# Patient Record
Sex: MALE | Race: BLACK OR AFRICAN AMERICAN | Employment: OTHER | URBAN - METROPOLITAN AREA
[De-identification: names, ages, dates, MRNs, and addresses within clinical notes are randomized per-mention and may not be internally consistent; named-entity substitution may affect disease eponyms.]

---

## 2017-01-04 ENCOUNTER — OFFICE VISIT (OUTPATIENT)
Dept: FAMILY MEDICINE CLINIC | Age: 67
End: 2017-01-04

## 2017-01-04 VITALS
SYSTOLIC BLOOD PRESSURE: 144 MMHG | DIASTOLIC BLOOD PRESSURE: 89 MMHG | HEART RATE: 96 BPM | WEIGHT: 165.4 LBS | BODY MASS INDEX: 24.5 KG/M2 | RESPIRATION RATE: 18 BRPM | HEIGHT: 69 IN | TEMPERATURE: 98.2 F | OXYGEN SATURATION: 77 %

## 2017-01-04 DIAGNOSIS — E05.90 HYPERTHYROIDISM: ICD-10-CM

## 2017-01-04 DIAGNOSIS — Z00.00 ANNUAL PHYSICAL EXAM: ICD-10-CM

## 2017-01-04 DIAGNOSIS — I10 ESSENTIAL HYPERTENSION, MALIGNANT: Primary | ICD-10-CM

## 2017-01-04 DIAGNOSIS — M19.90 ARTHRITIS: ICD-10-CM

## 2017-01-04 DIAGNOSIS — Z23 ENCOUNTER FOR IMMUNIZATION: ICD-10-CM

## 2017-01-04 DIAGNOSIS — R80.9 PROTEINURIA: ICD-10-CM

## 2017-01-04 RX ORDER — PROMETHAZINE HYDROCHLORIDE AND CODEINE PHOSPHATE 6.25; 1 MG/5ML; MG/5ML
5 SOLUTION ORAL
Qty: 120 ML | Refills: 1 | Status: SHIPPED | OUTPATIENT
Start: 2017-01-04 | End: 2017-07-17

## 2017-01-04 RX ORDER — AMLODIPINE BESYLATE 5 MG/1
5 TABLET ORAL DAILY
Qty: 90 TAB | Refills: 4 | Status: SHIPPED | OUTPATIENT
Start: 2017-01-04 | End: 2017-08-15 | Stop reason: SDUPTHER

## 2017-01-04 RX ORDER — LISINOPRIL 20 MG/1
20 TABLET ORAL DAILY
Qty: 90 TAB | Refills: 4 | Status: SHIPPED | OUTPATIENT
Start: 2017-01-04 | End: 2017-08-15 | Stop reason: SDUPTHER

## 2017-01-04 RX ORDER — TRAMADOL HYDROCHLORIDE 50 MG/1
50 TABLET ORAL 2 TIMES DAILY
Qty: 60 TAB | Refills: 5 | Status: SHIPPED | OUTPATIENT
Start: 2017-01-04 | End: 2017-07-17

## 2017-01-04 NOTE — PROGRESS NOTES
Tano Lynn is a 78 y.o.  male and presents with    Chief Complaint   Patient presents with    Hypertension    Arthritis    Thyroid Problem    Proteinuria           Subjective:    Cardiovascular Review:  The patient has hypertension and hyperlipidemia. Diet and Lifestyle: not attempting to follow a low fat, low cholesterol diet, not attempting to follow a low sodium diet  Home BP Monitoring: is not measured at home. Pertinent ROS: taking medications as instructed, no medication side effects noted, no TIA's, no chest pain on exertion, no dyspnea on exertion, no swelling of ankles. Thyroid Review:  Patient is seen for followup of hypothyroidism. Thyroid ROS: denies fatigue, weight changes, heat/cold intolerance, bowel/skin changes or CVS symptoms. Osteoarthritis and Chronic Pain:  Patient has osteoarthritis, primarily affecting the diffuse. Symptoms onset: problem is longstanding. Rheumatological ROS: no current joint or muscle symptoms, essentially pain-free. Response to treatment plan: stable. Proteinuria here for f/u     Additional Concerns:          Patient Active Problem List    Diagnosis Date Noted    Proteinuria 05/30/2014    Hyperthyroidism 05/30/2014    Arthritis 06/06/2011    Essential hypertension, malignant 08/03/2010     Current Outpatient Prescriptions   Medication Sig Dispense Refill    traMADol (ULTRAM) 50 mg tablet Take 1 Tab by mouth two (2) times a day. For arthritic pain 60 Tab 5    lisinopril (PRINIVIL, ZESTRIL) 20 mg tablet Take 1 Tab by mouth daily. 90 Tab 4    amLODIPine (NORVASC) 5 mg tablet Take 1 Tab by mouth daily. 90 Tab 4     No Known Allergies  Past Medical History   Diagnosis Date    Essential hypertension, malignant 8/3/2010    Leg pain 8/3/2010     No past surgical history on file.   Family History   Problem Relation Age of Onset    Heart Disease Mother     Lung Disease Father      Social History   Substance Use Topics    Smoking status: Former Smoker    Smokeless tobacco: Not on file    Alcohol use Yes       ROS       All other systems reviewed and are negative. Objective:  Vitals:    01/04/17 1232   BP: 144/89   Pulse: 96   Resp: 18   Temp: 98.2 °F (36.8 °C)   TempSrc: Oral   SpO2: (!) 77%   Weight: 165 lb 6.4 oz (75 kg)   Height: 5' 9\" (1.753 m)   PainSc:   0 - No pain                 alert, well appearing, and in no distress, oriented to person, place, and time and normal appearing weight  Chest - clear to auscultation, no wheezes, rales or rhonchi, symmetric air entry  Heart - normal rate, regular rhythm, normal S1, S2, no murmurs, rubs, clicks or gallops  Abdomen - soft, nontender, nondistended, no masses or organomegaly        LABS   Component      Latest Ref Rng & Units 12/30/2016 12/30/2016 12/30/2016 6/17/2016           2:00 PM  2:00 PM  2:00 PM  4:50 PM   Sodium      136 - 145 mmol/L   142    Potassium      3.5 - 5.5 mmol/L   4.3    Chloride      100 - 108 mmol/L   106    CO2      21 - 32 mmol/L   26    Anion gap      3.0 - 18 mmol/L   10    Glucose      74 - 99 mg/dL   80    BUN      7.0 - 18 MG/DL   9    Creatinine      0.6 - 1.3 MG/DL   0.78    BUN/Creatinine ratio      12 - 20     12    GFR est AA      >60 ml/min/1.73m2   >60    GFR est non-AA      >60 ml/min/1.73m2   >60    Calcium      8.5 - 10.1 MG/DL   9.3    Bilirubin, total      0.2 - 1.0 MG/DL   0.4    ALT      16 - 61 U/L   20    AST      15 - 37 U/L   14 (L)    Alk.  phosphatase      45 - 117 U/L   47    Protein, total      6.4 - 8.2 g/dL   7.7    Albumin      3.4 - 5.0 g/dL   3.8    Globulin      2.0 - 4.0 g/dL   3.9    A-G Ratio      0.8 - 1.7     1.0    TSH      0.36 - 3.74 uIU/mL  <0.01 (L)  <0.01 (L)   T4, Free      0.7 - 1.5 NG/DL 1.2   1.3     TESTS      Assessment/Plan:    Hypertension - stable  arthitsi stable  Thyroid well controlled  Proteinuria will recheck in 6 mo  Mild cough rx phen cod      Lab review: labs are reviewed, up to date and normal, orders written for new lab studies as appropriate; see orders      I have discussed the diagnosis with the patient and the intended plan as seen in the above orders. The patient has received an after-visit summary and questions were answered concerning future plans. I have discussed medication side effects and warnings with the patient as well. I have reviewed the plan of care with the patient, accepted their input and they are in agreement with the treatment goals. Follow-up Disposition:  Return in about 6 months (around 7/4/2017) for physical, labs prior, EKG next visit.

## 2017-01-04 NOTE — MR AVS SNAPSHOT
Visit Information Date & Time Provider Department Dept. Phone Encounter #  
 1/4/2017 12:30 PM Courtney Feng [phone] [id number] Follow-up Instructions Return in about 6 months (around 7/4/2017) for physical, labs prior, EKG next visit. Upcoming Health Maintenance Date Due DTaP/Tdap/Td series (1 - Tdap) 8/4/2006 INFLUENZA AGE 9 TO ADULT 8/1/2016 Pneumococcal 65+ Low/Medium Risk (2 of 2 - PPSV23) 11/19/2016 MEDICARE YEARLY EXAM 6/22/2017 GLAUCOMA SCREENING Q2Y 7/14/2018 COLONOSCOPY 6/6/2021 Allergies as of 1/4/2017  Review Complete On: 6/21/2016 By: Crystal Mckeon., DO No Known Allergies Current Immunizations  Reviewed on 2/4/2013 Name Date Influenza High Dose Vaccine PF  Incomplete, 11/19/2015 Influenza Vaccine 11/18/2014, 2/4/2013 Influenza Vaccine Split 11/17/2011, 11/22/2010 Pneumococcal Conjugate (PCV-13) 11/19/2015 Pneumococcal Vaccine (Unspecified Type) 11/22/2010 TD Vaccine 8/3/2006 Not reviewed this visit You Were Diagnosed With   
  
 Codes Comments Essential hypertension, malignant    -  Primary ICD-10-CM: I10 
ICD-9-CM: 401.0 Encounter for immunization     ICD-10-CM: A30 ICD-9-CM: V03.89 Arthritis     ICD-10-CM: M19.90 ICD-9-CM: 716.90 Proteinuria     ICD-10-CM: R80.9 ICD-9-CM: 791.0 Hyperthyroidism     ICD-10-CM: E05.90 ICD-9-CM: 242.90 Annual physical exam     ICD-10-CM: Z00.00 ICD-9-CM: V70.0 Vitals BP Pulse Temp Resp Height(growth percentile) Weight(growth percentile) 144/89 (BP 1 Location: Left arm, BP Patient Position: Sitting) 96 98.2 °F (36.8 °C) (Oral) 18 5' 9\" (1.753 m) 165 lb 6.4 oz (75 kg) SpO2 BMI Smoking Status (!) 77% 24.43 kg/m2 Former Smoker Vitals History BMI and BSA Data Body Mass Index Body Surface Area  
 24.43 kg/m 2 1.91 m 2 Preferred Pharmacy Pharmacy Name Phone [facility] [address] [place] [phone] Your Updated Medication List  
  
   
This list is accurate as of: 1/4/17 12:50 PM.  Always use your most recent med list. amLODIPine 5 mg tablet Commonly known as:  Imelda Aurelio Take 1 Tab by mouth daily. lisinopril 20 mg tablet Commonly known as:  Nico Marie Take 1 Tab by mouth daily. promethazine-codeine 6.25-10 mg/5 mL syrup Commonly known as:  PHENERGAN with CODEINE Take 5 mL by mouth four (4) times daily as needed for Cough. Max Daily Amount: 20 mL. traMADol 50 mg tablet Commonly known as:  ULTRAM  
Take 1 Tab by mouth two (2) times a day. For arthritic pain Prescriptions Printed Refills  
 traMADol (ULTRAM) 50 mg tablet 5 Sig: Take 1 Tab by mouth two (2) times a day. For arthritic pain  
 Class: Print Route: Oral  
 promethazine-codeine (PHENERGAN WITH CODEINE) 6.25-10 mg/5 mL syrup 1 Sig: Take 5 mL by mouth four (4) times daily as needed for Cough. Max Daily Amount: 20 mL. Class: Print Route: Oral  
  
Prescriptions Sent to Pharmacy Refills  
 lisinopril (PRINIVIL, ZESTRIL) 20 mg tablet 4 Sig: Take 1 Tab by mouth daily. Class: Normal  
 Pharmacy: [facility] [address] Ph #: [phone] Route: Oral  
 amLODIPine (NORVASC) 5 mg tablet 4 Sig: Take 1 Tab by mouth daily. Class: Normal  
 Pharmacy: [facility] [address] Ph #: [phone] Route: Oral  
  
Follow-up Instructions Return in about 6 months (around 7/4/2017) for physical, labs prior, EKG next visit. To-Do List   
 Around 07/03/2017 Lab:  CBC WITH AUTOMATED DIFF Around 07/03/2017 Lab:  LIPID PANEL Around 07/03/2017 Lab:  METABOLIC PANEL, COMPREHENSIVE Around 07/03/2017 Lab:  PSA DIAGNOSTIC (PROSTATIC SPECIFIC AG) Around 07/03/2017 Lab:  T4, FREE Around 07/03/2017 Lab:  TSH 3RD GENERATION Around 07/03/2017 Lab:  URINALYSIS W/ RFLX MICROSCOPIC Introducing [facility]! Lauren Bailey introduces [employer] patient portal. Now you can access parts of your medical record, email your doctor's office, and request medication refills online. 1. In your internet browser, go to [email or web address] 2. Click on the First Time User? Click Here link in the Sign In box. You will see the New Member Sign Up page. 3. Enter your ECO Access Code exactly as it appears below. You will not need to use this code after youve completed the sign-up process. If you do not sign up before the expiration date, you must request a new code. · [employer] Access Code: [email or web address] Expires: 3/30/2017  1:51 PM 
 
4. Enter the last four digits of your Social Security Number (xxxx) and Date of Birth (mm/dd/yyyy) as indicated and click Submit. You will be taken to the next sign-up page. 5. Create a ECO ID. This will be your E[employer] login ID and cannot be changed, so think of one that is secure and easy to remember. 6. Create a ECO password. You can change your password at any time. 7. Enter your Password Reset Question and Answer. This can be used at a later time if you forget your password. 8. Enter your e-mail address. You will receive e-mail notification when new information is available in [address] 9. Click Sign Up. You can now view and download portions of your medical record. 10. Click the Download Summary menu link to download a portable copy of your medical information. If you have questions, please visit the Frequently Asked Questions section of the ECO website. Remember, [employer] is NOT to be used for urgent needs. For medical emergencies, dial 911. Now available from your iPhone and Android! Please provide this summary of care documentation to your next provider. Your primary care clinician is listed as [id number] [facility]. If you have any questions after today's visit, please call [phone].

## 2017-01-04 NOTE — PROGRESS NOTES
1. Have you been to the ER, urgent care clinic since your last visit? Hospitalized since your last visit? No    2. Have you seen or consulted any other health care providers outside of the [address] [place] [address] since your last visit? Include any pap smears or colon screening.  No

## 2017-07-11 ENCOUNTER — HOSPITAL ENCOUNTER (EMERGENCY)
Age: 67
Discharge: HOME | End: 2017-07-11
Attending: EMERGENCY MEDICINE | Admitting: EMERGENCY MEDICINE
Payer: MEDICARE

## 2017-07-11 ENCOUNTER — APPOINTMENT (OUTPATIENT)
Dept: GENERAL RADIOLOGY | Age: 67
End: 2017-07-11
Attending: EMERGENCY MEDICINE
Payer: MEDICARE

## 2017-07-11 VITALS
HEART RATE: 74 BPM | SYSTOLIC BLOOD PRESSURE: 124 MMHG | TEMPERATURE: 98 F | OXYGEN SATURATION: 100 % | DIASTOLIC BLOOD PRESSURE: 81 MMHG | RESPIRATION RATE: 23 BRPM

## 2017-07-11 DIAGNOSIS — R10.12 ABDOMINAL PAIN, LUQ (LEFT UPPER QUADRANT): Primary | ICD-10-CM

## 2017-07-11 LAB
ALBUMIN SERPL BCP-MCNC: 3.8 G/DL (ref 3.4–5)
ALBUMIN/GLOB SERPL: 0.9 {RATIO} (ref 0.8–1.7)
ALP SERPL-CCNC: 47 U/L (ref 45–117)
ALT SERPL-CCNC: 17 U/L (ref 16–61)
ANION GAP BLD CALC-SCNC: 9 MMOL/L (ref 3–18)
AST SERPL W P-5'-P-CCNC: 16 U/L (ref 15–37)
BASOPHILS # BLD AUTO: 0 K/UL (ref 0–0.06)
BASOPHILS # BLD: 0 % (ref 0–2)
BILIRUB SERPL-MCNC: 0.5 MG/DL (ref 0.2–1)
BUN SERPL-MCNC: 8 MG/DL (ref 7–18)
BUN/CREAT SERPL: 9 (ref 12–20)
CALCIUM SERPL-MCNC: 9.3 MG/DL (ref 8.5–10.1)
CHLORIDE SERPL-SCNC: 105 MMOL/L (ref 100–108)
CK MB CFR SERPL CALC: 1.5 % (ref 0–4)
CK MB SERPL-MCNC: 3.6 NG/ML (ref 5–25)
CK SERPL-CCNC: 236 U/L (ref 39–308)
CO2 SERPL-SCNC: 25 MMOL/L (ref 21–32)
CREAT SERPL-MCNC: 0.89 MG/DL (ref 0.6–1.3)
DIFFERENTIAL METHOD BLD: ABNORMAL
EOSINOPHIL # BLD: 0.2 K/UL (ref 0–0.4)
EOSINOPHIL NFR BLD: 3 % (ref 0–5)
ERYTHROCYTE [DISTWIDTH] IN BLOOD BY AUTOMATED COUNT: 15 % (ref 11.6–14.5)
ETHANOL SERPL-MCNC: <3 MG/DL (ref 0–3)
GLOBULIN SER CALC-MCNC: 4.1 G/DL (ref 2–4)
GLUCOSE SERPL-MCNC: 117 MG/DL (ref 74–99)
HCT VFR BLD AUTO: 41.8 % (ref 36–48)
HGB BLD-MCNC: 14.6 G/DL (ref 13–16)
LIPASE SERPL-CCNC: 95 U/L (ref 73–393)
LYMPHOCYTES # BLD AUTO: 18 % (ref 21–52)
LYMPHOCYTES # BLD: 1.2 K/UL (ref 0.9–3.6)
MCH RBC QN AUTO: 29.6 PG (ref 24–34)
MCHC RBC AUTO-ENTMCNC: 34.9 G/DL (ref 31–37)
MCV RBC AUTO: 84.8 FL (ref 74–97)
MONOCYTES # BLD: 0.4 K/UL (ref 0.05–1.2)
MONOCYTES NFR BLD AUTO: 6 % (ref 3–10)
NEUTS SEG # BLD: 4.9 K/UL (ref 1.8–8)
NEUTS SEG NFR BLD AUTO: 73 % (ref 40–73)
PLATELET # BLD AUTO: 256 K/UL (ref 135–420)
PMV BLD AUTO: 10 FL (ref 9.2–11.8)
POTASSIUM SERPL-SCNC: 3.6 MMOL/L (ref 3.5–5.5)
PROT SERPL-MCNC: 7.9 G/DL (ref 6.4–8.2)
RBC # BLD AUTO: 4.93 M/UL (ref 4.7–5.5)
SODIUM SERPL-SCNC: 139 MMOL/L (ref 136–145)
TROPONIN I SERPL-MCNC: <0.02 NG/ML (ref 0–0.04)
WBC # BLD AUTO: 6.7 K/UL (ref 4.6–13.2)

## 2017-07-11 PROCEDURE — 71020 XR CHEST PA LAT: CPT

## 2017-07-11 PROCEDURE — 74011250637 HC RX REV CODE- 250/637: Performed by: EMERGENCY MEDICINE

## 2017-07-11 PROCEDURE — 80053 COMPREHEN METABOLIC PANEL: CPT | Performed by: EMERGENCY MEDICINE

## 2017-07-11 PROCEDURE — 80307 DRUG TEST PRSMV CHEM ANLYZR: CPT | Performed by: EMERGENCY MEDICINE

## 2017-07-11 PROCEDURE — 82550 ASSAY OF CK (CPK): CPT | Performed by: EMERGENCY MEDICINE

## 2017-07-11 PROCEDURE — 83690 ASSAY OF LIPASE: CPT | Performed by: EMERGENCY MEDICINE

## 2017-07-11 PROCEDURE — 99285 EMERGENCY DEPT VISIT HI MDM: CPT

## 2017-07-11 PROCEDURE — 85025 COMPLETE CBC W/AUTO DIFF WBC: CPT | Performed by: EMERGENCY MEDICINE

## 2017-07-11 PROCEDURE — 93005 ELECTROCARDIOGRAM TRACING: CPT

## 2017-07-11 RX ORDER — FAMOTIDINE 20 MG/1
20 TABLET, FILM COATED ORAL
Status: COMPLETED | OUTPATIENT
Start: 2017-07-11 | End: 2017-07-11

## 2017-07-11 RX ORDER — FAMOTIDINE 20 MG/1
20 TABLET, FILM COATED ORAL 2 TIMES DAILY
Qty: 60 TAB | Refills: 0 | Status: SHIPPED | OUTPATIENT
Start: 2017-07-11 | End: 2017-08-10

## 2017-07-11 RX ADMIN — FAMOTIDINE 20 MG: 20 TABLET ORAL at 08:41

## 2017-07-11 NOTE — ED PROVIDER NOTES
HPI Comments: 8:07 AM Angela Naik is a 78 y.o. male with a history of HTN who presents to ED c/o intermittent, left upper quadrant abdominal pain onset approximately one month ago. Pt does not currently have pain but describes pain as \"burning\". Pt also notes his stomach seems to swell when he has the pain. Pt reports he has been consuming EtOH daily, which exacerbates pain. Pt explains he had an appointment with his PCP yesterday but missed appointment because he was not told of appointment date change. Pt had R arm injury when he was 28 y.o. No other concerns at this time. PCP: Jihan Pierre.DO        Patient is a 78 y.o. male presenting with abdominal pain. Abdominal Pain    Pertinent negatives include no diarrhea, no dysuria, no hematuria, no arthralgias and no myalgias. Past Medical History:   Diagnosis Date    Essential hypertension, malignant 8/3/2010    Leg pain 8/3/2010       History reviewed. No pertinent surgical history. Family History:   Problem Relation Age of Onset    Heart Disease Mother     Lung Disease Father        Social History     Social History    Marital status: SINGLE     Spouse name: N/A    Number of children: N/A    Years of education: N/A     Occupational History    Not on file. Social History Main Topics    Smoking status: Former Smoker    Smokeless tobacco: Never Used    Alcohol use Yes    Drug use: No    Sexual activity: No     Other Topics Concern    Not on file     Social History Narrative         ALLERGIES: Review of patient's allergies indicates no known allergies. Review of Systems   HENT: Negative for congestion and sore throat. Eyes: Negative for pain and itching. Gastrointestinal: Positive for abdominal distention and abdominal pain (LUQ). Negative for diarrhea. Endocrine: Negative for polydipsia and polyuria. Genitourinary: Negative for dysuria and hematuria. Musculoskeletal: Negative for arthralgias and myalgias. Skin: Negative for rash and wound. Neurological: Negative for seizures and syncope. Hematological: Does not bruise/bleed easily. Psychiatric/Behavioral: Negative for agitation and hallucinations. Vitals:    07/11/17 0759   BP: 130/83   Pulse: (!) 113   Resp: 18   Temp: 98 °F (36.7 °C)   SpO2: 99%            Physical Exam   Constitutional: He appears well-developed and well-nourished. HENT:   Head: Normocephalic and atraumatic. Eyes: Conjunctivae are normal. No scleral icterus. Neck: Normal range of motion. Neck supple. No JVD present. Cardiovascular: Normal rate, regular rhythm and normal heart sounds. 4 intact extremity pulses   Pulmonary/Chest: Effort normal. No respiratory distress. He has rales (R lower lobe). Abdominal: Soft. Bowel sounds are normal. He exhibits no distension. There is no tenderness. Musculoskeletal: Normal range of motion. Lymphadenopathy:     He has no cervical adenopathy. Neurological: He is alert. Skin: Skin is warm and dry. Nursing note and vitals reviewed. MDM  Number of Diagnoses or Management Options  Diagnosis management comments: DDX: Gastritis associated with alocohol use. Less like pancreatitis. Minimal suspicion for ACS, CHF, or Pneumonia,       ED Course       Procedures    Vitals:  Patient Vitals for the past 12 hrs:   Temp Pulse Resp BP SpO2   07/11/17 0759 98 °F (36.7 °C) (!) 113 18 130/83 99 %     99% on RA, indicating adequate oxygenation.      Medications ordered:   Medications   famotidine (PEPCID) tablet 20 mg (20 mg Oral Given 7/11/17 0841)         Lab findings:  Recent Results (from the past 12 hour(s))   EKG, 12 LEAD, INITIAL    Collection Time: 07/11/17  8:01 AM   Result Value Ref Range    Ventricular Rate 110 BPM    Atrial Rate 110 BPM    P-R Interval 116 ms    QRS Duration 70 ms    Q-T Interval 348 ms    QTC Calculation (Bezet) 470 ms    Calculated R Axis 35 degrees    Calculated T Axis 100 degrees    Diagnosis       Sinus tachycardia  Nonspecific T wave abnormality  Abnormal ECG  No previous ECGs available     CBC WITH AUTOMATED DIFF    Collection Time: 07/11/17  8:10 AM   Result Value Ref Range    WBC 6.7 4.6 - 13.2 K/uL    RBC 4.93 4.70 - 5.50 M/uL    HGB 14.6 13.0 - 16.0 g/dL    HCT 41.8 36.0 - 48.0 %    MCV 84.8 74.0 - 97.0 FL    MCH 29.6 24.0 - 34.0 PG    MCHC 34.9 31.0 - 37.0 g/dL    RDW 15.0 (H) 11.6 - 14.5 %    PLATELET 937 340 - 500 K/uL    MPV 10.0 9.2 - 11.8 FL    NEUTROPHILS 73 40 - 73 %    LYMPHOCYTES 18 (L) 21 - 52 %    MONOCYTES 6 3 - 10 %    EOSINOPHILS 3 0 - 5 %    BASOPHILS 0 0 - 2 %    ABS. NEUTROPHILS 4.9 1.8 - 8.0 K/UL    ABS. LYMPHOCYTES 1.2 0.9 - 3.6 K/UL    ABS. MONOCYTES 0.4 0.05 - 1.2 K/UL    ABS. EOSINOPHILS 0.2 0.0 - 0.4 K/UL    ABS. BASOPHILS 0.0 0.0 - 0.06 K/UL    DF AUTOMATED     METABOLIC PANEL, COMPREHENSIVE    Collection Time: 07/11/17  8:10 AM   Result Value Ref Range    Sodium 139 136 - 145 mmol/L    Potassium 3.6 3.5 - 5.5 mmol/L    Chloride 105 100 - 108 mmol/L    CO2 25 21 - 32 mmol/L    Anion gap 9 3.0 - 18 mmol/L    Glucose 117 (H) 74 - 99 mg/dL    BUN 8 7.0 - 18 MG/DL    Creatinine 0.89 0.6 - 1.3 MG/DL    BUN/Creatinine ratio 9 (L) 12 - 20      GFR est AA >60 >60 ml/min/1.73m2    GFR est non-AA >60 >60 ml/min/1.73m2    Calcium 9.3 8.5 - 10.1 MG/DL    Bilirubin, total 0.5 0.2 - 1.0 MG/DL    ALT (SGPT) 17 16 - 61 U/L    AST (SGOT) 16 15 - 37 U/L    Alk.  phosphatase 47 45 - 117 U/L    Protein, total 7.9 6.4 - 8.2 g/dL    Albumin 3.8 3.4 - 5.0 g/dL    Globulin 4.1 (H) 2.0 - 4.0 g/dL    A-G Ratio 0.9 0.8 - 1.7     LIPASE    Collection Time: 07/11/17  8:10 AM   Result Value Ref Range    Lipase 95 73 - 393 U/L   CARDIAC PANEL,(CK, CKMB & TROPONIN)    Collection Time: 07/11/17  8:10 AM   Result Value Ref Range     39 - 308 U/L    CK - MB 3.6 (H) <3.6 ng/ml    CK-MB Index 1.5 0.0 - 4.0 %    Troponin-I, Qt. <0.02 0.0 - 0.045 NG/ML   ETHYL ALCOHOL    Collection Time: 07/11/17  8:10 AM   Result Value Ref Range    ALCOHOL(ETHYL),SERUM <3 0 - 3 MG/DL       EKG interpretation by ED Physician:  nsr @110, nonspec T wave abnormality. No ischemia    X-Ray, CT or other radiology findings or impressions:  XR CHEST PA LAT   Final Result   I  IMPRESSION: No acute radiographic cardiopulmonary abnormality. Progress notes, Consult notes or additional Procedure notes:   10:03 AM  Still pain free,  No acute events during his stay in ED. 10:04 AM Pt reevaluated at this time and is resting comfortably in NAD. Discussed results and findings, as well as, diagnosis and plan for discharge. Pt verbalizes understanding and agreement with plan. All questions addressed at this time. Really he has abd pain. Not c/w pe  HEART score 2 (ekg, age)    Disposition:  Diagnosis:   1. Abdominal pain, LUQ (left upper quadrant)        Disposition: home    Follow-up Information     None            Patient's Medications   Start Taking    FAMOTIDINE (PEPCID) 20 MG TABLET    Take 1 Tab by mouth two (2) times a day for 30 days. Continue Taking    AMLODIPINE (NORVASC) 5 MG TABLET    Take 1 Tab by mouth daily. LISINOPRIL (PRINIVIL, ZESTRIL) 20 MG TABLET    Take 1 Tab by mouth daily. PROMETHAZINE-CODEINE (PHENERGAN WITH CODEINE) 6.25-10 MG/5 ML SYRUP    Take 5 mL by mouth four (4) times daily as needed for Cough. Max Daily Amount: 20 mL. TRAMADOL (ULTRAM) 50 MG TABLET    Take 1 Tab by mouth two (2) times a day. For arthritic pain   These Medications have changed    No medications on file   Stop Taking    No medications on file     Scribe Attestation:     I, Taqueria De Los Santos, scribing for and in the presence of  Shelbi Puentes MD July 11, 2017 at 10:04 AM     Physician Attestation:   I personally performed the services described in this documentation, reviewed and edited the documentation which was dictated to the scribe in my presence, and it accurately records my words and actions.  Allegra Frazier MD  July 11, 2017     Signed by: Toni Goetz, 07/11/17, 8:07 AM

## 2017-07-11 NOTE — ED NOTES
Bedside and Verbal shift change report given to Robbie Burnett RN (oncoming nurse) by Amanda Evans RN (offgoing nurse). Report included the following information SBAR and ED Summary.

## 2017-07-11 NOTE — ED TRIAGE NOTES
\"I've been having pain in my stomach and I feel like it's swollen and sometimes my chest hurt. It's all been going on for about a month. \"

## 2017-07-12 LAB
ATRIAL RATE: 110 BPM
CALCULATED R AXIS, ECG10: 35 DEGREES
CALCULATED T AXIS, ECG11: 100 DEGREES
DIAGNOSIS, 93000: NORMAL
P-R INTERVAL, ECG05: 116 MS
Q-T INTERVAL, ECG07: 348 MS
QRS DURATION, ECG06: 70 MS
QTC CALCULATION (BEZET), ECG08: 470 MS
VENTRICULAR RATE, ECG03: 110 BPM

## 2017-07-13 ENCOUNTER — PATIENT OUTREACH (OUTPATIENT)
Dept: FAMILY MEDICINE CLINIC | Age: 67
End: 2017-07-13

## 2017-07-13 NOTE — PROGRESS NOTES
Nurse Navigator  POST ED NOTE  Patient discharged on 17 from [facility] ED : abd pain  NN contact on 17      START: 1637   END: 1647      Reached patient on phone and verified identity using name and . Introduced self/role and purpose of call. Pt stated: \"I'm alright. \" Pt denied any NVD, fever/chills, HA/dizziness, numbness/tingling, CP, bleeding, numbness/tingling. Pt c/o: Rib pain and SOB with ambulation      Med Reconciliation: Reviewed home medication and patient verbalized understanding self management of medications. Newly prescribed meds: pepcid  Discontinued meds: n/a  Medication dosage changes: n/a    Pt reported he has picked up Pepcid and taking it as prescribed      Reviewed red flags for: increased in pain, bleeding, NVD, fall, LOC, CP, increased SOB, fever/chills and patient understands when to seek medical attention from PCP/ED. Also discussed with patient to avoid consuming alcohol. Pt verbalized understanding    Patients next follow up visit: scheduled for 17 at 9:30am with PCP Dr. Steph Quinonez. Pt reported he will be taking the bus. Patient verbalized understanding of discharge plan and special follow up with PCP. Reviewed plan of care. Patient has provided input to plan and agrees with goals. Answered any questions patient had. Provided contact info for any additional questions. This note will not be viewable in [address]

## 2017-07-17 ENCOUNTER — HOSPITAL ENCOUNTER (OUTPATIENT)
Dept: LAB | Age: 67
Discharge: HOME | End: 2017-07-17
Payer: MEDICARE

## 2017-07-17 ENCOUNTER — OFFICE VISIT (OUTPATIENT)
Dept: FAMILY MEDICINE CLINIC | Age: 67
End: 2017-07-17

## 2017-07-17 ENCOUNTER — TELEPHONE (OUTPATIENT)
Dept: FAMILY MEDICINE CLINIC | Age: 67
End: 2017-07-17

## 2017-07-17 VITALS
HEART RATE: 90 BPM | DIASTOLIC BLOOD PRESSURE: 83 MMHG | BODY MASS INDEX: 24.65 KG/M2 | WEIGHT: 166.4 LBS | SYSTOLIC BLOOD PRESSURE: 124 MMHG | RESPIRATION RATE: 16 BRPM | HEIGHT: 69 IN | TEMPERATURE: 97.8 F | OXYGEN SATURATION: 97 %

## 2017-07-17 DIAGNOSIS — Z23 ENCOUNTER FOR IMMUNIZATION: ICD-10-CM

## 2017-07-17 DIAGNOSIS — R80.9 PROTEINURIA: ICD-10-CM

## 2017-07-17 DIAGNOSIS — R80.9 PROTEINURIA, UNSPECIFIED TYPE: Primary | ICD-10-CM

## 2017-07-17 DIAGNOSIS — M19.90 ARTHRITIS: ICD-10-CM

## 2017-07-17 DIAGNOSIS — E05.90 HYPERTHYROIDISM: ICD-10-CM

## 2017-07-17 DIAGNOSIS — I10 ESSENTIAL HYPERTENSION, MALIGNANT: ICD-10-CM

## 2017-07-17 DIAGNOSIS — N42.9 PROSTATE DISEASE: ICD-10-CM

## 2017-07-17 LAB
ALBUMIN SERPL BCP-MCNC: 3.9 G/DL (ref 3.4–5)
ALBUMIN/GLOB SERPL: 1 {RATIO} (ref 0.8–1.7)
ALP SERPL-CCNC: 45 U/L (ref 45–117)
ALT SERPL-CCNC: 20 U/L (ref 16–61)
ANION GAP BLD CALC-SCNC: 10 MMOL/L (ref 3–18)
APPEARANCE UR: CLEAR
AST SERPL W P-5'-P-CCNC: 15 U/L (ref 15–37)
BACTERIA URNS QL MICRO: NEGATIVE /HPF
BASOPHILS # BLD AUTO: 0 K/UL (ref 0–0.06)
BASOPHILS # BLD: 1 % (ref 0–2)
BILIRUB SERPL-MCNC: 0.4 MG/DL (ref 0.2–1)
BILIRUB UR QL: NEGATIVE
BUN SERPL-MCNC: 8 MG/DL (ref 7–18)
BUN/CREAT SERPL: 9 (ref 12–20)
CALCIUM SERPL-MCNC: 8.6 MG/DL (ref 8.5–10.1)
CHLORIDE SERPL-SCNC: 103 MMOL/L (ref 100–108)
CHOLEST SERPL-MCNC: 184 MG/DL
CO2 SERPL-SCNC: 25 MMOL/L (ref 21–32)
COLOR UR: YELLOW
CREAT SERPL-MCNC: 0.91 MG/DL (ref 0.6–1.3)
DIFFERENTIAL METHOD BLD: ABNORMAL
EOSINOPHIL # BLD: 0.2 K/UL (ref 0–0.4)
EOSINOPHIL NFR BLD: 3 % (ref 0–5)
EPITH CASTS URNS QL MICRO: NEGATIVE /LPF (ref 0–5)
ERYTHROCYTE [DISTWIDTH] IN BLOOD BY AUTOMATED COUNT: 15.1 % (ref 11.6–14.5)
GLOBULIN SER CALC-MCNC: 4.1 G/DL (ref 2–4)
GLUCOSE SERPL-MCNC: 75 MG/DL (ref 74–99)
GLUCOSE UR STRIP.AUTO-MCNC: NEGATIVE MG/DL
HCT VFR BLD AUTO: 42 % (ref 36–48)
HDLC SERPL-MCNC: 47 MG/DL (ref 40–60)
HDLC SERPL: 3.9 {RATIO} (ref 0–5)
HGB BLD-MCNC: 14.5 G/DL (ref 13–16)
HGB UR QL STRIP: NEGATIVE
KETONES UR QL STRIP.AUTO: NEGATIVE MG/DL
LDLC SERPL CALC-MCNC: 118.2 MG/DL (ref 0–100)
LEUKOCYTE ESTERASE UR QL STRIP.AUTO: NEGATIVE
LIPID PROFILE,FLP: ABNORMAL
LYMPHOCYTES # BLD AUTO: 22 % (ref 21–52)
LYMPHOCYTES # BLD: 1.3 K/UL (ref 0.9–3.6)
MCH RBC QN AUTO: 29.8 PG (ref 24–34)
MCHC RBC AUTO-ENTMCNC: 34.5 G/DL (ref 31–37)
MCV RBC AUTO: 86.2 FL (ref 74–97)
MONOCYTES # BLD: 0.5 K/UL (ref 0.05–1.2)
MONOCYTES NFR BLD AUTO: 8 % (ref 3–10)
NEUTS SEG # BLD: 4 K/UL (ref 1.8–8)
NEUTS SEG NFR BLD AUTO: 66 % (ref 40–73)
NITRITE UR QL STRIP.AUTO: NEGATIVE
PH UR STRIP: 5.5 [PH] (ref 5–8)
PLATELET # BLD AUTO: 278 K/UL (ref 135–420)
PMV BLD AUTO: 9.9 FL (ref 9.2–11.8)
POTASSIUM SERPL-SCNC: 4.2 MMOL/L (ref 3.5–5.5)
PROT SERPL-MCNC: 8 G/DL (ref 6.4–8.2)
PROT UR STRIP-MCNC: 30 MG/DL
PSA SERPL-MCNC: 2.3 NG/ML (ref 0–4)
RBC # BLD AUTO: 4.87 M/UL (ref 4.7–5.5)
RBC #/AREA URNS HPF: 0 /HPF (ref 0–5)
SODIUM SERPL-SCNC: 138 MMOL/L (ref 136–145)
SP GR UR REFRACTOMETRY: 1.02 (ref 1–1.03)
T4 FREE SERPL-MCNC: 1 NG/DL (ref 0.7–1.5)
TRIGL SERPL-MCNC: 94 MG/DL (ref ?–150)
TSH SERPL DL<=0.05 MIU/L-ACNC: 0.47 UIU/ML (ref 0.36–3.74)
UROBILINOGEN UR QL STRIP.AUTO: 1 EU/DL (ref 0.2–1)
VLDLC SERPL CALC-MCNC: 18.8 MG/DL
WBC # BLD AUTO: 5.9 K/UL (ref 4.6–13.2)
WBC URNS QL MICRO: NORMAL /HPF (ref 0–4)

## 2017-07-17 PROCEDURE — 36415 COLL VENOUS BLD VENIPUNCTURE: CPT | Performed by: FAMILY MEDICINE

## 2017-07-17 PROCEDURE — 84153 ASSAY OF PSA TOTAL: CPT | Performed by: FAMILY MEDICINE

## 2017-07-17 PROCEDURE — 81001 URINALYSIS AUTO W/SCOPE: CPT | Performed by: FAMILY MEDICINE

## 2017-07-17 PROCEDURE — 84443 ASSAY THYROID STIM HORMONE: CPT | Performed by: FAMILY MEDICINE

## 2017-07-17 PROCEDURE — 80053 COMPREHEN METABOLIC PANEL: CPT | Performed by: FAMILY MEDICINE

## 2017-07-17 PROCEDURE — 84439 ASSAY OF FREE THYROXINE: CPT | Performed by: FAMILY MEDICINE

## 2017-07-17 PROCEDURE — 80061 LIPID PANEL: CPT | Performed by: FAMILY MEDICINE

## 2017-07-17 PROCEDURE — 85025 COMPLETE CBC W/AUTO DIFF WBC: CPT | Performed by: FAMILY MEDICINE

## 2017-07-17 NOTE — MR AVS SNAPSHOT
Visit Information Date & Time Provider Department Dept. Phone Encounter #  
 7/17/2017  9:30 AM Courtney Feng [phone] [id number] Follow-up Instructions Return in about 4 weeks (around 8/14/2017) for physical, labs today, EKG next visit, Bring in Meds. Follow-up and Disposition History Your Appointments 7/17/2017  9:30 AM  
HOSPITAL FOLLOW-UP with Yoselin Hartley.,  [address]) Appt Note: ED f/u for abd pain; Confirmed 7/14/2017 ce; Pt due for  [facility] Appointment [address] [place] [address]  
  
   
 [address] 8/15/2017 12:30 PM  
Office Visit [facility] Yoselin Hartley.,  [address] [address]) Appt Note: Return in about 6 months (around 7/4/2017) for physical, labs prior, EKG next visit.; formerly [facility] 7/7/2017 ce; RESCHEDULED FROM 07/10/2017  
 [address] [place] [address] [facility] [address]  
  
   
 [address] Upcoming Health Maintenance Date Due DTaP/Tdap/Td series (1 - Tdap) 8/4/2006 Pneumococcal 65+ Low/Medium Risk (2 of 2 - PPSV23) 11/19/2016 MEDICARE YEARLY EXAM 6/22/2017 INFLUENZA AGE 9 TO ADULT 8/1/2017 GLAUCOMA SCREENING Q2Y 7/14/2018 COLONOSCOPY 6/6/2021 Allergies as of 7/17/2017  Review Complete On: 7/11/2017 By: Demetria Erwin RN No Known Allergies Current Immunizations  Reviewed on 1/4/2017 Name Date Influenza High Dose Vaccine PF 1/4/2017, 11/19/2015 Influenza Vaccine 11/18/2014, 2/4/2013 Influenza Vaccine Split 11/17/2011, 11/22/2010 Pneumococcal Conjugate (PCV-13) 11/19/2015 Pneumococcal Vaccine (Unspecified Type) 11/22/2010 TD Vaccine 8/3/2006 Not reviewed this visit You Were Diagnosed With   
  
 Codes Comments Proteinuria, unspecified type    -  Primary ICD-10-CM: R80.9 ICD-9-CM: 791.0 Hyperthyroidism     ICD-10-CM: E05.90 ICD-9-CM: 242.90 Arthritis     ICD-10-CM: M19.90 ICD-9-CM: 716.90 Essential hypertension, malignant     ICD-10-CM: I10 
ICD-9-CM: 401.0 Vitals BP Pulse Temp Resp Height(growth percentile) Weight(growth percentile) 124/83 (BP 1 Location: Left arm, BP Patient Position: Sitting) 90 97.8 °F (36.6 °C) (Oral) 16 5' 9\" (1.753 m) 166 lb 6.4 oz (75.5 kg) SpO2 BMI Smoking Status 97% 24.57 kg/m2 Current Every Day Smoker BMI and BSA Data Body Mass Index Body Surface Area 24.57 kg/m 2 1.92 m 2 Preferred Pharmacy Pharmacy Name Phone [facility] [address] [place] [address] [phone] Your Updated Medication List  
  
   
This list is accurate as of: 7/17/17  9:28 AM.  Always use your most recent med list. amLODIPine 5 mg tablet Commonly known as:  Florencia Decker Take 1 Tab by mouth daily. famotidine 20 mg tablet Commonly known as:  PEPCID Take 1 Tab by mouth two (2) times a day for 30 days. lisinopril 20 mg tablet Commonly known as:  Ramon Gage Take 1 Tab by mouth daily. Follow-up Instructions Return in about 4 weeks (around 8/14/2017) for physical, labs today, EKG next visit, Bring in Meds. Introducing [facility] & HEALTH SERVICES! [employer] introduces [employer] patient portal. Now you can access parts of your medical record, email your doctor's office, and request medication refills online. 1. In your internet browser, go to [email or web address] 2. Click on the First Time User? Click Here link in the Sign In box. You will see the New Member Sign Up page. 3. Enter your [employer] Access Code exactly as it appears below. You will not need to use this code after youve completed the sign-up process.  If you do not sign up before the expiration date, you must request a new code. · "[employer] Access Code: [email or web address] Expires: 10/9/2017 10:03 AM 
 
4. Enter the last four digits of your Social Security Number (xxxx) and Date of Birth (mm/dd/yyyy) as indicated and click Submit. You will be taken to the next sign-up page. 5. Create a "[employer] ID. This will be your "[employer] login ID and cannot be changed, so think of one that is secure and easy to remember. 6. Create a "[employer] password. You can change your password at any time. 7. Enter your Password Reset Question and Answer. This can be used at a later time if you forget your password. 8. Enter your e-mail address. You will receive e-mail notification when new information is available in [address] 9. Click Sign Up. You can now view and download portions of your medical record. 10. Click the Download Summary menu link to download a portable copy of your medical information. If you have questions, please visit the Frequently Asked Questions section of the "[employer] website. Remember, "[employer] is NOT to be used for urgent needs. For medical emergencies, dial 911. Now available from your iPhone and Android! Please provide this summary of care documentation to your next provider. Your primary care clinician is listed as [address] [facility]. If you have any questions after today's visit, please call [phone].

## 2017-07-17 NOTE — TELEPHONE ENCOUNTER
Patient called checking on the request for refills while in the office today. He forgot to mention this during his appointment but remembered at check out. Patient was told when provider comes out of room this would be taken care of. He is requesting a call letting him know done.

## 2017-07-17 NOTE — TELEPHONE ENCOUNTER
Patient was called earlier and was advised per Dr. Erin Ba that he was given a year worth of prescriptions on his 1/2017 office visit. Left patient phone number to call if he had any questions or concerns. No further action required.

## 2017-07-17 NOTE — PROGRESS NOTES
Dre Morocho is a 78 y.o. male presented to clinic for an hospital f/u. Patient was an inpatient at [facility] x 6 days ago. Pt states that he has started back smoking 1.5 pack per day since January 2017 and drinks daily. 1. Have you been to the ER, urgent care clinic since your last visit? Hospitalized since your last visit? Yes Reason for visit: Abdominal pain    2. Have you seen or consulted any other health care providers outside of the [address] [place] [address] since your last visit? Include any pap smears or colon screening. No     Learning Assessment 8/4/2014   PRIMARY LEARNER Patient   HIGHEST LEVEL OF EDUCATION - PRIMARY LEARNER  4 YEARS OF COLLEGE   BARRIERS PRIMARY LEARNER NONE   CO-LEARNER CAREGIVER No   PRIMARY LANGUAGE ENGLISH   LEARNER PREFERENCE PRIMARY READING     -   ANSWERED BY self   RELATIONSHIP SELF     Hm reviewed.

## 2017-07-17 NOTE — PROGRESS NOTES
Esther Bradley is a 78 y.o.  male and presents with    Chief Complaint   Patient presents with    Abdominal Pain    Hypertension    Arthritis    Proteinuria    Thyroid Problem     Here for transi of care  In [facility] 7/11  NN 7/13  hosp records, meds tests reviewed with pt today          Subjective:    Cardiovascular Review:  The patient has hypertension. Diet and Lifestyle: not attempting to follow a low fat, low cholesterol diet, not attempting to follow a low sodium diet  Home BP Monitoring: is not measured at home. Pertinent ROS: taking medications as instructed, no medication side effects noted, no TIA's, no chest pain on exertion, no dyspnea on exertion, no swelling of ankles. Thyroid Review:  Patient is seen for followup of hyperthyroidism. Thyroid ROS: denies fatigue, weight changes, heat/cold intolerance, bowel/skin changes or CVS symptoms. Osteoarthritis and Chronic Pain:  Patient has osteoarthritis, primarily affecting the diffuse. Symptoms onset: problem is longstanding. Rheumatological ROS: no current joint or muscle symptoms, essentially pain-free. Response to treatment plan: stable. 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 60 Tab 0    lisinopril (PRINIVIL, ZESTRIL) 20 mg tablet Take 1 Tab by mouth daily. 90 Tab 4    amLODIPine (NORVASC) 5 mg tablet Take 1 Tab by mouth daily. 90 Tab 4     No Known Allergies  Past Medical History:   Diagnosis Date    Essential hypertension, malignant 8/3/2010    Leg pain 8/3/2010     No past surgical history on file.   Family History   Problem Relation Age of Onset    Heart Disease Mother     Lung Disease Father      Social History   Substance Use Topics    Smoking status: Current Every Day Smoker     Packs/day: 1.50     Types: Cigarettes     Start date: 1/17/2017    Smokeless tobacco: Never Used      Comment: Restarted in Jan 2017    Alcohol use 6.6 oz/week     3 Cans of beer, 8 Standard drinks or equivalent per week      Comment: Drinks daily       ROS       All other systems reviewed and are negative. Objective:  Vitals:    07/17/17 0845   BP: 124/83   Pulse: 90   Resp: 16   Temp: 97.8 °F (36.6 °C)   TempSrc: Oral   SpO2: 97%   Weight: 166 lb 6.4 oz (75.5 kg)   Height: 5' 9\" (1.753 m)   PainSc:   3   PainLoc: Abdomen                 alert, well appearing, and in no distress, oriented to person, place, and time and normal appearing weight  Chest - clear to auscultation, no wheezes, rales or rhonchi, symmetric air entry  Heart - normal rate, regular rhythm, normal S1, S2, no murmurs, rubs, clicks or gallops  Abdomen - soft, nontender, nondistended, no masses or organomegaly        LABS   Component      Latest Ref Rng & Units 7/11/2017 7/11/2017 7/11/2017 7/11/2017           8:10 AM  8:10 AM  8:10 AM  8:10 AM   WBC      4.6 - 13.2 K/uL       RBC      4.70 - 5.50 M/uL       HGB      13.0 - 16.0 g/dL       HCT      36.0 - 48.0 %       MCV      74.0 - 97.0 FL       MCH      24.0 - 34.0 PG       MCHC      31.0 - 37.0 g/dL       RDW      11.6 - 14.5 %       PLATELET      257 - 091 K/uL       MPV      9.2 - 11.8 FL       NEUTROPHILS      40 - 73 %       LYMPHOCYTES      21 - 52 %       MONOCYTES      3 - 10 %       EOSINOPHILS      0 - 5 %       BASOPHILS      0 - 2 %       ABS. NEUTROPHILS      1.8 - 8.0 K/UL       ABS. LYMPHOCYTES      0.9 - 3.6 K/UL       ABS. MONOCYTES      0.05 - 1.2 K/UL       ABS. EOSINOPHILS      0.0 - 0.4 K/UL       ABS.  BASOPHILS      0.0 - 0.06 K/UL       DF             Sodium      136 - 145 mmol/L  139 Potassium      3.5 - 5.5 mmol/L  3.6     Chloride      100 - 108 mmol/L  105     CO2      21 - 32 mmol/L  25     Anion gap      3.0 - 18 mmol/L  9     Glucose      74 - 99 mg/dL  117 (H)     BUN      7.0 - 18 MG/DL  8     Creatinine      0.6 - 1.3 MG/DL  0.89     BUN/Creatinine ratio      12 - 20    9 (L)     GFR est AA      >60 ml/min/1.73m2  >60     GFR est non-AA      >60 ml/min/1.73m2  >60     Calcium      8.5 - 10.1 MG/DL  9.3     Bilirubin, total      0.2 - 1.0 MG/DL  0.5     ALT (SGPT)      16 - 61 U/L  17     AST      15 - 37 U/L  16     Alk. phosphatase      45 - 117 U/L  47     Protein, total      6.4 - 8.2 g/dL  7.9     Albumin      3.4 - 5.0 g/dL  3.8     Globulin      2.0 - 4.0 g/dL  4.1 (H)     A-G Ratio      0.8 - 1.7    0.9     CK      39 - 308 U/L 236      CK - MB      <3.6 ng/ml 3.6 (H)      CK-MB Index      0.0 - 4.0 % 1.5      Troponin-I, Qt.      0.0 - 0.045 NG/ML <0.02      Lipase      73 - 393 U/L    95   ALCOHOL(ETHYL),SERUM      0 - 3 MG/DL   <3      Component      Latest Ref Rng & Units 7/11/2017           8:10 AM   WBC      4.6 - 13.2 K/uL 6.7   RBC      4.70 - 5.50 M/uL 4.93   HGB      13.0 - 16.0 g/dL 14.6   HCT      36.0 - 48.0 % 41.8   MCV      74.0 - 97.0 FL 84.8   MCH      24.0 - 34.0 PG 29.6   MCHC      31.0 - 37.0 g/dL 34.9   RDW      11.6 - 14.5 % 15.0 (H)   PLATELET      898 - 185 K/uL 256   MPV      9.2 - 11.8 FL 10.0   NEUTROPHILS      40 - 73 % 73   LYMPHOCYTES      21 - 52 % 18 (L)   MONOCYTES      3 - 10 % 6   EOSINOPHILS      0 - 5 % 3   BASOPHILS      0 - 2 % 0   ABS. NEUTROPHILS      1.8 - 8.0 K/UL 4.9   ABS. LYMPHOCYTES      0.9 - 3.6 K/UL 1.2   ABS. MONOCYTES      0.05 - 1.2 K/UL 0.4   ABS. EOSINOPHILS      0.0 - 0.4 K/UL 0.2   ABS.  BASOPHILS      0.0 - 0.06 K/UL 0.0   DF       AUTOMATED   Sodium      136 - 145 mmol/L    Potassium      3.5 - 5.5 mmol/L    Chloride      100 - 108 mmol/L    CO2      21 - 32 mmol/L    Anion gap      3.0 - 18 mmol/L    Glucose      74 - 99 mg/dL    BUN      7.0 - 18 MG/DL    Creatinine      0.6 - 1.3 MG/DL    BUN/Creatinine ratio      12 - 20      GFR est AA      >60 ml/min/1.73m2    GFR est non-AA      >60 ml/min/1.73m2    Calcium      8.5 - 10.1 MG/DL    Bilirubin, total      0.2 - 1.0 MG/DL    ALT (SGPT)      16 - 61 U/L    AST      15 - 37 U/L    Alk. phosphatase      45 - 117 U/L    Protein, total      6.4 - 8.2 g/dL    Albumin      3.4 - 5.0 g/dL    Globulin      2.0 - 4.0 g/dL    A-G Ratio      0.8 - 1.7      CK      39 - 308 U/L    CK - MB      <3.6 ng/ml    CK-MB Index      0.0 - 4.0 %    Troponin-I, Qt.      0.0 - 0.045 NG/ML    Lipase      73 - 393 U/L    ALCOHOL(ETHYL),SERUM      0 - 3 MG/DL      TESTS      Assessment/Plan:    Hypertension - stable  arthritsi nasir  Not taking pa8in meds at this time  -- some confusion about what pain meds he is taking -- states it isn't tramadol --  just shows tramadol in last year  Protein uria needs f/u  Thyroid needs f/u  [id number] take pepcid for now f/u 1 mo  Overdue for medicare wellness will get labs today and f/u 1 ;mo      Lab review: orders written for new lab studies as appropriate; see orders      I have discussed the diagnosis with the patient and the intended plan as seen in the above orders. The patient has received an after-visit summary and questions were answered concerning future plans. I have discussed medication side effects and warnings with the patient as well. I have reviewed the plan of care with the patient, accepted their input and they are in agreement with the treatment goals. Follow-up Disposition:  Return in about 4 weeks (around 8/14/2017) for physical, labs today, EKG next visit, Bring in Meds.

## 2017-07-18 DIAGNOSIS — I10 ESSENTIAL HYPERTENSION, MALIGNANT: ICD-10-CM

## 2017-07-18 DIAGNOSIS — M19.90 ARTHRITIS: ICD-10-CM

## 2017-07-18 DIAGNOSIS — Z00.00 ANNUAL PHYSICAL EXAM: ICD-10-CM

## 2017-07-18 RX ORDER — LISINOPRIL 20 MG/1
20 TABLET ORAL DAILY
Qty: 90 TAB | Refills: 4 | Status: CANCELLED | OUTPATIENT
Start: 2017-07-18

## 2017-07-18 NOTE — TELEPHONE ENCOUNTER
2 patient identifiers verified. Spoke with Romi Alcala Person. Patient made aware that medication refill for his lisinopril and amlodipine, is ready for  at the [address] on [facility]  Patient was also provided education that he has enough medication refills on both meds for a year.   Patient acknowledges understanding and voices no concerns at this time

## 2017-08-15 ENCOUNTER — OFFICE VISIT (OUTPATIENT)
Dept: FAMILY MEDICINE CLINIC | Age: 67
End: 2017-08-15

## 2017-08-15 VITALS
HEART RATE: 107 BPM | OXYGEN SATURATION: 99 % | RESPIRATION RATE: 18 BRPM | HEIGHT: 69 IN | WEIGHT: 163.8 LBS | TEMPERATURE: 98.1 F | SYSTOLIC BLOOD PRESSURE: 126 MMHG | BODY MASS INDEX: 24.26 KG/M2 | DIASTOLIC BLOOD PRESSURE: 71 MMHG

## 2017-08-15 DIAGNOSIS — R80.9 PROTEINURIA, UNSPECIFIED TYPE: ICD-10-CM

## 2017-08-15 DIAGNOSIS — I10 ESSENTIAL HYPERTENSION, MALIGNANT: ICD-10-CM

## 2017-08-15 DIAGNOSIS — I25.9 CHEST PAIN DUE TO MYOCARDIAL ISCHEMIA, UNSPECIFIED ISCHEMIC CHEST PAIN TYPE: ICD-10-CM

## 2017-08-15 DIAGNOSIS — Z00.00 ROUTINE GENERAL MEDICAL EXAMINATION AT A HEALTH CARE FACILITY: Primary | ICD-10-CM

## 2017-08-15 DIAGNOSIS — Z00.00 ANNUAL PHYSICAL EXAM: ICD-10-CM

## 2017-08-15 DIAGNOSIS — M19.90 ARTHRITIS: ICD-10-CM

## 2017-08-15 DIAGNOSIS — Z23 ENCOUNTER FOR IMMUNIZATION: ICD-10-CM

## 2017-08-15 RX ORDER — AMLODIPINE BESYLATE 5 MG/1
5 TABLET ORAL DAILY
Qty: 90 TAB | Refills: 4 | Status: SHIPPED | OUTPATIENT
Start: 2017-08-15 | End: 2018-01-15

## 2017-08-15 RX ORDER — LISINOPRIL 20 MG/1
20 TABLET ORAL DAILY
Qty: 90 TAB | Refills: 4 | Status: SHIPPED | OUTPATIENT
Start: 2017-08-15 | End: 2018-01-15

## 2017-08-15 NOTE — PROGRESS NOTES
Dre Morocho is a 78 y.o. male presents today for his annual physical exam.  Patient reports having upper abdominal pain. Patient reports pain is present when he eats. Pt is in Room # 4        Learning Assessment (baseline): Completed  Depression Screening: Completed  Fall Risk Screening: Completed  Abuse screening: Completed  ADL Assessment: Completed    1. Have you been to the ER, urgent care clinic since your last visit? Hospitalized since your last visit? No    2. Have you seen or consulted any other health care providers outside of the [address] [place] [address] since your last visit? Include any pap smears or colon screening.  No

## 2017-08-15 NOTE — ACP (ADVANCE CARE PLANNING)
Advance Care Planning (ACP) Provider Note - Comprehensive     Date of ACP Conversation: 08/15/17  Persons included in Conversation:  patient  Length of ACP Conversation in minutes:  <16 minutes (Non-Billable)    Authorized Decision Maker (if patient is incapable of making informed decisions): This person is:  Healthcare Agent/Medical Power of  under Advance Directive          General ACP for ALL Patients with Decision Making Capacity:   Importance of advance care planning, including choosing a healthcare agent to communicate patient's healthcare decisions if patient lost the ability to make decisions, such as after a sudden illness or accident    Review of Existing Advance Directive:       For Serious or Chronic Illness:  Understanding of medical condition      Interventions Provided:  Reviewed existing Advance Directive

## 2017-08-15 NOTE — MR AVS SNAPSHOT
Visit Information Date & Time Provider Department Dept. Phone Encounter #  
 8/15/2017 12:30 PM Carloz Archibald., [address] [place] [address] [phone] [id number] Follow-up Instructions Return in about 2 weeks (around 8/29/2017) for EOV, xrays prior. Upcoming Health Maintenance Date Due DTaP/Tdap/Td series (1 - Tdap) 8/4/2006 Pneumococcal 65+ Low/Medium Risk (2 of 2 - PPSV23) 11/19/2016 MEDICARE YEARLY EXAM 6/22/2017 INFLUENZA AGE 9 TO ADULT 8/1/2017 GLAUCOMA SCREENING Q2Y 7/14/2018 COLONOSCOPY 6/6/2021 Allergies as of 8/15/2017  Review Complete On: 7/11/2017 By: Jillian Monique RN No Known Allergies Current Immunizations  Reviewed on 1/4/2017 Name Date Influenza High Dose Vaccine PF 1/4/2017, 11/19/2015 Influenza Vaccine 11/18/2014, 2/4/2013 Influenza Vaccine Split 11/17/2011, 11/22/2010 Pneumococcal Conjugate (PCV-13) 11/19/2015 TD Vaccine 8/3/2006 Tdap  Incomplete ZZZ-RETIRED (DO NOT USE) Pneumococcal Vaccine (Unspecified Type) 11/22/2010 Not reviewed this visit You Were Diagnosed With   
  
 Codes Comments Routine general medical examination at a health care facility    -  Primary ICD-10-CM: Z00.00 ICD-9-CM: V70.0 Essential hypertension, malignant     ICD-10-CM: I10 
ICD-9-CM: 401.0 Encounter for immunization     ICD-10-CM: W18 ICD-9-CM: V03.89 Proteinuria, unspecified type     ICD-10-CM: R80.9 ICD-9-CM: 791.0 Arthritis     ICD-10-CM: M19.90 ICD-9-CM: 716.90 Annual physical exam     ICD-10-CM: Z00.00 ICD-9-CM: V70.0 Chest pain due to myocardial ischemia, unspecified ischemic chest pain type ([facility] [address]     ICD-10-CM: I20.9 ICD-9-CM: 786.50 Vitals BP Pulse Temp Resp Height(growth percentile) Weight(growth percentile) 126/71 (BP 1 Location: Right arm, BP Patient Position: Sitting) (!) 107 98.1 °F (36.7 °C) (Oral) 18 5' 9\" (1.753 m) 163 lb 12.8 oz (74.3 kg) SpO2 BMI Smoking Status 99% 24.19 kg/m2 Current Every Day Smoker BMI and BSA Data Body Mass Index Body Surface Area  
 24.19 kg/m 2 1.9 m 2 Preferred Pharmacy Pharmacy Name Phone [facility] [address] [place], [address] [place] [address] [phone] Your Updated Medication List  
  
   
This list is accurate as of: 8/15/17 12:35 PM.  Always use your most recent med list. amLODIPine 5 mg tablet Commonly known as:  Achilles Boncarbo Take 1 Tab by mouth daily. lisinopril 20 mg tablet Commonly known as:  Kathyleen Niko Take 1 Tab by mouth daily. Prescriptions Sent to Pharmacy Refills  
 lisinopril (PRINIVIL, ZESTRIL) 20 mg tablet 4 Sig: Take 1 Tab by mouth daily. Class: Normal  
 Pharmacy: [address] Ph #: [phone] Route: Oral  
 amLODIPine (NORVASC) 5 mg tablet 4 Sig: Take 1 Tab by mouth daily. Class: Normal  
 Pharmacy: [address] Ph #: [phone] Route: Oral  
  
We Performed the Following AMB POC EKG ROUTINE W/ 12 LEADS, INTER & REP [66558 CPT(R)] TETANUS, DIPHTHERIA TOXOIDS AND ACELLULAR PERTUSSIS VACCINE (TDAP), IN INDIVIDS. >=7, IM [id number] CPT(R)] Follow-up Instructions Return in about 2 weeks (around 8/29/2017) for EOV, xrays prior. To-Do List   
 08/15/2017 ECG:  STRESS TEST CARDIAC   
  
 08/15/2017 Imaging:  XR UPPER GI W KUB AIR CONT Introducing [facility]! Aram [employer] introduces [employer] patient portal. Now you can access parts of your medical record, email your doctor's office, and request medication refills online. 1. In your internet browser, go to [email or web address] 2. Click on the First Time User? Click Here link in the Sign In box. You will see the New Member Sign Up page. 3. Enter your [employer] Access Code exactly as it appears below. You will not need to use this code after youve completed the sign-up process. If you do not sign up before the expiration date, you must request a new code. · [employer] Access Code: [email or web address] Expires: 10/9/2017 10:03 AM 
 
4. Enter the last four digits of your Social Security Number (xxxx) and Date of Birth (mm/dd/yyyy) as indicated and click Submit. You will be taken to the next sign-up page. 5. Create a [employer] ID. This will be your [employer] login ID and cannot be changed, so think of one that is secure and easy to remember. 6. Create a [employer] password. You can change your password at any time. 7. Enter your Password Reset Question and Answer. This can be used at a later time if you forget your password. 8. Enter your e-mail address. You will receive e-mail notification when new information is available in [address] 9. Click Sign Up. You can now view and download portions of your medical record. 10. Click the Download Summary menu link to download a portable copy of your medical information. If you have questions, please visit the Frequently Asked Questions section of the [employer] website. Remember, [employer] is NOT to be used for urgent needs. For medical emergencies, dial 911. Now available from your iPhone and Android! Please provide this summary of care documentation to your next provider. Your primary care clinician is listed as [id number] [facility]. If you have any questions after today's visit, please call [phone].

## 2017-08-15 NOTE — PROGRESS NOTES
THE SUBSEQUENT MEDICARE ANNUAL WELLNESS VISIT PROGRESS NOTES    This is a Subsequent Medicare Annual Wellness Visit providing Personalized Prevention Plan Services (PPPS) (Performed 12 months after initial AWV and PPPS )    I have reviewed the patient's medical history in detail and updated the computerized patient record. Honorio Cardona is a 78 y.o.  male and presents for an subsequent annual wellness exam     Patient Active Problem List    Diagnosis Date Noted    Proteinuria 05/30/2014    Hyperthyroidism 05/30/2014    Arthritis 06/06/2011    Essential hypertension, malignant 08/03/2010     Current Outpatient Prescriptions   Medication Sig Dispense Refill    lisinopril (PRINIVIL, ZESTRIL) 20 mg tablet Take 1 Tab by mouth daily. 90 Tab 4    amLODIPine (NORVASC) 5 mg tablet Take 1 Tab by mouth daily. 90 Tab 4     No Known Allergies  Past Medical History:   Diagnosis Date    Essential hypertension, malignant 8/3/2010    Leg pain 8/3/2010     No past surgical history on file. Family History   Problem Relation Age of Onset    Heart Disease Mother     Lung Disease Father      Social History   Substance Use Topics    Smoking status: Current Every Day Smoker     Packs/day: 1.50     Types: Cigarettes     Start date: 1/17/2017    Smokeless tobacco: Never Used      Comment: Restarted in Jan 2017    Alcohol use 6.6 oz/week     3 Cans of beer, 8 Standard drinks or equivalent per week      Comment: Drinks daily         ROS       All other systems reviewed and are negative. History     Past Medical History:   Diagnosis Date    Essential hypertension, malignant 8/3/2010    Leg pain 8/3/2010      No past surgical history on file. Current Outpatient Prescriptions   Medication Sig Dispense Refill    lisinopril (PRINIVIL, ZESTRIL) 20 mg tablet Take 1 Tab by mouth daily. 90 Tab 4    amLODIPine (NORVASC) 5 mg tablet Take 1 Tab by mouth daily.  90 Tab 4     No Known Allergies  Family History Problem Relation Age of Onset    Heart Disease Mother     Lung Disease Father      Social History   Substance Use Topics    Smoking status: Current Every Day Smoker     Packs/day: 1.50     Types: Cigarettes     Start date: 1/17/2017    Smokeless tobacco: Never Used      Comment: Restarted in Jan 2017    Alcohol use 6.6 oz/week     3 Cans of beer, 8 Standard drinks or equivalent per week      Comment: Drinks daily     Patient Active Problem List   Diagnosis Code    Essential hypertension, malignant I10    Arthritis M19.90    Proteinuria R80.9    Hyperthyroidism E05.90       Health Maintenance History  Immunizations reviewed, dtap due today  , pneumovax utd , flu utd , zoster na   Colonoscopy:utd ,   Chest CT :,  Eye exam:utd     Health Care Directive or Living Will: yes    Depression Risk Factor Screening:      Patient Health Questionnaire (PHQ-2)   Over the last 2 weeks, how often have you been bothered by any of the following problems? · Little interest or pleasure in doing things? · Not at all. [0]  · Feeling down, depressed, or hopeless? · Not at all. [0]    Total Score: 0/6  PHQ-2 Assessment Scoring:   A score of 2 or more requires further screening with the PHQ-9    Alcohol Risk Factor Screening:     Women: On any occasion during the past 3 months, have you had more than 3 drinks containing alcohol? Do you average more than 7 drinks per week? Men: On any occasion during the past 3 months, have you had more than 4 drinks containing alcohol? Do you average more than 14 drinks per week? Functional Ability and Level of Safety:     Hearing Loss    mild    Activities of Daily Living   Self-care.    Requires assistance with: no ADLs    Fall Risk   No fall risk factors    Abuse Screen   None      Examination   Physical Examination  Vitals:    08/15/17 1209   BP: 126/71   Pulse: (!) 107   Resp: 18   Temp: 98.1 °F (36.7 °C)   TempSrc: Oral   SpO2: 99%   Weight: 163 lb 12.8 oz (74.3 kg)   Height: 5' 9\" (1.753 m)   PainSc:   6   PainLoc: Abdomen     Body mass index is 24.19 kg/(m^2). Evaluation of Cognitive Function:  Mood/affect:appropriate   Appearance: well groomed   Family member/caregiver input: na     alert, well appearing, and in no distress, oriented to person, place, and time and normal appearing weight    Patient Care Team:  Nickolas Schmid,  as PCP - General  Edenilson Troy as Ambulatory Care Navigator    Advice/Referrals/Counseling/Plan:   Education and counseling provided:  Are appropriate based on today's review and evaluation  End-of-Life planning (with patient's consent)  Include in education list (weight loss, physical activity, smoking cessation, fall prevention, and nutrition)  current treatment plan is effective, no change in therapy. I have discussed the diagnosis with the patient and the intended plan as seen in the above orders. The patient has received an after-visit summary and questions were answered concerning future plans. I have discussed medication side effects and warnings with the patient as well. I have reviewed the plan of care with the patient, accepted their input and they are in agreement with the treatment goals. Follow-up Disposition:  Return in about 2 weeks (around 8/29/2017) for EOV, xrays prior. _____________________________________________________________    Problem Assessment    for treatment of   Chief Complaint   Patient presents with    Hypertension    Arthritis    Thyroid Problem    Proteinuria         SUBJECTIVE      Cardiovascular Review:  The patient has hypertension and hyperlipidemia. Diet and Lifestyle: not attempting to follow a low fat, low cholesterol diet, not attempting to follow a low sodium diet  Home BP Monitoring: is not measured at home. Pertinent ROS: taking medications as instructed, no medication side effects noted, no TIA's, no chest pain on exertion, no dyspnea on exertion, no swelling of ankles.    Thyroid Review:  Patient is seen for followup of hyperthyroidism. Thyroid ROS: denies fatigue, weight changes, heat/cold intolerance, bowel/skin changes or CVS symptoms. Protein uria ongoing          Additional Concerns: episodes of epigastric pain -- notes he drinks 1/2 gallon of gin every 4 days. Has some trouble swallowing. Has some episodes of chest and upper abd pain. In ed a few days ago. Labs noted         Visit Vitals    /71 (BP 1 Location: Right arm, BP Patient Position: Sitting)    Pulse (!) 107    Temp 98.1 °F (36.7 °C) (Oral)    Resp 18    Ht 5' 9\" (1.753 m)    Wt 163 lb 12.8 oz (74.3 kg)    SpO2 99%    BMI 24.19 kg/m2     General:  Alert, cooperative, no distress, appears stated age. Head:  Normocephalic, without obvious abnormality, atraumatic. Eyes:  Conjunctivae/corneas clear. PERRL, EOMs intact. Fundi benign   Ears:  Normal TMs and external ear canals both ears. Nose: Nares normal. Septum midline. Mucosa normal. No drainage or sinus tenderness. Throat: Lips, mucosa, and tongue normal. Teeth and gums normal.   Neck: Supple, symmetrical, trachea midline, no adenopathy, thyroid: no enlargement/tenderness/nodules, no carotid bruit and no JVD. Back:   Symmetric, no curvature. ROM normal. No CVA tenderness. Lungs:   Clear to auscultation bilaterally. Chest wall:  No tenderness or deformity. Heart:  Regular rate and rhythm, S1, S2 normal, no murmur, click, rub or gallop. Abdomen:   Soft, non-tender. Bowel sounds normal. No masses,  No organomegaly. Genitalia:  Normal male without lesion, discharge or tenderness. Rectal:  Normal tone, normal prostate, no masses or tenderness  Guaiac negative stool. Extremities: Extremities normal, atraumatic, no cyanosis or edema. Pulses: 2+ and symmetric all extremities. Skin: Skin color, texture, turgor normal. No rashes or lesions   Lymph nodes: Cervical, supraclavicular, and axillary nodes normal.   Neurologic: CNII-XII intact. Normal strength, sensation and reflexes throughout. LABS Component      Latest Ref Rng & Units 7/17/2017 7/17/2017 7/17/2017 7/17/2017           9:38 AM  9:38 AM  9:38 AM  9:38 AM   WBC      4.6 - 13.2 K/uL       RBC      4.70 - 5.50 M/uL       HGB      13.0 - 16.0 g/dL       HCT      36.0 - 48.0 %       MCV      74.0 - 97.0 FL       MCH      24.0 - 34.0 PG       MCHC      31.0 - 37.0 g/dL       RDW      11.6 - 14.5 %       PLATELET      990 - 781 K/uL       MPV      9.2 - 11.8 FL       NEUTROPHILS      40 - 73 %       LYMPHOCYTES      21 - 52 %       MONOCYTES      3 - 10 %       EOSINOPHILS      0 - 5 %       BASOPHILS      0 - 2 %       ABS. NEUTROPHILS      1.8 - 8.0 K/UL       ABS. LYMPHOCYTES      0.9 - 3.6 K/UL       ABS. MONOCYTES      0.05 - 1.2 K/UL       ABS. EOSINOPHILS      0.0 - 0.4 K/UL       ABS. BASOPHILS      0.0 - 0.06 K/UL       DF             Sodium      136 - 145 mmol/L       Potassium      3.5 - 5.5 mmol/L       Chloride      100 - 108 mmol/L       CO2      21 - 32 mmol/L       Anion gap      3.0 - 18 mmol/L       Glucose      74 - 99 mg/dL       BUN      7.0 - 18 MG/DL       Creatinine      0.6 - 1.3 MG/DL       BUN/Creatinine ratio      12 - 20         GFR est AA      >60 ml/min/1.73m2       GFR est non-AA      >60 ml/min/1.73m2       Calcium      8.5 - 10.1 MG/DL       Bilirubin, total      0.2 - 1.0 MG/DL       ALT (SGPT)      16 - 61 U/L       AST      15 - 37 U/L       Alk.  phosphatase      45 - 117 U/L       Protein, total      6.4 - 8.2 g/dL       Albumin      3.4 - 5.0 g/dL       Globulin      2.0 - 4.0 g/dL       A-G Ratio      0.8 - 1.7         Color       YELLOW      Appearance       CLEAR      Specific gravity      1.005 - 1.030   1.018      pH (UA)      5.0 - 8.0   5.5      Protein      NEG mg/dL 30 (A)      Glucose      NEG mg/dL NEGATIVE      Ketone      NEG mg/dL NEGATIVE      Bilirubin      NEG   NEGATIVE      Blood      NEG   NEGATIVE      Urobilinogen      0.2 - 1.0 EU/dL 1.0      Nitrites      NEG   NEGATIVE      Leukocyte Esterase      NEG   NEGATIVE      Cholesterol, total      <200 MG/DL       Triglyceride      <150 MG/DL       HDL Cholesterol      40 - 60 MG/DL       LDL, calculated      0 - 100 MG/DL       VLDL, calculated      MG/DL       CHOL/HDL Ratio      0 - 5.0         CK      39 - 308 U/L       CK - MB      <3.6 ng/ml       CK-MB Index      0.0 - 4.0 %       Troponin-I, Qt.      0.0 - 0.045 NG/ML       Lipase      73 - 393 U/L       ALCOHOL(ETHYL),SERUM      0 - 3 MG/DL       T4, Free      0.7 - 1.5 NG/DL   1.0    Prostate Specific Ag      0.0 - 4.0 ng/mL  2.3     TSH      0.36 - 3.74 uIU/mL    0.47     Component      Latest Ref Rng & Units 7/17/2017 7/17/2017 7/11/2017 7/11/2017           9:38 AM  9:38 AM  8:10 AM  8:10 AM   WBC      4.6 - 13.2 K/uL  5.9     RBC      4.70 - 5.50 M/uL  4.87     HGB      13.0 - 16.0 g/dL  14.5     HCT      36.0 - 48.0 %  42.0     MCV      74.0 - 97.0 FL  86.2     MCH      24.0 - 34.0 PG  29.8     MCHC      31.0 - 37.0 g/dL  34.5     RDW      11.6 - 14.5 %  15.1 (H)     PLATELET      806 - 841 K/uL  278     MPV      9.2 - 11.8 FL  9.9     NEUTROPHILS      40 - 73 %  66     LYMPHOCYTES      21 - 52 %  22     MONOCYTES      3 - 10 %  8     EOSINOPHILS      0 - 5 %  3     BASOPHILS      0 - 2 %  1     ABS. NEUTROPHILS      1.8 - 8.0 K/UL  4.0     ABS. LYMPHOCYTES      0.9 - 3.6 K/UL  1.3     ABS. MONOCYTES      0.05 - 1.2 K/UL  0.5     ABS. EOSINOPHILS      0.0 - 0.4 K/UL  0.2     ABS.  BASOPHILS      0.0 - 0.06 K/UL  0.0     DF        AUTOMATED     Sodium      136 - 145 mmol/L    139   Potassium      3.5 - 5.5 mmol/L    3.6   Chloride      100 - 108 mmol/L    105   CO2      21 - 32 mmol/L    25   Anion gap      3.0 - 18 mmol/L    9   Glucose      74 - 99 mg/dL    117 (H)   BUN      7.0 - 18 MG/DL    8   Creatinine      0.6 - 1.3 MG/DL    0.89   BUN/Creatinine ratio      12 - 20      9 (L)   GFR est AA      >60 ml/min/1.73m2    >60 GFR est non-AA      >60 ml/min/1.73m2    >60   Calcium      8.5 - 10.1 MG/DL    9.3   Bilirubin, total      0.2 - 1.0 MG/DL    0.5   ALT (SGPT)      16 - 61 U/L    17   AST      15 - 37 U/L    16   Alk.  phosphatase      45 - 117 U/L    47   Protein, total      6.4 - 8.2 g/dL    7.9   Albumin      3.4 - 5.0 g/dL    3.8   Globulin      2.0 - 4.0 g/dL    4.1 (H)   A-G Ratio      0.8 - 1.7      0.9   Color             Appearance             Specific gravity      1.005 - 1.030         pH (UA)      5.0 - 8.0         Protein      NEG mg/dL       Glucose      NEG mg/dL       Ketone      NEG mg/dL       Bilirubin      NEG         Blood      NEG         Urobilinogen      0.2 - 1.0 EU/dL       Nitrites      NEG         Leukocyte Esterase      NEG         Cholesterol, total      <200 MG/      Triglyceride      <150 MG/DL 94      HDL Cholesterol      40 - 60 MG/DL 47      LDL, calculated      0 - 100 MG/.2 (H)      VLDL, calculated      MG/DL 18.8      CHOL/HDL Ratio      0 - 5.0   3.9      CK      39 - 308 U/L   236    CK - MB      <3.6 ng/ml   3.6 (H)    CK-MB Index      0.0 - 4.0 %   1.5    Troponin-I, Qt.      0.0 - 0.045 NG/ML   <0.02    Lipase      73 - 393 U/L       ALCOHOL(ETHYL),SERUM      0 - 3 MG/DL       T4, Free      0.7 - 1.5 NG/DL       Prostate Specific Ag      0.0 - 4.0 ng/mL       TSH      0.36 - 3.74 uIU/mL         Component      Latest Ref Rng & Units 7/11/2017 7/11/2017 7/11/2017           8:10 AM  8:10 AM  8:10 AM   WBC      4.6 - 13.2 K/uL   6.7   RBC      4.70 - 5.50 M/uL   4.93   HGB      13.0 - 16.0 g/dL   14.6   HCT      36.0 - 48.0 %   41.8   MCV      74.0 - 97.0 FL   84.8   MCH      24.0 - 34.0 PG   29.6   MCHC      31.0 - 37.0 g/dL   34.9   RDW      11.6 - 14.5 %   15.0 (H)   PLATELET      761 - 454 K/uL   256   MPV      9.2 - 11.8 FL   10.0   NEUTROPHILS      40 - 73 %   73   LYMPHOCYTES      21 - 52 %   18 (L)   MONOCYTES      3 - 10 %   6   EOSINOPHILS      0 - 5 %   3   BASOPHILS 0 - 2 %   0   ABS. NEUTROPHILS      1.8 - 8.0 K/UL   4.9   ABS. LYMPHOCYTES      0.9 - 3.6 K/UL   1.2   ABS. MONOCYTES      0.05 - 1.2 K/UL   0.4   ABS. EOSINOPHILS      0.0 - 0.4 K/UL   0.2   ABS. BASOPHILS      0.0 - 0.06 K/UL   0.0   DF         AUTOMATED   Sodium      136 - 145 mmol/L      Potassium      3.5 - 5.5 mmol/L      Chloride      100 - 108 mmol/L      CO2      21 - 32 mmol/L      Anion gap      3.0 - 18 mmol/L      Glucose      74 - 99 mg/dL      BUN      7.0 - 18 MG/DL      Creatinine      0.6 - 1.3 MG/DL      BUN/Creatinine ratio      12 - 20        GFR est AA      >60 ml/min/1.73m2      GFR est non-AA      >60 ml/min/1.73m2      Calcium      8.5 - 10.1 MG/DL      Bilirubin, total      0.2 - 1.0 MG/DL      ALT (SGPT)      16 - 61 U/L      AST      15 - 37 U/L      Alk.  phosphatase      45 - 117 U/L      Protein, total      6.4 - 8.2 g/dL      Albumin      3.4 - 5.0 g/dL      Globulin      2.0 - 4.0 g/dL      A-G Ratio      0.8 - 1.7        Color            Appearance            Specific gravity      1.005 - 1.030        pH (UA)      5.0 - 8.0        Protein      NEG mg/dL      Glucose      NEG mg/dL      Ketone      NEG mg/dL      Bilirubin      NEG        Blood      NEG        Urobilinogen      0.2 - 1.0 EU/dL      Nitrites      NEG        Leukocyte Esterase      NEG        Cholesterol, total      <200 MG/DL      Triglyceride      <150 MG/DL      HDL Cholesterol      40 - 60 MG/DL      LDL, calculated      0 - 100 MG/DL      VLDL, calculated      MG/DL      CHOL/HDL Ratio      0 - 5.0        CK      39 - 308 U/L      CK - MB      <3.6 ng/ml      CK-MB Index      0.0 - 4.0 %      Troponin-I, Qt.      0.0 - 0.045 NG/ML      Lipase      73 - 393 U/L  95    ALCOHOL(ETHYL),SERUM      0 - 3 MG/DL <3     T4, Free      0.7 - 1.5 NG/DL      Prostate Specific Ag      0.0 - 4.0 ng/mL      TSH      0.36 - 3.74 uIU/mL        TESTS  ekg  nsr      Assessment/Plan:      Hypertension - stable  Hyperlipidemia - stable  Thyroid stable  Proteinuria ongiong but stable  abd pain  ? ? Cardiac vs GI will start with stress ekg and upper GI -- f/u  2 wk      Diagnoses and all orders for this visit:    1. Essential hypertension, malignant  -     AMB POC EKG ROUTINE W/ 12 LEADS, INTER & REP    2. Encounter for immunization  -     Tetanus, diphtheria toxoids and acellular pertussis (TDAP) vaccine, in individuals >=7 years, IM    3. Proteinuria, unspecified type    4. Arthritis    5.  Annual physical exam          Lab review: labs are reviewed, up to date and normal

## 2017-08-18 ENCOUNTER — HOSPITAL ENCOUNTER (OUTPATIENT)
Dept: NON INVASIVE DIAGNOSTICS | Age: 67
Discharge: HOME | End: 2017-08-18
Attending: FAMILY MEDICINE
Payer: MEDICARE

## 2017-08-18 ENCOUNTER — HOSPITAL ENCOUNTER (OUTPATIENT)
Dept: GENERAL RADIOLOGY | Age: 67
Discharge: HOME | End: 2017-08-18
Attending: FAMILY MEDICINE
Payer: MEDICARE

## 2017-08-18 DIAGNOSIS — Z23 ENCOUNTER FOR IMMUNIZATION: ICD-10-CM

## 2017-08-18 DIAGNOSIS — I10 ESSENTIAL HYPERTENSION, MALIGNANT: ICD-10-CM

## 2017-08-18 DIAGNOSIS — Z00.00 ANNUAL PHYSICAL EXAM: ICD-10-CM

## 2017-08-18 DIAGNOSIS — R80.9 PROTEINURIA, UNSPECIFIED TYPE: ICD-10-CM

## 2017-08-18 DIAGNOSIS — M19.90 ARTHRITIS: ICD-10-CM

## 2017-08-18 LAB
ATTENDING PHYSICIAN, CST07: NORMAL
DIAGNOSIS, 93000: NORMAL
DUKE TM SCORE RESULT, CST14: NORMAL
DUKE TREADMILL SCORE, CST13: NORMAL
ECG INTERP BEFORE EX, CST11: NORMAL
ECG INTERP DURING EX, CST12: NORMAL
FUNCTIONAL CAPACITY, CST17: NORMAL
KNOWN CARDIAC CONDITION, CST08: NORMAL
MAX. DIASTOLIC BP, CST04: 82 MMHG
MAX. HEART RATE, CST05: 142 BPM
MAX. SYSTOLIC BP, CST03: 149 MMHG
OVERALL BP RESPONSE TO EXERCISE, CST16: NORMAL
OVERALL HR RESPONSE TO EXERCISE, CST15: NORMAL
PEAK EX METS, CST10: 7 METS
PROTOCOL NAME, CST01: NORMAL
TEST INDICATION, CST09: NORMAL

## 2017-08-18 PROCEDURE — 74011000255 HC RX REV CODE- 255: Performed by: FAMILY MEDICINE

## 2017-08-18 PROCEDURE — 74247 XR UPPER GI W KUB AIR CONT: CPT

## 2017-08-18 PROCEDURE — 93017 CV STRESS TEST TRACING ONLY: CPT

## 2017-08-18 PROCEDURE — 74011000250 HC RX REV CODE- 250: Performed by: FAMILY MEDICINE

## 2017-08-18 RX ADMIN — BARIUM SULFATE 135 ML: 980 POWDER, FOR SUSPENSION ORAL at 08:56

## 2017-08-18 RX ADMIN — BARIUM SULFATE 355 ML: 0.6 SUSPENSION ORAL at 08:56

## 2017-08-18 RX ADMIN — ANTACID/ANTIFLATULENT 4 G: 380; 550; 10; 10 GRANULE, EFFERVESCENT ORAL at 08:56

## 2017-09-06 ENCOUNTER — OFFICE VISIT (OUTPATIENT)
Dept: FAMILY MEDICINE CLINIC | Age: 67
End: 2017-09-06

## 2017-09-06 VITALS
BODY MASS INDEX: 23.7 KG/M2 | HEIGHT: 69 IN | HEART RATE: 109 BPM | OXYGEN SATURATION: 96 % | RESPIRATION RATE: 18 BRPM | SYSTOLIC BLOOD PRESSURE: 126 MMHG | WEIGHT: 160 LBS | DIASTOLIC BLOOD PRESSURE: 83 MMHG | TEMPERATURE: 96.6 F

## 2017-09-06 DIAGNOSIS — R13.19 ESOPHAGEAL DYSPHAGIA: Primary | ICD-10-CM

## 2017-09-06 NOTE — MR AVS SNAPSHOT
Visit Information Date & Time Provider Department Dept. Phone Encounter #  
 9/6/2017  8:30 AM Ron Willis., [address] [facility] [phone] [id number] Follow-up Instructions Return for rov. Upcoming Health Maintenance Date Due Pneumococcal 65+ Low/Medium Risk (2 of 2 - PPSV23) 11/19/2016 INFLUENZA AGE 9 TO ADULT 8/1/2017 GLAUCOMA SCREENING Q2Y 7/14/2018 MEDICARE YEARLY EXAM 8/16/2018 COLONOSCOPY 6/6/2021 DTaP/Tdap/Td series (2 - Td) 8/15/2027 Allergies as of 9/6/2017  Review Complete On: 9/6/2017 By: Ron Curry, DO No Known Allergies Current Immunizations  Reviewed on 1/4/2017 Name Date Influenza High Dose Vaccine PF 1/4/2017, 11/19/2015 Influenza Vaccine 11/18/2014, 2/4/2013 Influenza Vaccine Split 11/17/2011, 11/22/2010 Pneumococcal Conjugate (PCV-13) 11/19/2015 TD Vaccine 8/3/2006 Tdap 8/15/2017 ZZZ-RETIRED (DO NOT USE) Pneumococcal Vaccine (Unspecified Type) 11/22/2010 Not reviewed this visit You Were Diagnosed With   
  
 Codes Comments Esophageal dysphagia    -  Primary ICD-10-CM: R13.14 ICD-9-CM: 787.24 Vitals BP Pulse Temp Resp Height(growth percentile) Weight(growth percentile) 126/83 (BP 1 Location: Right arm, BP Patient Position: Sitting) (!) 109 96.6 °F (35.9 °C) (Oral) 18 5' 9\" (1.753 m) 160 lb (72.6 kg) SpO2 BMI Smoking Status 96% 23.63 kg/m2 Current Every Day Smoker BMI and BSA Data Body Mass Index Body Surface Area  
 23.63 kg/m 2 1.88 m 2 Preferred Pharmacy Pharmacy Name Phone [facility] [address] [place] [address] [phone] Your Updated Medication List  
  
   
This list is accurate as of: 9/6/17  8:40 AM.  Always use your most recent med list. amLODIPine 5 mg tablet Commonly known as:  Marie Foley Take 1 Tab by mouth daily. lisinopril 20 mg tablet Commonly known as:  Mariela Husbands Take 1 Tab by mouth daily. We Performed the Following REFERRAL TO GASTROENTEROLOGY [PIH20 Custom] Comments:  
 Please evaluate patient for distal esophageal mass with stricture and dysphagia. 1st available is fine. REFERRAL TO SURGERY [UIF513 Custom] Comments:  
 Please evaluate patient for distal esophageal mass with stricture and dysphagia. Follow-up Instructions Return for rov. Referral Information Referral ID Referred By Referred To  
  
 [id number] Gloria Goncalves, [address] [facility], [place]   
   [facility] [address] [facility] Phone: [phone] Fax: [phone] Visits Status Start Date End Date 1 New Request 9/6/17 9/6/18 If your referral has a status of pending review or denied, additional information will be sent to support the outcome of this decision. Referral ID Referred By Referred To  
 [id number] Gloria Goncalves, [address], [place]  
   [address] [facility] [address] [place] [address] Phone: [phone] Fax: [phone] Visits Status Start Date End Date 1 New Request 9/6/17 9/6/18 If your referral has a status of pending review or denied, additional information will be sent to support the outcome of this decision. Introducing [facility]! Salty Dennis introduces [employer] patient portal. Now you can access parts of your medical record, email your doctor's office, and request medication refills online. 1. In your internet browser, go to [email or web address] 2. Click on the First Time User? Click Here link in the Sign In box. You will see the New Member Sign Up page. 3. Enter your [employer] Access Code exactly as it appears below. You will not need to use this code after youve completed the sign-up process.  If you do not sign up before the expiration date, you must request a new code. 
 
· [facility] Access Code: [email or web address] Expires: 10/9/2017 10:03 AM 
 
4. Enter the last four digits of your Social Security Number (xxxx) and Date of Birth (mm/dd/yyyy) as indicated and click Submit. You will be taken to the next sign-up page. 5. Create a [facility] ID. This will be your [facility] login ID and cannot be changed, so think of one that is secure and easy to remember. 6. Create a [facility] password. You can change your password at any time. 7. Enter your Password Reset Question and Answer. This can be used at a later time if you forget your password. 8. Enter your e-mail address. You will receive e-mail notification when new information is available in [address] 9. Click Sign Up. You can now view and download portions of your medical record. 10. Click the Download Summary menu link to download a portable copy of your medical information. If you have questions, please visit the Frequently Asked Questions section of the [facility] website. Remember, [facility] is NOT to be used for urgent needs. For medical emergencies, dial 911. Now available from your iPhone and Android! Please provide this summary of care documentation to your next provider. Your primary care clinician is listed as [id number] [facility]. If you have any questions after today's visit, please call [phone].

## 2017-09-06 NOTE — PROGRESS NOTES
Tati Abraham is a 78 y.o.  male and presents with    Chief Complaint   Patient presents with    Dysphagia           Subjective: Additional Concerns: ongong dysphagia . Here for f/u          Patient Active Problem List    Diagnosis Date Noted    Proteinuria 05/30/2014    Hyperthyroidism 05/30/2014    Arthritis 06/06/2011    Essential hypertension, malignant 08/03/2010     Current Outpatient Prescriptions   Medication Sig Dispense Refill    lisinopril (PRINIVIL, ZESTRIL) 20 mg tablet Take 1 Tab by mouth daily. 90 Tab 4    amLODIPine (NORVASC) 5 mg tablet Take 1 Tab by mouth daily. 90 Tab 4     No Known Allergies  Past Medical History:   Diagnosis Date    Essential hypertension, malignant 8/3/2010    Leg pain 8/3/2010     No past surgical history on file. Family History   Problem Relation Age of Onset    Heart Disease Mother     Lung Disease Father      Social History   Substance Use Topics    Smoking status: Current Every Day Smoker     Packs/day: 1.50     Types: Cigarettes     Start date: 1/17/2017    Smokeless tobacco: Never Used      Comment: Restarted in Jan 2017    Alcohol use 6.6 oz/week     3 Cans of beer, 8 Standard drinks or equivalent per week      Comment: Drinks daily       ROS       All other systems reviewed and are negative.       Objective:  Vitals:    09/06/17 0830   BP: 126/83   Pulse: (!) 109   Resp: 18   Temp: 96.6 °F (35.9 °C)   TempSrc: Oral   SpO2: 96%   Weight: 160 lb (72.6 kg)   Height: 5' 9\" (1.753 m)   PainSc:   0 - No pain                 alert, well appearing, and in no distress, oriented to person, place, and time and normal appearing weight  Chest - clear to auscultation, no wheezes, rales or rhonchi, symmetric air entry  Heart - normal rate, regular rhythm, normal S1, S2, no murmurs, rubs, clicks or gallops  Abdomen - soft, nontender, nondistended, no masses or organomegaly        LABS   Component      Latest Ref Rng & Units 7/17/2017 7/17/2017 7/17/2017 7/17/2017           9:38 AM  9:38 AM  9:38 AM  9:38 AM   WBC      4.6 - 13.2 K/uL       RBC      4.70 - 5.50 M/uL       HGB      13.0 - 16.0 g/dL       HCT      36.0 - 48.0 %       MCV      74.0 - 97.0 FL       MCH      24.0 - 34.0 PG       MCHC      31.0 - 37.0 g/dL       RDW      11.6 - 14.5 %       PLATELET      904 - 268 K/uL       MPV      9.2 - 11.8 FL       NEUTROPHILS      40 - 73 %       LYMPHOCYTES      21 - 52 %       MONOCYTES      3 - 10 %       EOSINOPHILS      0 - 5 %       BASOPHILS      0 - 2 %       ABS. NEUTROPHILS      1.8 - 8.0 K/UL       ABS. LYMPHOCYTES      0.9 - 3.6 K/UL       ABS. MONOCYTES      0.05 - 1.2 K/UL       ABS. EOSINOPHILS      0.0 - 0.4 K/UL       ABS. BASOPHILS      0.0 - 0.06 K/UL       DF             Sodium      136 - 145 mmol/L       Potassium      3.5 - 5.5 mmol/L       Chloride      100 - 108 mmol/L       CO2      21 - 32 mmol/L       Anion gap      3.0 - 18 mmol/L       Glucose      74 - 99 mg/dL       BUN      7.0 - 18 MG/DL       Creatinine      0.6 - 1.3 MG/DL       BUN/Creatinine ratio      12 - 20         GFR est AA      >60 ml/min/1.73m2       GFR est non-AA      >60 ml/min/1.73m2       Calcium      8.5 - 10.1 MG/DL       Bilirubin, total      0.2 - 1.0 MG/DL       ALT (SGPT)      16 - 61 U/L       AST      15 - 37 U/L       Alk.  phosphatase      45 - 117 U/L       Protein, total      6.4 - 8.2 g/dL       Albumin      3.4 - 5.0 g/dL       Globulin      2.0 - 4.0 g/dL       A-G Ratio      0.8 - 1.7         Color       YELLOW      Appearance       CLEAR      Specific gravity      1.005 - 1.030   1.018      pH (UA)      5.0 - 8.0   5.5      Protein      NEG mg/dL 30 (A)      Glucose      NEG mg/dL NEGATIVE      Ketone      NEG mg/dL NEGATIVE      Bilirubin      NEG   NEGATIVE      Blood      NEG   NEGATIVE      Urobilinogen      0.2 - 1.0 EU/dL 1.0      Nitrites      NEG   NEGATIVE      Leukocyte Esterase      NEG   NEGATIVE      Cholesterol, total      <200 MG/DL       Triglyceride      <150 MG/DL       HDL Cholesterol      40 - 60 MG/DL       LDL, calculated      0 - 100 MG/DL       VLDL, calculated      MG/DL       CHOL/HDL Ratio      0 - 5.0         CK      39 - 308 U/L       CK - MB      <3.6 ng/ml       CK-MB Index      0.0 - 4.0 %       Troponin-I, Qt.      0.0 - 0.045 NG/ML       Lipase      73 - 393 U/L       ALCOHOL(ETHYL),SERUM      0 - 3 MG/DL       T4, Free      0.7 - 1.5 NG/DL   1.0    Prostate Specific Ag      0.0 - 4.0 ng/mL  2.3     TSH      0.36 - 3.74 uIU/mL    0.47     Component      Latest Ref Rng & Units 7/17/2017 7/17/2017 7/17/2017 7/11/2017           9:38 AM  9:38 AM  9:38 AM  8:10 AM   WBC      4.6 - 13.2 K/uL   5.9    RBC      4.70 - 5.50 M/uL   4.87    HGB      13.0 - 16.0 g/dL   14.5    HCT      36.0 - 48.0 %   42.0    MCV      74.0 - 97.0 FL   86.2    MCH      24.0 - 34.0 PG   29.8    MCHC      31.0 - 37.0 g/dL   34.5    RDW      11.6 - 14.5 %   15.1 (H)    PLATELET      414 - 981 K/uL   278    MPV      9.2 - 11.8 FL   9.9    NEUTROPHILS      40 - 73 %   66    LYMPHOCYTES      21 - 52 %   22    MONOCYTES      3 - 10 %   8    EOSINOPHILS      0 - 5 %   3    BASOPHILS      0 - 2 %   1    ABS. NEUTROPHILS      1.8 - 8.0 K/UL   4.0    ABS. LYMPHOCYTES      0.9 - 3.6 K/UL   1.3    ABS. MONOCYTES      0.05 - 1.2 K/UL   0.5    ABS. EOSINOPHILS      0.0 - 0.4 K/UL   0.2    ABS.  BASOPHILS      0.0 - 0.06 K/UL   0.0    DF         AUTOMATED    Sodium      136 - 145 mmol/L 138      Potassium      3.5 - 5.5 mmol/L 4.2      Chloride      100 - 108 mmol/L 103      CO2      21 - 32 mmol/L 25      Anion gap      3.0 - 18 mmol/L 10      Glucose      74 - 99 mg/dL 75      BUN      7.0 - 18 MG/DL 8      Creatinine      0.6 - 1.3 MG/DL 0.91      BUN/Creatinine ratio      12 - 20   9 (L)      GFR est AA      >60 ml/min/1.73m2 >60      GFR est non-AA      >60 ml/min/1.73m2 >60      Calcium      8.5 - 10.1 MG/DL 8.6      Bilirubin, total      0.2 - 1.0 MG/DL 0.4 ALT (SGPT)      16 - 61 U/L 20      AST      15 - 37 U/L 15      Alk. phosphatase      45 - 117 U/L 45      Protein, total      6.4 - 8.2 g/dL 8.0      Albumin      3.4 - 5.0 g/dL 3.9      Globulin      2.0 - 4.0 g/dL 4.1 (H)      A-G Ratio      0.8 - 1.7   1.0      Color             Appearance             Specific gravity      1.005 - 1.030         pH (UA)      5.0 - 8.0         Protein      NEG mg/dL       Glucose      NEG mg/dL       Ketone      NEG mg/dL       Bilirubin      NEG         Blood      NEG         Urobilinogen      0.2 - 1.0 EU/dL       Nitrites      NEG         Leukocyte Esterase      NEG         Cholesterol, total      <200 MG/DL  184     Triglyceride      <150 MG/DL  94     HDL Cholesterol      40 - 60 MG/DL  47     LDL, calculated      0 - 100 MG/DL  118.2 (H)     VLDL, calculated      MG/DL  18.8     CHOL/HDL Ratio      0 - 5.0    3.9     CK      39 - 308 U/L    236   CK - MB      <3.6 ng/ml    3.6 (H)   CK-MB Index      0.0 - 4.0 %    1.5   Troponin-I, Qt.      0.0 - 0.045 NG/ML    <0.02   Lipase      73 - 393 U/L       ALCOHOL(ETHYL),SERUM      0 - 3 MG/DL       T4, Free      0.7 - 1.5 NG/DL       Prostate Specific Ag      0.0 - 4.0 ng/mL       TSH      0.36 - 3.74 uIU/mL         Component      Latest Ref Rng & Units 7/11/2017 7/11/2017 7/11/2017 7/11/2017           8:10 AM  8:10 AM  8:10 AM  8:10 AM   WBC      4.6 - 13.2 K/uL    6.7   RBC      4.70 - 5.50 M/uL    4.93   HGB      13.0 - 16.0 g/dL    14.6   HCT      36.0 - 48.0 %    41.8   MCV      74.0 - 97.0 FL    84.8   MCH      24.0 - 34.0 PG    29.6   MCHC      31.0 - 37.0 g/dL    34.9   RDW      11.6 - 14.5 %    15.0 (H)   PLATELET      541 - 360 K/uL    256   MPV      9.2 - 11.8 FL    10.0   NEUTROPHILS      40 - 73 %    73   LYMPHOCYTES      21 - 52 %    18 (L)   MONOCYTES      3 - 10 %    6   EOSINOPHILS      0 - 5 %    3   BASOPHILS      0 - 2 %    0   ABS. NEUTROPHILS      1.8 - 8.0 K/UL    4.9   ABS.  LYMPHOCYTES      0.9 - 3.6 K/UL    1.2   ABS. MONOCYTES      0.05 - 1.2 K/UL    0.4   ABS. EOSINOPHILS      0.0 - 0.4 K/UL    0.2   ABS. BASOPHILS      0.0 - 0.06 K/UL    0.0   DF          AUTOMATED   Sodium      136 - 145 mmol/L 139      Potassium      3.5 - 5.5 mmol/L 3.6      Chloride      100 - 108 mmol/L 105      CO2      21 - 32 mmol/L 25      Anion gap      3.0 - 18 mmol/L 9      Glucose      74 - 99 mg/dL 117 (H)      BUN      7.0 - 18 MG/DL 8      Creatinine      0.6 - 1.3 MG/DL 0.89      BUN/Creatinine ratio      12 - 20   9 (L)      GFR est AA      >60 ml/min/1.73m2 >60      GFR est non-AA      >60 ml/min/1.73m2 >60      Calcium      8.5 - 10.1 MG/DL 9.3      Bilirubin, total      0.2 - 1.0 MG/DL 0.5      ALT (SGPT)      16 - 61 U/L 17      AST      15 - 37 U/L 16      Alk.  phosphatase      45 - 117 U/L 47      Protein, total      6.4 - 8.2 g/dL 7.9      Albumin      3.4 - 5.0 g/dL 3.8      Globulin      2.0 - 4.0 g/dL 4.1 (H)      A-G Ratio      0.8 - 1.7   0.9      Color             Appearance             Specific gravity      1.005 - 1.030         pH (UA)      5.0 - 8.0         Protein      NEG mg/dL       Glucose      NEG mg/dL       Ketone      NEG mg/dL       Bilirubin      NEG         Blood      NEG         Urobilinogen      0.2 - 1.0 EU/dL       Nitrites      NEG         Leukocyte Esterase      NEG         Cholesterol, total      <200 MG/DL       Triglyceride      <150 MG/DL       HDL Cholesterol      40 - 60 MG/DL       LDL, calculated      0 - 100 MG/DL       VLDL, calculated      MG/DL       CHOL/HDL Ratio      0 - 5.0         CK      39 - 308 U/L       CK - MB      <3.6 ng/ml       CK-MB Index      0.0 - 4.0 %       Troponin-I, Qt.      0.0 - 0.045 NG/ML       Lipase      73 - 393 U/L   95    ALCOHOL(ETHYL),SERUM      0 - 3 MG/DL  <3     T4, Free      0.7 - 1.5 NG/DL       Prostate Specific Ag      0.0 - 4.0 ng/mL       TSH      0.36 - 3.74 uIU/mL         TESTS  cxr clear      DOUBLE CONTRAST UPPER GI SERIES:     Indication:  Dysphagia. Epigastric pain. Difficulty swallowing.     Double contrast upper GI series was performed. I was present for the procedure.     Minimal laryngeal penetration noted (image 17), however cleared promptly. No  aspiration. There is mild esophageal dysmotility. There is mucosal irregularity  with associated mass involving the distal esophagus with causing short segment  moderate stricture. Poor coating of the lesser curvature of the stomach on  initial spot imaging. Otherwise, there is no evidence of gastric ulcer or mass. Duodenal bulb and sweep are unremarkable. Gastroesophageal reflux was not  demonstrated on this exam.     Fluoro time:  2.6 minutes. Fluoro images:  32  Radiation dose (reference air kerma):  106.933 mGy.      IMPRESSION  IMPRESSION:     1.  Mucosal irregularity with associated distal esophageal mass causing short  segment moderate stricture. Suggest endoscopy for further evaluation.       Assessment/Plan:    Esophageal mass with stricture and dysphagia  Will ask GI to eval and scope  Will also ask surg to eval     Lab review: labs are reviewed, up to date and normal    Diagnoses and all orders for this visit:    1. Esophageal dysphagia  -     REFERRAL TO GASTROENTEROLOGY  -     REFERRAL TO SURGERY        I have discussed the diagnosis with the patient and the intended plan as seen in the above orders. The patient has received an after-visit summary and questions were answered concerning future plans. I have discussed medication side effects and warnings with the patient as well. I have reviewed the plan of care with the patient, accepted their input and they are in agreement with the treatment goals.      Follow-up Disposition: Not on File

## 2017-09-06 NOTE — PROGRESS NOTES
Dre Morocho is a 78 y.o. male presented to clinic for a f/u pn HTN, thyroid, and upper GI issues. Pt denies any pain or concerns at this time. 1. Have you been to the ER, urgent care clinic since your last visit? Hospitalized since your last visit? No    2. Have you seen or consulted any other health care providers outside of the [address] [place] [address] since your last visit? Include any pap smears or colon screening. No     Learning Assessment 8/15/2017   PRIMARY LEARNER Patient   HIGHEST LEVEL OF EDUCATION - PRIMARY LEARNER  -   BARRIERS PRIMARY LEARNER -   CO-LEARNER CAREGIVER -   PRIMARY LANGUAGE ENGLISH   LEARNER PREFERENCE PRIMARY LISTENING     -   ANSWERED BY patient   RELATIONSHIP SELF      Patient verbally agrees to permit the Students working in [address] [place] [address] office to observe and participate in medical care during the appointment today, including, where appropriate, providing direct medical care to patient under the physicians direct supervision. Patient agrees that he/she have been given the opportunity to refuse to give such consent and may withdraw consent at any time during appointment.

## 2017-09-15 ENCOUNTER — OFFICE VISIT (OUTPATIENT)
Dept: SURGERY | Age: 67
End: 2017-09-15

## 2017-09-15 VITALS
WEIGHT: 160 LBS | TEMPERATURE: 98.2 F | RESPIRATION RATE: 20 BRPM | DIASTOLIC BLOOD PRESSURE: 67 MMHG | BODY MASS INDEX: 23.7 KG/M2 | HEART RATE: 98 BPM | HEIGHT: 69 IN | SYSTOLIC BLOOD PRESSURE: 109 MMHG

## 2017-09-15 DIAGNOSIS — R13.19 ESOPHAGEAL DYSPHAGIA: Primary | ICD-10-CM

## 2017-09-15 NOTE — PATIENT INSTRUCTIONS
If you have any questions or concerns about today's appointment, the verbal and/or written instructions you were given for follow up care, please call our office at [phone].     [facility] - [address]    [phone] office  [phone]

## 2017-09-15 NOTE — PROGRESS NOTES
Dakota Donis is a 78 y.o. male who presents today with   Chief Complaint   Patient presents with    Other     Pt presents today c/o dysphagia and and epgigastric pain. He is here for surgical intervention. UGI 8/15/2017                1. Have you been to the ER, urgent care clinic since your last visit? Hospitalized since your last visit? No    2. Have you seen or consulted any other health care providers outside of the [address] [place] [address] since your last visit? Include any pap smears or colon screening.  No

## 2017-09-15 NOTE — PROGRESS NOTES
Nadeem Stanford is a 78 y.o. male referred by Dr Hien Ramos for evaluation of dysphagia. .  Patient describes that he has had trouble swallowing for the past 3 months. He is having trouble swallowing his food and has moved to eating softer foods. He has lost about 20lbs. He had an UGI done in August which showed an irregularity of his distal esophagus. He has been referred to GI, but has not had his appointment yet. He notes some discomfort when he eats. He smokes a pack per day and has done so since age 22. He also drinks alcohol, approximately 1 pint of liquor several times a week. Past Medical History:   Diagnosis Date    Essential hypertension, malignant 8/3/2010    Leg pain 8/3/2010       History reviewed. No pertinent surgical history. Current Outpatient Prescriptions   Medication Sig Dispense Refill    lisinopril (PRINIVIL, ZESTRIL) 20 mg tablet Take 1 Tab by mouth daily. 90 Tab 4    amLODIPine (NORVASC) 5 mg tablet Take 1 Tab by mouth daily. 90 Tab 4       No Known Allergies    Social History     Social History    Marital status: SINGLE     Spouse name: N/A    Number of children: N/A    Years of education: N/A     Occupational History    Not on file.      Social History Main Topics    Smoking status: Current Every Day Smoker     Packs/day: 1.50     Types: Cigarettes     Start date: 1/17/2017    Smokeless tobacco: Never Used      Comment: Restarted in Jan 2017    Alcohol use 6.6 oz/week     3 Cans of beer, 8 Standard drinks or equivalent per week      Comment: Drinks daily    Drug use: No    Sexual activity: No     Other Topics Concern    Not on file     Social History Narrative       Family history noncontributory      Review of Systems    ROS, positive where in bold:    General: fevers, chills, night sweats, fatigue, weight loss, weight gain    GI: as per HPI    Integumentary: dermatitis or abnormal moles    HEENT:  visual changes, vertigo, epistaxis, dysphagia, hoarseness    Cardiac: chest pain, palpitations, hypertension, edema,  varicosities    Resp:  cough, shortness of breath, wheezing, hemoptysis, snoring,  reactive airway disease    : hematuria, dysuria, frequency, urgency, nocturia, stress urinary incontinence    MSK: weakness, joint pain, arthritis    Endocrine: diabetes, thyroid disease, polyuria, polydipsia, polyphagia, poor wound healing, heat intolerance,cold intolerance    Lymph/Heme: anemia, bruising,  history of blood transfusions    Neuro: dizziness, headache, fainting, seizures, stroke    Psychiatry:  Anxiety, depression, psychosis         Objective:     Physical Exam:  Visit Vitals    /67 (BP 1 Location: Right arm, BP Patient Position: At rest)    Pulse 98    Temp 98.2 °F (36.8 °C) (Oral)    Resp 20    Ht 5' 9\" (1.753 m)    Wt 72.6 kg (160 lb)    BMI 23.63 kg/m2       General: Well developed, well nourished 78 y.o. male in no acute distress  ENMT: normocephalic, atraumatic mouth:clear, no overt lesions, oral mucosa pink and moist  Neck: supple, no masses, no adenopathy or carotid bruits, trachea midline  Skin: warm, smooth, dry and well perfused  Respiratory: clear to auscultation bilaterally, no wheeze, rhonchi or rales, excursions normal and symmetrical  Cardiovascular: Regular rate and rhythm, no murmurs, clicks, gallops or rubs, no edema or varicosities  Gastrointestinal: soft, nontender, nondistended, normoactive bowel sounds, no hernias, no hepatosplenomegaly  Musculoskeletal: warm, well-perfused, no tenderness or swelling, normal gait/station  Neuro: sensation and strength grossly intact and symmetrical  Psych: alert and oriented to person, place and time      Imaging:  DOUBLE CONTRAST UPPER GI SERIES:     Indication:  Dysphagia. Epigastric pain. Difficulty swallowing.     Double contrast upper GI series was performed. I was present for the procedure.     Minimal laryngeal penetration noted (image 17), however cleared promptly. No  aspiration. There is mild esophageal dysmotility. There is mucosal irregularity  with associated mass involving the distal esophagus with causing short segment  moderate stricture. Poor coating of the lesser curvature of the stomach on  initial spot imaging. Otherwise, there is no evidence of gastric ulcer or mass. Duodenal bulb and sweep are unremarkable. Gastroesophageal reflux was not  demonstrated on this exam.     Fluoro time:  2.6 minutes. Fluoro images:  32  Radiation dose (reference air kerma):  106.933 mGy.      IMPRESSION  IMPRESSION:     1.  Mucosal irregularity with associated distal esophageal mass causing short  segment moderate stricture. Suggest endoscopy for further evaluation.         Assessment:   Patient with a long standing history of alcohol and tobacco abuse now with dysphagia and distal esophageal mass causing symptoms. This is concerning for possible malignancy. Plan:     - Patient needs an EGD. He was escorted to the GI suite where his appointment was verified iwht Dr Winter De La Fuente. Patient notified that it is very important to keep that appointment and that the results of that exam will determine next steps. Patient was additionally advised that if he requires esophageal surgery, he may need to be referred to the [facility] system as we do not currently perform that type of surgery here.  -patient understands and agrees with this course of action.

## 2017-10-05 RX ORDER — OMEPRAZOLE 40 MG/1
40 CAPSULE, DELAYED RELEASE ORAL DAILY
COMMUNITY
End: 2017-11-06

## 2017-10-05 RX ORDER — OXYCODONE HYDROCHLORIDE 5 MG/1
5 TABLET ORAL
COMMUNITY
End: 2017-10-09 | Stop reason: SDUPTHER

## 2017-10-09 ENCOUNTER — OFFICE VISIT (OUTPATIENT)
Dept: FAMILY MEDICINE CLINIC | Age: 67
End: 2017-10-09

## 2017-10-09 VITALS
WEIGHT: 157 LBS | DIASTOLIC BLOOD PRESSURE: 74 MMHG | OXYGEN SATURATION: 96 % | BODY MASS INDEX: 23.25 KG/M2 | RESPIRATION RATE: 16 BRPM | HEIGHT: 69 IN | SYSTOLIC BLOOD PRESSURE: 115 MMHG | TEMPERATURE: 98.7 F | HEART RATE: 93 BPM

## 2017-10-09 DIAGNOSIS — K22.89 ESOPHAGEAL MASS: Primary | ICD-10-CM

## 2017-10-09 RX ORDER — OXYCODONE HYDROCHLORIDE 5 MG/1
5 TABLET ORAL
Qty: 50 TAB | Refills: 0 | Status: SHIPPED | OUTPATIENT
Start: 2017-10-09 | End: 2017-10-30 | Stop reason: SDUPTHER

## 2017-10-09 NOTE — PROGRESS NOTES
Called and requested most recent notes from Dr. Christos Land. Notes to be sent for patient's most recent visit on 10/2/17. Caller did express that patient is scheduled for a EGD this week. Awaiting notes.

## 2017-10-09 NOTE — PROGRESS NOTES
1. Have you been to the ER, urgent care clinic since your last visit? Hospitalized since your last visit? No    2. Have you seen or consulted any other health care providers outside of the [address] [place] [address] since your last visit? Include any pap smears or colon screening.  No

## 2017-10-09 NOTE — MR AVS SNAPSHOT
Visit Information Date & Time Provider Department Dept. Phone Encounter #  
 10/9/2017  8:30 AM Abhishek Aus., [address] [facility] [phone] [id number] Follow-up Instructions Return in about 2 weeks (around 10/23/2017) for EOV get records from dr Darline Garza. Routing History Follow-up and Disposition History Your Appointments 10/9/2017  8:30 AM  
[place] [address] [address]) Appt Note: 1 month f/u per Dr. Alphonso Sullivan; Appt conf 10/6/17 @ 10:21 am CLB [address] [facility] [address]  
  
   
 [address] [facility] Upcoming Health Maintenance Date Due Pneumococcal 65+ Low/Medium Risk (2 of 2 - PPSV23) 11/19/2016 INFLUENZA AGE 9 TO ADULT 8/1/2017 GLAUCOMA SCREENING Q2Y 7/14/2018 MEDICARE YEARLY EXAM 8/16/2018 COLONOSCOPY 6/6/2021 DTaP/Tdap/Td series (2 - Td) 8/15/2027 Allergies as of 10/9/2017  Review Complete On: 10/9/2017 By: Danelle Mccray LPN No Known Allergies Current Immunizations  Reviewed on 1/4/2017 Name Date Influenza High Dose Vaccine PF 1/4/2017, 11/19/2015 Influenza Vaccine 11/18/2014, 2/4/2013 Influenza Vaccine Split 11/17/2011, 11/22/2010 Pneumococcal Conjugate (PCV-13) 11/19/2015 TD Vaccine 8/3/2006 Tdap 8/15/2017 ZZZ-RETIRED (DO NOT USE) Pneumococcal Vaccine (Unspecified Type) 11/22/2010 Not reviewed this visit You Were Diagnosed With   
  
 Codes Comments Esophageal mass    -  Primary ICD-10-CM: K22.9 ICD-9-CM: 530.9 Vitals BP Pulse Temp Resp Height(growth percentile) Weight(growth percentile) 115/74 (BP 1 Location: Right arm, BP Patient Position: Sitting) 93 98.7 °F (37.1 °C) (Oral) 16 5' 9\" (1.753 m) 157 lb (71.2 kg) SpO2 BMI Smoking Status 96% 23.18 kg/m2 Current Every Day Smoker BMI and BSA Data Body Mass Index Body Surface Area  
 23.18 kg/m 2 1.86 m 2 Preferred Pharmacy Pharmacy Name Phone [facility] [address] [phone] Your Updated Medication List  
  
   
This list is accurate as of: 10/9/17  8:28 AM.  Always use your most recent med list. amLODIPine 5 mg tablet Commonly known as:  Mendoza Fanti Take 1 Tab by mouth daily. lisinopril 20 mg tablet Commonly known as:  Tatianna Antunez Take 1 Tab by mouth daily. omeprazole 40 mg capsule Commonly known as:  PRILOSEC Take 40 mg by mouth daily. oxyCODONE IR 5 mg immediate release tablet Commonly known as:  Stacy Ramal Take 1 Tab by mouth every eight (8) hours as needed for Pain. Max Daily Amount: 15 mg.  
  
  
  
  
Prescriptions Printed Refills  
 oxyCODONE IR (ROXICODONE) 5 mg immediate release tablet 0 Sig: Take 1 Tab by mouth every eight (8) hours as needed for Pain. Max Daily Amount: 15 mg.  
 Class: Print Route: Oral  
  
Follow-up Instructions Return in about 2 weeks (around 10/23/2017) for EOV get records from dr Isabel Baez. Introducing [facility] & HEALTH SERVICES! [employer] introduces [facility] patient portal. Now you can access parts of your medical record, email your doctor's office, and request medication refills online. 1. In your internet browser, go to [email or web address] 2. Click on the First Time User? Click Here link in the Sign In box. You will see the New Member Sign Up page. 3. Enter your [facility] Access Code exactly as it appears below. You will not need to use this code after youve completed the sign-up process. If you do not sign up before the expiration date, you must request a new code. · [facility] Access Code: [email or web address] Expires: 10/9/2017 10:03 AM 
 
4.  Enter the last four digits of your Social Security Number (xxxx) and Date of Birth (mm/dd/yyyy) as indicated and click Submit. You will be taken to the next sign-up page. 5. Create a Smart Plate ID. This will be your Smart Plate login ID and cannot be changed, so think of one that is secure and easy to remember. 6. Create a Smart Plate password. You can change your password at any time. 7. Enter your Password Reset Question and Answer. This can be used at a later time if you forget your password. 8. Enter your e-mail address. You will receive e-mail notification when new information is available in [address] 9. Click Sign Up. You can now view and download portions of your medical record. 10. Click the Download Summary menu link to download a portable copy of your medical information. If you have questions, please visit the Frequently Asked Questions section of the Smart [employer] website. Remember, [employer] is NOT to be used for urgent needs. For medical emergencies, dial 911. Now available from your iPhone and Android! Please provide this summary of care documentation to your next provider. Your primary care clinician is listed as [id number] [facility]. If you have any questions after today's visit, please call [phone].

## 2017-10-09 NOTE — PROGRESS NOTES
Luigi Stevens is a 78 y.o.  male and presents with    Chief Complaint   Patient presents with    Dysphagia       Pt seen 1 mo ago with increasing dysphagia. Referred to gen surg aby and gi eliza  Seen by aby 1 mo ago. Concern for malignancy. Seen by Addy Wetzel -- has egd scheduled in 3 days  Was given some pain meds by eliza but is now out of pain meds  No notes from eliza available    no records at this time. Subjective: Additional Concerns:          Patient Active Problem List    Diagnosis Date Noted    Proteinuria 05/30/2014    Hyperthyroidism 05/30/2014    Arthritis 06/06/2011    Essential hypertension, malignant 08/03/2010     Current Outpatient Prescriptions   Medication Sig Dispense Refill    oxyCODONE IR (ROXICODONE) 5 mg immediate release tablet Take 1 Tab by mouth every eight (8) hours as needed for Pain. Max Daily Amount: 15 mg. 50 Tab 0    omeprazole (PRILOSEC) 40 mg capsule Take 40 mg by mouth daily.  lisinopril (PRINIVIL, ZESTRIL) 20 mg tablet Take 1 Tab by mouth daily. 90 Tab 4    amLODIPine (NORVASC) 5 mg tablet Take 1 Tab by mouth daily. 90 Tab 4     No Known Allergies  Past Medical History:   Diagnosis Date    Essential hypertension, malignant 8/3/2010    Leg pain 8/3/2010     No past surgical history on file. Family History   Problem Relation Age of Onset    Heart Disease Mother     Lung Disease Father      Social History   Substance Use Topics    Smoking status: Current Every Day Smoker     Packs/day: 1.00     Types: Cigarettes     Start date: 1/17/2017    Smokeless tobacco: Never Used      Comment: Restarted in Jan 2017    Alcohol use 6.6 oz/week     8 Standard drinks or equivalent, 3 Shots of liquor per week      Comment: Drinks daily       ROS       All other systems reviewed and are negative.       Objective:  Vitals:    10/09/17 0812   BP: 115/74   Pulse: 93   Resp: 16   Temp: 98.7 °F (37.1 °C)   TempSrc: Oral   SpO2: 96%   Weight: 157 lb (71.2 kg)   Height: 5' 9\" (1.753 m)   PainSc:   8                 alert, well appearing, and in no distress and normal appearing weight  Chest - clear to auscultation, no wheezes, rales or rhonchi, symmetric air entry  Heart - normal rate, regular rhythm, normal S1, S2, no murmurs, rubs, clicks or gallops  Abdomen - soft, nontender, nondistended, no masses or organomegaly        LABS     TESTS       Assessment/Plan:    Concern for esophageal malignancy. Ramesh De Oliveira Person presents with acute pain (pain of less than three months duration). The pain affects his swallowing that is secondary to possible malignancy. .  Since this event his pain has worsened. He describes the pain as dull. Since this event he is able to do  his normal daily activities. He reports the following adverse side effects from treatment: none. Least pain over the last week has been 4/10. Worst pain over the last week has been 6/10. Prior records: have been requested. Personal or family history of psychiatric, addiction, or substance abuse: no    Opioid risk tool reviewed: YES     Aberrant behaviors: None. Urine Drug Screen: na     Controlled Substance Agreementt: NO: not needed if malignancy       reviewed: yes. Pill count is consistent with his prescription: yes    Concomitant use of a benzodiazepine: no            Lab review:     Diagnoses and all orders for this visit:    1. Esophageal mass  -     oxyCODONE IR (ROXICODONE) 5 mg immediate release tablet; Take 1 Tab by mouth every eight (8) hours as needed for Pain. Max Daily Amount: 15 mg. I have discussed the diagnosis with the patient and the intended plan as seen in the above orders. The patient has received an after-visit summary and questions were answered concerning future plans. I have discussed medication side effects and warnings with the patient as well.  I have reviewed the plan of care with the patient, accepted their input and they are in agreement with the treatment goals. Follow-up Disposition:  Return in about 2 weeks (around 10/23/2017) for EOV get records from dr Addy Wetzel.

## 2017-10-11 ENCOUNTER — ANESTHESIA EVENT (OUTPATIENT)
Dept: ENDOSCOPY | Age: 67
End: 2017-10-11
Payer: MEDICARE

## 2017-10-12 ENCOUNTER — ANESTHESIA (OUTPATIENT)
Dept: ENDOSCOPY | Age: 67
End: 2017-10-12
Payer: MEDICARE

## 2017-10-12 ENCOUNTER — HOSPITAL ENCOUNTER (OUTPATIENT)
Age: 67
Setting detail: OUTPATIENT SURGERY
Discharge: HOME | End: 2017-10-12
Attending: INTERNAL MEDICINE | Admitting: INTERNAL MEDICINE
Payer: MEDICARE

## 2017-10-12 VITALS
RESPIRATION RATE: 16 BRPM | OXYGEN SATURATION: 99 % | HEIGHT: 69 IN | SYSTOLIC BLOOD PRESSURE: 113 MMHG | HEART RATE: 97 BPM | TEMPERATURE: 96.6 F | BODY MASS INDEX: 22.96 KG/M2 | WEIGHT: 155 LBS | DIASTOLIC BLOOD PRESSURE: 81 MMHG

## 2017-10-12 PROCEDURE — 77030009426 HC FCPS BIOP ENDOSC BSC -B: Performed by: INTERNAL MEDICINE

## 2017-10-12 PROCEDURE — 74011250636 HC RX REV CODE- 250/636

## 2017-10-12 PROCEDURE — 88342 IMHCHEM/IMCYTCHM 1ST ANTB: CPT | Performed by: INTERNAL MEDICINE

## 2017-10-12 PROCEDURE — 88341 IMHCHEM/IMCYTCHM EA ADD ANTB: CPT | Performed by: INTERNAL MEDICINE

## 2017-10-12 PROCEDURE — 76040000019: Performed by: INTERNAL MEDICINE

## 2017-10-12 PROCEDURE — 74011000250 HC RX REV CODE- 250

## 2017-10-12 PROCEDURE — 76060000031 HC ANESTHESIA FIRST 0.5 HR: Performed by: INTERNAL MEDICINE

## 2017-10-12 PROCEDURE — 88305 TISSUE EXAM BY PATHOLOGIST: CPT | Performed by: INTERNAL MEDICINE

## 2017-10-12 PROCEDURE — 74011250636 HC RX REV CODE- 250/636: Performed by: NURSE ANESTHETIST, CERTIFIED REGISTERED

## 2017-10-12 PROCEDURE — 88313 SPECIAL STAINS GROUP 2: CPT | Performed by: INTERNAL MEDICINE

## 2017-10-12 RX ORDER — LIDOCAINE HYDROCHLORIDE 10 MG/ML
0.1 INJECTION, SOLUTION EPIDURAL; INFILTRATION; INTRACAUDAL; PERINEURAL AS NEEDED
Status: DISCONTINUED | OUTPATIENT
Start: 2017-10-12 | End: 2017-10-12 | Stop reason: HOSPADM

## 2017-10-12 RX ORDER — LIDOCAINE HYDROCHLORIDE 20 MG/ML
INJECTION, SOLUTION EPIDURAL; INFILTRATION; INTRACAUDAL; PERINEURAL AS NEEDED
Status: DISCONTINUED | OUTPATIENT
Start: 2017-10-12 | End: 2017-10-12 | Stop reason: HOSPADM

## 2017-10-12 RX ORDER — PROPOFOL 10 MG/ML
INJECTION, EMULSION INTRAVENOUS AS NEEDED
Status: DISCONTINUED | OUTPATIENT
Start: 2017-10-12 | End: 2017-10-12 | Stop reason: HOSPADM

## 2017-10-12 RX ORDER — SODIUM CHLORIDE, SODIUM LACTATE, POTASSIUM CHLORIDE, CALCIUM CHLORIDE 600; 310; 30; 20 MG/100ML; MG/100ML; MG/100ML; MG/100ML
75 INJECTION, SOLUTION INTRAVENOUS CONTINUOUS
Status: DISCONTINUED | OUTPATIENT
Start: 2017-10-12 | End: 2017-10-12 | Stop reason: HOSPADM

## 2017-10-12 RX ORDER — PROPOFOL 10 MG/ML
INJECTION, EMULSION INTRAVENOUS
Status: DISCONTINUED | OUTPATIENT
Start: 2017-10-12 | End: 2017-10-12 | Stop reason: HOSPADM

## 2017-10-12 RX ADMIN — LIDOCAINE HYDROCHLORIDE 60 MG: 20 INJECTION, SOLUTION EPIDURAL; INFILTRATION; INTRACAUDAL; PERINEURAL at 11:30

## 2017-10-12 RX ADMIN — SODIUM CHLORIDE, SODIUM LACTATE, POTASSIUM CHLORIDE, AND CALCIUM CHLORIDE 75 ML/HR: 600; 310; 30; 20 INJECTION, SOLUTION INTRAVENOUS at 10:30

## 2017-10-12 RX ADMIN — PROPOFOL 200 MCG/KG/MIN: 10 INJECTION, EMULSION INTRAVENOUS at 11:30

## 2017-10-12 RX ADMIN — PROPOFOL 30 MG: 10 INJECTION, EMULSION INTRAVENOUS at 11:33

## 2017-10-12 NOTE — IP AVS SNAPSHOT
Mary Iglesias 
 
 
 [address] 
[phone] Patient: Bucky Seo Person MRN: [id number] :1950 You are allergic to the following No active allergies Recent Documentation Height Weight BMI Smoking Status 1.753 m 70.3 kg 22.89 kg/m2 Current Every Day Smoker Emergency Contacts Name Discharge Info Relation Home Work Mobile Teryl Matter CAREGIVER [3] Daughter [21]   [phone] Mikey Aparicio NO [2] Friend [5] About your hospitalization You were admitted on:  2017 [facility] on:  2017 Unit phone number:  [phone] Why you were hospitalized Your primary diagnosis was:  Not on File Providers Seen During Your Hospitalizations Provider Role Specialty Primary office phone Katrina Perez MD Attending Provider Gastroenterology [phone] Your Primary Care Physician (PCP) Primary Care Physician Office Phone Office Fax Britney Vega [phone] Follow-up Information None Your Appointments 2017  9:30 AM EDT ROUTINE CARE with Crystal Mckeon.,  [address] [address] [address] [place] [address]) [address] [place] [address] [phone] Current Discharge Medication List  
  
ASK your doctor about these medications Dose & Instructions Dispensing Information Comments Morning Noon Evening Bedtime  
 amLODIPine 5 mg tablet Commonly known as:  Lynda Hughes Your last dose was: Your next dose is:    
   
   
 Dose:  5 mg Take 1 Tab by mouth daily. Quantity:  90 Tab Refills:  4  
     
   
   
   
  
 lisinopril 20 mg tablet Commonly known as:  Bhumika Del Real Your last dose was: Your next dose is: Dose:  20 mg Take 1 Tab by mouth daily. Quantity:  90 Tab Refills:  4  
     
   
   
   
  
 omeprazole 40 mg capsule Commonly known as:  PRILOSEC Your last dose was: Your next dose is:    
   
   
 Dose:  40 mg Take 40 mg by mouth daily. Refills:  0  
     
   
   
   
  
 oxyCODONE IR 5 mg immediate release tablet Commonly known as:  Mariana Samantha Your last dose was: Your next dose is:    
   
   
 Dose:  5 mg Take 1 Tab by mouth every eight (8) hours as needed for Pain. Max Daily Amount: 15 mg. Quantity:  50 Tab Refills:  0 Discharge Instructions Upper GI Endoscopy: What to Expect at [facility] Your Recovery After you have an endoscopy, you will stay at the hospital or clinic for 1 to 2 hours. This will allow the medicine to wear off. You will be able to go home after your doctor or nurse checks to make sure you are not having any problems. You may have to stay overnight if you had treatment during the test. You may have a sore throat for a day or two after the test. 
This care sheet gives you a general idea about what to expect after the test. 
How can you care for yourself at home? Activity · Rest as much as you need to after you go home. · You should be able to go back to your usual activities the day after the test. 
Diet · Follow your doctor's directions for eating after the test. 
· Drink plenty of fluids (unless your doctor has told you not to). Medications · If you have a sore throat the day after the test, use an over-the-counter spray to numb your throat. Follow-up care is a key part of your treatment and safety. Be sure to make and go to all appointments, and call your doctor if you are having problems. It's also a good idea to know your test results and keep a list of the medicines you take. When should you call for help? Call 911 anytime you think you may need emergency care. For example, call if: · You passed out (lost consciousness). · You cough up blood. · You vomit blood or what looks like coffee grounds. · You pass maroon or very bloody stools. Call your doctor now or seek immediate medical care if: 
· You have trouble swallowing. · You have belly pain. · Your stools are black and tarlike or have streaks of blood. · You are sick to your stomach or cannot keep fluids down. Watch closely for changes in your health, and be sure to contact your doctor if: 
· Your throat still hurts after a day or two. · You do not get better as expected. Where can you learn more? Go to http://hima-wilton.info/. Enter ([phone] [phone] in the search box to learn more about \"Upper GI Endoscopy: What to Expect at Home. \" Current as of: August 9, 2016 Content Version: 11.3 © 9151-9990 [employer]. Care instructions adapted under license by [employer] (which disclaims liability or warranty for this information). If you have questions about a medical condition or this instruction, always ask your healthcare professional. [place] [address] any warranty or liability for your use of this information. DISCHARGE SUMMARY from Nurse The following personal items are in your possession at time of discharge: 
 
Dental Appliances: None Visual Aid: None PATIENT INSTRUCTIONS: 
 
After general anesthesia or intravenous sedation, for 24 hours or while taking prescription Narcotics: · Limit your activities · Do not drive and operate hazardous machinery · Do not make important personal or business decisions · Do  not drink alcoholic beverages · If you have not urinated within 8 hours after discharge, please contact your surgeon on call. Report the following to your surgeon: 
· Excessive pain, swelling, redness or odor of or around the surgical area · Temperature over 100.5 · Nausea and vomiting lasting longer than 4 hours or if unable to take medications · Any signs of decreased circulation or nerve impairment to extremity: change in color, persistent  numbness, tingling, coldness or increase pain · Any questions What to do at Home: 
Recommended activity: Activity as tolerated and no driving for today These are general instructions for a healthy lifestyle: No smoking/ No tobacco products/ Avoid exposure to second hand smoke Surgeon General's Warning:  Quitting smoking now greatly reduces serious risk to your health. Obesity, smoking, and sedentary lifestyle greatly increases your risk for illness A healthy diet, regular physical exercise & weight monitoring are important for maintaining a healthy lifestyle You may be retaining fluid if you have a history of heart failure or if you experience any of the following symptoms:  Weight gain of 3 pounds or more overnight or 5 pounds in a week, increased swelling in our hands or feet or shortness of breath while lying flat in bed. Please call your doctor as soon as you notice any of these symptoms; do not wait until your next office visit. Recognize signs and symptoms of STROKE: 
 
F-face looks uneven A-arms unable to move or move unevenly S-speech slurred or non-existent T-time-call 911 as soon as signs and symptoms begin-DO NOT go Back to bed or wait to see if you get better-TIME IS BRAIN. Warning Signs of HEART ATTACK Call 911 if you have these symptoms: 
? Chest discomfort. Most heart attacks involve discomfort in the center of the chest that lasts more than a few minutes, or that goes away and comes back. It can feel like uncomfortable pressure, squeezing, fullness, or pain. ? Discomfort in other areas of the upper body. Symptoms can include pain or discomfort in one or both arms, the back, neck, jaw, or stomach. ? Shortness of breath with or without chest discomfort.  
? Other signs may include breaking out in a cold sweat, nausea, or lightheadedness. Don't wait more than five minutes to call [address]! Fast action can save your life. Calling 911 is almost always the fastest way to get lifesaving treatment. Emergency Medical Services staff can begin treatment when they arrive  up to an hour sooner than if someone gets to the hospital by car. The discharge information has been reviewed with the patient. The patient verbalized understanding. Discharge medications reviewed with the patient and appropriate educational materials and side effects teaching were provided. Patient armband removed and given to patient to take home. Patient was informed of the privacy risks if armband lost or stolen Discharge Orders None ACO Transitions of Care Introducing [employer] [address] offers a voluntary care coordination program to provide high quality service and care to [facility] fee-for-service beneficiaries. Barb Ruiz was designed to help you enhance your health and well-being through the following services: ? Transitions of Care  support for individuals who are transitioning from one care setting to another (example: Hospital to home). ? Chronic and Complex Care Coordination  support for individuals and caregivers of those with serious or chronic illnesses or with more than one chronic (ongoing) condition and those who take a number of different medications. If you meet specific medical criteria, a [facility] may call you directly to coordinate your care with your primary care physician and your other care providers. For questions about the [facility] programs, please, contact your physicians office. For general questions or additional information about Accountable Care Organizations: 
Please visit www.medicare.gov/acos. html or call 1-800-MEDICARE (9-917.827.9734) TTY users should call 8-538.339.7034. Introducing [facility] & HEALTH SERVICES! [employer] introduces [email or web address] patient portal. Now you can access parts of your medical record, email your doctor's office, and request medication refills online. 1. In your internet browser, go to [email or web address] 2. Click on the First Time User? Click Here link in the Sign In box. You will see the New Member Sign Up page. 3. Enter your [email or web address] Access Code exactly as it appears below. You will not need to use this code after youve completed the sign-up process. If you do not sign up before the expiration date, you must request a new code. · [email or web address] Access Code: [email or web address] Expires: 1/10/2018  9:08 AM 
 
4. Enter the last four digits of your Social Security Number (xxxx) and Date of Birth (mm/dd/yyyy) as indicated and click Submit. You will be taken to the next sign-up page. 5. Create a [email or web address] ID. This will be your [email or web address] login ID and cannot be changed, so think of one that is secure and easy to remember. 6. Create a [email or web address] password. You can change your password at any time. 7. Enter your Password Reset Question and Answer. This can be used at a later time if you forget your password. 8. Enter your e-mail address. You will receive e-mail notification when new information is available in [address] 9. Click Sign Up. You can now view and download portions of your medical record. 10. Click the Download Summary menu link to download a portable copy of your medical information. If you have questions, please visit the Frequently Asked Questions section of the [email or web address] website. Remember, [email or web address] is NOT to be used for urgent needs. For medical emergencies, dial 911. Now available from your iPhone and Android! General Information Please provide this summary of care documentation to your next provider. Patient Signature:  ____________________________________________________________ Date:  ____________________________________________________________  
  
Josph Perfect Provider Signature:  ____________________________________________________________ Date:  ____________________________________________________________

## 2017-10-12 NOTE — CONSULTS
Gastroenterology Consult     Referring Physician: Abel Akbar    Consult Date: 10/12/2017     Subjective:     Chief Complaint: Abnormal imaging     History of Present Illness: Lashon Sherman Person is a 78 y.o. male who is seen in consultation for abnormal esophageal imaging and dysphagia. Patient was evaluated and examined in the office. Please see scanned note. No interval changes in medical status or examination. Past Medical History:   Diagnosis Date    Essential hypertension, malignant 8/3/2010    Leg pain 8/3/2010     History reviewed. No pertinent surgical history. Family History   Problem Relation Age of Onset    Heart Disease Mother     Lung Disease Father      Social History   Substance Use Topics    Smoking status: Current Every Day Smoker     Packs/day: 1.00     Types: Cigarettes     Start date: 1/17/2017    Smokeless tobacco: Never Used      Comment: Restarted in Jan 2017    Alcohol use 6.6 oz/week     8 Standard drinks or equivalent, 3 Shots of liquor per week      Comment: Drinks daily      No Known Allergies  Current Facility-Administered Medications   Medication Dose Route Frequency    lidocaine (PF) (XYLOCAINE) 10 mg/mL (1 %) injection 0.1 mL  0.1 mL SubCUTAneous PRN    lactated Ringers infusion  75 mL/hr IntraVENous CONTINUOUS        Review of Systems:  A detailed 10 organ review of systems is obtained with pertinent positives as listed in the History of Present Illness and Past Medical History. All others are negative. Objective:     Physical Exam:  Visit Vitals    /69    Pulse 86    Temp 96.6 °F (35.9 °C)    Resp 16    Ht 5' 9\" (1.753 m)    Wt 70.3 kg (155 lb)    SpO2 100%    BMI 22.89 kg/m2        HEENT: Sclerae anicteric. Cardiovascular: Regular rate and rhythm. Respiratory:  Comfortable breathing with no accessory muscle use. GI:  Abdomen nondistended, soft, and nontender. Neurological:  Gross memory appears intact.   Patient is alert and oriented.       Assessment/Plan:     EGD as planned

## 2017-10-12 NOTE — ANESTHESIA PREPROCEDURE EVALUATION
Anesthetic History   No history of anesthetic complications            Review of Systems / Medical History  Patient summary reviewed and pertinent labs reviewed    Pulmonary          Smoker         Neuro/Psych   Within defined limits           Cardiovascular    Hypertension                   GI/Hepatic/Renal                Endo/Other      Hypothyroidism  Arthritis     Other Findings   Comments:   Risk Factors for Postoperative nausea/vomiting:       History of postoperative nausea/vomiting? NO       Female? NO       Motion sickness? NO       Intended opioid administration for postoperative analgesia? NO      Smoking Abstinence  Current Smoker? YES  Elective Surgery? YES  Seen preoperatively by anesthesiologist or proxy prior to day of surgery? YES  Pt abstained from smoking 24 hours prior to anesthesia?  NO           Physical Exam    Airway  Mallampati: III  TM Distance: 4 - 6 cm  Neck ROM: decreased range of motion   Mouth opening: Diminished (comment)     Cardiovascular    Rhythm: irregular  Rate: normal         Dental    Dentition: Poor dentition and Loose teeth     Pulmonary  Breath sounds clear to auscultation               Abdominal  GI exam deferred       Other Findings            Anesthetic Plan    ASA: 3  Anesthesia type: MAC            Anesthetic plan and risks discussed with: Patient

## 2017-10-12 NOTE — DISCHARGE INSTRUCTIONS
Upper GI Endoscopy: What to Expect at [address] [place] [address]  After you have an endoscopy, you will stay at the hospital or clinic for 1 to 2 hours. This will allow the medicine to wear off. You will be able to go home after your doctor or nurse checks to make sure you are not having any problems. You may have to stay overnight if you had treatment during the test. You may have a sore throat for a day or two after the test.  This care sheet gives you a general idea about what to expect after the test.  How can you care for yourself at home? Activity  · Rest as much as you need to after you go home. · You should be able to go back to your usual activities the day after the test.  Diet  · Follow your doctor's directions for eating after the test.  · Drink plenty of fluids (unless your doctor has told you not to). Medications  · If you have a sore throat the day after the test, use an over-the-counter spray to numb your throat. Follow-up care is a key part of your treatment and safety. Be sure to make and go to all appointments, and call your doctor if you are having problems. It's also a good idea to know your test results and keep a list of the medicines you take. When should you call for help? Call 911 anytime you think you may need emergency care. For example, call if:  · You passed out (lost consciousness). · You cough up blood. · You vomit blood or what looks like coffee grounds. · You pass maroon or very bloody stools. Call your doctor now or seek immediate medical care if:  · You have trouble swallowing. · You have belly pain. · Your stools are black and tarlike or have streaks of blood. · You are sick to your stomach or cannot keep fluids down. Watch closely for changes in your health, and be sure to contact your doctor if:  · Your throat still hurts after a day or two. · You do not get better as expected. Where can you learn more? Go to http://patrica.info/.   Enter J454 in the search box to learn more about \"Upper GI Endoscopy: What to Expect at Home. \"  Current as of: August 9, 2016  Content Version: 11.3  © 4000-9275 [employer]. Care instructions adapted under license by [employer] (which disclaims liability or warranty for this information). If you have questions about a medical condition or this instruction, always ask your healthcare professional. [place] [address] any warranty or liability for your use of this information. DISCHARGE SUMMARY from Nurse    The following personal items are in your possession at time of discharge:    Dental Appliances: None  Visual Aid: None                            PATIENT INSTRUCTIONS:    After general anesthesia or intravenous sedation, for 24 hours or while taking prescription Narcotics:  · Limit your activities  · Do not drive and operate hazardous machinery  · Do not make important personal or business decisions  · Do  not drink alcoholic beverages  · If you have not urinated within 8 hours after discharge, please contact your surgeon on call. Report the following to your surgeon:  · Excessive pain, swelling, redness or odor of or around the surgical area  · Temperature over 100.5  · Nausea and vomiting lasting longer than 4 hours or if unable to take medications  · Any signs of decreased circulation or nerve impairment to extremity: change in color, persistent  numbness, tingling, coldness or increase pain  · Any questions        What to do at Home:  Recommended activity: Activity as tolerated and no driving for today              These are general instructions for a healthy lifestyle:    No smoking/ No tobacco products/ Avoid exposure to second hand smoke    Surgeon General's Warning:  Quitting smoking now greatly reduces serious risk to your health.     Obesity, smoking, and sedentary lifestyle greatly increases your risk for illness    A healthy diet, regular physical exercise & weight monitoring are important for maintaining a healthy lifestyle    You may be retaining fluid if you have a history of heart failure or if you experience any of the following symptoms:  Weight gain of 3 pounds or more overnight or 5 pounds in a week, increased swelling in our hands or feet or shortness of breath while lying flat in bed. Please call your doctor as soon as you notice any of these symptoms; do not wait until your next office visit. Recognize signs and symptoms of STROKE:    F-face looks uneven    A-arms unable to move or move unevenly    S-speech slurred or non-existent    T-time-call 911 as soon as signs and symptoms begin-DO NOT go       Back to bed or wait to see if you get better-TIME IS BRAIN. Warning Signs of HEART ATTACK     Call 911 if you have these symptoms:   Chest discomfort. Most heart attacks involve discomfort in the center of the chest that lasts more than a few minutes, or that goes away and comes back. It can feel like uncomfortable pressure, squeezing, fullness, or pain.  Discomfort in other areas of the upper body. Symptoms can include pain or discomfort in one or both arms, the back, neck, jaw, or stomach.  Shortness of breath with or without chest discomfort.  Other signs may include breaking out in a cold sweat, nausea, or lightheadedness. Don't wait more than five minutes to call 911 - MINUTES MATTER! Fast action can save your life. Calling 911 is almost always the fastest way to get lifesaving treatment. Emergency Medical Services staff can begin treatment when they arrive -- up to an hour sooner than if someone gets to the hospital by car. The discharge information has been reviewed with the patient. The patient verbalized understanding. Discharge medications reviewed with the patient and appropriate educational materials and side effects teaching were provided. Patient armband removed and given to patient to take home.   Patient was informed of the privacy risks if armband lost or stolen

## 2017-10-12 NOTE — H&P
Endoscopy Procedure Note    Patient: Zoraida De La Rosa Person MRN: [id number]  SSN: [id number]    YOB: 1950  Age: 78 y.o. Sex: male      Date/Time:  10/12/2017 11:44 AM    Esophagogastroduodenoscopy (EGD) Procedure Note    Procedure: Esophagogastroduodenoscopy with biopsy    IMPRESSION:   1. Large friable esophageal mass extending from 32-36 cm of insertion. The mass is nearly obstructing. Multiple biopsies were performed. GE junction is located a 40 cm. 2. Normal appearing stomach  3. Normal appearing duodenum    RECOMMENDATIONS:  1. Resume regular diet. 2. Omeprazole 40 mg daily  3. Will set up patient for esophageal stent placement. 4. CT scan of chest has been ordered. It was declined by insurance on first attempt  5. Referral to radiation oncology and oncology. Indication: Dyphagia/odynophagia  :  Lety Hicks MD  Assistants: Endoscopy RN-1: Sara Delgado RN  Endoscopy RN-2: Raina Cochran RN    Referring Provider:   Natasha Thomas DO  History: The history and physical exam were reviewed and updated. Endoscope: Olympus GIF-190 diagnostic endoscope  Extent of Exam: second portion of the duodenum  ASA: ASA 2 - Patient with mild systemic disease with no functional limitations  Anethesia/Sedation:  MAC anesthesia Propofol    Description of the procedure: The procedure was discussed with the patient including risks, benefits, alternatives including risks of iv sedation, bleeding, perforation and aspiration. A safety timeout was performed. The patient was placed in the left lateral decubitus position. A bite block was placed. The patient was given incremental doses of intravenous sedation until moderate sedation was achieved. The patients vital signs were monitored at all times including heart rate/rhythm, blood pressure and oxygen saturation. The endoscope was then passed under direct visualization to the second portion of the duodenum.   The endoscope was then slowly withdrawn while visualizing the mucosa. In the stomach a retroflexion was performed and gastric fundus and cardia visualized. The endoscope was then slowly withdrawn. The patient was then transferred to recovery in stable condition. Findings:   1. Large friable esophageal mass extending from 32-36 cm of insertion. The mass is nearly obstructing. Multiple biopsies were performed. GE junction is located a 40 cm. 2. Normal appearing stomach  3. Normal appearing duodenum    Specimens:   ID Type Source Tests Collected by Time Destination   1 : Bx esophageal mass r/o malignancy Preservative Esophagus  Kae Vides MD 10/12/2017 1141 Pathology               Complications:   None; patient tolerated the procedure well.     EBL:Minimal    Discharge disposition:  Home in the company of  when able to ambulate    Kae Vides MD  October 12, 2017  11:44 AM

## 2017-10-12 NOTE — ANESTHESIA POSTPROCEDURE EVALUATION
Post-Anesthesia Evaluation & Assessment    Visit Vitals    /81    Pulse 97    Temp 35.9 °C (96.6 °F)    Resp 16    Ht 5' 9\" (1.753 m)    Wt 70.3 kg (155 lb)    SpO2 99%    BMI 22.89 kg/m2       Nausea/Vomiting: no nausea and no vomiting    Post-operative hydration adequate. Pain score (VAS): 0    Mental status & Level of consciousness: alert and oriented x 3    Neurological status: moves all extremities, sensation grossly intact    Pulmonary status: airway patent, no supplemental oxygen required    Complications related to anesthesia: none    Patient has met all discharge requirements.     Additional comments:        Maria M Thompson, CRNA

## 2017-10-17 ENCOUNTER — HOSPITAL ENCOUNTER (OUTPATIENT)
Dept: RADIATION THERAPY | Age: 67
Discharge: HOME | End: 2017-10-17
Payer: MEDICARE

## 2017-10-17 ENCOUNTER — OFFICE VISIT (OUTPATIENT)
Dept: ONCOLOGY | Age: 67
End: 2017-10-17

## 2017-10-17 ENCOUNTER — HOSPITAL ENCOUNTER (OUTPATIENT)
Dept: INFUSION THERAPY | Age: 67
Discharge: HOME | End: 2017-10-17
Payer: MEDICARE

## 2017-10-17 VITALS
BODY MASS INDEX: 23.25 KG/M2 | RESPIRATION RATE: 17 BRPM | TEMPERATURE: 97.6 F | SYSTOLIC BLOOD PRESSURE: 112 MMHG | HEART RATE: 89 BPM | OXYGEN SATURATION: 100 % | HEIGHT: 69 IN | DIASTOLIC BLOOD PRESSURE: 81 MMHG | WEIGHT: 157 LBS

## 2017-10-17 VITALS
DIASTOLIC BLOOD PRESSURE: 81 MMHG | TEMPERATURE: 97.6 F | HEART RATE: 89 BPM | RESPIRATION RATE: 17 BRPM | OXYGEN SATURATION: 100 % | SYSTOLIC BLOOD PRESSURE: 112 MMHG

## 2017-10-17 DIAGNOSIS — C15.9 MALIGNANT NEOPLASM OF ESOPHAGUS, UNSPECIFIED LOCATION (HCC): Primary | ICD-10-CM

## 2017-10-17 LAB
ALBUMIN SERPL-MCNC: 3.8 G/DL (ref 3.4–5)
ALBUMIN/GLOB SERPL: 0.8 {RATIO} (ref 0.8–1.7)
ALP SERPL-CCNC: 54 U/L (ref 45–117)
ALT SERPL-CCNC: 18 U/L (ref 16–61)
ANION GAP SERPL CALC-SCNC: 7 MMOL/L (ref 3–18)
AST SERPL-CCNC: 9 U/L (ref 15–37)
BASO+EOS+MONOS # BLD AUTO: 0.4 K/UL (ref 0–2.3)
BASO+EOS+MONOS # BLD AUTO: 5 % (ref 0.1–17)
BILIRUB SERPL-MCNC: 0.3 MG/DL (ref 0.2–1)
BUN SERPL-MCNC: 6 MG/DL (ref 7–18)
BUN/CREAT SERPL: 7 (ref 12–20)
CALCIUM SERPL-MCNC: 9.1 MG/DL (ref 8.5–10.1)
CHLORIDE SERPL-SCNC: 102 MMOL/L (ref 100–108)
CO2 SERPL-SCNC: 27 MMOL/L (ref 21–32)
CREAT SERPL-MCNC: 0.84 MG/DL (ref 0.6–1.3)
DIFFERENTIAL METHOD BLD: ABNORMAL
ERYTHROCYTE [DISTWIDTH] IN BLOOD BY AUTOMATED COUNT: 14.6 % (ref 11.5–14.5)
GLOBULIN SER CALC-MCNC: 4.5 G/DL (ref 2–4)
GLUCOSE SERPL-MCNC: 139 MG/DL (ref 74–99)
HCT VFR BLD AUTO: 39.9 % (ref 36–48)
HGB BLD-MCNC: 12.8 G/DL (ref 12–16)
LYMPHOCYTES # BLD: 1.6 K/UL (ref 1.1–5.9)
LYMPHOCYTES NFR BLD: 19 % (ref 14–44)
MCH RBC QN AUTO: 28 PG (ref 25–35)
MCHC RBC AUTO-ENTMCNC: 32.1 G/DL (ref 31–37)
MCV RBC AUTO: 87.3 FL (ref 78–102)
NEUTS SEG # BLD: 6.6 K/UL (ref 1.8–9.5)
NEUTS SEG NFR BLD: 76 % (ref 40–70)
PLATELET # BLD AUTO: 354 K/UL (ref 135–420)
POTASSIUM SERPL-SCNC: 3.6 MMOL/L (ref 3.5–5.5)
PROT SERPL-MCNC: 8.3 G/DL (ref 6.4–8.2)
RBC # BLD AUTO: 4.57 M/UL (ref 4.1–5.1)
SODIUM SERPL-SCNC: 136 MMOL/L (ref 136–145)
WBC # BLD AUTO: 8.6 K/UL (ref 4.5–13)

## 2017-10-17 PROCEDURE — 85025 COMPLETE CBC W/AUTO DIFF WBC: CPT | Performed by: INTERNAL MEDICINE

## 2017-10-17 PROCEDURE — 99211 OFF/OP EST MAY X REQ PHY/QHP: CPT

## 2017-10-17 PROCEDURE — 85049 AUTOMATED PLATELET COUNT: CPT | Performed by: INTERNAL MEDICINE

## 2017-10-17 PROCEDURE — 80053 COMPREHEN METABOLIC PANEL: CPT | Performed by: INTERNAL MEDICINE

## 2017-10-17 PROCEDURE — 36415 COLL VENOUS BLD VENIPUNCTURE: CPT

## 2017-10-17 NOTE — PROGRESS NOTES
[facility]:     Patient: Alwin Saint Person Age: 78 y.o. Sex: male    YOB: 1950 Admit Date: (Not on file) PCP: Danelle DO Vinnie   MRN: [id number]  CSN: [id number]          Patient Active Problem List   Diagnosis Code    Essential hypertension, malignant I10    Arthritis M19.90    Proteinuria R80.9    Hyperthyroidism E05.90       Assessment/ Plan:     1.) Distal Esophageal Cancer  -Pathology results reviewedSquamous Cell Carcinoma  -Status post EGD biopsy per Dr. Toyin Johnson 10/12/2017  -Esophageal stent placement per Dr. Toyin Johnson  -Patient referred to radiation oncology[facility]  -Obtain complete staging workup with FDG PET CT scan  -MediPort placement per Dr. Gerson Mccall  -Consider feeding tube (PEG) placement to maintain nutrition  -Plan for concurrent chemotherapy/radiotherapy  -Return to clinic in approximately 2 weeks to reevaluate clinically and for further medical decision-making. Pathology report 10/12/2017:  FINAL DIAGNOSIS:   ESOPHAGEAL MASS, BIOPSY:   INVASIVE, POORLY DIFFERENTIATED SQUAMOUS CELL CARCINOMA. GROSS DESCRIPTION:     The specimen is received in formalin in a container labeled with the patient's name, Mariela Solid, and biopsy esophageal mass, rule out malignancy and consists of two soft tissue fragments, one pale tan and the other pink to dark red, ranging from 0.3 to 0.5 cm in greatest dimension. The specimen is submitted in toto in cassette A1. (cd)   DMM/clr         I have discussed the diagnosis with the patient and the intended plan as seen in the above orders. I have reviewed the plan of care with the patient, accepted their input and they are in agreement with the treatment goals. Patient has provided input and agrees with goals.       History:   Alwin Saint Person is a 78 y.o. male presenting today for:     Distal Esophageal Cancer  -Pathology results reviewedSquamous cell carcinoma  -Status post EGD biopsy per  Yazmin Malik  -Lost 15 pounds in past 6 months  -No f/c/s/cp or SOB. -Complaining of worsening dysphagia over the past several months. Current Outpatient Prescriptions   Medication Sig Dispense Refill    oxyCODONE IR (ROXICODONE) 5 mg immediate release tablet Take 1 Tab by mouth every eight (8) hours as needed for Pain. Max Daily Amount: 15 mg. 50 Tab 0    omeprazole (PRILOSEC) 40 mg capsule Take 40 mg by mouth daily.  lisinopril (PRINIVIL, ZESTRIL) 20 mg tablet Take 1 Tab by mouth daily. 90 Tab 4    amLODIPine (NORVASC) 5 mg tablet Take 1 Tab by mouth daily. 90 Tab 4       Objective:   VS:    Vitals:    10/17/17 1317   Resp: 17   SpO2: 100%   Weight: 71.2 kg (157 lb)   Height: 5' 9\" (1.753 m)     Physical Exam:     Constitutional:  no acute distress and alert and oriented x 3    HENT:  atraumatic   Eyes:  EOMI, PERRLA   Neck:  No palpable masses   Cardiovascular:  heart sounds normal, S1, S2   Pulmonary/Chest Wall:  breath sounds are clear   Abdominal:  Non tender, no palpable masses       Musculoskeletal:  No deformities   Skin:  Normal and no rashes or lesions    Peripheral Vascular:  Pulses palpable       Review of Systems: The remainder of 12 point ROS was otherwise negative except for what was reported in the CC/HPI:      No results found for this or any previous visit (from the past 168 hour(s)). Past Medical History:   Diagnosis Date    Essential hypertension, malignant 8/3/2010    Leg pain 8/3/2010       No past surgical history on file. Social History     Social History    Marital status:      Spouse name: N/A    Number of children: N/A    Years of education: N/A     Occupational History    Not on file.      Social History Main Topics    Smoking status: Current Every Day Smoker     Packs/day: 1.00     Types: Cigarettes     Start date: 1/17/2017    Smokeless tobacco: Never Used      Comment: Restarted in Jan 2017    Alcohol use 6.6 oz/week     8 Standard drinks or equivalent, 3 Shots of liquor per week      Comment: Drinks daily    Drug use: No    Sexual activity: No     Other Topics Concern    Not on file     Social History Narrative       Family History   Problem Relation Age of Onset    Heart Disease Mother     Lung Disease Father        No Known Allergies    Follow-up Disposition: Not on File    Krupa Gonzalez MD, MPH Hematology-Medical Oncology  October 17, 2017 1:16 PM

## 2017-10-17 NOTE — MR AVS SNAPSHOT
Visit Information Date & Time Provider Department Dept. Phone Encounter #  
 10/17/2017  1:00 PM Peña Canseco MD [facility] [phone] [id number] Follow-up Instructions Return in about 2 weeks (around 10/31/2017), or if symptoms worsen or fail to improve, for reeval.. Routing History Your Appointments 10/18/2017  1:30 PM  
Follow Up with Mina Goff MD  
[address]) Appt Note: mediport placement/ referred Dr. Yamilex Morse Medicare [address] [facility] [address]  
  
   
 [address] [facility] [address]  
  
    
 10/19/2017  2:00 PM  
Office Visit with Oksana Moreau,  [address] [place] [address]) Appt Note: Return in about 2 weeks (around 10/23/2017) for EOV get records from dr Dejon rea; pt r/s from 10/23/17  
 [address] [facility] [address] [address]  
[phone]  
  
   
 [address] [facility]  
  
    
 10/30/2017  1:30 PM  
Follow Up with Peña Canseco MD  
[facility] [address] [place] [address]) Appt Note: 2 week f-up [address] [facility] [address]  
  
   
 [address] Upcoming Health Maintenance Date Due Pneumococcal 65+ Low/Medium Risk (2 of 2 - PPSV23) 11/19/2016 INFLUENZA AGE 9 TO ADULT 8/1/2017 GLAUCOMA SCREENING Q2Y 7/14/2018 MEDICARE YEARLY EXAM 8/16/2018 COLONOSCOPY 6/6/2021 DTaP/Tdap/Td series (2 - Td) 8/15/2027 Allergies as of 10/17/2017  Review Complete On: 10/17/2017 By: Peña Canseco MD  
 No Known Allergies Current Immunizations  Reviewed on 1/4/2017 Name Date Influenza High Dose Vaccine PF 1/4/2017, 11/19/2015 Influenza Vaccine 11/18/2014, 2/4/2013 Influenza Vaccine Split 11/17/2011, 11/22/2010 Pneumococcal Conjugate (PCV-13) 11/19/2015 TD Vaccine 8/3/2006 Tdap 8/15/2017 ZZZ-RETIRED (DO NOT USE) Pneumococcal Vaccine (Unspecified Type) 11/22/2010 Not reviewed this visit You Were Diagnosed With   
  
 Codes Comments Malignant neoplasm of esophagus, unspecified location [facility])    -  Primary ICD-10-CM: C15.9 ICD-9-CM: 150.9 Vitals BP Pulse Temp Resp Height(growth percentile) Weight(growth percentile) 112/81 (BP 1 Location: Right arm, BP Patient Position: Sitting) 89 97.6 °F (36.4 °C) (Oral) 17 5' 9\" (1.753 m) 157 lb (71.2 kg) SpO2 BMI Smoking Status 100% 23.18 kg/m2 Current Every Day Smoker Vitals History BMI and BSA Data Body Mass Index Body Surface Area  
 23.18 kg/m 2 1.86 m 2 Preferred Pharmacy Pharmacy Name Phone [facility] [address], [address] [place] [address] [phone] Your Updated Medication List  
  
   
This list is accurate as of: 10/17/17  1:51 PM.  Always use your most recent med list. amLODIPine 5 mg tablet Commonly known as:  Duarte Eric Take 1 Tab by mouth daily. lisinopril 20 mg tablet Commonly known as:  Hayde Reasons Take 1 Tab by mouth daily. omeprazole 40 mg capsule Commonly known as:  PRILOSEC Take 40 mg by mouth daily. oxyCODONE IR 5 mg immediate release tablet Commonly known as:  Franceen Ly Take 1 Tab by mouth every eight (8) hours as needed for Pain. Max Daily Amount: 15 mg. We Performed the Following REFERRAL TO RADIATION ONCOLOGY [REF95 Custom] Comments:  
 Please evaluate patient for esophageal CA. REFERRAL TO SURGERY [[id number] Custom] Comments:  
 Please evaluate patient for mediport placement Follow-up Instructions Return in about 2 weeks (around 10/31/2017), or if symptoms worsen or fail to improve, for reeval.. To-Do List   
 10/17/2017 Lab:  CBC WITH AUTOMATED DIFF   
  
 10/17/2017 Lab: METABOLIC PANEL, COMPREHENSIVE   
  
 10/17/2017 2:00 PM  
  Appointment with [address] at [facility] OP INFUSION [facility] ([phone]) 10/17/2017 3:30 PM  
  Appointment with [address] at [facility] RADIATION THERAPY ([phone]) This appointment time is simply a place [place] and may not reflect the true appointment time. If patient does not know the appointment time given to them at the time of scheduling, they should contact the Radiation Therapy department for detail. 10/22/2017 Imaging:  CT CHEST ABD PELV W WO CONT Referral Information Referral ID Referred By Referred To  
  
 [id number] Oxana Allen MD   
   [id number] [facility] [address] [facility] Phone: [phone] Fax: [phone] Visits Status Start Date End Date 1 New Request 10/17/17 10/17/18 If your referral has a status of pending review or denied, additional information will be sent to support the outcome of this decision. Referral ID Referred By Referred To  
 [id number] Betsey May MD  
   [id number] [facility] [address] [place] [address] Phone: [phone] Fax: [phone] Visits Status Start Date End Date 1 New Request 10/17/17 10/17/18 If your referral has a status of pending review or denied, additional information will be sent to support the outcome of this decision. Patient Instructions Stopping Smoking: Care Instructions Your Care Instructions Cigarette smokers crave the nicotine in cigarettes. Giving it up is much harder than simply changing a habit. Your body has to stop craving the nicotine. It is hard to quit, but you can do it. There are many tools that people use to quit smoking. You may find that combining tools works best for you. There are several steps to quitting. First you get ready to quit.  Then you get support to help you. After that, you learn new skills and behaviors to become a nonsmoker. For many people, a necessary step is getting and using medicine. Your doctor will help you set up the plan that best meets your needs. You may want to attend a smoking cessation program to help you quit smoking. When you choose a program, look for one that has proven success. Ask your doctor for ideas. You will greatly increase your chances of success if you take medicine as well as get counseling or join a cessation program. 
Some of the changes you feel when you first quit tobacco are uncomfortable. Your body will miss the nicotine at first, and you may feel short-tempered and grumpy. You may have trouble sleeping or concentrating. Medicine can help you deal with these symptoms. You may struggle with changing your smoking habits and rituals. The last step is the tricky one: Be prepared for the smoking urge to continue for a time. This is a lot to deal with, but keep at it. You will feel better. Follow-up care is a key part of your treatment and safety. Be sure to make and go to all appointments, and call your doctor if you are having problems. Its also a good idea to know your test results and keep a list of the medicines you take. How can you care for yourself at home? · Ask your family, friends, and coworkers for support. You have a better chance of quitting if you have help and support. · Join a support group, such as Nicotine Anonymous, for people who are trying to quit smoking. · Consider signing up for a smoking cessation program, such as the American Lung Association's Freedom from Smoking program. 
· Set a quit date. Pick your date carefully so that it is not right in the middle of a big deadline or stressful time. Once you quit, do not even take a puff. Get rid of all ashtrays and lighters after your last cigarette. Clean your house and your clothes so that they do not smell of smoke. · Learn how to be a nonsmoker. Think about ways you can avoid those things that make you reach for a cigarette. ¨ Avoid situations that put you at greatest risk for smoking. For some people, it is hard to have a drink with friends without smoking. For others, they might skip a coffee break with coworkers who smoke. ¨ Change your daily routine. Take a different route to work or eat a meal in a different place. · Cut down on stress. Calm yourself or release tension by doing an activity you enjoy, such as reading a book, taking a hot bath, or gardening. · Talk to your doctor or pharmacist about nicotine replacement therapy, which replaces the nicotine in your body. You still get nicotine but you do not use tobacco. Nicotine replacement products help you slowly reduce the amount of nicotine you need. These products come in several forms, many of them available over-the-counter: ¨ Nicotine patches ¨ Nicotine gum and lozenges ¨ Nicotine inhaler · Ask your doctor about bupropion (Wellbutrin) or varenicline (Chantix), which are prescription medicines. They do not contain nicotine. They help you by reducing withdrawal symptoms, such as stress and anxiety. · Some people find hypnosis, acupuncture, and massage helpful for ending the smoking habit. · Eat a healthy diet and get regular exercise. Having healthy habits will help your body move past its craving for nicotine. · Be prepared to keep trying. Most people are not successful the first few times they try to quit. Do not get mad at yourself if you smoke again. Make a list of things you learned and think about when you want to try again, such as next week, next month, or next year. Where can you learn more? Go to http://hima-wilton.info/. Enter V598 in the search box to learn more about \"Stopping Smoking: Care Instructions. \" Current as of: March 20, 2017 Content Version: 11.3 © 0769-0345 Healthwise, Incorporated. Care instructions adapted under license by [employer] (which disclaims liability or warranty for this information). If you have questions about a medical condition or this instruction, always ask your healthcare professional. [address] any warranty or liability for your use of this information. Stopping Smoking: Care Instructions Your Care Instructions Cigarette smokers crave the nicotine in cigarettes. Giving it up is much harder than simply changing a habit. Your body has to stop craving the nicotine. It is hard to quit, but you can do it. There are many tools that people use to quit smoking. You may find that combining tools works best for you. There are several steps to quitting. First you get ready to quit. Then you get support to help you. After that, you learn new skills and behaviors to become a nonsmoker. For many people, a necessary step is getting and using medicine. Your doctor will help you set up the plan that best meets your needs. You may want to attend a smoking cessation program to help you quit smoking. When you choose a program, look for one that has proven success. Ask your doctor for ideas. You will greatly increase your chances of success if you take medicine as well as get counseling or join a cessation program. 
Some of the changes you feel when you first quit tobacco are uncomfortable. Your body will miss the nicotine at first, and you may feel short-tempered and grumpy. You may have trouble sleeping or concentrating. Medicine can help you deal with these symptoms. You may struggle with changing your smoking habits and rituals. The last step is the tricky one: Be prepared for the smoking urge to continue for a time. This is a lot to deal with, but keep at it. You will feel better. Follow-up care is a key part of your treatment and safety.  Be sure to make and go to all appointments, and call your doctor if you are having problems. Its also a good idea to know your test results and keep a list of the medicines you take. How can you care for yourself at home? · Ask your family, friends, and coworkers for support. You have a better chance of quitting if you have help and support. · Join a support group, such as Nicotine Anonymous, for people who are trying to quit smoking. · Consider signing up for a smoking cessation program, such as the American Lung Association's Freedom from Smoking program. 
· Set a quit date. Pick your date carefully so that it is not right in the middle of a big deadline or stressful time. Once you quit, do not even take a puff. Get rid of all ashtrays and lighters after your last cigarette. Clean your house and your clothes so that they do not smell of smoke. · Learn how to be a nonsmoker. Think about ways you can avoid those things that make you reach for a cigarette. ¨ Avoid situations that put you at greatest risk for smoking. For some people, it is hard to have a drink with friends without smoking. For others, they might skip a coffee break with coworkers who smoke. ¨ Change your daily routine. Take a different route to work or eat a meal in a different place. · Cut down on stress. Calm yourself or release tension by doing an activity you enjoy, such as reading a book, taking a hot bath, or gardening. · Talk to your doctor or pharmacist about nicotine replacement therapy, which replaces the nicotine in your body. You still get nicotine but you do not use tobacco. Nicotine replacement products help you slowly reduce the amount of nicotine you need. These products come in several forms, many of them available over-the-counter: ¨ Nicotine patches ¨ Nicotine gum and lozenges ¨ Nicotine inhaler · Ask your doctor about bupropion (Wellbutrin) or varenicline (Chantix), which are prescription medicines. They do not contain nicotine. They help you by reducing withdrawal symptoms, such as stress and anxiety. · Some people find hypnosis, acupuncture, and massage helpful for ending the smoking habit. · Eat a healthy diet and get regular exercise. Having healthy habits will help your body move past its craving for nicotine. · Be prepared to keep trying. Most people are not successful the first few times they try to quit. Do not get mad at yourself if you smoke again. Make a list of things you learned and think about when you want to try again, such as next week, next month, or next year. Where can you learn more? Go to http://himaNaurexwilton.info/. Enter A425 in the search box to learn more about \"Stopping Smoking: Care Instructions. \" Current as of: March 20, 2017 Content Version: 11.3 © 2810-0861 [employer]. Care instructions adapted under license by [employer] (which disclaims liability or warranty for this information). If you have questions about a medical condition or this instruction, always ask your healthcare professional. [address] any warranty or liability for your use of this information. Please provide this summary of care documentation to your next provider. Your primary care clinician is listed as [address]. If you have any questions after today's visit, please call [phone].

## 2017-10-17 NOTE — PROGRESS NOTES
Douglas Posadas is a 78 y.o. male who presents today to establish care. Pt scheduled for mediport placement and XRT today. Pt educated on smoking cessation, pt verbalized understanding. 1. Have you been to the ER, urgent care clinic since your last visit? Hospitalized since your last visit? NO    2. Have you seen or consulted any other health care providers outside of the [facility] since your last visit? Include any pap smears or colon screening. NO    Health Maintenance reviewed.     Health Maintenance Due   Topic Date Due    Pneumococcal 65+ Low/Medium Risk (2 of 2 - PPSV23) 11/19/2016    INFLUENZA AGE 9 TO ADULT  08/01/2017

## 2017-10-17 NOTE — PATIENT INSTRUCTIONS

## 2017-10-18 ENCOUNTER — OFFICE VISIT (OUTPATIENT)
Dept: SURGERY | Age: 67
End: 2017-10-18

## 2017-10-18 VITALS
BODY MASS INDEX: 22.81 KG/M2 | RESPIRATION RATE: 20 BRPM | HEART RATE: 78 BPM | SYSTOLIC BLOOD PRESSURE: 120 MMHG | HEIGHT: 69 IN | WEIGHT: 154 LBS | TEMPERATURE: 97.2 F | DIASTOLIC BLOOD PRESSURE: 80 MMHG

## 2017-10-18 DIAGNOSIS — C15.5 MALIGNANT NEOPLASM OF LOWER THIRD OF ESOPHAGUS (HCC): ICD-10-CM

## 2017-10-18 DIAGNOSIS — R13.19 ESOPHAGEAL DYSPHAGIA: Primary | ICD-10-CM

## 2017-10-18 NOTE — PROGRESS NOTES
Wendy Uriostegui is a 78 y.o. male for evaluation for Mediport placement. he has been diagnosed with squamous cell carcinoma of esophagus and requires long term central IV access for ongoing treatment. Past Medical History:   Diagnosis Date    Essential hypertension, malignant 8/3/2010    Leg pain 8/3/2010    Throat cancer ([facility] [address]        No past surgical history on file. Current Outpatient Prescriptions   Medication Sig Dispense Refill    oxyCODONE IR (ROXICODONE) 5 mg immediate release tablet Take 1 Tab by mouth every eight (8) hours as needed for Pain. Max Daily Amount: 15 mg. 50 Tab 0    omeprazole (PRILOSEC) 40 mg capsule Take 40 mg by mouth daily.  lisinopril (PRINIVIL, ZESTRIL) 20 mg tablet Take 1 Tab by mouth daily. 90 Tab 4    amLODIPine (NORVASC) 5 mg tablet Take 1 Tab by mouth daily. 90 Tab 4       No Known Allergies    Social History     Social History    Marital status:      Spouse name: N/A    Number of children: N/A    Years of education: N/A     Occupational History    Not on file.      Social History Main Topics    Smoking status: Current Every Day Smoker     Packs/day: 1.00     Types: Cigarettes     Start date: 1/17/2017    Smokeless tobacco: Never Used      Comment: Restarted in Jan 2017    Alcohol use 6.6 oz/week     3 Shots of liquor, 8 Standard drinks or equivalent per week      Comment: Drinks daily    Drug use: No    Sexual activity: No     Other Topics Concern    Not on file     Social History Narrative       Family history noncontributory    Review of Systems    ROS, positive where in bold:    General: fevers, chills, night sweats, fatigue, weight loss, weight gain    GI: abdominal pain, nausea, vomiting, change in bowel habits, dysphagia    Integumentary: dermatitis or abnormal moles    HEENT:  visual changes, vertigo, epistaxis, dysphagia, hoarseness    Cardiac: chest pain, palpitations, hypertension, edema,  varicosities    Resp:  cough, shortness of breath, wheezing, hemoptysis, snoring,  reactive airway disease    : hematuria, dysuria, frequency, urgency, nocturia, stress urinary incontinence    MSK: weakness, joint pain, arthritis    Endocrine: diabetes, thyroid disease, polyuria, polydipsia, polyphagia, poor wound healing, heat intolerance,cold intolerance    Lymph/Heme: anemia, bruising,  history of blood transfusions    Neuro: dizziness, headache, fainting, seizures, stroke    Psychiatry:  Anxiety, depression, psychosis         Objective:     Physical Exam:  Visit Vitals    /80 (BP 1 Location: Left arm, BP Patient Position: At rest)    Pulse 78    Temp 97.2 °F (36.2 °C) (Oral)    Resp 20    Ht 5' 9\" (1.753 m)    Wt 69.9 kg (154 lb)    BMI 22.74 kg/m2       General: Well developed, well nourished 78 y.o. male in no acute distress  ENMT: normocephalic, atraumatic mouth:clear, no overt lesions, oral mucosa pink and moist  Neck: supple, no masses, no adenopathy or carotid bruits, trachea midline  Skin: warm, smooth, dry and well perfused  Respiratory: clear to auscultation bilaterally, no wheeze, rhonchi or rales, excursions normal and symmetrical  Cardiovascular: Regular rate and rhythm, no murmurs, clicks, gallops or rubs, no edema or varicosities  Gastrointestinal: soft, nontender, nondistended, normoactive bowel sounds, no hernias, no hepatosplenomegaly  Musculoskeletal: warm, well-perfused, no tenderness or swelling, normal gait/station  Neuro: sensation and strength grossly intact and symmetrical  Psych: alert and oriented to person, place and time    Assessment:   Joaquin Alejandra is a 78 y.o. male in need of Mediport placement for long term central venous access. We have discussed the procedure, the risks and benefits and likely postoperative course. We have discussed risks including bleeding, infection, need for reoperation for repositioning, pneumothorax and vascular injury as well as risks of anesthesia.   Patient understands these risk and benefits and wishes to proceed.     Plan:     - scheduled for placement of mediport  -informed consent obtained  -preoperative antibiotics ordered

## 2017-10-18 NOTE — PROGRESS NOTES
Alisha Garcia is a 78 y.o. male who presents today with   Chief Complaint   Patient presents with    Follow-up     Pt presents today for mediport insertion consult              1. Have you been to the ER, urgent care clinic since your last visit? Hospitalized since your last visit? No    2. Have you seen or consulted any other health care providers outside of the [facility] since your last visit? Include any pap smears or colon screening.  No

## 2017-10-18 NOTE — MR AVS SNAPSHOT
Visit Information Date & Time Provider Department Dept. Phone Encounter #  
 10/18/2017  1:30 PM MD Rehana [facility] [phone] [id number] Your Appointments 10/19/2017  2:00 PM  
[place] [address] [facility] MED [facility]) Appt Note: Return in about 2 weeks (around 10/23/2017) for EOV get records from dr Rina Nageotte per rona; pt r/s from 10/23/17  
 [address] [address]  
[phone]  
  
   
 [address]  
  
    
 10/30/2017  1:30 PM  
Follow Up with Tessie Bloom MD  
[address] [place] [address] [facility]) Appt Note: 2 week f-up [address] [facility]  
  
   
 [address] Upcoming Health Maintenance Date Due Pneumococcal 65+ High/Highest Risk (2 of 2 - PPSV23) 1/14/2016 INFLUENZA AGE 9 TO ADULT 8/1/2017 GLAUCOMA SCREENING Q2Y 7/14/2018 MEDICARE YEARLY EXAM 8/16/2018 COLONOSCOPY 6/6/2021 DTaP/Tdap/Td series (2 - Td) 8/15/2027 Allergies as of 10/18/2017  Review Complete On: 10/18/2017 By: Marine Nye No Known Allergies Current Immunizations  Reviewed on 1/4/2017 Name Date Influenza High Dose Vaccine PF 1/4/2017, 11/19/2015 Influenza Vaccine 11/18/2014, 2/4/2013 Influenza Vaccine Split 11/17/2011, 11/22/2010 Pneumococcal Conjugate (PCV-13) 11/19/2015 TD Vaccine 8/3/2006 Tdap 8/15/2017 ZZZ-RETIRED (DO NOT USE) Pneumococcal Vaccine (Unspecified Type) 11/22/2010 Not reviewed this visit Vitals BP Pulse Temp Resp Height(growth percentile) Weight(growth percentile) 120/80 (BP 1 Location: Left arm, BP Patient Position: At rest) 78 97.2 °F (36.2 °C) (Oral) 20 5' 9\" (1.753 m) 154 lb (69.9 kg) BMI Smoking Status 22.74 kg/m2 Current Every Day Smoker BMI and BSA Data Body Mass Index Body Surface Area 22.74 kg/m 2 1.84 m 2 Preferred Pharmacy Pharmacy Name Phone [facility] [address], [place] [phone] Your Updated Medication List  
  
   
This list is accurate as of: 10/18/17  1:48 PM.  Always use your most recent med list. amLODIPine 5 mg tablet Commonly known as:  [place] Conine Take 1 Tab by mouth daily. lisinopril 20 mg tablet Commonly known as:  Sharon Lights Take 1 Tab by mouth daily. omeprazole 40 mg capsule Commonly known as:  PRILOSEC Take 40 mg by mouth daily. oxyCODONE IR 5 mg immediate release tablet Commonly known as:  Katelyn Boers Take 1 Tab by mouth every eight (8) hours as needed for Pain. Max Daily Amount: 15 mg. To-Do List   
 10/19/2017 2:30 PM  
  [facility] ([phone]) OUTSIDE FILMS  - Any outside films related to the study being scheduled should be brought with you on the day of the exam.  If this cannot be done there may be a delay in the reading of the study. MEDICATIONS  - Patient must bring a complete list of all medications currently taking to include prescriptions, over-the-counter meds, herbals, vitamins & any dietary supplements  GENERAL INSTRUCTIONS  1. Entire visit to the hospital will last about 2 Hours. 2. Eat a low carb/high protein diet the evening before your procedure. Stay away from food and drink that contains sugars the night before and ESPECIALLY the day of the test. 3. Do NOT eat food/chew gum/eat candy 6 hours prior to your procedure. The patient may drink all the plain water they want, up until the time of their appointment. 24-48 oz. of plain water is recommended within 6 hours prior to the procedure.  4. Do NOT take insulin or any other diabetic medication at least 6 hours before your procedure. 5. Take meds with water (except diabetic medications). 6. Avoid strenuous exercise 24 hours prior to your procedure. 7. Avoid chewing gum, playing video games or any activity that contains constant, repetitive movements the day of exam. 8. Do not take STEROIDS or DIURETICS (fluid pills) 12 hours prior to exam. 9. You should bring medications for pain, anxiety, or claustrophobia if you need them. If you take medications for anxiety or claustrophobia, a ride needs to come with you. 10. Materials for this procedure are ordered in advance and are time-sensitive. If you need to cancel or reschedule, you must call [phone] at least 48 hours prior to your appointment time. Staff will contact the facility to cancel the DOSE!! 11. Bring recent CT scan results from other facilities with you. 12. Wear comfortable clothing without metal (buttons/zippers, etc) and do not wear jewelry or body piercing's. 15. No children or pregnant women should accompany/escort the patient. 14. Patient MUST wait one week (7 days) after having any procedure that uses ORAL Contrast.  (No restrictions for IV Contrast) 15. If patient has undergone chemotherapy, it is preferable but NOT MANDATORY to wait 3-4 weeks post chemotherapy to allow for any metabolic changes to subside in order to obtain the most accurate results. 16. If patient has undergone radiation therapy, it is preferable but NOT MANDATORY to wait 3-4 weeks post radiation therapy to allow for any metabolic changes in the tissue in order to obtain the most accurate results. You must call 48 hours prior to your appointment time in order to cancel or reschedule your appointment. Please call Central Scheduling at [phone]. Please provide this summary of care documentation to your next provider. Your primary care clinician is listed as [id number] [facility]. If you have any questions after today's visit, please call [phone].

## 2017-10-19 ENCOUNTER — HOSPITAL ENCOUNTER (OUTPATIENT)
Dept: PET IMAGING | Age: 67
Discharge: HOME | End: 2017-10-19
Attending: RADIOLOGY
Payer: MEDICARE

## 2017-10-19 DIAGNOSIS — C15.9 ESOPHAGEAL CANCER (HCC): ICD-10-CM

## 2017-10-19 PROCEDURE — A9552 F18 FDG: HCPCS

## 2017-10-20 PROBLEM — K31.89 GASTRIC PERFORATION, ACUTE: Status: ACTIVE | Noted: 2017-10-20

## 2017-10-20 PROBLEM — K31.89 ACUTE GASTRIC PERFORATION: Status: ACTIVE | Noted: 2017-10-20

## 2017-10-23 ENCOUNTER — TELEPHONE (OUTPATIENT)
Dept: SURGERY | Age: 67
End: 2017-10-23

## 2017-10-23 NOTE — TELEPHONE ENCOUNTER
Mr. Judi Mccartney daughter, Katrine Osgood, called stating her dad, was hospitalized at [facility] and was not able to make it to his scheduled surgery (mediport placment) with Dr. Prince Berry today. Mrs. Morgan states the surgeons at [facility] had complications trying to implant a feeding tube and she want to know if Dr. Prince Berry would be able to implant the feeding tube and the mediport. I informed Ms. Airline I would inquire with Dr. Prince Berry and call her back.

## 2017-10-24 ENCOUNTER — PATIENT OUTREACH (OUTPATIENT)
Dept: FAMILY MEDICINE CLINIC | Age: 67
End: 2017-10-24

## 2017-10-24 NOTE — PROGRESS NOTES
Nurse Sigifredo 10 Follow Up Note  -10/20/17 scheduled admission at [facility] for EGD, micrperforation of the stomach, lapascopy  -10/23/17 Discharged to Home   -10/24/17 NN contact          DC summary per [facility] 10/23/17:  HISTORY OF PRESENT ILLNESS:  This is a 79-year-old gentleman with squamous cell esophageal cancer who had an attempted EGD on Friday. The patient had an EGD performed and the patient developed a large pneumoperitoneum. The patient was taken emergently to the operating room and had a laparoscopy performed. Laparoscopy could not find a definitive hole. The patient had the EGD once again performed by Dr. Luna Mendoza, which revealed a microperforation on the top lesser curvature of the stomach. It was felt to be very tiny and could only be found with positive pressure. The patient was treated with n.p.o. The patient then had his diet advanced. The patient now with no complaints, tolerating a diet. The patient now to be discharged home. The patient to follow up with 1-week for wound evaluation. The patient is to continue his current medications. The patient to reschedule his MediPort with his family doctor/surgeon up at [facility]. The patient will need a repeat EGD and PEG tube at some time once his microperforation is completely healed. 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 The patient is agreeable. We will be discharged home. START: 008   HMK: [id number]    Reached patient on phone and verified identity using name and . Introduced self/role and purpose of call. Pt stated: \"I'm alright. \" Pt c/o pain, but taking Roxicodone for pain relief. Pt denied any CP, SOB, bleeding, fever/chills, NVD, swelling, HA/dizziness. Pt reported appetite is good, he is able to tolerate solid foods, as long as he chews them.          Med Reconciliation:   Reviewed home medication and patient verbalized understanding self management of medications. Newly prescribed meds: n/a  Discontinued meds: n/a  Medication dosage changes: n/a        Reviewed red flags for:    CP, SOB, swelling, fever/chills, NVD, bleeding, HA/dizziness, increased pain and patient understands when to seek medical attention from PCP/ED. Pt would like NN to call his daughter regarding his appt. Will call his daughter Nasim Garcia regarding follow up appt. Reviewed plan of care. Patient has provided input to plan and agrees with goals. Answered any questions patient had. Provided contact info for any additional questions. RRAT Score: 8 low              This note will not be viewable in [employer]. Alwin Saint Person is a 78 y.o. male   This patient was received as a referral from [facility]            3       Total Score        3 Has Seen PCP in Last 6 Months (Yes=3, No=0)        Criteria that do not apply:    . Living with Significant Other. Assisted Living. LTAC. SNF. or   Rehab    Patient Length of Stay (>5 days = 3)    IP Visits Last 12 Months (1-3=4, 4=9, >4=11)    Pt. Coverage (Medicare=5 , Medicaid, or Self-Pay=4)    Charlson Comorbidity Score (Age + Comorbid Conditions)        Summary of patients top three problems:     Problem 1: failed EGD, repeat once microperforation healed     Problem 2: lack of nutrition, needs PEG tube per oncology     Problem 3: mediport placement for chemo/radiation for Squamous Cell Carcinoma    Patient's challenges to self management identified:  depression, lack of knowledge about disease, medication management and transportation  Patients motivational level on a scale of 0-10: 7  Medication Management:  good adherence and good understanding  Advance Care Planning:   Patient was offered the opportunity to discuss advance care planning:       Does patient have an Advance Directive:  yes   If no, did you provide information on Advance Care Planning?        Community Services, Referrals, and Durable Medical Equipment:     Follow up appointments:  10/30/17 with PCP Dr. Mira Brink, and Surgery with Dr. Veena Cleaning Attends follow-up appointments as directed.  Establish PCP relationships and regularly scheduled appointments.  Prevent complications post hospitalization. Patient verbalized understanding of all information discussed. Patient has this Nurse Navigators contact information for any further questions, concerns, or needs.

## 2017-10-24 NOTE — PROGRESS NOTES
Nurse Sigifredo 10 Follow Up Note  -10/20/17 scheduled admission at [facility] for EGD, micrperforation of the stomach, lapascopy  -10/23/17 Discharged to Home   -10/24/17 NN contact          Called and spoke to patient's daughter, Elmira Habermann. She informed me she talked to Dr. Aashish Hilliard and was advised to schedule an appt after patient follows up with PCP. She is aware of appt with Oncology Dr. Edi Taveras on 10/30/17 at 1:30pm and would like appt on same day. PCP f/u appt scheduled for 10/30/17 at 12:30pm with Dr. Candie Lozada. Patient's daughter verbalized understanding of discharge plan and special follow up with PCP/Oncology/Surgery.

## 2017-10-30 ENCOUNTER — OFFICE VISIT (OUTPATIENT)
Dept: FAMILY MEDICINE CLINIC | Age: 67
End: 2017-10-30

## 2017-10-30 ENCOUNTER — OFFICE VISIT (OUTPATIENT)
Dept: ONCOLOGY | Age: 67
End: 2017-10-30

## 2017-10-30 ENCOUNTER — OFFICE VISIT (OUTPATIENT)
Dept: SURGERY | Age: 67
End: 2017-10-30

## 2017-10-30 VITALS
SYSTOLIC BLOOD PRESSURE: 122 MMHG | HEART RATE: 100 BPM | HEIGHT: 69 IN | TEMPERATURE: 97 F | RESPIRATION RATE: 20 BRPM | BODY MASS INDEX: 22.36 KG/M2 | WEIGHT: 151 LBS | DIASTOLIC BLOOD PRESSURE: 81 MMHG

## 2017-10-30 VITALS
RESPIRATION RATE: 18 BRPM | HEIGHT: 69 IN | HEART RATE: 99 BPM | SYSTOLIC BLOOD PRESSURE: 137 MMHG | BODY MASS INDEX: 22.48 KG/M2 | TEMPERATURE: 96.3 F | OXYGEN SATURATION: 95 % | WEIGHT: 151.8 LBS | DIASTOLIC BLOOD PRESSURE: 76 MMHG

## 2017-10-30 VITALS
SYSTOLIC BLOOD PRESSURE: 137 MMHG | DIASTOLIC BLOOD PRESSURE: 76 MMHG | RESPIRATION RATE: 17 BRPM | HEART RATE: 99 BPM | HEIGHT: 69 IN | TEMPERATURE: 96.3 F | OXYGEN SATURATION: 100 % | BODY MASS INDEX: 22.36 KG/M2 | WEIGHT: 151 LBS

## 2017-10-30 DIAGNOSIS — C15.9 SQUAMOUS CELL ESOPHAGEAL CANCER (HCC): Primary | ICD-10-CM

## 2017-10-30 DIAGNOSIS — I10 ESSENTIAL HYPERTENSION, MALIGNANT: ICD-10-CM

## 2017-10-30 DIAGNOSIS — K22.89 ESOPHAGEAL MASS: ICD-10-CM

## 2017-10-30 DIAGNOSIS — C15.5 MALIGNANT NEOPLASM OF LOWER THIRD OF ESOPHAGUS (HCC): Primary | ICD-10-CM

## 2017-10-30 DIAGNOSIS — E05.90 HYPERTHYROIDISM: ICD-10-CM

## 2017-10-30 DIAGNOSIS — M19.90 ARTHRITIS: ICD-10-CM

## 2017-10-30 RX ORDER — OXYCODONE HYDROCHLORIDE 5 MG/1
5 TABLET ORAL
Qty: 30 TAB | Refills: 0 | Status: SHIPPED | OUTPATIENT
Start: 2017-10-30 | End: 2017-11-22 | Stop reason: CLARIF

## 2017-10-30 NOTE — MR AVS SNAPSHOT
Visit Information Date & Time Provider Department Dept. Phone Encounter #  
 10/30/2017  1:30 PM Lexis Manuel MD [facility] [phone] [id number] Follow-up Instructions Return in about 9 days (around 11/8/2017), or if symptoms worsen or fail to improve, for reeval.. Your Appointments 11/7/2017  2:30 PM  
[place] [address] [address]) Appt Note: Return in about 2 weeks (around 10/23/2017) for EOV get records from dr Olivia Bolanos per rona; pt r/s from 10/23/17; Pt R/s  
 [address] [facility] [address] [place]  
  
   
 [address] [place] [address] [address] [place]  
  
    
 11/17/2017  2:00 PM  
POST OP with Ruddy Lock MD  
[address] ([facility]) Appt Note: post op appt [address] [facility] [address] [facility] [address] [place]  
  
   
 [address] [facility] [address]  
  
    
 12/6/2017  2:00 PM  
[place] [address] [address]) Appt Note: Return in about 4 weeks (around 11/27/2017) for EOV. [address]  
  
   
 [address] Upcoming Health Maintenance Date Due Pneumococcal 65+ High/Highest Risk (2 of 2 - PPSV23) 1/14/2016 GLAUCOMA SCREENING Q2Y 7/14/2018 MEDICARE YEARLY EXAM 8/16/2018 COLONOSCOPY 6/6/2021 DTaP/Tdap/Td series (2 - Td) 8/15/2027 Allergies as of 10/30/2017  Review Complete On: 10/30/2017 By: Lexis Manuel MD  
 No Known Allergies Current Immunizations  Reviewed on 1/4/2017 Name Date Influenza High Dose Vaccine PF 1/4/2017, 11/19/2015 Influenza Vaccine 11/18/2014, 2/4/2013 Influenza Vaccine (Quad) PF 10/22/2017 10:21 PM  
 Influenza Vaccine Split 11/17/2011, 11/22/2010 Pneumococcal Conjugate (PCV-13) 11/19/2015 TD Vaccine 8/3/2006 Tdap 8/15/2017 ZZZ-RETIRED (DO NOT USE) Pneumococcal Vaccine (Unspecified Type) 11/22/2010 Not reviewed this visit You Were Diagnosed With   
  
 Codes Comments Esophageal mass     ICD-10-CM: K22.9 ICD-9-CM: 530.9 Vitals Smoking Status Current Every Day Smoker Preferred Pharmacy Pharmacy Name Phone [facility] [address], [address] [facility] [phone] Your Updated Medication List  
  
   
This list is accurate as of: 10/30/17  2:19 PM.  Always use your most recent med list. amLODIPine 5 mg tablet Commonly known as:  Mendoza Fanti Take 1 Tab by mouth daily. lisinopril 20 mg tablet Commonly known as:  Tatianna Antunez Take 1 Tab by mouth daily. omeprazole 40 mg capsule Commonly known as:  PRILOSEC Take 40 mg by mouth daily. oxyCODONE IR 5 mg immediate release tablet Commonly known as:  Stacy Ramal Take 1 Tab by mouth every eight (8) hours as needed for Pain. Max Daily Amount: 15 mg.  
  
  
  
  
Prescriptions Printed Refills  
 oxyCODONE IR (ROXICODONE) 5 mg immediate release tablet 0 Sig: Take 1 Tab by mouth every eight (8) hours as needed for Pain. Max Daily Amount: 15 mg.  
 Class: Print Route: Oral  
  
Follow-up Instructions Return in about 9 days (around 11/8/2017), or if symptoms worsen or fail to improve, for reeval.. To-Do List   
 10/30/2017 3:00 PM  
  Appointment with [facility] INFUSION NURSE 5 at [facility] OP INFUSION [facility] ([phone]) Please provide this summary of care documentation to your next provider. Your primary care clinician is listed as [id number] [facility]. If you have any questions after today's visit, please call [phone].

## 2017-10-30 NOTE — PROGRESS NOTES
Subjective:      Alwin Saint Person is a 75 y.o. male presents for followup care of esophageal cancer. He was supposed to get Mediport last week, however, he had a PEG attempted which resulted in a perforation and he required surgery. No earl perforation found, and he has since recovered well. He returns to office today to determine next steps. He is feeling fairly well, tolerating some liquids and soups and ensure shakes. He has no pain. Past Medical History:   Diagnosis Date    Essential hypertension, malignant 8/3/2010    Leg pain 8/3/2010    Throat cancer ([address]        No past surgical history on file. except as above    Current Outpatient Prescriptions   Medication Sig Dispense Refill    omeprazole (PRILOSEC) 40 mg capsule Take 40 mg by mouth daily.  lisinopril (PRINIVIL, ZESTRIL) 20 mg tablet Take 1 Tab by mouth daily. 90 Tab 4    amLODIPine (NORVASC) 5 mg tablet Take 1 Tab by mouth daily. 90 Tab 4    oxyCODONE IR (ROXICODONE) 5 mg immediate release tablet Take 1 Tab by mouth every eight (8) hours as needed for Pain.  Max Daily Amount: 15 mg. 50 Tab 0       No Known Allergies    ROS negative, except as stated in HPI      Objective:     Physical Exam:  Visit Vitals    /81 (BP 1 Location: Right arm, BP Patient Position: At rest)    Pulse 100    Temp 97 °F (36.1 °C) (Oral)    Resp 20    Ht 5' 9\" (1.753 m)    Wt 68.5 kg (151 lb)    BMI 22.3 kg/m2       General: Well developed, well nourished 75 y.o. male in no acute distress  ENMT: normocephalic, atraumatic mouth:clear, no overt lesions, oral mucosa pink and moist  Neck: supple, no masses, no adenopathy or carotid bruits, trachea midline  Skin: warm, smooth, dry and well perfused  Gastrointestinal: soft, nontender, nondistended, normoactive bowel sounds, no hernias, no hepatosplenomegaly, incisions healing well  Musculoskeletal: warm, well-perfused, no tenderness or swelling, normal gait/station  Neuro: sensation and strength grossly intact and symmetrical  Psych: alert and oriented to person, place and time          Assessment:     Devonte Villegas is a 75 y.o. male with esophageal cancer in need of mediport. Discussed with Dr Rhea Cruz; will place mediport next week. If still needs G tube, will place either laparoscopically or open in a separate operation to avoid risk of bacteremia affecting vascular access.         Plan:   -as above  -mediport scheduled for 11/7

## 2017-10-30 NOTE — PROGRESS NOTES
Malini Siddiqi is a 75 y.o. male who presents today with   Chief Complaint   Patient presents with    Other     Pt presents today to discuss mediport placement. 1. Have you been to the ER, urgent care clinic since your last visit? Hospitalized since your last visit? No    2. Have you seen or consulted any other health care providers outside of the [facility] since your last visit? Include any pap smears or colon screening.  No

## 2017-10-30 NOTE — PROGRESS NOTES
David Adorno is a 75 y.o. male who presents today for follow up. Pt educated on taking meds as directed, pt verbalized understanding. 1. Have you been to the ER, urgent care clinic since your last visit? Hospitalized since your last visit? NO    2. Have you seen or consulted any other health care providers outside of the [address] [place] [address] since your last visit? Include any pap smears or colon screening. NO    Health Maintenance reviewed.     Health Maintenance Due   Topic Date Due    Pneumococcal 65+ High/Highest Risk (2 of 2 - PPSV23) 01/14/2016

## 2017-10-30 NOTE — MR AVS SNAPSHOT
Visit Information Date & Time Provider Department Dept. Phone Encounter #  
 10/30/2017 12:30 PM Courtney Feng [phone] [id number] Follow-up Instructions Return in about 4 weeks (around 11/27/2017) for EOV. Your Appointments 10/30/2017  1:00 PM  
POST OP with Anh Wills MD  
[address] [facility]) Appt Note: add on per Dr. Javid Orozco [address] [place] [address] [address] [facility] [address] [facility] [address]  
  
    
 10/30/2017  1:30 PM  
Follow Up with Willam May MD  
[facility] Oncology [address]) Appt Note: 2 week f-up [address] [address]  
[address]  
  
    
 11/7/2017  2:30 PM  
[place] [address] [facility] [address] [place] [address]) Appt Note: Return in about 2 weeks (around 10/23/2017) for EOV get records from dr Kj Hoover per rona; pt r/s from 10/23/17; Pt R/s  
 [address] 710 [facility] 951 Upcoming Health Maintenance Date Due Pneumococcal 65+ High/Highest Risk (2 of 2 - PPSV23) 1/14/2016 GLAUCOMA SCREENING Q2Y 7/14/2018 MEDICARE YEARLY EXAM 8/16/2018 COLONOSCOPY 6/6/2021 DTaP/Tdap/Td series (2 - Td) 8/15/2027 Allergies as of 10/30/2017  Review Complete On: 10/22/2017 By: Oumar Suggs MD  
 No Known Allergies Current Immunizations  Reviewed on 1/4/2017 Name Date Influenza High Dose Vaccine PF 1/4/2017, 11/19/2015 Influenza Vaccine 11/18/2014, 2/4/2013 Influenza Vaccine (Quad) PF 10/22/2017 10:21 PM  
 Influenza Vaccine Split 11/17/2011, 11/22/2010 Pneumococcal Conjugate (PCV-13) 11/19/2015 TD Vaccine 8/3/2006 Tdap 8/15/2017 ZZZ-RETIRED (DO NOT USE) Pneumococcal Vaccine (Unspecified Type) 11/22/2010 Not reviewed this visit You Were Diagnosed With   
  
 Codes Comments Malignant neoplasm of lower third of esophagus (HCC)    -  Primary ICD-10-CM: C15.5 ICD-9-CM: 150.5 Essential hypertension, malignant     ICD-10-CM: I10 
ICD-9-CM: 401.0 Arthritis     ICD-10-CM: M19.90 ICD-9-CM: 716.90 Hyperthyroidism     ICD-10-CM: E05.90 ICD-9-CM: 242.90 Vitals BP Pulse Temp Resp Height(growth percentile) Weight(growth percentile)  
 137/76 (BP 1 Location: Right arm, BP Patient Position: Sitting) 99 96.3 °F (35.7 °C) (Oral) 18 5' 9\" (1.753 m) 151 lb 12.8 oz (68.9 kg) SpO2 BMI Smoking Status 95% 22.42 kg/m2 Current Every Day Smoker BMI and BSA Data Body Mass Index Body Surface Area  
 22.42 kg/m 2 1.83 m 2 Preferred Pharmacy Pharmacy Name [facility] [address] [facility] [phone] Your Updated Medication List  
  
   
This list is accurate as of: 10/30/17 12:34 PM.  Always use your most recent med list. amLODIPine 5 mg tablet Commonly known as:  Milton Mend Take 1 Tab by mouth daily. lisinopril 20 mg tablet Commonly known as:  Judy Needy Take 1 Tab by mouth daily. omeprazole 40 mg capsule Commonly known as:  PRILOSEC Take 40 mg by mouth daily. oxyCODONE IR 5 mg immediate release tablet Commonly known as:  Antonietapolinar Lyons Take 1 Tab by mouth every eight (8) hours as needed for Pain. Max Daily Amount: 15 mg. Follow-up Instructions Return in about 4 weeks (around 11/27/2017) for EOV. To-Do List   
 10/30/2017 3:00 PM  
  Appointment with [facility] INFUSION NURSE 5 at [facility] OP INFUSION [facility] ([phone]) Please provide this summary of care documentation to your next provider. Your primary care clinician is listed as [address] [place] [address].  If you have any questions after today's visit, please call [phone].

## 2017-10-30 NOTE — PROGRESS NOTES
Astrid Pate is a 75 y.o. male presented to clinic accompanied by daughter for a transition of care from [facility] for a failed EGD. 1. Have you been to the ER, urgent care clinic since your last visit? Hospitalized since your last visit? Yes Reason for visit: failed EGD    2. Have you seen or consulted any other health care providers outside of the [address] [place] [address] since your last visit? Include any pap smears or colon screening.  No     Learning Assessment 10/17/2017   PRIMARY LEARNER Patient   HIGHEST LEVEL OF EDUCATION - PRIMARY LEARNER  GRADUATED HIGH SCHOOL OR GED   BARRIERS PRIMARY LEARNER PHYSICAL   CO-LEARNER CAREGIVER No   PRIMARY LANGUAGE ENGLISH    NEED No   LEARNER PREFERENCE PRIMARY LISTENING     -   LEARNING SPECIAL TOPICS no   ANSWERED BY self   RELATIONSHIP SELF

## 2017-10-30 NOTE — PROGRESS NOTES
Brandy Nunez is a 75 y.o.  male and presents with    Chief Complaint   Patient presents with    Esophageal Cancer    Hypertension    Thyroid Problem    Arthritis     Adm to hosp 10/20  D/c 10/23  NN 10/24  Here for delia      Subjective:    Cardiovascular Review:  The patient has hypertension. Diet and Lifestyle: not attempting to follow a low fat, low cholesterol diet, not attempting to follow a low sodium diet  Home BP Monitoring: is not measured at home. Pertinent ROS: taking medications as instructed, no medication side effects noted, no TIA's, no chest pain on exertion, no dyspnea on exertion, no swelling of ankles. Thyroid Review:  Patient is seen for followup of hypothyroidism. Thyroid ROS: denies fatigue, weight changes, heat/cold intolerance, bowel/skin changes or CVS symptoms. Osteoarthritis and Chronic Pain:  Patient has osteoarthritis, primarily affecting the diffuse. Symptoms onset: problem is longstanding. Rheumatological ROS: no current joint or muscle symptoms, essentially pain-free. Response to treatment plan: stable. Esophageal cqancer s/p perf of stomach  Has f/u today with rivera and hemeonc            Additional Concerns:          Patient Active Problem List    Diagnosis Date Noted    Acute gastric perforation ([facility] [address] 10/20/2017    Gastric perforation, acute ([facility] [address] 10/20/2017    Malignant neoplasm of esophagus (HCC) 10/17/2017    Proteinuria 05/30/2014    Hyperthyroidism 05/30/2014    Arthritis 06/06/2011    Essential hypertension, malignant 08/03/2010     Current Outpatient Prescriptions   Medication Sig Dispense Refill    oxyCODONE IR (ROXICODONE) 5 mg immediate release tablet Take 1 Tab by mouth every eight (8) hours as needed for Pain. Max Daily Amount: 15 mg. 50 Tab 0    omeprazole (PRILOSEC) 40 mg capsule Take 40 mg by mouth daily.  lisinopril (PRINIVIL, ZESTRIL) 20 mg tablet Take 1 Tab by mouth daily.  90 Tab 4    amLODIPine (NORVASC) 5 mg tablet Take 1 Tab by mouth daily. 90 Tab 4     No Known Allergies  Past Medical History:   Diagnosis Date    Essential hypertension, malignant 8/3/2010    Leg pain 8/3/2010    Throat cancer ([address]      No past surgical history on file. Family History   Problem Relation Age of Onset    Heart Disease Mother     Lung Disease Father      Social History   Substance Use Topics    Smoking status: Current Every Day Smoker     Packs/day: 1.00     Types: Cigarettes     Start date: 1/17/2017    Smokeless tobacco: Never Used      Comment: Restarted in Jan 2017    Alcohol use 6.6 oz/week     3 Shots of liquor, 8 Standard drinks or equivalent per week      Comment: Drinks daily       ROS       All other systems reviewed and are negative.       Objective:  Vitals:    10/30/17 1226   BP: 137/76   Pulse: 99   Resp: 18   Temp: 96.3 °F (35.7 °C)   TempSrc: Oral   SpO2: 95%   Weight: 151 lb 12.8 oz (68.9 kg)   Height: 5' 9\" (1.753 m)   PainSc:   0 - No pain                 alert, well appearing, and in no distress, oriented to person, place, and time and normal appearing weight  Chest - clear to auscultation, no wheezes, rales or rhonchi, symmetric air entry  Heart - normal rate, regular rhythm, normal S1, S2, no murmurs, rubs, clicks or gallops  Abdomen - soft, nontender, nondistended, no masses or organomegaly        LABS   Component      Latest Ref Rng & Units 10/20/2017 10/20/2017 10/20/2017 10/17/2017          12:42 PM 11:27 AM 11:27 AM  2:35 PM   Sodium      136 - 145 mEq/L 139      Potassium      3.5 - 5.1 mEq/L 4.0      Chloride      98 - 107 mEq/L 110 (H)      CO2      21 - 32 mEq/L 20 (L)      Anion gap      3.0 - 18 mmol/L       Glucose      74 - 106 mg/dl 115 (H)      BUN      7 - 25 mg/dl 6 (L)      Creatinine      0.6 - 1.3 mg/dl 0.8      BUN/Creatinine ratio      12 - 20         GFR est AA       >60      GFR est non-AA       >60      Calcium      8.5 - 10.1 mg/dl 8.2 (L)      Bilirubin, total      0.2 - 1.0 mg/dl 0.3      ALT (SGPT)      12 - 78 U/L 55      AST      15 - 37 U/L 53 (H)      Alk. phosphatase      45 - 117 U/L 43 (L)      Protein, total      6.4 - 8.2 gm/dl 6.9      Albumin      3.4 - 5.0 gm/dl 3.0 (L)      Globulin      2.0 - 4.0 g/dL       A-G Ratio      0.8 - 1.7         WBC      4.0 - 11.0 1000/mm3   12.2 (H)    RBC      3.80 - 5.70 M/uL   4.29    HGB      12.4 - 17.2 gm/dl   12.0 (L)    HCT      37.0 - 50.0 %   36.5 (L)    MCV      80.0 - 98.0 fL   85.1    MCH      23.0 - 34.6 pg   28.0    MCHC      30.0 - 36.0 gm/dl   32.9    PLATELET      742 - 024 1000/mm3   304 354   MPV      6.0 - 10.0 fL   9.9    RDW-SD      35.1 - 43.9     43.6    NRBC      0 - 0     0    IMMATURE GRANULOCYTES      0.0 - 3.0 %   0.4    NEUTROPHILS      34 - 64 %   92.8 (H)    LYMPHOCYTES      28 - 48 %   4.8 (L)    MONOCYTES      1 - 13 %   1.3    EOSINOPHILS      0 - 5 %   0.6    BASOPHILS      0 - 3 %   0.1    RDW      11.5 - 14.5 %       MIXED CELLS      0.1 - 17 %       ABS. NEUTROPHILS      1.8 - 9.5 K/UL       ABS. MIXED CELLS      0.0 - 2.3 K/uL       ABS. LYMPHOCYTES      1.1 - 5.9 K/UL       DF             Prothrombin time      10.2 - 12.9 seconds  13.3 (H)     INR      0.1 - 1.1    1.2 (H)       Component      Latest Ref Rng & Units 10/17/2017 10/17/2017           2:35 PM  2:35 PM   Sodium      136 - 145 mEq/L 136    Potassium      3.5 - 5.1 mEq/L 3.6    Chloride      98 - 107 mEq/L 102    CO2      21 - 32 mEq/L 27    Anion gap      3.0 - 18 mmol/L 7    Glucose      74 - 106 mg/dl 139 (H)    BUN      7 - 25 mg/dl 6 (L)    Creatinine      0.6 - 1.3 mg/dl 0.84    BUN/Creatinine ratio      12 - 20   7 (L)    GFR est AA       >60    GFR est non-AA       >60    Calcium      8.5 - 10.1 mg/dl 9.1    Bilirubin, total      0.2 - 1.0 mg/dl 0.3    ALT (SGPT)      12 - 78 U/L 18    AST      15 - 37 U/L 9 (L)    Alk.  phosphatase      45 - 117 U/L 54    Protein, total      6.4 - 8.2 gm/dl 8.3 (H)    Albumin      3.4 - 5.0 gm/dl 3.8    Globulin      2.0 - 4.0 g/dL 4.5 (H)    A-G Ratio      0.8 - 1.7   0.8    WBC      4.0 - 11.0 1000/mm3  8.6   RBC      3.80 - 5.70 M/uL  4.57   HGB      12.4 - 17.2 gm/dl  12.8   HCT      37.0 - 50.0 %  39.9   MCV      80.0 - 98.0 fL  87.3   MCH      23.0 - 34.6 pg  28.0   MCHC      30.0 - 36.0 gm/dl  32.1   PLATELET      826 - 608 1000/mm3     MPV      6.0 - 10.0 fL     RDW-SD      35.1 - 43.9       NRBC      0 - 0       IMMATURE GRANULOCYTES      0.0 - 3.0 %     NEUTROPHILS      34 - 64 %  76 (H)   LYMPHOCYTES      28 - 48 %  19   MONOCYTES      1 - 13 %     EOSINOPHILS      0 - 5 %     BASOPHILS      0 - 3 %     RDW      11.5 - 14.5 %  14.6 (H)   MIXED CELLS      0.1 - 17 %  5   ABS. NEUTROPHILS      1.8 - 9.5 K/UL  6.6   ABS. MIXED CELLS      0.0 - 2.3 K/uL  0.4   ABS. LYMPHOCYTES      1.1 - 5.9 K/UL  1.6   DF        AUTOMATED   Prothrombin time      10.2 - 12.9 seconds     INR      0.1 - 1.1         TESTS      Assessment/Plan:    Hypertension - stable  Thyroid stable  arthrisi stable  [facility]      Lab review: labs are reviewed, up to date and normal    Diagnoses and all orders for this visit:    1. Malignant neoplasm of lower third of esophagus (HCC)    2. Essential hypertension, malignant    3. Arthritis    4. Hyperthyroidism        I have discussed the diagnosis with the patient and the intended plan as seen in the above orders. The patient has received an after-visit summary and questions were answered concerning future plans. I have discussed medication side effects and warnings with the patient as well. I have reviewed the plan of care with the patient, accepted their input and they are in agreement with the treatment goals. Follow-up Disposition:  Return if symptoms worsen or fail to improve.

## 2017-10-30 NOTE — PROGRESS NOTES
[facility]:     Patient: Lluvia Hernandez Person Age: 75 y.o. Sex: male    YOB: 1950 Admit Date: (Not on file) PCP: Sona MoyaDO Adele   MRN: [id number]  CSN: [id number]          Patient Active Problem List   Diagnosis Code    Essential hypertension, malignant I10    Arthritis M19.90    Proteinuria R80.9    Hyperthyroidism E05.90    Malignant neoplasm of esophagus ([address] C15.9    Acute gastric perforation ([address] K25.1    Gastric perforation, acute ([address] K25.1       Assessment/ Plan:     1.) Advanced Esophageal Cancer-Squamous Cell Carcinoma    -Pathology results reviewed-Squamous Cell Carcinoma  -Status post EGD biopsy per Dr. Nila Vee 10/12/2017    -s/p evaluation radiation oncology-Dr. Lobo Short [place]  -Complete staging workup with FDG PET CT scan revealed lymph node metastasis (supraclavicular)  -MediPort placement per Dr. Dahlia Whiting for concurrent chemotherapy/radiotherapy-Start next week-11/8/2017  -Return to clinic in approximately 1 1/2 weeks to reevaluate clinically and for further medical decision-making. FDG-PET scan 10/19/2017:    IMPRESSION: [Malignant activity at the esophageal mass, middle mediastinal lymph nodes,  supraclavicular lymph nodes compatible with metastases. ]     Activity at the right hilum but without enlarged lymph node, could be early  metastasis.     No malignant activity at subcentimeter pulmonary nodules. Potential for false  negative result exists because of their small size. Advise short-term follow-up  with diagnostic CT.     Activity at dental disease also seen in the mandible and maxilla.     Activity posterior to the right side mandible without definite mass or skeletal  abnormality of uncertain significance. Could be a tiny adjacent lymph node. Attention should be paid to this region on subsequent examinations.     Emphysema.       2.) Bowel perforation  -Occurred with procedure to place PEG tube  -Recently admission to [facility]  -Improved    Pathology report 10/12/2017:  FINAL DIAGNOSIS:   ESOPHAGEAL MASS, BIOPSY:   INVASIVE, POORLY DIFFERENTIATED SQUAMOUS CELL CARCINOMA. GROSS DESCRIPTION:     The specimen is received in formalin in a container labeled with the patient's name, Pete Lopez, and biopsy esophageal mass, rule out malignancy and consists of two soft tissue fragments, one pale tan and the other pink to dark red, ranging from 0.3 to 0.5 cm in greatest dimension. The specimen is submitted in toto in cassette A1. (cd)   DMM/clr     I have discussed the diagnosis with the patient and the intended plan as seen in the above orders. I have reviewed the plan of care with the patient, accepted their input and they are in agreement with the treatment goals. Patient has provided input and agrees with goals. History:   Fartun Sellers is a 75 y.o. male presenting today for:     Distal Esophageal Cancer  -Pathology results reviewed-Squamous cell carcinoma  -Status post EGD biopsy per Dr. Tangela Bhagat  -Lost 15 pounds in past 6 months  -No f/c/s/cp or SOB. -Complaining of worsening dysphagia over the past several months. Current Outpatient Prescriptions   Medication Sig Dispense Refill    oxyCODONE IR (ROXICODONE) 5 mg immediate release tablet Take 1 Tab by mouth every eight (8) hours as needed for Pain. Max Daily Amount: 15 mg. 30 Tab 0    omeprazole (PRILOSEC) 40 mg capsule Take 40 mg by mouth daily.  lisinopril (PRINIVIL, ZESTRIL) 20 mg tablet Take 1 Tab by mouth daily. 90 Tab 4    amLODIPine (NORVASC) 5 mg tablet Take 1 Tab by mouth daily.  90 Tab 4       Objective:   VS:    Vitals:    10/30/17 1525   BP: 137/76   Pulse: 99   Resp: 17   Temp: 96.3 °F (35.7 °C)   TempSrc: Oral   SpO2: 100%   Weight: 68.5 kg (151 lb)   Height: 5' 9\" (1.753 m)       Physical Exam:     Constitutional:  no acute distress and alert and oriented x 3    HENT:  atraumatic   Eyes:  EOMI, PERRLA   Neck:  No palpable masses Cardiovascular:  S1, S2   Pulmonary/Chest Wall:   clear   Abdominal:  Non tender, no palpable masses       Musculoskeletal:  No deformities   Skin:  No rashes or lesions    Peripheral Vascular:  Pulses palpable       Review of Systems: The remainder of 12 point ROS was otherwise negative except for what was reported in the CC/HPI:      No results found for this or any previous visit (from the past 168 hour(s)). Past Medical History:   Diagnosis Date    Essential hypertension, malignant 8/3/2010    Leg pain 8/3/2010    Throat cancer ([facility] [address]        No past surgical history on file. Social History     Social History    Marital status:      Spouse name: N/A    Number of children: N/A    Years of education: N/A     Occupational History    Not on file. Social History Main Topics    Smoking status: Current Every Day Smoker     Packs/day: 1.00     Types: Cigarettes     Start date: 1/17/2017    Smokeless tobacco: Never Used      Comment: Restarted in Jan 2017    Alcohol use 6.6 oz/week     3 Shots of liquor, 8 Standard drinks or equivalent per week      Comment: Drinks daily    Drug use: No    Sexual activity: No     Other Topics Concern    Not on file     Social History Narrative       Family History   Problem Relation Age of Onset    Heart Disease Mother     Lung Disease Father        No Known Allergies    Follow-up Disposition: Not on File    Navarro Hsu MD, MPH Hematology-Medical Oncology  October 30, 2017 1:16 PM

## 2017-11-01 ENCOUNTER — HOSPITAL ENCOUNTER (OUTPATIENT)
Dept: RADIATION THERAPY | Age: 67
Discharge: HOME | End: 2017-11-01
Payer: MEDICARE

## 2017-11-01 PROCEDURE — 77470 SPECIAL RADIATION TREATMENT: CPT

## 2017-11-02 ENCOUNTER — HOSPITAL ENCOUNTER (OUTPATIENT)
Dept: RADIATION THERAPY | Age: 67
Discharge: HOME | End: 2017-11-02
Payer: MEDICARE

## 2017-11-02 PROCEDURE — 77334 RADIATION TREATMENT AID(S): CPT

## 2017-11-03 ENCOUNTER — HOSPITAL ENCOUNTER (OUTPATIENT)
Dept: RADIATION THERAPY | Age: 67
Discharge: HOME | End: 2017-11-03
Payer: MEDICARE

## 2017-11-03 RX ORDER — DIPHENHYDRAMINE HYDROCHLORIDE 50 MG/ML
50 INJECTION, SOLUTION INTRAMUSCULAR; INTRAVENOUS ONCE
Status: CANCELLED | OUTPATIENT
Start: 2017-11-08 | End: 2017-11-08

## 2017-11-03 RX ORDER — PALONOSETRON 0.05 MG/ML
0.25 INJECTION, SOLUTION INTRAVENOUS ONCE
Status: CANCELLED | OUTPATIENT
Start: 2017-11-08 | End: 2017-11-08

## 2017-11-03 RX ORDER — FAMOTIDINE 10 MG/ML
20 INJECTION INTRAVENOUS ONCE
Status: CANCELLED | OUTPATIENT
Start: 2017-11-08 | End: 2017-11-08

## 2017-11-06 ENCOUNTER — ANESTHESIA EVENT (OUTPATIENT)
Dept: SURGERY | Age: 67
End: 2017-11-06
Payer: MEDICARE

## 2017-11-06 ENCOUNTER — HOSPITAL ENCOUNTER (OUTPATIENT)
Dept: RADIATION THERAPY | Age: 67
Discharge: HOME | End: 2017-11-06
Payer: MEDICARE

## 2017-11-06 PROCEDURE — 77338 DESIGN MLC DEVICE FOR IMRT: CPT

## 2017-11-06 PROCEDURE — 77300 RADIATION THERAPY DOSE PLAN: CPT

## 2017-11-06 PROCEDURE — 77301 RADIOTHERAPY DOSE PLAN IMRT: CPT

## 2017-11-07 ENCOUNTER — APPOINTMENT (OUTPATIENT)
Dept: GENERAL RADIOLOGY | Age: 67
End: 2017-11-07
Attending: SURGERY
Payer: MEDICARE

## 2017-11-07 ENCOUNTER — ANESTHESIA (OUTPATIENT)
Dept: SURGERY | Age: 67
End: 2017-11-07
Payer: MEDICARE

## 2017-11-07 ENCOUNTER — HOSPITAL ENCOUNTER (OUTPATIENT)
Age: 67
Setting detail: OUTPATIENT SURGERY
Discharge: HOME | End: 2017-11-07
Attending: SURGERY | Admitting: SURGERY
Payer: MEDICARE

## 2017-11-07 ENCOUNTER — HOSPITAL ENCOUNTER (OUTPATIENT)
Dept: RADIATION THERAPY | Age: 67
Discharge: HOME | End: 2017-11-07
Payer: MEDICARE

## 2017-11-07 VITALS
OXYGEN SATURATION: 100 % | HEART RATE: 77 BPM | SYSTOLIC BLOOD PRESSURE: 123 MMHG | BODY MASS INDEX: 21.84 KG/M2 | HEIGHT: 69 IN | TEMPERATURE: 97 F | RESPIRATION RATE: 18 BRPM | WEIGHT: 147.5 LBS | DIASTOLIC BLOOD PRESSURE: 79 MMHG

## 2017-11-07 PROCEDURE — 76060000033 HC ANESTHESIA 1 TO 1.5 HR: Performed by: SURGERY

## 2017-11-07 PROCEDURE — 76010000161 HC OR TIME 1 TO 1.5 HR INTENSV-TIER 1: Performed by: SURGERY

## 2017-11-07 PROCEDURE — 74011000258 HC RX REV CODE- 258

## 2017-11-07 PROCEDURE — 77030032490 HC SLV COMPR SCD KNE COVD -B: Performed by: SURGERY

## 2017-11-07 PROCEDURE — 74011000250 HC RX REV CODE- 250: Performed by: SURGERY

## 2017-11-07 PROCEDURE — 74011000250 HC RX REV CODE- 250

## 2017-11-07 PROCEDURE — 76210000006 HC OR PH I REC 0.5 TO 1 HR: Performed by: SURGERY

## 2017-11-07 PROCEDURE — 71010 XR CHEST PORT: CPT

## 2017-11-07 PROCEDURE — 74011250636 HC RX REV CODE- 250/636: Performed by: SURGERY

## 2017-11-07 PROCEDURE — 77030031139 HC SUT VCRL2 J&J -A: Performed by: SURGERY

## 2017-11-07 PROCEDURE — 74011250636 HC RX REV CODE- 250/636

## 2017-11-07 PROCEDURE — C1788 PORT, INDWELLING, IMP: HCPCS | Performed by: SURGERY

## 2017-11-07 PROCEDURE — 76210000020 HC REC RM PH II FIRST 0.5 HR: Performed by: SURGERY

## 2017-11-07 PROCEDURE — 77030010507 HC ADH SKN DERMBND J&J -B: Performed by: SURGERY

## 2017-11-07 PROCEDURE — 74011250637 HC RX REV CODE- 250/637

## 2017-11-07 PROCEDURE — 77030018836 HC SOL IRR NACL ICUM -A: Performed by: SURGERY

## 2017-11-07 PROCEDURE — 77030012510 HC MSK AIRWY LMA TELE -B: Performed by: ANESTHESIOLOGY

## 2017-11-07 PROCEDURE — 74011250636 HC RX REV CODE- 250/636: Performed by: NURSE ANESTHETIST, CERTIFIED REGISTERED

## 2017-11-07 PROCEDURE — 77001 FLUOROGUIDE FOR VEIN DEVICE: CPT

## 2017-11-07 PROCEDURE — 77030002986 HC SUT PROL J&J -A: Performed by: SURGERY

## 2017-11-07 PROCEDURE — 77030002933 HC SUT MCRYL J&J -A: Performed by: SURGERY

## 2017-11-07 DEVICE — PORT INFUS 8FR PLAS ATTCH OPN END POLYUR CATH SIL FILL SUT: Type: IMPLANTABLE DEVICE | Site: OTHER | Status: FUNCTIONAL

## 2017-11-07 RX ORDER — DEXAMETHASONE SODIUM PHOSPHATE 4 MG/ML
INJECTION, SOLUTION INTRA-ARTICULAR; INTRALESIONAL; INTRAMUSCULAR; INTRAVENOUS; SOFT TISSUE AS NEEDED
Status: DISCONTINUED | OUTPATIENT
Start: 2017-11-07 | End: 2017-11-07 | Stop reason: HOSPADM

## 2017-11-07 RX ORDER — DIPHENHYDRAMINE HYDROCHLORIDE 50 MG/ML
50 INJECTION, SOLUTION INTRAMUSCULAR; INTRAVENOUS ONCE
Status: DISCONTINUED | OUTPATIENT
Start: 2017-11-08 | End: 2017-11-07 | Stop reason: HOSPADM

## 2017-11-07 RX ORDER — ONDANSETRON 2 MG/ML
4 INJECTION INTRAMUSCULAR; INTRAVENOUS
Status: DISCONTINUED | OUTPATIENT
Start: 2017-11-07 | End: 2017-11-07 | Stop reason: HOSPADM

## 2017-11-07 RX ORDER — ONDANSETRON 2 MG/ML
INJECTION INTRAMUSCULAR; INTRAVENOUS AS NEEDED
Status: DISCONTINUED | OUTPATIENT
Start: 2017-11-07 | End: 2017-11-07 | Stop reason: HOSPADM

## 2017-11-07 RX ORDER — CEFAZOLIN SODIUM 2 G/50ML
2 SOLUTION INTRAVENOUS ONCE
Status: COMPLETED | OUTPATIENT
Start: 2017-11-07 | End: 2017-11-07

## 2017-11-07 RX ORDER — SODIUM CHLORIDE 0.9 % (FLUSH) 0.9 %
5-10 SYRINGE (ML) INJECTION AS NEEDED
Status: DISCONTINUED | OUTPATIENT
Start: 2017-11-07 | End: 2017-11-07 | Stop reason: HOSPADM

## 2017-11-07 RX ORDER — FAMOTIDINE 20 MG/1
20 TABLET, FILM COATED ORAL ONCE
Status: COMPLETED | OUTPATIENT
Start: 2017-11-08 | End: 2017-11-07

## 2017-11-07 RX ORDER — OXYCODONE AND ACETAMINOPHEN 5; 325 MG/1; MG/1
1 TABLET ORAL AS NEEDED
Status: DISCONTINUED | OUTPATIENT
Start: 2017-11-07 | End: 2017-11-07 | Stop reason: HOSPADM

## 2017-11-07 RX ORDER — FENTANYL CITRATE 50 UG/ML
INJECTION, SOLUTION INTRAMUSCULAR; INTRAVENOUS AS NEEDED
Status: DISCONTINUED | OUTPATIENT
Start: 2017-11-07 | End: 2017-11-07 | Stop reason: HOSPADM

## 2017-11-07 RX ORDER — FENTANYL CITRATE 50 UG/ML
25 INJECTION, SOLUTION INTRAMUSCULAR; INTRAVENOUS
Status: DISCONTINUED | OUTPATIENT
Start: 2017-11-07 | End: 2017-11-07 | Stop reason: HOSPADM

## 2017-11-07 RX ORDER — LIDOCAINE HYDROCHLORIDE AND EPINEPHRINE 10; 10 MG/ML; UG/ML
30 INJECTION, SOLUTION INFILTRATION; PERINEURAL ONCE
Status: DISCONTINUED | OUTPATIENT
Start: 2017-11-07 | End: 2017-11-07 | Stop reason: CLARIF

## 2017-11-07 RX ORDER — FAMOTIDINE 10 MG/ML
20 INJECTION INTRAVENOUS ONCE
Status: DISCONTINUED | OUTPATIENT
Start: 2017-11-08 | End: 2017-11-07 | Stop reason: HOSPADM

## 2017-11-07 RX ORDER — SODIUM CHLORIDE 0.9 % (FLUSH) 0.9 %
5-10 SYRINGE (ML) INJECTION EVERY 8 HOURS
Status: DISCONTINUED | OUTPATIENT
Start: 2017-11-07 | End: 2017-11-07 | Stop reason: HOSPADM

## 2017-11-07 RX ORDER — MIDAZOLAM HYDROCHLORIDE 1 MG/ML
INJECTION, SOLUTION INTRAMUSCULAR; INTRAVENOUS AS NEEDED
Status: DISCONTINUED | OUTPATIENT
Start: 2017-11-07 | End: 2017-11-07 | Stop reason: HOSPADM

## 2017-11-07 RX ORDER — LIDOCAINE HYDROCHLORIDE 20 MG/ML
INJECTION, SOLUTION EPIDURAL; INFILTRATION; INTRACAUDAL; PERINEURAL AS NEEDED
Status: DISCONTINUED | OUTPATIENT
Start: 2017-11-07 | End: 2017-11-07 | Stop reason: HOSPADM

## 2017-11-07 RX ORDER — PALONOSETRON 0.05 MG/ML
0.25 INJECTION, SOLUTION INTRAVENOUS ONCE
Status: DISCONTINUED | OUTPATIENT
Start: 2017-11-08 | End: 2017-11-07

## 2017-11-07 RX ORDER — SODIUM CHLORIDE, SODIUM LACTATE, POTASSIUM CHLORIDE, CALCIUM CHLORIDE 600; 310; 30; 20 MG/100ML; MG/100ML; MG/100ML; MG/100ML
50 INJECTION, SOLUTION INTRAVENOUS CONTINUOUS
Status: DISCONTINUED | OUTPATIENT
Start: 2017-11-07 | End: 2017-11-07 | Stop reason: HOSPADM

## 2017-11-07 RX ORDER — OXYCODONE HYDROCHLORIDE 10 MG/1
5 TABLET ORAL
Qty: 10 TAB | Refills: 0 | Status: SHIPPED | OUTPATIENT
Start: 2017-11-07 | End: 2017-11-22 | Stop reason: SDUPTHER

## 2017-11-07 RX ORDER — HYDROMORPHONE HYDROCHLORIDE 2 MG/ML
0.5 INJECTION, SOLUTION INTRAMUSCULAR; INTRAVENOUS; SUBCUTANEOUS
Status: DISCONTINUED | OUTPATIENT
Start: 2017-11-07 | End: 2017-11-07 | Stop reason: HOSPADM

## 2017-11-07 RX ORDER — FAMOTIDINE 20 MG/1
TABLET, FILM COATED ORAL
Status: COMPLETED
Start: 2017-11-07 | End: 2017-11-07

## 2017-11-07 RX ORDER — LIDOCAINE HYDROCHLORIDE AND EPINEPHRINE 10; 10 MG/ML; UG/ML
INJECTION, SOLUTION INFILTRATION; PERINEURAL AS NEEDED
Status: DISCONTINUED | OUTPATIENT
Start: 2017-11-07 | End: 2017-11-07 | Stop reason: HOSPADM

## 2017-11-07 RX ORDER — HEPARIN SODIUM 1000 [USP'U]/ML
INJECTION, SOLUTION INTRAVENOUS; SUBCUTANEOUS AS NEEDED
Status: DISCONTINUED | OUTPATIENT
Start: 2017-11-07 | End: 2017-11-07 | Stop reason: HOSPADM

## 2017-11-07 RX ORDER — SODIUM CHLORIDE, SODIUM LACTATE, POTASSIUM CHLORIDE, CALCIUM CHLORIDE 600; 310; 30; 20 MG/100ML; MG/100ML; MG/100ML; MG/100ML
50 INJECTION, SOLUTION INTRAVENOUS CONTINUOUS
Status: DISCONTINUED | OUTPATIENT
Start: 2017-11-08 | End: 2017-11-07 | Stop reason: HOSPADM

## 2017-11-07 RX ORDER — PROPOFOL 10 MG/ML
INJECTION, EMULSION INTRAVENOUS AS NEEDED
Status: DISCONTINUED | OUTPATIENT
Start: 2017-11-07 | End: 2017-11-07 | Stop reason: HOSPADM

## 2017-11-07 RX ORDER — DIPHENHYDRAMINE HYDROCHLORIDE 50 MG/ML
25 INJECTION, SOLUTION INTRAMUSCULAR; INTRAVENOUS
Status: DISCONTINUED | OUTPATIENT
Start: 2017-11-07 | End: 2017-11-07 | Stop reason: HOSPADM

## 2017-11-07 RX ADMIN — MIDAZOLAM HYDROCHLORIDE 2 MG: 1 INJECTION, SOLUTION INTRAMUSCULAR; INTRAVENOUS at 13:04

## 2017-11-07 RX ADMIN — FAMOTIDINE 20 MG: 20 TABLET, FILM COATED ORAL at 12:51

## 2017-11-07 RX ADMIN — DEXAMETHASONE SODIUM PHOSPHATE 4 MG: 4 INJECTION, SOLUTION INTRA-ARTICULAR; INTRALESIONAL; INTRAMUSCULAR; INTRAVENOUS; SOFT TISSUE at 13:12

## 2017-11-07 RX ADMIN — PROPOFOL 150 MG: 10 INJECTION, EMULSION INTRAVENOUS at 13:11

## 2017-11-07 RX ADMIN — SODIUM CHLORIDE, SODIUM LACTATE, POTASSIUM CHLORIDE, AND CALCIUM CHLORIDE 50 ML/HR: 600; 310; 30; 20 INJECTION, SOLUTION INTRAVENOUS at 12:47

## 2017-11-07 RX ADMIN — FAMOTIDINE 20 MG: 20 TABLET ORAL at 12:51

## 2017-11-07 RX ADMIN — ONDANSETRON 4 MG: 2 INJECTION INTRAMUSCULAR; INTRAVENOUS at 14:01

## 2017-11-07 RX ADMIN — FENTANYL CITRATE 50 MCG: 50 INJECTION, SOLUTION INTRAMUSCULAR; INTRAVENOUS at 13:45

## 2017-11-07 RX ADMIN — LIDOCAINE HYDROCHLORIDE 60 MG: 20 INJECTION, SOLUTION EPIDURAL; INFILTRATION; INTRACAUDAL; PERINEURAL at 13:11

## 2017-11-07 RX ADMIN — FENTANYL CITRATE 50 MCG: 50 INJECTION, SOLUTION INTRAMUSCULAR; INTRAVENOUS at 13:11

## 2017-11-07 RX ADMIN — CEFAZOLIN SODIUM 2 G: 2 SOLUTION INTRAVENOUS at 13:15

## 2017-11-07 NOTE — IP AVS SNAPSHOT
[address] [place] [address] Chantel Ariza  
[phone] Patient: Marylou Nascimento Person MRN: [id number] :1950 About your hospitalization You were admitted on:  2017 You last received care in the:  [facility] PHASE 2 RECOVERY You were discharged on:  2017 Why you were hospitalized Your primary diagnosis was:  Not on File Things You Need To Do (next 8 weeks) Follow up with Nicolette Nolen MD in 2 week(s) Phone:  [phone] Where:  [address] [facility], [facility] [address], 102 [facility], [address] [place] [address] Follow up with Cecile Figueroa DO Phone:  [phone] Where:  [address] [facility] , [place] [id number]  RADIATION ONCOLOGY with [facility] RADIATION ONCOLOGY at  8:00 AM  
This appointment time is simply a place [place] and may not reflect the true appointment time. If patient does not know the appointment time given to them at the time of scheduling, they should contact the Radiation Therapy department for detail. Where:  [facility] RADIATION THERAPY [facility]) INFUSION 30 with [facility] INFUSION NURSE 1 at 11:00 AM  
Where:  [facility] OP INFUSION [facility] ([address])  RADIATION ONCOLOGY with [facility] RADIATION ONCOLOGY at  8:00 AM  
This appointment time is simply a place [place] and may not reflect the true appointment time. If patient does not know the appointment time given to them at the time of scheduling, they should contact the Radiation Therapy department for detail. Where:  [facility] RADIATION THERAPY [facility]) Friday Nov 10, 2017 RADIATION ONCOLOGY with [facility] RADIATION ONCOLOGY at  8:00 AM  
This appointment time is simply a place [place] and may not reflect the true appointment time.   If patient does not know the appointment time given to them at the time of scheduling, they should contact the Radiation Therapy department for detail. Where:  [facility]) Monday Nov 13, 2017 RADIATION ONCOLOGY with [facility] RADIATION ONCOLOGY at  8:00 AM  
This appointment time is simply a place [place] and may not reflect the true appointment time. If patient does not know the appointment time given to them at the time of scheduling, they should contact the Radiation Therapy department for detail. Where:  [facility]) Tuesday Nov 14, 2017 RADIATION ONCOLOGY with [facility] RADIATION ONCOLOGY at  8:00 AM  
This appointment time is simply a place [place] and may not reflect the true appointment time. If patient does not know the appointment time given to them at the time of scheduling, they should contact the Radiation Therapy department for detail. Where:  [facility]) Wednesday Nov 15, 2017 RADIATION ONCOLOGY with [facility] RADIATION ONCOLOGY at  8:00 AM  
This appointment time is simply a place [place] and may not reflect the true appointment time. If patient does not know the appointment time given to them at the time of scheduling, they should contact the Radiation Therapy department for detail. Where:  [facility]) Thursday Nov 16, 2017 RADIATION ONCOLOGY with [facility] RADIATION ONCOLOGY at  8:00 AM  
This appointment time is simply a place [place] and may not reflect the true appointment time. If patient does not know the appointment time given to them at the time of scheduling, they should contact the Radiation Therapy department for detail. Where:  [facility]) Friday Nov 17, 2017 RADIATION ONCOLOGY with [facility] RADIATION ONCOLOGY at  8:00 AM  
This appointment time is simply a place [place] and may not reflect the true appointment time. If patient does not know the appointment time given to them at the time of scheduling, they should contact the Radiation Therapy department for detail. Where:  [facility]) POST OP with Rosetta Rob MD at  2:00 PM  
Where: [address] ([facility]) Monday Nov 20, 2017 RADIATION ONCOLOGY with [facility] RADIATION ONCOLOGY at  8:00 AM  
This appointment time is simply a place [place] and may not reflect the true appointment time. If patient does not know the appointment time given to them at the time of scheduling, they should contact the Radiation Therapy department for detail. Where:  [facility]) Tuesday Nov 21, 2017 RADIATION ONCOLOGY with [facility] RADIATION ONCOLOGY at  8:00 AM  
This appointment time is simply a place [place] and may not reflect the true appointment time. If patient does not know the appointment time given to them at the time of scheduling, they should contact the Radiation Therapy department for detail. Where:  [facility]) Wednesday Nov 22, 2017 RADIATION ONCOLOGY with [facility] RADIATION ONCOLOGY at  8:00 AM  
This appointment time is simply a place [place] and may not reflect the true appointment time. If patient does not know the appointment time given to them at the time of scheduling, they should contact the Radiation Therapy department for detail. Where:  [facility]) Friday Nov 24, 2017 RADIATION ONCOLOGY with [facility] RADIATION ONCOLOGY at  8:00 AM  
This appointment time is simply a place [place] and may not reflect the true appointment time. If patient does not know the appointment time given to them at the time of scheduling, they should contact the Radiation Therapy department for detail. Where:  [facility] RADIATION THERAPY [facility]) Monday Nov 27, 2017 RADIATION ONCOLOGY with [facility] RADIATION ONCOLOGY at  8:00 AM  
This appointment time is simply a place [place] and may not reflect the true appointment time. If patient does not know the appointment time given to them at the time of scheduling, they should contact the Radiation Therapy department for detail. Where:  [facility]) Tuesday Nov 28, 2017 RADIATION ONCOLOGY with [facility] RADIATION ONCOLOGY at  8:00 AM  
This appointment time is simply a place [place] and may not reflect the true appointment time. If patient does not know the appointment time given to them at the time of scheduling, they should contact the Radiation Therapy department for detail. Where:  [facility]) Wednesday Nov 29, 2017 RADIATION ONCOLOGY with [facility] RADIATION ONCOLOGY at  8:00 AM  
This appointment time is simply a place [place] and may not reflect the true appointment time. If patient does not know the appointment time given to them at the time of scheduling, they should contact the Radiation Therapy department for detail. Where:  [facility]) Thursday Nov 30, 2017 RADIATION ONCOLOGY with [facility] RADIATION ONCOLOGY at  8:00 AM  
This appointment time is simply a place [place] and may not reflect the true appointment time. If patient does not know the appointment time given to them at the time of scheduling, they should contact the Radiation Therapy department for detail. Where:  [facility]) Friday Dec 01, 2017 RADIATION ONCOLOGY with [facility] RADIATION ONCOLOGY at  8:00 AM  
This appointment time is simply a place [place] and may not reflect the true appointment time.   If patient does not know the appointment time given to them at the time of scheduling, they should contact the Radiation Therapy department for detail. Where:  [facility]) Monday Dec 04, 2017 RADIATION ONCOLOGY with [facility] RADIATION ONCOLOGY at  8:00 AM  
This appointment time is simply a place [place] and may not reflect the true appointment time. If patient does not know the appointment time given to them at the time of scheduling, they should contact the Radiation Therapy department for detail. Where:  [facility]) Tuesday Dec 05, 2017 RADIATION ONCOLOGY with [facility] RADIATION ONCOLOGY at  8:00 AM  
This appointment time is simply a place [place] and may not reflect the true appointment time. If patient does not know the appointment time given to them at the time of scheduling, they should contact the Radiation Therapy department for detail. Where:  [facility]) Wednesday Dec 06, 2017 RADIATION ONCOLOGY with [facility] RADIATION ONCOLOGY at  8:00 AM  
This appointment time is simply a place [place] and may not reflect the true appointment time. If patient does not know the appointment time given to them at the time of scheduling, they should contact the Radiation Therapy department for detail. Where:  [facility]) ROUTINE CARE with Natali Sarkar, DO at  2:00 PM  
Where:  [address] [address]) Thursday Dec 07, 2017 RADIATION ONCOLOGY with [facility] RADIATION ONCOLOGY at  8:00 AM  
This appointment time is simply a place [place] and may not reflect the true appointment time. If patient does not know the appointment time given to them at the time of scheduling, they should contact the Radiation Therapy department for detail. Where:  [facility]) Friday Dec 08, 2017 RADIATION ONCOLOGY with [facility] RADIATION ONCOLOGY at  8:00 AM  
This appointment time is simply a place [place] and may not reflect the true appointment time. If patient does not know the appointment time given to them at the time of scheduling, they should contact the Radiation Therapy department for detail. Where:  [facility] RADIATION THERAPY [facility]) Monday Dec 11, 2017 RADIATION ONCOLOGY with [facility] RADIATION ONCOLOGY at  8:00 AM  
This appointment time is simply a place [place] and may not reflect the true appointment time. If patient does not know the appointment time given to them at the time of scheduling, they should contact the Radiation Therapy department for detail. Where:  [facility] RADIATION THERAPY [facility]) Tuesday Dec 12, 2017 RADIATION ONCOLOGY with [facility] RADIATION ONCOLOGY at  8:00 AM  
This appointment time is simply a place [place] and may not reflect the true appointment time. If patient does not know the appointment time given to them at the time of scheduling, they should contact the Radiation Therapy department for detail. Where:  [facility] RADIATION THERAPY [facility]) Discharge Orders None A check wale indicates which time of day the medication should be taken. My Medications TAKE these medications as instructed Instructions Each Dose to Equal  
 Morning Noon Evening Bedtime  
 amLODIPine 5 mg tablet Commonly known as:  [place] Your last dose was: Your next dose is: Take 1 Tab by mouth daily. 5 mg  
    
   
   
   
  
 lisinopril 20 mg tablet Commonly known as:  Panfilo Argueta Your last dose was: Your next dose is: Take 1 Tab by mouth daily. 20 mg  
    
   
   
   
  
 * oxyCODONE IR 5 mg immediate release tablet Commonly known as:  Sally Copper Your last dose was: Your next dose is: Take 1 Tab by mouth every eight (8) hours as needed for Pain. Max Daily Amount: 15 mg.  
 5 mg * oxyCODONE IR 10 mg Tab immediate release tablet Commonly known as:  Jenifer Noble Your last dose was: Your next dose is: Take 0.5 Tabs by mouth every six (6) hours as needed. Max Daily Amount: 20 mg.  
 5 mg * Notice: This list has 2 medication(s) that are the same as other medications prescribed for you. Read the directions carefully, and ask your doctor or other care provider to review them with you. Where to Get Your Medications Information on where to get these meds will be given to you by the nurse or doctor. ! Ask your nurse or doctor about these medications  
  oxyCODONE IR 10 mg Tab immediate release tablet Discharge Instructions Dr. Kevin Wylie Discharge Instructions Patient: Aravind Sellers MRN: [id number]  SSN: [id number] YOB: 1950  Age: 75 y.o. Sex: male Activity · As tolerated, walking encouraged, stairs are okay · Avoid strenuous activities - no lifting anything heavier than ten pounds. · You may shower at home, do not soak in a tub. Diet · Regular diet after nausea from the anesthetic has passed. · If nausea and vomiting continues, call your doctor Pain · Take pain medication as directed by your doctor ·  Call your doctor if pain is NOT relieved by medication · DO NOT take aspirin or blood thinners until 48 hours after surgery unless directed by your doctor Follow-Up Phone Calls · Call [phone] today to make your follow-up appointment. · If you have any problems, call your doctor as needed Call your doctor if 
· Excessive bleeding that does not stop after holding mild pressure over the area · Temperature of 101 degrees F or above · Redness,excessive swelling or bruising, and/or green or yellow, smelly discharge from incision After Anesthesia · For the first 24 hours: DO NOT Drive, Drink alcoholic beverages, or make important decisions · Be aware of dizziness following anesthesia and while taking pain medication Medication changes may have been made by your doctor; see Leslye Shah form. Continue home medications as previously prescribed unless instructed by your doctor. Other Instructions: Call the office to schedule a follow-up appointment Appointment date/time: 10 to 14 days Your doctor's phone number: [phone]. DISCHARGE SUMMARY from Nurse PATIENT INSTRUCTIONS: 
 
After general anesthesia or intravenous sedation, for 24 hours or while taking prescription Narcotics: · Limit your activities · Do not drive and operate hazardous machinery · Do not make important personal or business decisions · Do  not drink alcoholic beverages · If you have not urinated within 8 hours after discharge, please contact your surgeon on call. Report the following to your surgeon: 
· Excessive pain, swelling, redness or odor of or around the surgical area · Temperature over 100.5 · Nausea and vomiting lasting longer than 4 hours or if unable to take medications · Any signs of decreased circulation or nerve impairment to extremity: change in color, persistent  numbness, tingling, coldness or increase pain · Any questions What to do at Home: These are general instructions for a healthy lifestyle: No smoking/ No tobacco products/ Avoid exposure to second hand smoke Surgeon General's Warning:  Quitting smoking now greatly reduces serious risk to your health. Obesity, smoking, and sedentary lifestyle greatly increases your risk for illness A healthy diet, regular physical exercise & weight monitoring are important for maintaining a healthy lifestyle You may be retaining fluid if you have a history of heart failure or if you experience any of the following symptoms:  Weight gain of 3 pounds or more overnight or 5 pounds in a week, increased swelling in our hands or feet or shortness of breath while lying flat in bed. Please call your doctor as soon as you notice any of these symptoms; do not wait until your next office visit. Recognize signs and symptoms of STROKE: 
 
F-face looks uneven A-arms unable to move or move unevenly S-speech slurred or non-existent T-time-call 911 as soon as signs and symptoms begin-DO NOT go Back to bed or wait to see if you get better-TIME IS BRAIN. Warning Signs of HEART ATTACK Call 911 if you have these symptoms: 
? Chest discomfort. Most heart attacks involve discomfort in the center of the chest that lasts more than a few minutes, or that goes away and comes back. It can feel like uncomfortable pressure, squeezing, fullness, or pain. ? Discomfort in other areas of the upper body. Symptoms can include pain or discomfort in one or both arms, the back, neck, jaw, or stomach. ? Shortness of breath with or without chest discomfort. ? Other signs may include breaking out in a cold sweat, nausea, or lightheadedness. Don't wait more than five minutes to call [address]! Fast action can save your life. Calling 911 is almost always the fastest way to get lifesaving treatment. Emergency Medical Services staff can begin treatment when they arrive  up to an hour sooner than if someone gets to the hospital by car. The discharge information has been reviewed with the patient. The patient verbalized understanding. Discharge medications reviewed with the patient and appropriate educational materials and side effects teaching were provided. ___________________________________________________________________________________________________________________________________ Patient armband removed and given to patient to take home. Patient was informed of the privacy risks if armband lost or stolen ACO Transitions of Care Introducing [employer] [address] offers a voluntary care coordination program to provide high quality service and care to [facility] fee-for-service beneficiaries. Dionne Wood was designed to help you enhance your health and well-being through the following services: ? Transitions of Care  support for individuals who are transitioning from one care setting to another (example: Hospital to home). ? Chronic and Complex Care Coordination  support for individuals and caregivers of those with serious or chronic illnesses or with more than one chronic (ongoing) condition and those who take a number of different medications. If you meet specific medical criteria, a [facility] may call you directly to coordinate your care with your primary care physician and your other care providers. For questions about the [facility] programs, please, contact your physicians office. For general questions or additional information about Accountable Care Organizations: 
Please visit www.medicare.gov/acos. html or call 1-800-MEDICARE (8-287.593.4580) TTY users should call 7-178.268.3693. Unresulted Labs-Please follow up with your PCP about these lab tests Order Current Status XR CHEST PORT In process XR PLACEMENT CVAD FLUORO GUIDED In process Providers Seen During Your Hospitalization Provider Specialty Primary office phone Tessie Vital MD General Surgery [phone] Your Primary Care Physician (PCP) Primary Care Physician Office Phone Office Fax Ha De Los Santos [phone] You are allergic to the following No active allergies Recent Documentation Height Weight BMI Smoking Status 1.753 m 66.9 kg 21.78 kg/m2 Current Every Day Smoker Emergency Contacts Name Discharge Info Relation Home Work Mobile Ruthann Hendrickson CAREGIVER [3] Daughter [21]   [phone] Mikey Aparicio N/A  AT THIS TIME [6] Friend [5] [phone] Patient Belongings The following personal items are in your possession at time of discharge: 
  Dental Appliances: None  Visual Aid: None      Home Medications: None   Jewelry: None  Clothing: Pants, Shirt, Undergarments, Footwear, Jacket/Coat    Other Valuables: Cell Phone, HO Please provide this summary of care documentation to your next provider. Signatures-by signing, you are acknowledging that this After Visit Summary has been reviewed with you and you have received a copy. Patient Signature:  ____________________________________________________________ Date:  ____________________________________________________________  
  
[facility] Provider Signature:  ____________________________________________________________ Date:  ____________________________________________________________

## 2017-11-07 NOTE — OP NOTES
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 Using Site-Rite ultrasound the right internal jugular vein was identified and attempted to be  cannulated using the Seldinger technique. There was one puncture of artery, immediately identified, pressure held for 5 min at which time there was no hematoma or oozing. We then used the Site-Rite ultrasound and identified the right internal jugular vein and it was cannulated using the Seldinger technique. Wire placement was confirmed with fluoroscopy. A subcutaneous pocket was created on the right anterior chest wall in the infraclavicular position. Hemostasis was achieved with cautery. A tunnel was then created between the IJ stick site and the subcutaneous pocket. The catheter was tunneled over to the stick site and cut to an appropriate length. A dilator and sheath was placed over the wire under fluoroscopic guidance without difficulty. The dilator and wire removed and the catheter placed through the sheath and the sheath removed without difficulty. A single lumen MediPort was placed in the pocket and tacked to the chest wall using prolene sutures. The catheter was in good position by fluoroscopic evaluation and functioned well and aspirated and flushed easily. The wounds were then irrigated and closed with Vicryl in the subcutaneous tissue and Monocryl in the skin. Dermabond was then used and a sterile dressing applied.  A chest xray was ordered in recovery room. The MediPort is now ready for use.     Karmen Hall MD,

## 2017-11-07 NOTE — ANESTHESIA PREPROCEDURE EVALUATION
Anesthetic History               Review of Systems / Medical History  Patient summary reviewed and pertinent labs reviewed    Pulmonary                   Neuro/Psych              Cardiovascular    Hypertension: well controlled                   GI/Hepatic/Renal                Endo/Other      Hypothyroidism: well controlled  Arthritis and cancer    Comments: Esophageal cancer Other Findings   Comments:   Risk Factors for Postoperative nausea/vomiting:       History of postoperative nausea/vomiting? NO       Female? NO       Motion sickness? NO       Intended opioid administration for postoperative analgesia? YES      Smoking Abstinence  Current Smoker? YES  Elective Surgery? YES  Seen preoperatively by anesthesiologist or proxy prior to day of surgery? YES  Pt abstained from smoking 24 hours prior to anesthesia?  NO           Physical Exam    Airway  Mallampati: III  TM Distance: 4 - 6 cm  Neck ROM: normal range of motion   Mouth opening: Normal     Cardiovascular    Rhythm: regular  Rate: normal         Dental    Dentition: Poor dentition     Pulmonary  Breath sounds clear to auscultation               Abdominal  GI exam deferred       Other Findings            Anesthetic Plan    ASA: 3  Anesthesia type: general          Induction: Intravenous  Anesthetic plan and risks discussed with: Patient

## 2017-11-07 NOTE — DISCHARGE INSTRUCTIONS
Dr. Rossy Lacy Discharge Instructions     Patient: Ron Tucker Person MRN: [id number]  SSN: [id number]    YOB: 1950  Age: 75 y.o. Sex: male      Activity  · As tolerated, walking encouraged, stairs are okay  · Avoid strenuous activities - no lifting anything heavier than ten pounds. · You may shower at home, do not soak in a tub. Diet  · Regular diet after nausea from the anesthetic has passed. · If nausea and vomiting continues, call your doctor    Pain  · Take pain medication as directed by your doctor  ·  Call your doctor if pain is NOT relieved by medication  · DO NOT take aspirin or blood thinners until 48 hours after surgery unless directed by your doctor    Follow-Up Phone Calls  · Call [phone] today to make your follow-up appointment. · If you have any problems, call your doctor as needed    Call your doctor if  · Excessive bleeding that does not stop after holding mild pressure over the area  · Temperature of 101 degrees F or above  · Redness,excessive swelling or bruising, and/or green or yellow, smelly discharge from incision    After Anesthesia  · For the first 24 hours: DO NOT Drive, Drink alcoholic beverages, or make important decisions  · Be aware of dizziness following anesthesia and while taking pain medication    Medication changes may have been made by your doctor; see Lynford Floras form. Continue home medications as previously prescribed unless instructed by your doctor. Other Instructions: Call the office to schedule a follow-up appointment    Appointment date/time: 10 to 14 days    Your doctor's phone number: [phone].       DISCHARGE SUMMARY from Nurse    PATIENT INSTRUCTIONS:    After general anesthesia or intravenous sedation, for 24 hours or while taking prescription Narcotics:  · Limit your activities  · Do not drive and operate hazardous machinery  · Do not make important personal or business decisions  · Do  not drink alcoholic beverages  · If you have not urinated within 8 hours after discharge, please contact your surgeon on call. Report the following to your surgeon:  · Excessive pain, swelling, redness or odor of or around the surgical area  · Temperature over 100.5  · Nausea and vomiting lasting longer than 4 hours or if unable to take medications  · Any signs of decreased circulation or nerve impairment to extremity: change in color, persistent  numbness, tingling, coldness or increase pain  · Any questions    What to do at Home:  These are general instructions for a healthy lifestyle:    No smoking/ No tobacco products/ Avoid exposure to second hand smoke  Surgeon General's Warning:  Quitting smoking now greatly reduces serious risk to your health. Obesity, smoking, and sedentary lifestyle greatly increases your risk for illness    A healthy diet, regular physical exercise & weight monitoring are important for maintaining a healthy lifestyle    You may be retaining fluid if you have a history of heart failure or if you experience any of the following symptoms:  Weight gain of 3 pounds or more overnight or 5 pounds in a week, increased swelling in our hands or feet or shortness of breath while lying flat in bed. Please call your doctor as soon as you notice any of these symptoms; do not wait until your next office visit. Recognize signs and symptoms of STROKE:    F-face looks uneven    A-arms unable to move or move unevenly    S-speech slurred or non-existent    T-time-call 911 as soon as signs and symptoms begin-DO NOT go       Back to bed or wait to see if you get better-TIME IS BRAIN. Warning Signs of HEART ATTACK     Call 911 if you have these symptoms:   Chest discomfort. Most heart attacks involve discomfort in the center of the chest that lasts more than a few minutes, or that goes away and comes back. It can feel like uncomfortable pressure, squeezing, fullness, or pain.  Discomfort in other areas of the upper body.  Symptoms can include pain or discomfort in one or both arms, the back, neck, jaw, or stomach.  Shortness of breath with or without chest discomfort.  Other signs may include breaking out in a cold sweat, nausea, or lightheadedness. Don't wait more than five minutes to call 911 - MINUTES MATTER! Fast action can save your life. Calling 911 is almost always the fastest way to get lifesaving treatment. Emergency Medical Services staff can begin treatment when they arrive -- up to an hour sooner than if someone gets to the hospital by car. The discharge information has been reviewed with the patient. The patient verbalized understanding. Discharge medications reviewed with the patient and appropriate educational materials and side effects teaching were provided. ___________________________________________________________________________________________________________________________________    Patient armband removed and given to patient to take home.   Patient was informed of the privacy risks if armband lost or stolen

## 2017-11-07 NOTE — ANESTHESIA POSTPROCEDURE EVALUATION
Post-Anesthesia Evaluation and Assessment    Patient: Veena Carr Person MRN: [id number]  SSN: [id number]    YOB: 1950  Age: 75 y.o. Sex: male      Data from PACU flowsheet    Cardiovascular Function/Vital Signs  Visit Vitals    /70    Pulse 63    Temp 36.1 °C (97 °F)    Resp 18    Ht 5' 9\" (1.753 m)    Wt 66.9 kg (147 lb 8 oz)    SpO2 100%    BMI 21.78 kg/m2       Patient is status post general anesthesia for Procedure(s):  mediport and ultrasound fluoro. Nausea/Vomiting: controlled    Postoperative hydration reviewed and adequate. Pain:  Pain Scale 1: Numeric (0 - 10) (11/07/17 1223)  Pain Intensity 1: 0 (11/07/17 1223)   Managed      Mental Status and Level of Consciousness: Alert and oriented     Pulmonary Status:   O2 Device: Oxygen mask (11/07/17 1419)   Adequate oxygenation and airway patent    Complications related to anesthesia: None    Post-anesthesia assessment completed.  No concerns    Signed By: Leona Abbott MD     November 7, 2017

## 2017-11-07 NOTE — INTERVAL H&P NOTE
H&P Update:  Cloteal Jesus Person was seen and examined. History and physical has been reviewed. The patient has been examined. There have been no significant clinical changes since the completion of the originally dated History and Physical.  Patient identified by surgeon; surgical site was confirmed by patient and surgeon.     Signed By: Ancelmo Majano MD     November 7, 2017 1:01 PM

## 2017-11-07 NOTE — H&P (VIEW-ONLY)
Subjective:      Nayana Chaney Person is a 75 y.o. male presents for followup care of esophageal cancer. He was supposed to get Mediport last week, however, he had a PEG attempted which resulted in a perforation and he required surgery. No earl perforation found, and he has since recovered well. He returns to office today to determine next steps. He is feeling fairly well, tolerating some liquids and soups and ensure shakes. He has no pain. Past Medical History:   Diagnosis Date    Essential hypertension, malignant 8/3/2010    Leg pain 8/3/2010    Throat cancer ([facility] [address]        No past surgical history on file. except as above    Current Outpatient Prescriptions   Medication Sig Dispense Refill    omeprazole (PRILOSEC) 40 mg capsule Take 40 mg by mouth daily.  lisinopril (PRINIVIL, ZESTRIL) 20 mg tablet Take 1 Tab by mouth daily. 90 Tab 4    amLODIPine (NORVASC) 5 mg tablet Take 1 Tab by mouth daily. 90 Tab 4    oxyCODONE IR (ROXICODONE) 5 mg immediate release tablet Take 1 Tab by mouth every eight (8) hours as needed for Pain.  Max Daily Amount: 15 mg. 50 Tab 0       No Known Allergies    ROS negative, except as stated in HPI      Objective:     Physical Exam:  Visit Vitals    /81 (BP 1 Location: Right arm, BP Patient Position: At rest)    Pulse 100    Temp 97 °F (36.1 °C) (Oral)    Resp 20    Ht 5' 9\" (1.753 m)    Wt 68.5 kg (151 lb)    BMI 22.3 kg/m2       General: Well developed, well nourished 75 y.o. male in no acute distress  ENMT: normocephalic, atraumatic mouth:clear, no overt lesions, oral mucosa pink and moist  Neck: supple, no masses, no adenopathy or carotid bruits, trachea midline  Skin: warm, smooth, dry and well perfused  Gastrointestinal: soft, nontender, nondistended, normoactive bowel sounds, no hernias, no hepatosplenomegaly, incisions healing well  Musculoskeletal: warm, well-perfused, no tenderness or swelling, normal gait/station  Neuro: sensation and strength grossly intact and symmetrical  Psych: alert and oriented to person, place and time          Assessment:     Lynn Soares is a 75 y.o. male with esophageal cancer in need of mediport. Discussed with Dr Martha Cabrera; will place mediport next week. If still needs G tube, will place either laparoscopically or open in a separate operation to avoid risk of bacteremia affecting vascular access.         Plan:   -as above  -mediport scheduled for 11/7

## 2017-11-08 ENCOUNTER — HOSPITAL ENCOUNTER (OUTPATIENT)
Dept: RADIATION THERAPY | Age: 67
Discharge: HOME | End: 2017-11-08
Payer: MEDICARE

## 2017-11-08 ENCOUNTER — HOSPITAL ENCOUNTER (OUTPATIENT)
Dept: INFUSION THERAPY | Age: 67
Discharge: HOME | End: 2017-11-08
Payer: MEDICARE

## 2017-11-08 VITALS
HEIGHT: 69 IN | HEART RATE: 90 BPM | RESPIRATION RATE: 17 BRPM | SYSTOLIC BLOOD PRESSURE: 97 MMHG | TEMPERATURE: 97.2 F | DIASTOLIC BLOOD PRESSURE: 69 MMHG | BODY MASS INDEX: 22.2 KG/M2 | OXYGEN SATURATION: 100 % | WEIGHT: 149.9 LBS

## 2017-11-08 LAB
ALBUMIN SERPL-MCNC: 3.3 G/DL (ref 3.4–5)
ALBUMIN/GLOB SERPL: 0.7 {RATIO} (ref 0.8–1.7)
ALP SERPL-CCNC: 70 U/L (ref 45–117)
ALT SERPL-CCNC: 22 U/L (ref 16–61)
ANION GAP BLD CALC-SCNC: 15 MMOL/L (ref 10–20)
AST SERPL-CCNC: 15 U/L (ref 15–37)
BASO+EOS+MONOS # BLD AUTO: 1 K/UL (ref 0–2.3)
BASO+EOS+MONOS # BLD AUTO: 10 % (ref 0.1–17)
BILIRUB DIRECT SERPL-MCNC: 0.1 MG/DL (ref 0–0.2)
BILIRUB SERPL-MCNC: 0.1 MG/DL (ref 0.2–1)
BUN BLD-MCNC: 11 MG/DL (ref 7–18)
CA-I BLD-MCNC: 1.23 MMOL/L (ref 1.12–1.32)
CHLORIDE BLD-SCNC: 105 MMOL/L (ref 100–108)
CO2 BLD-SCNC: 25 MMOL/L (ref 19–24)
CREAT UR-MCNC: 0.9 MG/DL (ref 0.6–1.3)
DIFFERENTIAL METHOD BLD: ABNORMAL
ERYTHROCYTE [DISTWIDTH] IN BLOOD BY AUTOMATED COUNT: 13.8 % (ref 11.5–14.5)
GLOBULIN SER CALC-MCNC: 4.5 G/DL (ref 2–4)
GLUCOSE BLD STRIP.AUTO-MCNC: 97 MG/DL (ref 74–106)
HCT VFR BLD AUTO: 33.9 % (ref 36–48)
HCT VFR BLD CALC: 34 % (ref 36–49)
HGB BLD-MCNC: 11.1 G/DL (ref 12–16)
HGB BLD-MCNC: 11.6 G/DL (ref 12–16)
LYMPHOCYTES # BLD: 1.5 K/UL (ref 1.1–5.9)
LYMPHOCYTES NFR BLD: 15 % (ref 14–44)
MCH RBC QN AUTO: 28 PG (ref 25–35)
MCHC RBC AUTO-ENTMCNC: 32.7 G/DL (ref 31–37)
MCV RBC AUTO: 85.4 FL (ref 78–102)
NEUTS SEG # BLD: 7 K/UL (ref 1.8–9.5)
NEUTS SEG NFR BLD: 74 % (ref 40–70)
PLATELET # BLD AUTO: 380 K/UL (ref 135–420)
POTASSIUM BLD-SCNC: 3.7 MMOL/L (ref 3.5–5.5)
PROT SERPL-MCNC: 7.8 G/DL (ref 6.4–8.2)
RBC # BLD AUTO: 3.97 M/UL (ref 4.1–5.1)
SODIUM BLD-SCNC: 140 MMOL/L (ref 136–145)
WBC # BLD AUTO: 9.5 K/UL (ref 4.5–13)

## 2017-11-08 PROCEDURE — 80047 BASIC METABLC PNL IONIZED CA: CPT

## 2017-11-08 PROCEDURE — 77030012965 HC NDL HUBR BBMI -A

## 2017-11-08 PROCEDURE — 85049 AUTOMATED PLATELET COUNT: CPT | Performed by: INTERNAL MEDICINE

## 2017-11-08 PROCEDURE — 80076 HEPATIC FUNCTION PANEL: CPT | Performed by: INTERNAL MEDICINE

## 2017-11-08 PROCEDURE — 96375 TX/PRO/DX INJ NEW DRUG ADDON: CPT

## 2017-11-08 PROCEDURE — 74011000258 HC RX REV CODE- 258: Performed by: INTERNAL MEDICINE

## 2017-11-08 PROCEDURE — 96367 TX/PROPH/DG ADDL SEQ IV INF: CPT

## 2017-11-08 PROCEDURE — 74011250636 HC RX REV CODE- 250/636: Performed by: INTERNAL MEDICINE

## 2017-11-08 PROCEDURE — 85025 COMPLETE CBC W/AUTO DIFF WBC: CPT | Performed by: INTERNAL MEDICINE

## 2017-11-08 PROCEDURE — 96417 CHEMO IV INFUS EACH ADDL SEQ: CPT

## 2017-11-08 PROCEDURE — 74011000250 HC RX REV CODE- 250: Performed by: INTERNAL MEDICINE

## 2017-11-08 PROCEDURE — 36591 DRAW BLOOD OFF VENOUS DEVICE: CPT

## 2017-11-08 PROCEDURE — 77386 HC IMRT TRMT DLVR COMPL: CPT

## 2017-11-08 PROCEDURE — 96413 CHEMO IV INFUSION 1 HR: CPT

## 2017-11-08 RX ORDER — FAMOTIDINE 10 MG/ML
20 INJECTION INTRAVENOUS ONCE
Status: COMPLETED | OUTPATIENT
Start: 2017-11-08 | End: 2017-11-08

## 2017-11-08 RX ORDER — SODIUM CHLORIDE 9 MG/ML
250 INJECTION, SOLUTION INTRAVENOUS CONTINUOUS
Status: DISPENSED | OUTPATIENT
Start: 2017-11-08 | End: 2017-11-09

## 2017-11-08 RX ORDER — SODIUM CHLORIDE 0.9 % (FLUSH) 0.9 %
10-40 SYRINGE (ML) INJECTION AS NEEDED
Status: DISCONTINUED | OUTPATIENT
Start: 2017-11-08 | End: 2017-11-12 | Stop reason: HOSPADM

## 2017-11-08 RX ORDER — DIPHENHYDRAMINE HYDROCHLORIDE 50 MG/ML
50 INJECTION, SOLUTION INTRAMUSCULAR; INTRAVENOUS ONCE
Status: COMPLETED | OUTPATIENT
Start: 2017-11-08 | End: 2017-11-08

## 2017-11-08 RX ORDER — HEPARIN 100 UNIT/ML
500 SYRINGE INTRAVENOUS ONCE
Status: COMPLETED | OUTPATIENT
Start: 2017-11-08 | End: 2017-11-08

## 2017-11-08 RX ORDER — PALONOSETRON 0.05 MG/ML
0.25 INJECTION, SOLUTION INTRAVENOUS ONCE
Status: COMPLETED | OUTPATIENT
Start: 2017-11-08 | End: 2017-11-08

## 2017-11-08 RX ADMIN — HEPARIN 500 UNITS: 100 SYRINGE at 14:59

## 2017-11-08 RX ADMIN — SODIUM CHLORIDE 250 ML/HR: 900 INJECTION, SOLUTION INTRAVENOUS at 11:50

## 2017-11-08 RX ADMIN — DEXAMETHASONE SODIUM PHOSPHATE 12 MG: 4 INJECTION, SOLUTION INTRA-ARTICULAR; INTRALESIONAL; INTRAMUSCULAR; INTRAVENOUS; SOFT TISSUE at 11:58

## 2017-11-08 RX ADMIN — DIPHENHYDRAMINE HYDROCHLORIDE 50 MG: 50 INJECTION INTRAMUSCULAR; INTRAVENOUS at 11:57

## 2017-11-08 RX ADMIN — FAMOTIDINE 20 MG: 10 INJECTION, SOLUTION INTRAVENOUS at 11:56

## 2017-11-08 RX ADMIN — Medication 30 ML: at 09:20

## 2017-11-08 RX ADMIN — Medication 10 ML: at 14:59

## 2017-11-08 RX ADMIN — PALONOSETRON HYDROCHLORIDE 0.25 MG: 0.25 INJECTION INTRAVENOUS at 11:55

## 2017-11-08 RX ADMIN — CARBOPLATIN 200 MG: 10 INJECTION, SOLUTION INTRAVENOUS at 13:59

## 2017-11-08 RX ADMIN — PACLITAXEL 90 MG: 6 INJECTION, SOLUTION INTRAVENOUS at 12:56

## 2017-11-08 NOTE — PROGRESS NOTES
[facility] Progress Note    Date: 2017    Name: Paula Laughlin Person              MRN: [id number]              : 1950    Chemotherapy Cycle: Paclitaxel/Carboplatin C1D1       Pt to hospitals, ambulatory, at 0915. Mr. Darrel Goff was assessed and education was provided. Patient signed chemo consent form in the presence of MD and myself as witness. Mr. Sellers's vitals were reviewed. Visit Vitals    BP 97/69 (BP 1 Location: Right arm, BP Patient Position: Sitting)    Pulse 90    Temp 97.2 °F (36.2 °C)    Resp 17    Ht 5' 9\" (1.753 m)    Wt 68 kg (149 lb 14.4 oz)    SpO2 100%    BMI 22.14 kg/m2       Right chest mediport accessed with 20 g 3/4 inch rios needle. Port flushed easily and had brisk blood return. Blood drawn off and sent for CBC, BMP, Hepatic per written orders after 10 ml waste. NS initiated @ [facility]. Lab results were obtained and reviewed. Recent Results (from the past 12 hour(s))   CBC WITH 3 PART DIFF    Collection Time: 17  9:40 AM   Result Value Ref Range    WBC 9.5 4.5 - 13.0 K/uL    RBC 3.97 (L) 4.10 - 5.10 M/uL    HGB 11.1 (L) 12.0 - 16.0 g/dL    HCT 33.9 (L) 36 - 48 %    MCV 85.4 78 - 102 FL    MCH 28.0 25.0 - 35.0 PG    MCHC 32.7 31 - 37 g/dL    RDW 13.8 11.5 - 14.5 %    NEUTROPHILS 74 (H) 40 - 70 %    MIXED CELLS 10 0.1 - 17 %    LYMPHOCYTES 15 14 - 44 %    ABS. NEUTROPHILS 7.0 1.8 - 9.5 K/UL    ABS. MIXED CELLS 1.0 0.0 - 2.3 K/uL    ABS.  LYMPHOCYTES 1.5 1.1 - 5.9 K/UL    DF AUTOMATED     POC CHEM8    Collection Time: 17  9:44 AM   Result Value Ref Range    CO2, POC 25 (H) 19 - 24 MMOL/L    Glucose, POC 97 74 - 106 MG/DL    BUN, POC 11 7 - 18 MG/DL    Creatinine, POC 0.9 0.6 - 1.3 MG/DL    GFRAA, POC >60 >60 ml/min/1.73m2    GFRNA, POC >60 >60 ml/min/1.73m2    Sodium,  136 - 145 MMOL/L    Potassium, POC 3.7 3.5 - 5.5 MMOL/L    Calcium, ionized (POC) 1.23 1.12 - 1.32 MMOL/L    Chloride,  100 - 108 MMOL/L    Anion gap, POC 15 10 - 20      Hematocrit, POC 34 (L) 36 - 49 %    Hemoglobin, POC 11.6 (L) 12 - 16 G/DL   HEPATIC FUNCTION PANEL    Collection Time: 11/08/17  9:45 AM   Result Value Ref Range    Protein, total 7.8 6.4 - 8.2 g/dL    Albumin 3.3 (L) 3.4 - 5.0 g/dL    Globulin 4.5 (H) 2.0 - 4.0 g/dL    A-G Ratio 0.7 (L) 0.8 - 1.7      Bilirubin, total 0.1 (L) 0.2 - 1.0 MG/DL    Bilirubin, direct 0.1 0.0 - 0.2 MG/DL    Alk. phosphatase 70 45 - 117 U/L    AST (SGOT) 15 15 - 37 U/L    ALT (SGPT) 22 16 - 61 U/L   PLATELET COUNT    Collection Time: 11/08/17  9:45 AM   Result Value Ref Range    PLATELET 440 541 - 674 K/uL       Lab results within ordered parameters to give chemo today. ANC = 7.0, PLT = 380. Chemo dosages verified with today's BSA and found to be within 10% of ordered dosages. Pre-medications (Aloxi 0.25 mg, Decadron 12 mg, Pepcid 20 mg, & Benadryl 50 mg) were administered as ordered and chemotherapy was initiated after blood return from port re-verified. Reviewed expected side effects of premeds with patient. Taxol 90 mg was infused at  285 ml/hr over 60 minutes. VS stable at end of infusion and pt denied complaints. Line flushed with NS and blood return from port re-verified. Carboplatin 200 mg was infused at  590 ml/hr over 30 minutes. VS stable at end of infusion and pt denied complaints. Line flushed with NS and blood return from port re-verified. Mr. Brittni Cochran tolerated infusion, and had no complaints at this time. Patient observed 30 minutes post infusion. Instructions regarding chemo treatment side effects reviewed. Patient verbally states, he understand and had no questions or concerns at this time. Mediport flushed with NS 20 ml and Heparin 500 units then de-accessed. No irritation or bleeding noted. Bandaid applied. Patient armband removed and shredded. Mr. Brittni Cochran was discharged from [place] [address] in stable condition at 1500. He is to return on 11/15/2017 at 1100 for his next appointment.     Delphia Alpers Gary Wolfe RN  November 8, 2017

## 2017-11-09 ENCOUNTER — TELEPHONE (OUTPATIENT)
Dept: ONCOLOGY | Age: 67
End: 2017-11-09

## 2017-11-09 ENCOUNTER — HOSPITAL ENCOUNTER (OUTPATIENT)
Dept: RADIATION THERAPY | Age: 67
Discharge: HOME | End: 2017-11-09
Payer: MEDICARE

## 2017-11-09 PROCEDURE — 77386 HC IMRT TRMT DLVR COMPL: CPT

## 2017-11-09 NOTE — TELEPHONE ENCOUNTER
Ms Nair Alexis would like to know if you know anything setting up Transportation for her  Ihsan Tolentino, his insurance is Medicare.

## 2017-11-10 ENCOUNTER — HOSPITAL ENCOUNTER (OUTPATIENT)
Dept: RADIATION THERAPY | Age: 67
Discharge: HOME | End: 2017-11-10
Payer: MEDICARE

## 2017-11-13 ENCOUNTER — HOSPITAL ENCOUNTER (OUTPATIENT)
Dept: RADIATION THERAPY | Age: 67
Discharge: HOME | End: 2017-11-13
Payer: MEDICARE

## 2017-11-13 PROCEDURE — 77386 HC IMRT TRMT DLVR COMPL: CPT

## 2017-11-14 ENCOUNTER — HOSPITAL ENCOUNTER (OUTPATIENT)
Dept: RADIATION THERAPY | Age: 67
Discharge: HOME | End: 2017-11-14
Payer: MEDICARE

## 2017-11-14 PROCEDURE — 77386 HC IMRT TRMT DLVR COMPL: CPT

## 2017-11-15 ENCOUNTER — HOSPITAL ENCOUNTER (OUTPATIENT)
Dept: INFUSION THERAPY | Age: 67
Discharge: HOME | End: 2017-11-15
Payer: MEDICARE

## 2017-11-15 ENCOUNTER — OFFICE VISIT (OUTPATIENT)
Dept: ONCOLOGY | Age: 67
End: 2017-11-15

## 2017-11-15 ENCOUNTER — HOSPITAL ENCOUNTER (OUTPATIENT)
Dept: RADIATION THERAPY | Age: 67
Discharge: HOME | End: 2017-11-15
Payer: MEDICARE

## 2017-11-15 VITALS
BODY MASS INDEX: 21.77 KG/M2 | SYSTOLIC BLOOD PRESSURE: 96 MMHG | TEMPERATURE: 99 F | OXYGEN SATURATION: 99 % | RESPIRATION RATE: 18 BRPM | HEART RATE: 86 BPM | HEIGHT: 69 IN | DIASTOLIC BLOOD PRESSURE: 64 MMHG | WEIGHT: 147 LBS

## 2017-11-15 VITALS
DIASTOLIC BLOOD PRESSURE: 64 MMHG | WEIGHT: 147.2 LBS | HEART RATE: 86 BPM | BODY MASS INDEX: 21.8 KG/M2 | HEIGHT: 69 IN | SYSTOLIC BLOOD PRESSURE: 92 MMHG | TEMPERATURE: 99 F | OXYGEN SATURATION: 99 % | RESPIRATION RATE: 18 BRPM

## 2017-11-15 DIAGNOSIS — K22.89 ESOPHAGEAL MASS: ICD-10-CM

## 2017-11-15 DIAGNOSIS — C15.9 SQUAMOUS CELL ESOPHAGEAL CANCER (HCC): Primary | ICD-10-CM

## 2017-11-15 DIAGNOSIS — C15.9 MALIGNANT NEOPLASM OF ESOPHAGUS, UNSPECIFIED LOCATION (HCC): ICD-10-CM

## 2017-11-15 LAB
ANION GAP BLD CALC-SCNC: 16 MMOL/L (ref 10–20)
BASO+EOS+MONOS # BLD AUTO: 0.3 K/UL (ref 0–2.3)
BASO+EOS+MONOS # BLD AUTO: 7 % (ref 0.1–17)
BUN BLD-MCNC: 11 MG/DL (ref 7–18)
CA-I BLD-MCNC: 1.19 MMOL/L (ref 1.12–1.32)
CHLORIDE BLD-SCNC: 102 MMOL/L (ref 100–108)
CO2 BLD-SCNC: 27 MMOL/L (ref 19–24)
CREAT UR-MCNC: 0.9 MG/DL (ref 0.6–1.3)
DIFFERENTIAL METHOD BLD: ABNORMAL
ERYTHROCYTE [DISTWIDTH] IN BLOOD BY AUTOMATED COUNT: 13.6 % (ref 11.5–14.5)
GLUCOSE BLD STRIP.AUTO-MCNC: 107 MG/DL (ref 74–106)
HCT VFR BLD AUTO: 31.5 % (ref 36–48)
HCT VFR BLD CALC: 32 % (ref 36–49)
HGB BLD-MCNC: 10.1 G/DL (ref 12–16)
HGB BLD-MCNC: 10.9 G/DL (ref 12–16)
LYMPHOCYTES # BLD: 0.7 K/UL (ref 1.1–5.9)
LYMPHOCYTES NFR BLD: 13 % (ref 14–44)
MCH RBC QN AUTO: 27.3 PG (ref 25–35)
MCHC RBC AUTO-ENTMCNC: 32.1 G/DL (ref 31–37)
MCV RBC AUTO: 85.1 FL (ref 78–102)
NEUTS SEG # BLD: 4.2 K/UL (ref 1.8–9.5)
NEUTS SEG NFR BLD: 80 % (ref 40–70)
PLATELET # BLD AUTO: 334 K/UL (ref 140–440)
POTASSIUM BLD-SCNC: 3.9 MMOL/L (ref 3.5–5.5)
RBC # BLD AUTO: 3.7 M/UL (ref 4.1–5.1)
SODIUM BLD-SCNC: 140 MMOL/L (ref 136–145)
WBC # BLD AUTO: 5.2 K/UL (ref 4.5–13)

## 2017-11-15 PROCEDURE — 77030012965 HC NDL HUBR BBMI -A

## 2017-11-15 PROCEDURE — 96367 TX/PROPH/DG ADDL SEQ IV INF: CPT

## 2017-11-15 PROCEDURE — 36591 DRAW BLOOD OFF VENOUS DEVICE: CPT

## 2017-11-15 PROCEDURE — 74011250636 HC RX REV CODE- 250/636: Performed by: INTERNAL MEDICINE

## 2017-11-15 PROCEDURE — 96375 TX/PRO/DX INJ NEW DRUG ADDON: CPT

## 2017-11-15 PROCEDURE — 85025 COMPLETE CBC W/AUTO DIFF WBC: CPT | Performed by: INTERNAL MEDICINE

## 2017-11-15 PROCEDURE — 96413 CHEMO IV INFUSION 1 HR: CPT

## 2017-11-15 PROCEDURE — 77336 RADIATION PHYSICS CONSULT: CPT

## 2017-11-15 PROCEDURE — 96417 CHEMO IV INFUS EACH ADDL SEQ: CPT

## 2017-11-15 PROCEDURE — 74011000250 HC RX REV CODE- 250: Performed by: INTERNAL MEDICINE

## 2017-11-15 PROCEDURE — 74011000258 HC RX REV CODE- 258: Performed by: INTERNAL MEDICINE

## 2017-11-15 PROCEDURE — 80047 BASIC METABLC PNL IONIZED CA: CPT

## 2017-11-15 PROCEDURE — 96415 CHEMO IV INFUSION ADDL HR: CPT

## 2017-11-15 PROCEDURE — 77386 HC IMRT TRMT DLVR COMPL: CPT

## 2017-11-15 RX ORDER — SODIUM CHLORIDE 0.9 % (FLUSH) 0.9 %
10-40 SYRINGE (ML) INJECTION AS NEEDED
Status: DISCONTINUED | OUTPATIENT
Start: 2017-11-15 | End: 2017-11-19 | Stop reason: HOSPADM

## 2017-11-15 RX ORDER — HEPARIN 100 UNIT/ML
500 SYRINGE INTRAVENOUS ONCE
Status: COMPLETED | OUTPATIENT
Start: 2017-11-15 | End: 2017-11-15

## 2017-11-15 RX ORDER — DIPHENHYDRAMINE HYDROCHLORIDE 50 MG/ML
50 INJECTION, SOLUTION INTRAMUSCULAR; INTRAVENOUS ONCE
Status: COMPLETED | OUTPATIENT
Start: 2017-11-15 | End: 2017-11-15

## 2017-11-15 RX ORDER — SODIUM CHLORIDE 9 MG/ML
250 INJECTION, SOLUTION INTRAVENOUS CONTINUOUS
Status: DISPENSED | OUTPATIENT
Start: 2017-11-15 | End: 2017-11-16

## 2017-11-15 RX ORDER — PALONOSETRON 0.05 MG/ML
0.25 INJECTION, SOLUTION INTRAVENOUS ONCE
Status: COMPLETED | OUTPATIENT
Start: 2017-11-15 | End: 2017-11-15

## 2017-11-15 RX ORDER — FAMOTIDINE 10 MG/ML
20 INJECTION INTRAVENOUS ONCE
Status: COMPLETED | OUTPATIENT
Start: 2017-11-15 | End: 2017-11-15

## 2017-11-15 RX ORDER — WATER FOR INJECTION,STERILE
VIAL (ML) INJECTION
Status: DISPENSED
Start: 2017-11-15 | End: 2017-11-16

## 2017-11-15 RX ADMIN — CARBOPLATIN 200 MG: 10 INJECTION INTRAVENOUS at 14:20

## 2017-11-15 RX ADMIN — PALONOSETRON HYDROCHLORIDE 0.25 MG: 0.25 INJECTION INTRAVENOUS at 12:10

## 2017-11-15 RX ADMIN — Medication 10 ML: at 14:52

## 2017-11-15 RX ADMIN — DIPHENHYDRAMINE HYDROCHLORIDE 50 MG: 50 INJECTION INTRAMUSCULAR; INTRAVENOUS at 12:13

## 2017-11-15 RX ADMIN — HEPARIN 500 UNITS: 100 SYRINGE at 14:52

## 2017-11-15 RX ADMIN — FAMOTIDINE 20 MG: 10 INJECTION INTRAVENOUS at 12:12

## 2017-11-15 RX ADMIN — DEXAMETHASONE SODIUM PHOSPHATE 12 MG: 4 INJECTION, SOLUTION INTRA-ARTICULAR; INTRALESIONAL; INTRAMUSCULAR; INTRAVENOUS; SOFT TISSUE at 12:15

## 2017-11-15 RX ADMIN — SODIUM CHLORIDE 250 ML/HR: 900 INJECTION, SOLUTION INTRAVENOUS at 12:08

## 2017-11-15 RX ADMIN — Medication 10 ML: at 11:45

## 2017-11-15 RX ADMIN — Medication 20 ML: at 11:50

## 2017-11-15 RX ADMIN — PACLITAXEL 90 MG: 6 INJECTION, SOLUTION INTRAVENOUS at 13:11

## 2017-11-15 NOTE — PROGRESS NOTES
[facility]:     Patient: Ryan Salvador Person Age: 75 y.o. Sex: male    YOB: 1950 Admit Date: (Not on file) PCP: Natali Sarkar DO   MRN: [id number]  CSN: [id number]          Patient Active Problem List   Diagnosis Code    Essential hypertension, malignant I10    Arthritis M19.90    Proteinuria R80.9    Hyperthyroidism E05.90    Malignant neoplasm of esophagus ([address] C15.9    Acute gastric perforation ([address] K25.1    Gastric perforation, acute ([address] K25.1    Esophageal mass K22.9    Squamous cell esophageal cancer ([address] C15.9       Assessment/ Plan:     1.) Advanced Esophageal Cancer-Squamous Cell Carcinoma    -Pathology results reviewedSquamous Cell Carcinoma  -Status post EGD biopsy per Dr. Anna Marie Pacheco 10/12/2017    -s/p evaluation radiation oncologyDr. Adela Hanson [place]  -Complete staging workup with FDG PET CT scan revealed lymph node metastasis (supraclavicular)  -MediPort placement per Dr. Melchor-11/7/2017  -Continue concurrent chemotherapy/radiotherapy  -Week #2 today11/15/2017  -Return to clinic in approximately 1 week to reevaluate clinically and for further medical decision-making.  -Closely monitor weekly CBCs and CMP's      FDG-PET scan 10/19/2017:    IMPRESSION: [Malignant activity at the esophageal mass, middle mediastinal lymph nodes,  supraclavicular lymph nodes compatible with metastases. ]     Activity at the right hilum but without enlarged lymph node, could be early  metastasis.     No malignant activity at subcentimeter pulmonary nodules. Potential for false  negative result exists because of their small size. Advise short-term follow-up  with diagnostic CT.     Activity at dental disease also seen in the mandible and maxilla.     Activity posterior to the right side mandible without definite mass or skeletal  abnormality of uncertain significance. Could be a tiny adjacent lymph node.   Attention should be paid to this region on subsequent examinations.     Emphysema. 2.)  Dysphasia  -Continue triple mix oral solution per radiation oncology    3.) Bowel perforation  -Occurred with procedure to place PEG tube  -Recently admission to [address] [place] [address]  -Improved    Pathology report 10/12/2017:  FINAL DIAGNOSIS:   ESOPHAGEAL MASS, BIOPSY:   INVASIVE, POORLY DIFFERENTIATED SQUAMOUS CELL CARCINOMA. GROSS DESCRIPTION:     The specimen is received in formalin in a container labeled with the patient's name, Joseline Button, and biopsy esophageal mass, rule out malignancy and consists of two soft tissue fragments, one pale tan and the other pink to dark red, ranging from 0.3 to 0.5 cm in greatest dimension. The specimen is submitted in toto in cassette A1. (cd)   DMM/clr     I have discussed the diagnosis with the patient and the intended plan as seen in the above orders. I have reviewed the plan of care with the patient, accepted their input and they are in agreement with the treatment goals. Patient has provided input and agrees with goals. History:   Jatinder Sellers is a 75 y.o. male presenting today for:     Distal Esophageal Cancer  -Pathology results reviewedSquamous cell carcinoma  -Status post EGD biopsy per Dr. Berkley Epstein  -Lost 15 pounds in past 6 months  -No f/c/s/cp or SOB. -Complaining of worsening dysphagia over the past several months. Current Outpatient Prescriptions   Medication Sig Dispense Refill    oxyCODONE IR (ROXICODONE) 10 mg tab immediate release tablet Take 0.5 Tabs by mouth every six (6) hours as needed. Max Daily Amount: 20 mg. 10 Tab 0    oxyCODONE IR (ROXICODONE) 5 mg immediate release tablet Take 1 Tab by mouth every eight (8) hours as needed for Pain. Max Daily Amount: 15 mg. 30 Tab 0    lisinopril (PRINIVIL, ZESTRIL) 20 mg tablet Take 1 Tab by mouth daily. 90 Tab 4    amLODIPine (NORVASC) 5 mg tablet Take 1 Tab by mouth daily.  90 Tab 4     Facility-Administered Medications Ordered in Other Visits   Medication Dose Route Frequency Provider Last Rate Last Dose    sodium chloride (NS) flush 10-40 mL  10-40 mL IntraVENous PRN Neela Wong MD   20 mL at 11/15/17 1150    0.9% sodium chloride infusion  250 mL/hr IntraVENous CONTINUOUS Neela Wong  mL/hr at 11/15/17 1208 250 mL/hr at 11/15/17 1208    heparin (porcine) pf 500 Units  500 Units InterCATHeter ONCE Neela Wong MD        sterile water (preservative free) injection             CARBOplatin (PARAPLATIN) 200 mg in 0.9% sodium chloride 250 mL chemo infusion  200 mg IntraVENous ONCE Neela Wong MD        PACLitaxel (TAXOL) 90 mg in 0.9% sodium chloride 250 mL chemo infusion  90 mg IntraVENous ONCE Neela Wong MD   90 mg at 11/15/17 1311       Objective:   VS:    Vitals:    11/15/17 1312   BP: 96/64   Pulse: 86   Resp: 18   Temp: 99 °F (37.2 °C)   TempSrc: Oral   SpO2: 99%   Weight: 66.7 kg (147 lb)   Height: 5' 9\" (1.753 m)       Physical Exam:     Constitutional:  no acute distress and alert and oriented x 3    HENT:  atraumatic   Eyes:  EOMI, PERRLA   Neck:  No palpable masses   Cardiovascular:  S1, S2   Pulmonary/Chest Wall:   clear   Abdominal:  Non tender, no palpable masses       Musculoskeletal:  No deformities   Skin:  No rashes or lesions    Peripheral Vascular:  Pulses palpable       Review of Systems: The remainder of 12 point ROS was otherwise negative except for what was reported in the CC/HPI:      Recent Results (from the past 168 hour(s))   CBC WITH 3 PART DIFF    Collection Time: 11/15/17 11:45 AM   Result Value Ref Range    WBC 5.2 4.5 - 13.0 K/uL    RBC 3.70 (L) 4.10 - 5.10 M/uL    HGB 10.1 (L) 12.0 - 16.0 g/dL    HCT 31.5 (L) 36 - 48 %    MCV 85.1 78 - 102 FL    MCH 27.3 25.0 - 35.0 PG    MCHC 32.1 31 - 37 g/dL    RDW 13.6 11.5 - 14.5 %    PLATELET 099 216 - 904 K/uL    NEUTROPHILS 80 (H) 40 - 70 %    MIXED CELLS 7 0.1 - 17 %    LYMPHOCYTES 13 (L) 14 - 44 %    ABS. NEUTROPHILS 4.2 1.8 - 9.5 K/UL    ABS.  MIXED CELLS 0.3 0.0 - 2.3 K/uL    ABS. LYMPHOCYTES 0.7 (L) 1.1 - 5.9 K/UL    DF AUTOMATED     POC CHEM8    Collection Time: 11/15/17 11:53 AM   Result Value Ref Range    CO2, POC 27 (H) 19 - 24 MMOL/L    Glucose,  (H) 74 - 106 MG/DL    BUN, POC 11 7 - 18 MG/DL    Creatinine, POC 0.9 0.6 - 1.3 MG/DL    GFRAA, POC >60 >60 ml/min/1.73m2    GFRNA, POC >60 >60 ml/min/1.73m2    Sodium,  136 - 145 MMOL/L    Potassium, POC 3.9 3.5 - 5.5 MMOL/L    Calcium, ionized (POC) 1.19 1.12 - 1.32 MMOL/L    Chloride,  100 - 108 MMOL/L    Anion gap, POC 16 10 - 20      Hematocrit, POC 32 (L) 36 - 49 %    Hemoglobin, POC 10.9 (L) 12 - 16 G/DL       Past Medical History:   Diagnosis Date    Arthritis     Essential hypertension, malignant 8/3/2010    Leg pain 8/3/2010    Throat cancer [facility])        Past Surgical History:   Procedure Laterality Date    NM EGD DELIVER THERMAL ENERGY SPHNCTR/CARDIA GERD         Social History     Social History    Marital status:      Spouse name: N/A    Number of children: N/A    Years of education: N/A     Occupational History    Not on file. Social History Main Topics    Smoking status: Current Every Day Smoker     Packs/day: 0.25     Types: Cigarettes     Start date: 1/17/2017    Smokeless tobacco: Never Used      Comment: Restarted in Jan 2017    Alcohol use 6.6 oz/week     3 Shots of liquor, 8 Standard drinks or equivalent per week      Comment: \"NONE IN AWHILE\"    Drug use: No    Sexual activity: No     Other Topics Concern    Not on file     Social History Narrative       Family History   Problem Relation Age of Onset    Heart Disease Mother     Lung Disease Father        No Known Allergies    Follow-up Disposition: Not on File    Amaury Gross MD, MPH Hematology-Medical Oncology  November 15, 2017 1:16 PM

## 2017-11-15 NOTE — PROGRESS NOTES
Alisha Garcia is a 75 y.o. male who presents today for follow up. Pt educated on fall prevention, pt verbalized understanding. 1. Have you been to the ER, urgent care clinic since your last visit? Hospitalized since your last visit? NO    2. Have you seen or consulted any other health care providers outside of the [address] [place] [address] since your last visit? Include any pap smears or colon screening. NO    Health Maintenance reviewed.     Health Maintenance Due   Topic Date Due    Pneumococcal 65+ High/Highest Risk (2 of 2 - PPSV23) 01/14/2016

## 2017-11-15 NOTE — PROGRESS NOTES
[facility] Progress Note    Date: November 15, 2017    Name: Eulalio Hugo Person              MRN: [id number]              : 1950    Chemotherapy Cycle: Paclitaxel/Carboplatin C1D1       Pt to [facility], ambulatory, at 1140. Mr. Natalya Brock was assessed and education was provided. Patient signed chemo consent form in the presence of MD and myself as witness. Mr. Sellers's vitals were reviewed. Visit Vitals    BP 92/64 (BP 1 Location: Left arm, BP Patient Position: Sitting)    Pulse 86    Temp 99 °F (37.2 °C)    Resp 18    Ht 5' 9\" (1.753 m)    Wt 66.8 kg (147 lb 3.2 oz)    SpO2 99%    BMI 21.74 kg/m2       Right chest mediport accessed with 20 g 3/4 inch rios needle. Port flushed easily and had brisk blood return. Blood drawn off and sent for CBC, BMP, Hepatic per written orders after 10 ml waste. NS initiated @ [facility]. Lab results were obtained and reviewed. Recent Results (from the past 12 hour(s))   CBC WITH 3 PART DIFF    Collection Time: 11/15/17 11:45 AM   Result Value Ref Range    WBC 5.2 4.5 - 13.0 K/uL    RBC 3.70 (L) 4.10 - 5.10 M/uL    HGB 10.1 (L) 12.0 - 16.0 g/dL    HCT 31.5 (L) 36 - 48 %    MCV 85.1 78 - 102 FL    MCH 27.3 25.0 - 35.0 PG    MCHC 32.1 31 - 37 g/dL    RDW 13.6 11.5 - 14.5 %    PLATELET 639 595 - 335 K/uL    NEUTROPHILS 80 (H) 40 - 70 %    MIXED CELLS 7 0.1 - 17 %    LYMPHOCYTES 13 (L) 14 - 44 %    ABS. NEUTROPHILS 4.2 1.8 - 9.5 K/UL    ABS. MIXED CELLS 0.3 0.0 - 2.3 K/uL    ABS.  LYMPHOCYTES 0.7 (L) 1.1 - 5.9 K/UL    DF AUTOMATED     POC CHEM8    Collection Time: 11/15/17 11:53 AM   Result Value Ref Range    CO2, POC 27 (H) 19 - 24 MMOL/L    Glucose,  (H) 74 - 106 MG/DL    BUN, POC 11 7 - 18 MG/DL    Creatinine, POC 0.9 0.6 - 1.3 MG/DL    GFRAA, POC >60 >60 ml/min/1.73m2    GFRNA, POC >60 >60 ml/min/1.73m2    Sodium,  136 - 145 MMOL/L    Potassium, POC 3.9 3.5 - 5.5 MMOL/L    Calcium, ionized (POC) 1.19 1.12 - 1.32 MMOL/L    Chloride,  100 - 108 MMOL/L Anion gap, POC 16 10 - 20      Hematocrit, POC 32 (L) 36 - 49 %    Hemoglobin, POC 10.9 (L) 12 - 16 G/DL       Lab results within ordered parameters to give chemo today. ANC = 4.2, PLT = 334. Chemo dosages verified with today's BSA and found to be within 10% of ordered dosages. Pre-medications (Aloxi 0.25 mg, Decadron 12 mg, Pepcid 20 mg, & Benadryl 50 mg) were administered as ordered and chemotherapy was initiated after blood return from port re-verified. Reviewed expected side effects of premeds with patient. Taxol 90 mg was infused at  285 ml/hr over 60 minutes. VS stable at end of infusion and pt denied complaints. Line flushed with NS and blood return from port re-verified. Carboplatin 200 mg was infused at  590 ml/hr over 30 minutes. VS stable at end of infusion and pt denied complaints. Line flushed with NS and blood return from port re-verified. Mr. Meka Hammer tolerated infusion, and had no complaints at this time. Mediport flushed with NS 20 ml and Heparin 500 units then de-accessed. No irritation or bleeding noted. Bandaid applied. Patient armband removed and shredded. Mr. Meka Hammer was discharged from [place] [address] in stable condition at 1500. He is to return on 11/22/2017 at 1100 for his next appointment.     Lindsay Cassidy RN  November 15, 2017

## 2017-11-16 ENCOUNTER — HOSPITAL ENCOUNTER (OUTPATIENT)
Dept: RADIATION THERAPY | Age: 67
Discharge: HOME | End: 2017-11-16
Payer: MEDICARE

## 2017-11-16 PROCEDURE — 77386 HC IMRT TRMT DLVR COMPL: CPT

## 2017-11-17 ENCOUNTER — OFFICE VISIT (OUTPATIENT)
Dept: SURGERY | Age: 67
End: 2017-11-17

## 2017-11-17 ENCOUNTER — HOSPITAL ENCOUNTER (OUTPATIENT)
Dept: RADIATION THERAPY | Age: 67
Discharge: HOME | End: 2017-11-17
Payer: MEDICARE

## 2017-11-17 VITALS
WEIGHT: 151.2 LBS | RESPIRATION RATE: 16 BRPM | BODY MASS INDEX: 22.39 KG/M2 | TEMPERATURE: 96.6 F | DIASTOLIC BLOOD PRESSURE: 74 MMHG | HEART RATE: 90 BPM | SYSTOLIC BLOOD PRESSURE: 109 MMHG | HEIGHT: 69 IN

## 2017-11-17 DIAGNOSIS — C15.5 MALIGNANT NEOPLASM OF LOWER THIRD OF ESOPHAGUS (HCC): Primary | ICD-10-CM

## 2017-11-17 PROCEDURE — 77386 HC IMRT TRMT DLVR COMPL: CPT

## 2017-11-17 PROCEDURE — 77338 DESIGN MLC DEVICE FOR IMRT: CPT

## 2017-11-17 PROCEDURE — 77300 RADIATION THERAPY DOSE PLAN: CPT

## 2017-11-17 NOTE — PROGRESS NOTES
Marylou Sellers is a 75 y.o. male who presents today for a post-op exam for a MediPort placement performed on 11/07/17. Patient scores their post-op pain level on a pain scale from 1-10 as a 5 today. 1. Have you been to the ER, urgent care clinic since your last visit? Hospitalized since your last visit? No    2. Have you seen or consulted any other health care providers outside of the [address] [place] [address] since your last visit? Include any pap smears or colon screening.  No

## 2017-11-17 NOTE — PATIENT INSTRUCTIONS
If you have any questions or concerns about today's appointment, the verbal and/or written instructions you were given for follow up care, please call our office at [phone].     [facility] - [address]    [phone] office  [phone]

## 2017-11-17 NOTE — MR AVS SNAPSHOT
Visit Information Date & Time Provider Department Dept. Phone Encounter #  
 11/17/2017  2:15 PM Neo Nunn MD [facility] [phone] [id number] Your Appointments 12/6/2017  2:00 PM  
ROUTINE CARE with Consuelo Loren,  [address] [facility]) Appt Note: Return in about 4 weeks (around 11/27/2017) for EOV. [address] [facility] [address] [place]  
  
   
 [address] [facility] Upcoming Health Maintenance Date Due Pneumococcal 65+ High/Highest Risk (2 of 2 - PPSV23) 1/14/2016 GLAUCOMA SCREENING Q2Y 7/14/2018 MEDICARE YEARLY EXAM 8/16/2018 COLONOSCOPY 6/6/2021 DTaP/Tdap/Td series (2 - Td) 8/15/2027 Allergies as of 11/17/2017  Review Complete On: 11/17/2017 By: Tamela Pinon No Known Allergies Current Immunizations  Reviewed on 1/4/2017 Name Date Influenza High Dose Vaccine PF 1/4/2017, 11/19/2015 Influenza Vaccine 11/18/2014, 2/4/2013 Influenza Vaccine (Quad) PF 10/22/2017 10:21 PM  
 Influenza Vaccine Split 11/17/2011, 11/22/2010 Pneumococcal Conjugate (PCV-13) 11/19/2015 TD Vaccine 8/3/2006 Tdap 8/15/2017 ZZZ-RETIRED (DO NOT USE) Pneumococcal Vaccine (Unspecified Type) 11/22/2010 Not reviewed this visit Vitals BP Pulse Temp Resp Height(growth percentile) Weight(growth percentile) 109/74 (BP 1 Location: Right arm, BP Patient Position: Sitting) 90 96.6 °F (35.9 °C) (Oral) 16 5' 9\" (1.753 m) 151 lb 3.2 oz (68.6 kg) BMI Smoking Status 22.33 kg/m2 Current Every Day Smoker BMI and BSA Data Body Mass Index Body Surface Area  
 22.33 kg/m 2 1.83 m 2 Preferred Pharmacy Pharmacy Name Phone [facility] [address], [address] [place] [address] [phone] Your Updated Medication List  
  
   
 This list is accurate as of: 11/17/17  2:58 PM.  Always use your most recent med list. amLODIPine 5 mg tablet Commonly known as:  Imelda Aurelio Take 1 Tab by mouth daily. lisinopril 20 mg tablet Commonly known as:  Josepariangy Marie Take 1 Tab by mouth daily. * oxyCODONE IR 5 mg immediate release tablet Commonly known as:  Sayda Nuñez Take 1 Tab by mouth every eight (8) hours as needed for Pain. Max Daily Amount: 15 mg.  
  
 * oxyCODONE IR 10 mg Tab immediate release tablet Commonly known as:  Sayda Nuñez Take 0.5 Tabs by mouth every six (6) hours as needed. Max Daily Amount: 20 mg.  
  
 * Notice: This list has 2 medication(s) that are the same as other medications prescribed for you. Read the directions carefully, and ask your doctor or other care provider to review them with you. To-Do List   
 11/20/2017 8:00 AM  
  Appointment with [address] [place] [address] RADIATION THERAPY ([phone]) This appointment time is simply a place [place] and may not reflect the true appointment time. If patient does not know the appointment time given to them at the time of scheduling, they should contact the Radiation Therapy department for detail. 11/21/2017 8:00 AM  
  Appointment with [address] [place] [address] RADIATION THERAPY ([phone]) This appointment time is simply a place [place] and may not reflect the true appointment time. If patient does not know the appointment time given to them at the time of scheduling, they should contact the Radiation Therapy department for detail. 11/22/2017 8:00 AM  
  Appointment with [address] [place] [address] RADIATION THERAPY ([phone]) This appointment time is simply a place [place] and may not reflect the true appointment time. If patient does not know the appointment time given to them at the time of scheduling, they should contact the Radiation Therapy department for detail. 11/22/2017 11:00 AM  
  Appointment with [facility] INFUSION NURSE 2 at [facility] OP INFUSION [facility] ([phone])  
  
 11/24/2017 8:00 AM  
  Appointment with Celeste Kumar [facility] at [address] [facility] ([phone]) This appointment time is simply a place [place] and may not reflect the true appointment time. If patient does not know the appointment time given to them at the time of scheduling, they should contact the Radiation Therapy department for detail. 11/27/2017 8:00 AM  
  Appointment with [facility] RADIATION ONCOLOGY at [facility] RADIATION THERAPY ([phone]) This appointment time is simply a place [place] and may not reflect the true appointment time. If patient does not know the appointment time given to them at the time of scheduling, they should contact the Radiation Therapy department for detail. 11/28/2017 8:00 AM  
  Appointment with  José [facility] at [facility] RADIATION THERAPY ([phone]) This appointment time is simply a place [place] and may not reflect the true appointment time. If patient does not know the appointment time given to them at the time of scheduling, they should contact the Radiation Therapy department for detail. 11/29/2017 8:00 AM  
  Appointment with Celeste Kumar [facility] at [facility] RADIATION THERAPY ([phone]) This appointment time is simply a place [place] and may not reflect the true appointment time. If patient does not know the appointment time given to them at the time of scheduling, they should contact the Radiation Therapy department for detail. 11/29/2017 11:00 AM  
  Appointment with [address] 1 at [facility] OP INFUSION [facility] ([phone])  
  
 11/30/2017 8:00 AM  
  Appointment with Celeste Kumar [facility] at [address] [facility] ([phone]) This appointment time is simply a place [place] and may not reflect the true appointment time.   If patient does not know the appointment time given to them at the time of scheduling, they should contact the Radiation Therapy department for detail. 12/01/2017 8:00 AM  
  Appointment with [facility] RADIATION ONCOLOGY at [facility] RADIATION [facility] ([phone]) This appointment time is simply a place [place] and may not reflect the true appointment time. If patient does not know the appointment time given to them at the time of scheduling, they should contact the Radiation Therapy department for detail. 12/04/2017 8:00 AM  
  Appointment with [facility] RADIATION ONCOLOGY at [facility] RADIATION THERAPY ([phone]) This appointment time is simply a place [place] and may not reflect the true appointment time. If patient does not know the appointment time given to them at the time of scheduling, they should contact the Radiation Therapy department for detail. 12/05/2017 8:00 AM  
  Appointment with [facility] RADIATION ONCOLOGY at [facility] RADIATION THERAPY ([phone]) This appointment time is simply a place [place] and may not reflect the true appointment time. If patient does not know the appointment time given to them at the time of scheduling, they should contact the Radiation Therapy department for detail. 12/06/2017 8:00 AM  
  Appointment with [facility] RADIATION ONCOLOGY at [facility] RADIATION THERAPY ([phone]) This appointment time is simply a place [place] and may not reflect the true appointment time. If patient does not know the appointment time given to them at the time of scheduling, they should contact the Radiation Therapy department for detail. 12/06/2017 11:00 AM  
  Appointment with [facility] INFUSION NURSE 4 at [facility] OP INFUSION [facility] ([phone]) 12/07/2017 8:00 AM  
  Appointment with [facility] RADIATION ONCOLOGY at [facility] RADIATION THERAPY ([phone]) This appointment time is simply a place [place] and may not reflect the true appointment time.   If patient does not know the appointment time given to them at the time of scheduling, they should contact the Radiation Therapy department for detail. 12/08/2017 8:00 AM  
  Appointment with [facility] RADIATION ONCOLOGY at [facility] RADIATION THERAPY ([phone]) This appointment time is simply a place [place] and may not reflect the true appointment time. If patient does not know the appointment time given to them at the time of scheduling, they should contact the Radiation Therapy department for detail. 12/11/2017 8:00 AM  
  Appointment with [address] [place] [address] at [facility] RADIATION THERAPY ([phone]) This appointment time is simply a place [place] and may not reflect the true appointment time. If patient does not know the appointment time given to them at the time of scheduling, they should contact the Radiation Therapy department for detail. 12/12/2017 8:00 AM  
  Appointment with [address] [place] [address] at [facility] RADIATION THERAPY ([phone]) This appointment time is simply a place [place] and may not reflect the true appointment time. If patient does not know the appointment time given to them at the time of scheduling, they should contact the Radiation Therapy department for detail. Patient Instructions If you have any questions or concerns about today's appointment, the verbal and/or written instructions you were given for follow up care, please call our office at [phone]. [facility] - [address], [address] 
 
[phone] office [phone] Please provide this summary of care documentation to your next provider. Your primary care clinician is listed as [address] [facility]. If you have any questions after today's visit, please call [phone].

## 2017-11-17 NOTE — PROGRESS NOTES
Subjective:      Joaquin Alejandra is a 75 y.o. male presents for postop care 10 days status post mediport placement. Pain is well controlled. Appetite is fair. He is eating some soft foods and protein shakes. He notes his dysphagia is a bit better since starting XRT and chemo    Objective:     Visit Vitals    /74 (BP 1 Location: Right arm, BP Patient Position: Sitting)    Pulse 90    Temp 96.6 °F (35.9 °C) (Oral)    Resp 16    Ht 5' 9\" (1.753 m)    Wt 68.6 kg (151 lb 3.2 oz)    BMI 22.33 kg/m2       General:  alert, cooperative, no distress, fatigued   Abdomen: soft, bowel sounds active, non-tender   Incision:   healing well, no drainage, no erythema, no hernia, no seroma, no swelling, no dehiscence, incision well approximated         Assessment:     Doing well postoperatively. Patient on chemo and radiation for esophageal ca. Have advised him that if he needs enteral access, I am glad to place G tube for him. Patient and family understand and agree. Plan:   As above  Enteral access may be obtained as needed.

## 2017-11-20 ENCOUNTER — HOSPITAL ENCOUNTER (OUTPATIENT)
Dept: RADIATION THERAPY | Age: 67
Discharge: HOME | End: 2017-11-20
Payer: MEDICARE

## 2017-11-20 PROCEDURE — 77386 HC IMRT TRMT DLVR COMPL: CPT

## 2017-11-21 ENCOUNTER — HOSPITAL ENCOUNTER (OUTPATIENT)
Dept: RADIATION THERAPY | Age: 67
Discharge: HOME | End: 2017-11-21
Payer: MEDICARE

## 2017-11-21 PROCEDURE — 77386 HC IMRT TRMT DLVR COMPL: CPT

## 2017-11-22 ENCOUNTER — OFFICE VISIT (OUTPATIENT)
Dept: ONCOLOGY | Age: 67
End: 2017-11-22

## 2017-11-22 ENCOUNTER — HOSPITAL ENCOUNTER (OUTPATIENT)
Dept: RADIATION THERAPY | Age: 67
Discharge: HOME | End: 2017-11-22
Payer: MEDICARE

## 2017-11-22 ENCOUNTER — HOSPITAL ENCOUNTER (OUTPATIENT)
Dept: INFUSION THERAPY | Age: 67
Discharge: HOME | End: 2017-11-22
Payer: MEDICARE

## 2017-11-22 VITALS
BODY MASS INDEX: 22.36 KG/M2 | TEMPERATURE: 96.4 F | HEART RATE: 107 BPM | RESPIRATION RATE: 18 BRPM | WEIGHT: 151 LBS | HEIGHT: 69 IN | OXYGEN SATURATION: 100 % | SYSTOLIC BLOOD PRESSURE: 85 MMHG | DIASTOLIC BLOOD PRESSURE: 61 MMHG

## 2017-11-22 VITALS
HEART RATE: 86 BPM | SYSTOLIC BLOOD PRESSURE: 101 MMHG | OXYGEN SATURATION: 98 % | TEMPERATURE: 97.4 F | RESPIRATION RATE: 18 BRPM | DIASTOLIC BLOOD PRESSURE: 68 MMHG

## 2017-11-22 DIAGNOSIS — K22.89 ESOPHAGEAL MASS: ICD-10-CM

## 2017-11-22 DIAGNOSIS — C15.9 MALIGNANT NEOPLASM OF ESOPHAGUS, UNSPECIFIED LOCATION (HCC): ICD-10-CM

## 2017-11-22 DIAGNOSIS — C15.9 SQUAMOUS CELL ESOPHAGEAL CANCER (HCC): Primary | ICD-10-CM

## 2017-11-22 LAB
ALBUMIN SERPL-MCNC: 3.3 G/DL (ref 3.4–5)
ALBUMIN/GLOB SERPL: 0.8 {RATIO} (ref 0.8–1.7)
ALP SERPL-CCNC: 42 U/L (ref 45–117)
ALT SERPL-CCNC: 20 U/L (ref 16–61)
ANION GAP BLD CALC-SCNC: 15 MMOL/L (ref 10–20)
ANION GAP SERPL CALC-SCNC: 5 MMOL/L (ref 3–18)
AST SERPL-CCNC: 11 U/L (ref 15–37)
BASO+EOS+MONOS # BLD AUTO: 0.3 K/UL (ref 0–2.3)
BASO+EOS+MONOS # BLD AUTO: 10 % (ref 0.1–17)
BILIRUB SERPL-MCNC: 0.2 MG/DL (ref 0.2–1)
BUN BLD-MCNC: 22 MG/DL (ref 7–18)
BUN SERPL-MCNC: 23 MG/DL (ref 7–18)
BUN/CREAT SERPL: 21 (ref 12–20)
CA-I BLD-MCNC: 1.19 MMOL/L (ref 1.12–1.32)
CALCIUM SERPL-MCNC: 9.1 MG/DL (ref 8.5–10.1)
CHLORIDE BLD-SCNC: 104 MMOL/L (ref 100–108)
CHLORIDE SERPL-SCNC: 106 MMOL/L (ref 100–108)
CO2 BLD-SCNC: 27 MMOL/L (ref 19–24)
CO2 SERPL-SCNC: 28 MMOL/L (ref 21–32)
CREAT SERPL-MCNC: 1.1 MG/DL (ref 0.6–1.3)
CREAT UR-MCNC: 1.2 MG/DL (ref 0.6–1.3)
DIFFERENTIAL METHOD BLD: ABNORMAL
ERYTHROCYTE [DISTWIDTH] IN BLOOD BY AUTOMATED COUNT: 13.4 % (ref 11.5–14.5)
GLOBULIN SER CALC-MCNC: 4.1 G/DL (ref 2–4)
GLUCOSE BLD STRIP.AUTO-MCNC: 110 MG/DL (ref 74–106)
GLUCOSE SERPL-MCNC: 102 MG/DL (ref 74–99)
HCT VFR BLD AUTO: 32 % (ref 36–48)
HCT VFR BLD CALC: 32 % (ref 36–49)
HGB BLD-MCNC: 10.3 G/DL (ref 12–16)
HGB BLD-MCNC: 10.9 G/DL (ref 12–16)
LYMPHOCYTES # BLD: 0.4 K/UL (ref 1.1–5.9)
LYMPHOCYTES NFR BLD: 13 % (ref 14–44)
MCH RBC QN AUTO: 27.2 PG (ref 25–35)
MCHC RBC AUTO-ENTMCNC: 32.2 G/DL (ref 31–37)
MCV RBC AUTO: 84.7 FL (ref 78–102)
NEUTS SEG # BLD: 2.2 K/UL (ref 1.8–9.5)
NEUTS SEG NFR BLD: 77 % (ref 40–70)
PLATELET # BLD AUTO: 277 K/UL (ref 140–440)
POTASSIUM BLD-SCNC: 5 MMOL/L (ref 3.5–5.5)
POTASSIUM SERPL-SCNC: 4.9 MMOL/L (ref 3.5–5.5)
PROT SERPL-MCNC: 7.4 G/DL (ref 6.4–8.2)
RBC # BLD AUTO: 3.78 M/UL (ref 4.1–5.1)
SODIUM BLD-SCNC: 140 MMOL/L (ref 136–145)
SODIUM SERPL-SCNC: 139 MMOL/L (ref 136–145)
WBC # BLD AUTO: 2.9 K/UL (ref 4.5–13)

## 2017-11-22 PROCEDURE — 74011250636 HC RX REV CODE- 250/636: Performed by: INTERNAL MEDICINE

## 2017-11-22 PROCEDURE — 96375 TX/PRO/DX INJ NEW DRUG ADDON: CPT

## 2017-11-22 PROCEDURE — 96413 CHEMO IV INFUSION 1 HR: CPT

## 2017-11-22 PROCEDURE — 80047 BASIC METABLC PNL IONIZED CA: CPT

## 2017-11-22 PROCEDURE — 80053 COMPREHEN METABOLIC PANEL: CPT | Performed by: INTERNAL MEDICINE

## 2017-11-22 PROCEDURE — 74011000258 HC RX REV CODE- 258: Performed by: INTERNAL MEDICINE

## 2017-11-22 PROCEDURE — 74011000250 HC RX REV CODE- 250: Performed by: INTERNAL MEDICINE

## 2017-11-22 PROCEDURE — 77336 RADIATION PHYSICS CONSULT: CPT

## 2017-11-22 PROCEDURE — 96367 TX/PROPH/DG ADDL SEQ IV INF: CPT

## 2017-11-22 PROCEDURE — 77030012965 HC NDL HUBR BBMI -A

## 2017-11-22 PROCEDURE — 96360 HYDRATION IV INFUSION INIT: CPT

## 2017-11-22 PROCEDURE — 85025 COMPLETE CBC W/AUTO DIFF WBC: CPT | Performed by: INTERNAL MEDICINE

## 2017-11-22 PROCEDURE — 96417 CHEMO IV INFUS EACH ADDL SEQ: CPT

## 2017-11-22 PROCEDURE — 99211 OFF/OP EST MAY X REQ PHY/QHP: CPT

## 2017-11-22 PROCEDURE — 77386 HC IMRT TRMT DLVR COMPL: CPT

## 2017-11-22 PROCEDURE — 96361 HYDRATE IV INFUSION ADD-ON: CPT

## 2017-11-22 RX ORDER — SODIUM CHLORIDE 9 MG/ML
500 INJECTION, SOLUTION INTRAVENOUS CONTINUOUS
Status: DISPENSED | OUTPATIENT
Start: 2017-11-22 | End: 2017-11-23

## 2017-11-22 RX ORDER — FAMOTIDINE 10 MG/ML
20 INJECTION INTRAVENOUS ONCE
Status: COMPLETED | OUTPATIENT
Start: 2017-11-22 | End: 2017-11-22

## 2017-11-22 RX ORDER — PALONOSETRON 0.05 MG/ML
0.25 INJECTION, SOLUTION INTRAVENOUS ONCE
Status: COMPLETED | OUTPATIENT
Start: 2017-11-22 | End: 2017-11-22

## 2017-11-22 RX ORDER — SUCRALFATE 1 G/10ML
1 SUSPENSION ORAL 3 TIMES DAILY
COMMUNITY
End: 2017-12-05

## 2017-11-22 RX ORDER — DIPHENHYDRAMINE HYDROCHLORIDE 50 MG/ML
50 INJECTION, SOLUTION INTRAMUSCULAR; INTRAVENOUS ONCE
Status: COMPLETED | OUTPATIENT
Start: 2017-11-22 | End: 2017-11-22

## 2017-11-22 RX ORDER — SODIUM CHLORIDE 9 MG/ML
75 INJECTION, SOLUTION INTRAVENOUS CONTINUOUS
Status: DISPENSED | OUTPATIENT
Start: 2017-11-22 | End: 2017-11-23

## 2017-11-22 RX ORDER — HEPARIN 100 UNIT/ML
500 SYRINGE INTRAVENOUS ONCE
Status: COMPLETED | OUTPATIENT
Start: 2017-11-22 | End: 2017-11-22

## 2017-11-22 RX ORDER — OXYCODONE HYDROCHLORIDE 10 MG/1
5 TABLET ORAL
Qty: 10 TAB | Refills: 0 | Status: SHIPPED | OUTPATIENT
Start: 2017-11-22 | End: 2017-11-27 | Stop reason: SDUPTHER

## 2017-11-22 RX ORDER — SODIUM CHLORIDE 0.9 % (FLUSH) 0.9 %
10-40 SYRINGE (ML) INJECTION AS NEEDED
Status: DISCONTINUED | OUTPATIENT
Start: 2017-11-22 | End: 2017-11-26 | Stop reason: HOSPADM

## 2017-11-22 RX ADMIN — DEXAMETHASONE SODIUM PHOSPHATE 12 MG: 4 INJECTION, SOLUTION INTRA-ARTICULAR; INTRALESIONAL; INTRAMUSCULAR; INTRAVENOUS; SOFT TISSUE at 13:15

## 2017-11-22 RX ADMIN — Medication 10 ML: at 13:41

## 2017-11-22 RX ADMIN — PALONOSETRON HYDROCHLORIDE 0.25 MG: 0.25 INJECTION INTRAVENOUS at 12:55

## 2017-11-22 RX ADMIN — Medication 20 ML: at 15:37

## 2017-11-22 RX ADMIN — Medication 10 ML: at 12:58

## 2017-11-22 RX ADMIN — DIPHENHYDRAMINE HYDROCHLORIDE 50 MG: 50 INJECTION INTRAMUSCULAR; INTRAVENOUS at 13:41

## 2017-11-22 RX ADMIN — Medication 500 UNITS: at 15:38

## 2017-11-22 RX ADMIN — Medication 20 ML: at 11:35

## 2017-11-22 RX ADMIN — PACLITAXEL 90 MG: 6 INJECTION, SOLUTION, CONCENTRATE INTRAVENOUS at 13:47

## 2017-11-22 RX ADMIN — FAMOTIDINE 20 MG: 10 INJECTION INTRAVENOUS at 12:59

## 2017-11-22 RX ADMIN — SODIUM CHLORIDE 75 ML/HR: 900 INJECTION, SOLUTION INTRAVENOUS at 13:01

## 2017-11-22 RX ADMIN — SODIUM CHLORIDE 500 ML/HR: 900 INJECTION, SOLUTION INTRAVENOUS at 11:52

## 2017-11-22 RX ADMIN — CARBOPLATIN 160 MG: 10 INJECTION INTRAVENOUS at 14:56

## 2017-11-22 NOTE — PROGRESS NOTES
[facility] Progress Note    Date: 2017    Name:  Brochure Person              MRN: [id number]              : 1950    Chemotherapy Cycle: C3D1  Taxol/Carbo       Mr. Dheeraj Resendiz was assessed and education was provided. Patient arrived from radiation therapy, c/o being tired. BP below normal.    Mr. Sellers's vitals were reviewed. Visit Vitals    /68 (BP 1 Location: Left arm, BP Patient Position: Sitting)    Pulse 86    Temp 97.4 °F (36.3 °C)    Resp 18    SpO2 98%      Patient Vitals for the past 12 hrs:   Temp Pulse Resp BP SpO2   17 1538 97.4 °F (36.3 °C) 86 18 101/68 -   17 1320 - 98 18 90/65 -   17 1319 - 94 18 (!) 76/53 -   17 1140 96.4 °F (35.8 °C) (!) 107 18 (!) 85/61 98 %     Mediport at left upper chest accessed with 20 gauge, 1 inch Gonzalez needle w/o difficulty. Good blood return obtained, labs drawn then flushed with 20 ml NS. Lab results were obtained and reviewed. Recent Results (from the past 12 hour(s))   CBC WITH 3 PART DIFF    Collection Time: 17 11:35 AM   Result Value Ref Range    WBC 2.9 (L) 4.5 - 13.0 K/uL    RBC 3.78 (L) 4.10 - 5.10 M/uL    HGB 10.3 (L) 12.0 - 16.0 g/dL    HCT 32.0 (L) 36 - 48 %    MCV 84.7 78 - 102 FL    MCH 27.2 25.0 - 35.0 PG    MCHC 32.2 31 - 37 g/dL    RDW 13.4 11.5 - 14.5 %    PLATELET 440 633 - 989 K/uL    NEUTROPHILS 77 (H) 40 - 70 %    MIXED CELLS 10 0.1 - 17 %    LYMPHOCYTES 13 (L) 14 - 44 %    ABS. NEUTROPHILS 2.2 1.8 - 9.5 K/UL    ABS. MIXED CELLS 0.3 0.0 - 2.3 K/uL    ABS.  LYMPHOCYTES 0.4 (L) 1.1 - 5.9 K/UL    DF AUTOMATED     METABOLIC PANEL, COMPREHENSIVE    Collection Time: 17 11:35 AM   Result Value Ref Range    Sodium 139 136 - 145 mmol/L    Potassium 4.9 3.5 - 5.5 mmol/L    Chloride 106 100 - 108 mmol/L    CO2 28 21 - 32 mmol/L    Anion gap 5 3.0 - 18 mmol/L    Glucose 102 (H) 74 - 99 mg/dL    BUN 23 (H) 7.0 - 18 MG/DL    Creatinine 1.10 0.6 - 1.3 MG/DL    BUN/Creatinine ratio 21 (H) 12 - 20 GFR est AA >60 >60 ml/min/1.73m2    GFR est non-AA >60 >60 ml/min/1.73m2    Calcium 9.1 8.5 - 10.1 MG/DL    Bilirubin, total 0.2 0.2 - 1.0 MG/DL    ALT (SGPT) 20 16 - 61 U/L    AST (SGOT) 11 (L) 15 - 37 U/L    Alk. phosphatase 42 (L) 45 - 117 U/L    Protein, total 7.4 6.4 - 8.2 g/dL    Albumin 3.3 (L) 3.4 - 5.0 g/dL    Globulin 4.1 (H) 2.0 - 4.0 g/dL    A-G Ratio 0.8 0.8 - 1.7     POC CHEM8    Collection Time: 11/22/17 11:38 AM   Result Value Ref Range    CO2, POC 27 (H) 19 - 24 MMOL/L    Glucose,  (H) 74 - 106 MG/DL    BUN, POC 22 (H) 7 - 18 MG/DL    Creatinine, POC 1.2 0.6 - 1.3 MG/DL    GFRAA, POC >60 >60 ml/min/1.73m2    GFRNA, POC >60 >60 ml/min/1.73m2    Sodium,  136 - 145 MMOL/L    Potassium, POC 5.0 3.5 - 5.5 MMOL/L    Calcium, ionized (POC) 1.19 1.12 - 1.32 MMOL/L    Chloride,  100 - 108 MMOL/L    Anion gap, POC 15 10 - 20      Hematocrit, POC 32 (L) 36 - 49 %    Hemoglobin, POC 10.9 (L) 12 - 16 G/DL     Dr. Marilyn Goff notified of low BP, K+5, BUN 22. Ordered bolus of 1000 ml of Normal Saline prior to chemotherapy. 1000 ml Normal Saline infused at 999 ml/hr until completed prior to chemotherapy. Pre-medications of Aloxi 0.25 mg IVP, Pepcid 20 mg in 8 ml NS IVP, Benadryl 50 mg in 9 ml NS IVP, Dexamethasone 10 mg IVPB  were administered as ordered and chemotherapy was initiated. Taxol 90 mg in 285 ml NS was infused at 285 ml/hr. No s/s reaction noted. Carboplatin 160 mg in 266 ml NS was infused at 582 ml/hr. No s/s reaction noted. Carboplatin dose reduced due to side effects and toxicity. Port flushed with 20 ml NS and 5 ml of 100 units/ml Heparin, then de-accessed. No irritation noted at site, band aid applied. Mr. Lauren Rothman tolerated infusion, and had no complaints at this time. Armband removed and shredded. Mr. Lauren Rothman was discharged from [place] [address] in stable condition at 1540.  He is to return on 11/29/2017 at 1100 for his next appointment for labs and chemotherapy.     Shira Grissom RN  November 22, 2017  4:41 PM

## 2017-11-22 NOTE — PROGRESS NOTES
Devonte Villegas is a 75 y.o. male who presents today for follow up. Pt educated on fall prevention, pt verbalized understanding. 1. Have you been to the ER, urgent care clinic since your last visit? Hospitalized since your last visit? NO    2. Have you seen or consulted any other health care providers outside of the [address] [place] [address] since your last visit? Include any pap smears or colon screening. NO    Health Maintenance reviewed.     Health Maintenance Due   Topic Date Due    Pneumococcal 65+ High/Highest Risk (2 of 2 - PPSV23) 01/14/2016

## 2017-11-22 NOTE — PROGRESS NOTES
[facility]:     Patient: Faina Ling Person Age: 75 y.o. Sex: male    YOB: 1950 Admit Date: (Not on file) PCP: Patricia Callaway DO   MRN: [id number]  CSN: [id number]       Patient Active Problem List   Diagnosis Code    Essential hypertension, malignant I10    Arthritis M19.90    Proteinuria R80.9    Hyperthyroidism E05.90    Malignant neoplasm of esophagus ([address] C15.9    Acute gastric perforation ([address] K25.1    Gastric perforation, acute ([address] K25.1    Esophageal mass K22.9    Squamous cell esophageal cancer ([address] C15.9     Assessment/ Plan:     1.) Advanced Esophageal Cancer-Squamous Cell Carcinoma    -Pathology results reviewed-Squamous Cell Carcinoma  -Status post EGD biopsy per Dr. Luna Mendoza 10/12/2017    -s/p evaluation radiation oncology-Dr. Crockett [facility]  -Complete staging workup with FDG PET CT scan revealed lymph node metastasis (supraclavicular)  -MediPort placement per Dr. Melchor-11/7/2017  -Continue concurrent chemotherapy/radiotherapy  -Week #3 of 5 today-11/22/2017  -Carboplatin dose decreased due lightly decreased renal function  -Return to clinic in approximately 1 week to reevaluate clinically and for further medical decision-making.  -Closely monitor weekly CBCs and CMP's  -Instructed go the emergency room for any worsening of symptoms and or development of fever    2.)  Mild Dehydration  -Decreased p.o. intake last several days  -Rehydrated with 1 L of 0.9% normal saline today  -Oral liquids encouraged    FDG-PET scan 10/19/2017:    IMPRESSION: [Malignant activity at the esophageal mass, middle mediastinal lymph nodes,  supraclavicular lymph nodes compatible with metastases. ]     Activity at the right hilum but without enlarged lymph node, could be early  metastasis.     No malignant activity at subcentimeter pulmonary nodules. Potential for false  negative result exists because of their small size.  Advise short-term follow-up  with diagnostic CT.     Activity at dental disease also seen in the mandible and maxilla.     Activity posterior to the right side mandible without definite mass or skeletal  abnormality of uncertain significance. Could be a tiny adjacent lymph node. Attention should be paid to this region on subsequent examinations.     Emphysema. 2.)  Dysphasia  -Continue triple mix oral solution per radiation oncology  -Does report tolerating liquids    3.) Bowel perforation  -Occurred with procedure to place PEG tube  -[facility] admission to [facility]  -Improved    Pathology report 10/12/2017:  FINAL DIAGNOSIS:   ESOPHAGEAL MASS, BIOPSY:   INVASIVE, POORLY DIFFERENTIATED SQUAMOUS CELL CARCINOMA. GROSS DESCRIPTION:     The specimen is received in formalin in a container labeled with the patient's name, Chanell Cates, and biopsy esophageal mass, rule out malignancy and consists of two soft tissue fragments, one pale tan and the other pink to dark red, ranging from 0.3 to 0.5 cm in greatest dimension. The specimen is submitted in toto in cassette A1. (cd)   DMM/clr     I have discussed the diagnosis with the patient and the intended plan as seen in the above orders. I have reviewed the plan of care with the patient, accepted their input and they are in agreement with the treatment goals. Patient has provided input and agrees with goals. History:   Thedario Woodbine Person is a 75 y.o. male presenting today for:     Distal Esophageal Cancer  -Pathology results reviewed-Squamous cell carcinoma  -Status post EGD biopsy per Dr. Jj Sam  -Lost 15 pounds in past 6 months  -No f/c/s/cp or SOB. -Complaining of worsening dysphagia over the past several months-slightly improved    Current Outpatient Prescriptions   Medication Sig Dispense Refill    oxyCODONE IR (ROXICODONE) 10 mg tab immediate release tablet Take 0.5 Tabs by mouth every six (6) hours as needed.  Max Daily Amount: 20 mg. 10 Tab 0    oxyCODONE IR (ROXICODONE) 5 mg immediate release tablet Take 1 Tab by mouth every eight (8) hours as needed for Pain. Max Daily Amount: 15 mg. 30 Tab 0    lisinopril (PRINIVIL, ZESTRIL) 20 mg tablet Take 1 Tab by mouth daily. 90 Tab 4    amLODIPine (NORVASC) 5 mg tablet Take 1 Tab by mouth daily.  90 Tab 4     Facility-Administered Medications Ordered in Other Visits   Medication Dose Route Frequency Provider Last Rate Last Dose    diphenhydrAMINE (BENADRYL) injection 50 mg  50 mg IntraVENous ONCE Rufino Cope MD        famotidine (PF) (PEPCID) injection 20 mg  20 mg IntraVENous ONCE Rufino Cope MD        palonosetron HCl (ALOXI) injection 0.25 mg  0.25 mg IntraVENous ONCE Rufino Cope MD        heparin (porcine) pf 500 Units  500 Units InterCATHeter ONCE Rufino Cope MD        0.9% sodium chloride infusion  75 mL/hr IntraVENous CONTINUOUS Rufino Cope MD        sodium chloride (NS) flush 10-40 mL  10-40 mL InterCATHeter PRN Rufino Cope MD   20 mL at 11/22/17 1135    0.9% sodium chloride infusion  500 mL/hr IntraVENous CONTINUOUS Rufino Cope  mL/hr at 11/22/17 1152 500 mL/hr at 11/22/17 1152    dexamethasone (DECADRON) 12 mg in 0.9% sodium chloride 50 mL IVPB  12 mg IntraVENous ONCE Rufino Cope MD        CARBOplatin (PARAPLATIN) 200 mg in 0.9% sodium chloride 250 mL chemo infusion  200 mg IntraVENous ONCE Rufino Cope MD        PACLitaxel (TAXOL) 90 mg in 0.9% sodium chloride 250 mL chemo infusion  90 mg IntraVENous ONCE Rufino Cope MD           Objective:   VS:    Vitals:    11/22/17 1151   BP: (!) 85/61   Pulse: (!) 107   Resp: 18   Temp: 96.4 °F (35.8 °C)   TempSrc: Oral   SpO2: 100%   Weight: 68.5 kg (151 lb)   Height: 5' 9\" (1.753 m)       Physical Exam:     Constitutional:  no acute distress, alert and oriented x 3    HENT:  atraumatic   Eyes:  EOMI, PERRLA   Neck:  No palpable masses   Cardiovascular:  S1, S2   Pulmonary/Chest Wall:   clear   Abdominal:  Non tender, no palpable masses Musculoskeletal:  No deformities   Skin:  No rashes or lesions    Peripheral Vascular:  Pulses palpable       Review of Systems: The remainder of 12 point ROS was otherwise negative except for what was reported in the CC/HPI:      Recent Results (from the past 168 hour(s))   CBC WITH 3 PART DIFF    Collection Time: 11/22/17 11:35 AM   Result Value Ref Range    WBC 2.9 (L) 4.5 - 13.0 K/uL    RBC 3.78 (L) 4.10 - 5.10 M/uL    HGB 10.3 (L) 12.0 - 16.0 g/dL    HCT 32.0 (L) 36 - 48 %    MCV 84.7 78 - 102 FL    MCH 27.2 25.0 - 35.0 PG    MCHC 32.2 31 - 37 g/dL    RDW 13.4 11.5 - 14.5 %    PLATELET 224 394 - 305 K/uL    NEUTROPHILS 77 (H) 40 - 70 %    MIXED CELLS 10 0.1 - 17 %    LYMPHOCYTES 13 (L) 14 - 44 %    ABS. NEUTROPHILS 2.2 1.8 - 9.5 K/UL    ABS. MIXED CELLS 0.3 0.0 - 2.3 K/uL    ABS. LYMPHOCYTES 0.4 (L) 1.1 - 5.9 K/UL    DF AUTOMATED     POC CHEM8    Collection Time: 11/22/17 11:38 AM   Result Value Ref Range    CO2, POC 27 (H) 19 - 24 MMOL/L    Glucose,  (H) 74 - 106 MG/DL    BUN, POC 22 (H) 7 - 18 MG/DL    Creatinine, POC 1.2 0.6 - 1.3 MG/DL    GFRAA, POC >60 >60 ml/min/1.73m2    GFRNA, POC >60 >60 ml/min/1.73m2    Sodium,  136 - 145 MMOL/L    Potassium, POC 5.0 3.5 - 5.5 MMOL/L    Calcium, ionized (POC) 1.19 1.12 - 1.32 MMOL/L    Chloride,  100 - 108 MMOL/L    Anion gap, POC 15 10 - 20      Hematocrit, POC 32 (L) 36 - 49 %    Hemoglobin, POC 10.9 (L) 12 - 16 G/DL       Past Medical History:   Diagnosis Date    Arthritis     Essential hypertension, malignant 8/3/2010    Leg pain 8/3/2010    Throat cancer [facility])        Past Surgical History:   Procedure Laterality Date    GA EGD DELIVER THERMAL ENERGY SPHNCTR/CARDIA GERD         Social History     Social History    Marital status:      Spouse name: N/A    Number of children: N/A    Years of education: N/A     Occupational History    Not on file.      Social History Main Topics    Smoking status: Current Every Day Smoker Packs/day: 0.25     Types: Cigarettes     Start date: 1/17/2017    Smokeless tobacco: Never Used      Comment: Restarted in Jan 2017    Alcohol use 6.6 oz/week     3 Shots of liquor, 8 Standard drinks or equivalent per week      Comment: \"NONE IN AWHILE\"    Drug use: No    Sexual activity: No     Other Topics Concern    Not on file     Social History Narrative       Family History   Problem Relation Age of Onset    Heart Disease Mother     Lung Disease Father        No Known Allergies    Follow-up Disposition: Not on File    Felicita Lentz MD, MPH Hematology-Medical Oncology  November 22, 2017 1:16 PM

## 2017-11-24 ENCOUNTER — APPOINTMENT (OUTPATIENT)
Dept: RADIATION THERAPY | Age: 67
End: 2017-11-24
Payer: MEDICARE

## 2017-11-27 ENCOUNTER — ANESTHESIA EVENT (OUTPATIENT)
Dept: SURGERY | Age: 67
End: 2017-11-27
Payer: MEDICARE

## 2017-11-27 ENCOUNTER — HOSPITAL ENCOUNTER (OUTPATIENT)
Dept: RADIATION THERAPY | Age: 67
Discharge: HOME | End: 2017-11-27
Payer: MEDICARE

## 2017-11-27 PROCEDURE — 77386 HC IMRT TRMT DLVR COMPL: CPT

## 2017-11-27 RX ORDER — OXYCODONE HYDROCHLORIDE 10 MG/1
5 TABLET ORAL
Qty: 10 TAB | Refills: 0 | Status: SHIPPED | OUTPATIENT
Start: 2017-11-27 | End: 2017-11-29

## 2017-11-28 ENCOUNTER — HOSPITAL ENCOUNTER (OUTPATIENT)
Age: 67
Setting detail: OUTPATIENT SURGERY
Discharge: HOME | End: 2017-11-28
Attending: SURGERY | Admitting: SURGERY
Payer: MEDICARE

## 2017-11-28 ENCOUNTER — APPOINTMENT (OUTPATIENT)
Dept: RADIATION THERAPY | Age: 67
End: 2017-11-28
Payer: MEDICARE

## 2017-11-28 ENCOUNTER — ANESTHESIA (OUTPATIENT)
Dept: SURGERY | Age: 67
End: 2017-11-28
Payer: MEDICARE

## 2017-11-28 ENCOUNTER — TELEPHONE (OUTPATIENT)
Dept: ONCOLOGY | Age: 67
End: 2017-11-28

## 2017-11-28 VITALS
TEMPERATURE: 98.4 F | HEIGHT: 69 IN | WEIGHT: 146 LBS | DIASTOLIC BLOOD PRESSURE: 64 MMHG | SYSTOLIC BLOOD PRESSURE: 94 MMHG | BODY MASS INDEX: 21.62 KG/M2 | HEART RATE: 88 BPM | OXYGEN SATURATION: 94 % | RESPIRATION RATE: 18 BRPM

## 2017-11-28 PROCEDURE — 77030008683 HC TU ET CUF COVD -A: Performed by: ANESTHESIOLOGY

## 2017-11-28 PROCEDURE — 77030011640 HC PAD GRND REM COVD -A: Performed by: SURGERY

## 2017-11-28 PROCEDURE — 74011000250 HC RX REV CODE- 250

## 2017-11-28 PROCEDURE — 77030011265 HC ELECTRD BLD HEX COVD -A: Performed by: SURGERY

## 2017-11-28 PROCEDURE — 77030002996 HC SUT SLK J&J -A: Performed by: SURGERY

## 2017-11-28 PROCEDURE — 74011000272 HC RX REV CODE- 272: Performed by: SURGERY

## 2017-11-28 PROCEDURE — 77030032490 HC SLV COMPR SCD KNE COVD -B: Performed by: SURGERY

## 2017-11-28 PROCEDURE — 74011250636 HC RX REV CODE- 250/636: Performed by: NURSE ANESTHETIST, CERTIFIED REGISTERED

## 2017-11-28 PROCEDURE — 77030018842 HC SOL IRR SOD CL 9% BAXT -A: Performed by: SURGERY

## 2017-11-28 PROCEDURE — 76060000033 HC ANESTHESIA 1 TO 1.5 HR: Performed by: SURGERY

## 2017-11-28 PROCEDURE — 77030013079 HC BLNKT BAIR HGGR 3M -A: Performed by: ANESTHESIOLOGY

## 2017-11-28 PROCEDURE — 76210000021 HC REC RM PH II 0.5 TO 1 HR: Performed by: SURGERY

## 2017-11-28 PROCEDURE — 74011000258 HC RX REV CODE- 258

## 2017-11-28 PROCEDURE — 77030008477 HC STYL SATN SLP COVD -A: Performed by: ANESTHESIOLOGY

## 2017-11-28 PROCEDURE — 74011250636 HC RX REV CODE- 250/636

## 2017-11-28 PROCEDURE — 74011250637 HC RX REV CODE- 250/637: Performed by: NURSE ANESTHETIST, CERTIFIED REGISTERED

## 2017-11-28 PROCEDURE — 77030002916 HC SUT ETHLN J&J -A: Performed by: SURGERY

## 2017-11-28 PROCEDURE — 74011250636 HC RX REV CODE- 250/636: Performed by: SURGERY

## 2017-11-28 PROCEDURE — 77030031139 HC SUT VCRL2 J&J -A: Performed by: SURGERY

## 2017-11-28 PROCEDURE — 76010000149 HC OR TIME 1 TO 1.5 HR: Performed by: SURGERY

## 2017-11-28 PROCEDURE — 77030008462 HC STPLR SKN PROX J&J -A: Performed by: SURGERY

## 2017-11-28 PROCEDURE — 76210000016 HC OR PH I REC 1 TO 1.5 HR: Performed by: SURGERY

## 2017-11-28 PROCEDURE — 77030008727 HC TU G REPLMT BSC -B: Performed by: SURGERY

## 2017-11-28 PROCEDURE — 77030010545: Performed by: SURGERY

## 2017-11-28 PROCEDURE — 77030002966 HC SUT PDS J&J -A: Performed by: SURGERY

## 2017-11-28 RX ORDER — CEFAZOLIN SODIUM 2 G/50ML
2 SOLUTION INTRAVENOUS ONCE
Status: COMPLETED | OUTPATIENT
Start: 2017-11-28 | End: 2017-11-28

## 2017-11-28 RX ORDER — HYDROCODONE BITARTRATE AND ACETAMINOPHEN 5; 325 MG/1; MG/1
1 TABLET ORAL AS NEEDED
Status: DISCONTINUED | OUTPATIENT
Start: 2017-11-28 | End: 2017-11-28 | Stop reason: HOSPADM

## 2017-11-28 RX ORDER — SODIUM CHLORIDE 0.9 % (FLUSH) 0.9 %
5-10 SYRINGE (ML) INJECTION AS NEEDED
Status: DISCONTINUED | OUTPATIENT
Start: 2017-11-28 | End: 2017-11-28 | Stop reason: HOSPADM

## 2017-11-28 RX ORDER — MIDAZOLAM HYDROCHLORIDE 1 MG/ML
INJECTION, SOLUTION INTRAMUSCULAR; INTRAVENOUS AS NEEDED
Status: DISCONTINUED | OUTPATIENT
Start: 2017-11-28 | End: 2017-11-28 | Stop reason: HOSPADM

## 2017-11-28 RX ORDER — PROPOFOL 10 MG/ML
INJECTION, EMULSION INTRAVENOUS AS NEEDED
Status: DISCONTINUED | OUTPATIENT
Start: 2017-11-28 | End: 2017-11-28 | Stop reason: HOSPADM

## 2017-11-28 RX ORDER — SODIUM CHLORIDE 0.9 % (FLUSH) 0.9 %
5-10 SYRINGE (ML) INJECTION EVERY 8 HOURS
Status: DISCONTINUED | OUTPATIENT
Start: 2017-11-28 | End: 2017-11-28 | Stop reason: HOSPADM

## 2017-11-28 RX ORDER — SUCCINYLCHOLINE CHLORIDE 20 MG/ML
INJECTION INTRAMUSCULAR; INTRAVENOUS AS NEEDED
Status: DISCONTINUED | OUTPATIENT
Start: 2017-11-28 | End: 2017-11-28 | Stop reason: HOSPADM

## 2017-11-28 RX ORDER — FAMOTIDINE 20 MG/1
20 TABLET, FILM COATED ORAL ONCE
Status: COMPLETED | OUTPATIENT
Start: 2017-11-28 | End: 2017-11-28

## 2017-11-28 RX ORDER — ONDANSETRON 2 MG/ML
4 INJECTION INTRAMUSCULAR; INTRAVENOUS
Status: DISCONTINUED | OUTPATIENT
Start: 2017-11-28 | End: 2017-11-28 | Stop reason: HOSPADM

## 2017-11-28 RX ORDER — OXYCODONE HYDROCHLORIDE 10 MG/1
5 TABLET ORAL
Qty: 30 TAB | Refills: 0 | Status: SHIPPED | OUTPATIENT
Start: 2017-11-28 | End: 2017-11-29

## 2017-11-28 RX ORDER — FENTANYL CITRATE 50 UG/ML
25 INJECTION, SOLUTION INTRAMUSCULAR; INTRAVENOUS AS NEEDED
Status: DISCONTINUED | OUTPATIENT
Start: 2017-11-28 | End: 2017-11-28 | Stop reason: HOSPADM

## 2017-11-28 RX ORDER — HYDROMORPHONE HYDROCHLORIDE 2 MG/ML
0.5 INJECTION, SOLUTION INTRAMUSCULAR; INTRAVENOUS; SUBCUTANEOUS
Status: DISCONTINUED | OUTPATIENT
Start: 2017-11-28 | End: 2017-11-28 | Stop reason: HOSPADM

## 2017-11-28 RX ORDER — SODIUM CHLORIDE, SODIUM LACTATE, POTASSIUM CHLORIDE, CALCIUM CHLORIDE 600; 310; 30; 20 MG/100ML; MG/100ML; MG/100ML; MG/100ML
100 INJECTION, SOLUTION INTRAVENOUS CONTINUOUS
Status: DISCONTINUED | OUTPATIENT
Start: 2017-11-28 | End: 2017-11-28 | Stop reason: HOSPADM

## 2017-11-28 RX ORDER — SODIUM CHLORIDE, SODIUM LACTATE, POTASSIUM CHLORIDE, CALCIUM CHLORIDE 600; 310; 30; 20 MG/100ML; MG/100ML; MG/100ML; MG/100ML
50 INJECTION, SOLUTION INTRAVENOUS CONTINUOUS
Status: DISCONTINUED | OUTPATIENT
Start: 2017-11-28 | End: 2017-11-28 | Stop reason: HOSPADM

## 2017-11-28 RX ORDER — LIDOCAINE HYDROCHLORIDE 20 MG/ML
INJECTION, SOLUTION EPIDURAL; INFILTRATION; INTRACAUDAL; PERINEURAL AS NEEDED
Status: DISCONTINUED | OUTPATIENT
Start: 2017-11-28 | End: 2017-11-28 | Stop reason: HOSPADM

## 2017-11-28 RX ORDER — ONDANSETRON 2 MG/ML
INJECTION INTRAMUSCULAR; INTRAVENOUS AS NEEDED
Status: DISCONTINUED | OUTPATIENT
Start: 2017-11-28 | End: 2017-11-28 | Stop reason: HOSPADM

## 2017-11-28 RX ORDER — FENTANYL CITRATE 50 UG/ML
INJECTION, SOLUTION INTRAMUSCULAR; INTRAVENOUS AS NEEDED
Status: DISCONTINUED | OUTPATIENT
Start: 2017-11-28 | End: 2017-11-28 | Stop reason: HOSPADM

## 2017-11-28 RX ADMIN — SODIUM CHLORIDE, SODIUM LACTATE, POTASSIUM CHLORIDE, AND CALCIUM CHLORIDE 100 ML/HR: 600; 310; 30; 20 INJECTION, SOLUTION INTRAVENOUS at 08:28

## 2017-11-28 RX ADMIN — CEFAZOLIN SODIUM 2 G: 2 SOLUTION INTRAVENOUS at 09:50

## 2017-11-28 RX ADMIN — FENTANYL CITRATE 25 MCG: 50 INJECTION, SOLUTION INTRAMUSCULAR; INTRAVENOUS at 11:11

## 2017-11-28 RX ADMIN — SUCCINYLCHOLINE CHLORIDE 80 MG: 20 INJECTION INTRAMUSCULAR; INTRAVENOUS at 09:50

## 2017-11-28 RX ADMIN — FENTANYL CITRATE 50 MCG: 50 INJECTION, SOLUTION INTRAMUSCULAR; INTRAVENOUS at 09:50

## 2017-11-28 RX ADMIN — FENTANYL CITRATE 25 MCG: 50 INJECTION, SOLUTION INTRAMUSCULAR; INTRAVENOUS at 10:26

## 2017-11-28 RX ADMIN — PROPOFOL 120 MG: 10 INJECTION, EMULSION INTRAVENOUS at 09:50

## 2017-11-28 RX ADMIN — FAMOTIDINE 20 MG: 20 TABLET, FILM COATED ORAL at 08:28

## 2017-11-28 RX ADMIN — FENTANYL CITRATE 25 MCG: 50 INJECTION, SOLUTION INTRAMUSCULAR; INTRAVENOUS at 10:32

## 2017-11-28 RX ADMIN — MIDAZOLAM HYDROCHLORIDE 2 MG: 1 INJECTION, SOLUTION INTRAMUSCULAR; INTRAVENOUS at 09:42

## 2017-11-28 RX ADMIN — FENTANYL CITRATE 25 MCG: 50 INJECTION, SOLUTION INTRAMUSCULAR; INTRAVENOUS at 11:04

## 2017-11-28 RX ADMIN — FENTANYL CITRATE 25 MCG: 50 INJECTION, SOLUTION INTRAMUSCULAR; INTRAVENOUS at 11:17

## 2017-11-28 RX ADMIN — HYDROCODONE BITARTRATE AND ACETAMINOPHEN 1 TABLET: 5; 325 TABLET ORAL at 12:15

## 2017-11-28 RX ADMIN — ONDANSETRON 4 MG: 2 INJECTION INTRAMUSCULAR; INTRAVENOUS at 10:17

## 2017-11-28 RX ADMIN — LIDOCAINE HYDROCHLORIDE 100 MG: 20 INJECTION, SOLUTION EPIDURAL; INFILTRATION; INTRACAUDAL; PERINEURAL at 09:50

## 2017-11-28 NOTE — IP AVS SNAPSHOT
Sorin Parra 
 
 
 [address] 
[phone] Patient: Carlos Markham Person MRN: [id number] :1950 About your hospitalization You were admitted on:  2017 You last received care in the:  [facility] PHASE 2 RECOVERY You were discharged on:  2017 Why you were hospitalized Your primary diagnosis was:  Not on File Things You Need To Do (next 8 weeks) Follow up with Cass Knowles MD in 2 week(s) Phone:  [phone] Where:  [address] [facility], [facility] 88, 102 [facility], [address] [address] Follow up with Sita Bee., DO Phone:  [phone] Where:  [address] [facility] , [facility] 400, [address] [id number]  RADIATION ONCOLOGY with [facility] RADIATION ONCOLOGY at  8:00 AM  
This appointment time is simply a place [place] and may not reflect the true appointment time. If patient does not know the appointment time given to them at the time of scheduling, they should contact the Radiation Therapy department for detail. Where:  [facility] RADIATION THERAPY [address] [facility]) INFUSION 30 with [facility] INFUSION NURSE 1 at 11:00 AM  
Where:  [facility] OP INFUSION [facility] ([address])  RADIATION ONCOLOGY with [facility] RADIATION ONCOLOGY at  8:00 AM  
This appointment time is simply a place [place] and may not reflect the true appointment time. If patient does not know the appointment time given to them at the time of scheduling, they should contact the Radiation Therapy department for detail. Where:  [facility] RADIATION THERAPY [facility]) Friday Dec 01, 2017 RADIATION ONCOLOGY with [facility] RADIATION ONCOLOGY at  8:00 AM  
This appointment time is simply a place [place] and may not reflect the true appointment time.   If patient does not know the appointment time given to them at the time of scheduling, they should contact the Radiation Therapy department for detail. Where:  [facility] RADIATION THERAPY [address] [place] [address]) Monday Dec 04, 2017 RADIATION ONCOLOGY with [facility] RADIATION ONCOLOGY at  8:00 AM  
This appointment time is simply a place [place] and may not reflect the true appointment time. If patient does not know the appointment time given to them at the time of scheduling, they should contact the Radiation Therapy department for detail. Where:  [facility] RADIATION THERAPY [address] [place] [address]) Tuesday Dec 05, 2017 RADIATION ONCOLOGY with [facility] RADIATION ONCOLOGY at  8:00 AM  
This appointment time is simply a place [place] and may not reflect the true appointment time. If patient does not know the appointment time given to them at the time of scheduling, they should contact the Radiation Therapy department for detail. Where:  [facility] RADIATION THERAPY [address] [place] [address]) Wednesday Dec 06, 2017 RADIATION ONCOLOGY with [facility] RADIATION ONCOLOGY at  8:00 AM  
This appointment time is simply a place [place] and may not reflect the true appointment time. If patient does not know the appointment time given to them at the time of scheduling, they should contact the Radiation Therapy department for detail. Where:  [facility] RADIATION THERAPY [address] [place] [address]) INFUSION 30 with [facility] INFUSION NURSE 4 at 11:00 AM  
Where:  [facility] OP INFUSION [facility] ([address]) ROUTINE CARE with Olive Villar., DO at  2:00 PM  
Where:  [id number] [place] [address]) Thursday Dec 07, 2017 RADIATION ONCOLOGY with [facility] RADIATION ONCOLOGY at  8:00 AM  
This appointment time is simply a place [place] and may not reflect the true appointment time. If patient does not know the appointment time given to them at the time of scheduling, they should contact the Radiation Therapy department for detail. Where:  [facility] RADIATION THERAPY [facility]) Friday Dec 08, 2017 RADIATION ONCOLOGY with [facility] RADIATION ONCOLOGY at  8:00 AM  
This appointment time is simply a place [place] and may not reflect the true appointment time. If patient does not know the appointment time given to them at the time of scheduling, they should contact the Radiation Therapy department for detail. Where:  [facility]) Monday Dec 11, 2017 RADIATION ONCOLOGY with [facility] RADIATION ONCOLOGY at  8:00 AM  
This appointment time is simply a place [place] and may not reflect the true appointment time. If patient does not know the appointment time given to them at the time of scheduling, they should contact the Radiation Therapy department for detail. Where:  [facility]) Tuesday Dec 12, 2017 RADIATION ONCOLOGY with [facility] RADIATION ONCOLOGY at  8:00 AM  
This appointment time is simply a place [place] and may not reflect the true appointment time. If patient does not know the appointment time given to them at the time of scheduling, they should contact the Radiation Therapy department for detail. Where:  [facility]) Wednesday Dec 13, 2017 RADIATION ONCOLOGY with [facility] RADIATION ONCOLOGY at  8:00 AM  
This appointment time is simply a place [place] and may not reflect the true appointment time. If patient does not know the appointment time given to them at the time of scheduling, they should contact the Radiation Therapy department for detail. Where:  [facility]) Thursday Dec 14, 2017 RADIATION ONCOLOGY with [facility] RADIATION ONCOLOGY at  8:00 AM  
This appointment time is simply a place [place] and may not reflect the true appointment time.   If patient does not know the appointment time given to them at the time of scheduling, they should contact the Radiation Therapy department for detail. Where:  [facility] RADIATION THERAPY [address] [place] [address]) Monday Dec 18, 2017 RADIATION ONCOLOGY with [facility] RADIATION ONCOLOGY at  8:00 AM  
This appointment time is simply a place [place] and may not reflect the true appointment time. If patient does not know the appointment time given to them at the time of scheduling, they should contact the Radiation Therapy department for detail. Where:  [facility] RADIATION THERAPY [address] [place] [address]) Tuesday Dec 19, 2017 RADIATION ONCOLOGY with [facility] RADIATION ONCOLOGY at  8:00 AM  
This appointment time is simply a place [place] and may not reflect the true appointment time. If patient does not know the appointment time given to them at the time of scheduling, they should contact the Radiation Therapy department for detail. Where:  [facility] RADIATION THERAPY [address] [place] [address]) Wednesday Dec 20, 2017 RADIATION ONCOLOGY with [facility] RADIATION ONCOLOGY at  8:00 AM  
This appointment time is simply a place [place] and may not reflect the true appointment time. If patient does not know the appointment time given to them at the time of scheduling, they should contact the Radiation Therapy department for detail. Where:  [facility] RADIATION THERAPY [address] [place] [address]) Thursday Dec 21, 2017 RADIATION ONCOLOGY with [facility] RADIATION ONCOLOGY at  8:00 AM  
This appointment time is simply a place [place] and may not reflect the true appointment time. If patient does not know the appointment time given to them at the time of scheduling, they should contact the Radiation Therapy department for detail. Where:  [facility] RADIATION THERAPY [address] [place] [address]) Friday Dec 22, 2017 RADIATION ONCOLOGY with [facility] RADIATION ONCOLOGY at  8:00 AM  
This appointment time is simply a place [place] and may not reflect the true appointment time. If patient does not know the appointment time given to them at the time of scheduling, they should contact the Radiation Therapy department for detail. Where:  [facility]) Tuesday Dec 26, 2017 RADIATION ONCOLOGY with [facility] RADIATION ONCOLOGY at  8:00 AM  
This appointment time is simply a place [place] and may not reflect the true appointment time. If patient does not know the appointment time given to them at the time of scheduling, they should contact the Radiation Therapy department for detail. Where:  [facility]) Wednesday Dec 27, 2017 RADIATION ONCOLOGY with [facility] RADIATION ONCOLOGY at  8:00 AM  
This appointment time is simply a place [place] and may not reflect the true appointment time. If patient does not know the appointment time given to them at the time of scheduling, they should contact the Radiation Therapy department for detail. Where:  [facility]) Thursday Dec 28, 2017 RADIATION ONCOLOGY with [facility] RADIATION ONCOLOGY at  8:00 AM  
This appointment time is simply a place [place] and may not reflect the true appointment time. If patient does not know the appointment time given to them at the time of scheduling, they should contact the Radiation Therapy department for detail. Where:  [facility]) Friday Dec 29, 2017 RADIATION ONCOLOGY with [facility] RADIATION ONCOLOGY at  8:00 AM  
This appointment time is simply a place [place] and may not reflect the true appointment time. If patient does not know the appointment time given to them at the time of scheduling, they should contact the Radiation Therapy department for detail. Where:  [facility]) Discharge Orders None A check wale indicates which time of day the medication should be taken. My Medications TAKE these medications as instructed Instructions Each Dose to Equal  
 Morning Noon Evening Bedtime  
 amLODIPine 5 mg tablet Commonly known as:  Hong Franco Your last dose was: Your next dose is: Take 1 Tab by mouth daily. 5 mg CARAFATE 100 mg/mL suspension Generic drug:  sucralfate Your last dose was: Your next dose is: Take 1 tsp by mouth three (3) times daily. 1 tsp  
    
   
   
   
  
 lisinopril 20 mg tablet Commonly known as:  Merilynn Alonso Your last dose was: Your next dose is: Take 1 Tab by mouth daily. 20 mg  
    
   
   
   
  
 * oxyCODONE IR 10 mg Tab immediate release tablet Commonly known as:  Nidia Gun Your last dose was: Your next dose is: Take 0.5 Tabs by mouth every six (6) hours as needed. Max Daily Amount: 20 mg.  
 5 mg * oxyCODONE IR 10 mg Tab immediate release tablet Commonly known as:  Mellette Gun Your last dose was: Your next dose is: Take 0.5 Tabs by mouth every six (6) hours as needed. Max Daily Amount: 20 mg.  
 5 mg * Notice: This list has 2 medication(s) that are the same as other medications prescribed for you. Read the directions carefully, and ask your doctor or other care provider to review them with you. Where to Get Your Medications Information on where to get these meds will be given to you by the nurse or doctor. ! Ask your nurse or doctor about these medications  
  oxyCODONE IR 10 mg Tab immediate release tablet Discharge Instructions Dr. Ismael Mckenzie Discharge Instructions Patient: Yolanda Sparks Person MRN: [id number]  SSN: [id number] YOB: 1950  Age: 75 y.o. Sex: male Activity · As tolerated, walking encouraged, stairs are okay · Avoid strenuous activities - no lifting anything heavier than ten pounds. · Dressing may be removed in 48 hours, then you may may shower at home, do not soak in a tub · Diet · Regular diet after nausea from the anesthetic has passed · Dr Jessica Lentz and/or Dr Alvan Aase office will be in touch to arrange tube feedings · If nausea and vomiting continues, call your doctor Pain · Take pain medication as directed by your doctor ·  Call your doctor if pain is NOT relieved by medication · DO NOT take aspirin or blood thinners until 48 hours after surgery unless directed by your doctor Follow-Up Phone Calls · Call [phone] today to make your follow-up appointment. · If you have any problems, call your doctor as needed Call your doctor if 
· Excessive bleeding that does not stop after holding mild pressure over the area · Temperature of 101 degrees F or above · Redness,excessive swelling or bruising, and/or green or yellow, smelly discharge from incision After Anesthesia · For the first 24 hours: DO NOT Drive, Drink alcoholic beverages, or make important decisions · Be aware of dizziness following anesthesia and while taking pain medication Medication changes may have been made by your doctor; see Reynaldo Blackis form. Continue home medications as previously prescribed unless instructed by your doctor. Other Instructions: Call the office to schedule a follow-up appointment Appointment date/time: 10 to 14 days Your doctor's phone number: [phone]. DISCHARGE SUMMARY from Nurse PATIENT INSTRUCTIONS: 
 
After general anesthesia or intravenous sedation, for 24 hours or while taking prescription Narcotics: · Limit your activities · Do not drive and operate hazardous machinery · Do not make important personal or business decisions · Do  not drink alcoholic beverages · If you have not urinated within 8 hours after discharge, please contact your surgeon on call. Report the following to your surgeon: 
· Excessive pain, swelling, redness or odor of or around the surgical area · Temperature over 100.5 · Nausea and vomiting lasting longer than 4 hours or if unable to take medications · Any signs of decreased circulation or nerve impairment to extremity: change in color, persistent  numbness, tingling, coldness or increase pain · Any questions What to do at Home: These are general instructions for a healthy lifestyle: No smoking/ No tobacco products/ Avoid exposure to second hand smoke Surgeon General's Warning:  Quitting smoking now greatly reduces serious risk to your health. Obesity, smoking, and sedentary lifestyle greatly increases your risk for illness A healthy diet, regular physical exercise & weight monitoring are important for maintaining a healthy lifestyle You may be retaining fluid if you have a history of heart failure or if you experience any of the following symptoms:  Weight gain of 3 pounds or more overnight or 5 pounds in a week, increased swelling in our hands or feet or shortness of breath while lying flat in bed. Please call your doctor as soon as you notice any of these symptoms; do not wait until your next office visit. Recognize signs and symptoms of STROKE: 
 
F-face looks uneven A-arms unable to move or move unevenly S-speech slurred or non-existent T-time-call 911 as soon as signs and symptoms begin-DO NOT go Back to bed or wait to see if you get better-TIME IS BRAIN. Warning Signs of HEART ATTACK Call 911 if you have these symptoms: 
? Chest discomfort. Most heart attacks involve discomfort in the center of the chest that lasts more than a few minutes, or that goes away and comes back. It can feel like uncomfortable pressure, squeezing, fullness, or pain. ? Discomfort in other areas of the upper body. Symptoms can include pain or discomfort in one or both arms, the back, neck, jaw, or stomach. ? Shortness of breath with or without chest discomfort. ? Other signs may include breaking out in a cold sweat, nausea, or lightheadedness. Don't wait more than five minutes to call [address]! Fast action can save your life. Calling 911 is almost always the fastest way to get lifesaving treatment. Emergency Medical Services staff can begin treatment when they arrive  up to an hour sooner than if someone gets to the hospital by car. The discharge information has been reviewed with the patient. The patient verbalized understanding. Discharge medications reviewed with the patient and appropriate educational materials and side effects teaching were provided. ___________________________________________________________________________________________________________________________________ Patient armband removed and given to patient to take home. Patient was informed of the privacy risks if armband lost or stolen ACO Transitions of Care Introducing [facility] [address] offers a voluntary care coordination program to provide high quality service and care to [facility] fee-for-service beneficiaries. Jumana Ghotra was designed to help you enhance your health and well-being through the following services: ? Transitions of Care  support for individuals who are transitioning from one care setting to another (example: Hospital to home). ? Chronic and Complex Care Coordination  support for individuals and caregivers of those with serious or chronic illnesses or with more than one chronic (ongoing) condition and those who take a number of different medications.   
 
 
If you meet specific medical criteria, a [facility] Coordinator may call you directly to coordinate your care with your primary care physician and your other care providers. For questions about the [facility] programs, please, contact your physicians office. For general questions or additional information about Accountable Care Organizations: 
Please visit www.medicare.gov/acos. html or call 1-800-MEDICARE (8-147.112.1630) TTY users should call 2-328.506.2127. Providers Seen During Your Hospitalization Provider Specialty Primary office phone Sandor Romberg, MD General Surgery [phone] Your Primary Care Physician (PCP) Primary Care Physician Office Phone Office Fax Tip De La Rosa [phone] You are allergic to the following No active allergies Recent Documentation Height Weight BMI Smoking Status 1.753 m 66.2 kg 21.56 kg/m2 Former Smoker Emergency Contacts Name Discharge Info Relation Home Work Mobile [place] [employer] CAREGIVER [3] Daughter [21]   [phone] Mikey Aparicio N/A  AT THIS TIME [6] Friend [5] [phone] Patient Belongings The following personal items are in your possession at time of discharge: 
  Dental Appliances: None  Visual Aid: None      Home Medications: None   Jewelry: None  Clothing: Undergarments, Jacket/Coat, Socks, Footwear, Shirt, Pants, [place]    Other Valuables: [employer] Please provide this summary of care documentation to your next provider. Signatures-by signing, you are acknowledging that this After Visit Summary has been reviewed with you and you have received a copy. Patient Signature:  ____________________________________________________________ Date:  ____________________________________________________________  
  
Marcelle Sleight Provider Signature:  ____________________________________________________________ Date:  ____________________________________________________________

## 2017-11-28 NOTE — ANESTHESIA PREPROCEDURE EVALUATION
Anesthetic History   No history of anesthetic complications            Review of Systems / Medical History  Patient summary reviewed and pertinent labs reviewed    Pulmonary  Within defined limits                 Neuro/Psych   Within defined limits           Cardiovascular    Hypertension              Exercise tolerance: >4 METS     GI/Hepatic/Renal  Within defined limits              Endo/Other      Hypothyroidism  Cancer     Other Findings   Comments:   Risk Factors for Postoperative nausea/vomiting:       History of postoperative nausea/vomiting? NO       Female? NO       Motion sickness? NO       Intended opioid administration for postoperative analgesia? YES      Smoking Abstinence  Current Smoker? NO  Elective Surgery? YES  Seen preoperatively by anesthesiologist or proxy prior to day of surgery? YES  Pt abstained from smoking 24 hours prior to anesthesia?  N/A         Physical Exam    Airway  Mallampati: II  TM Distance: 4 - 6 cm  Neck ROM: normal range of motion   Mouth opening: Normal     Cardiovascular  Regular rate and rhythm,  S1 and S2 normal,  no murmur, click, rub, or gallop  Rhythm: regular  Rate: normal         Dental    Dentition: Poor dentition  Comments: Several missing molars, two loose molars L lower    Pulmonary  Breath sounds clear to auscultation               Abdominal  GI exam deferred       Other Findings            Anesthetic Plan    ASA: 3  Anesthesia type: general          Induction: Intravenous  Anesthetic plan and risks discussed with: Patient

## 2017-11-28 NOTE — OP NOTES
Valeriy Puentes    Name:  Waleska Rivera  MR#:  [id number]  :  1950  Account #:  [de-identified]  Date of Adm:  2017  Date of Surgery:  2017      PREOPERATIVE DIAGNOSIS: Distal esophageal cancer causing  dysphagia. POSTOPERATIVE DIAGNOSIS: Distal esophageal cancer causing  dysphagia. PROCEDURES PERFORMED: Open gastrostomy tube placement. SURGEON: Marci Quevedo MD.    ASSISTANT: Marcy Zeng SA    ESTIMATED BLOOD LOSS: 5 mL. SPECIMENS REMOVED: None. ANESTHESIA: General.    FINDINGS: A 20-Romanian G-tube placed in stomach. INDICATIONS: Briefly, the patient is a 50-year-old gentleman with a  past medical history significant for distal esophageal cancer,  progressive dysphasia, causing difficulty taking in adequate calories. Therefore, the patient was advised to undergo an enteral access. The  patient had a prior attempt at a percutaneous endoscopic gastrostomy  tube at another hospital, which was complicated by a perforation  requiring diagnostic laparoscopy. The patient has recovered and now  wishes to undergo surgical placement of tube. DESCRIPTION OF PROCEDURE: The patient was brought to the  operating room on 2017. Informed consent was obtained. IV  antibiotics were administered. The patient's identifying factors were  verified. A time-out procedure performed in which all parties agreed to  proposed planned and operation. After insufflation of general  endotracheal anesthesia and sequential compression devices were  placed, he was prepped and draped in the usual sterile fashion. A  time-out procedure performed. A midline laparotomy was made in the upper abdomen. Dissection was  carried down through subcutaneous tissue. The fascia was violated  sharply. Immediately upon entering the fascia, we did identify the  peritoneum, which was also identified and dilated sharply. No evidence  of injury upon entry.  We were immediately able to grasp the inferior  aspect of the stomach with a Bartlett and bring it easily to the  abdominal wall. A stab incision was made separately at the subcostal  margin and a tonsil clamp was passed through this. We advanced a  20-Burkinan G-tube. Noted the balloon to advance easily. We then  performed 2 circumferential pursestrings at the G-tube insertion site. We then made a gastrotomy, easily advanced the clamp into it, placed  the tube into it, insufflated the balloon with 6 mL of saline as per  manufacture suggestion. We  then tied down the 2 pursestring sutures,  stammed to the anterior abdominal wall with interrupted sutures. We  then verified placement and this was deemed to be excellent. We then closed the fascia using a running #1 looped PDS stitches,  irrigated the skin, closed it with surgical staples after closing the  subcutaneous layer with interrupted Vicryl stitches. A dry sterile  dressing was placed. The patient was then awakened and transferred  to the perioperative care area for further evaluation and assessment. There were no complications. He tolerated the procedure well. Sponge, instrument, and needle  counts were reported correct at the end of the case x2. MD JOSE Garcia / Tanvi.Tam  D:  11/28/2017   14:44  T:  11/28/2017   15:04  Job #:  [id number]

## 2017-11-28 NOTE — DISCHARGE INSTRUCTIONS
Dr. Goldberg Liana Discharge Instructions     Patient: Veena Carr Person MRN: [id number]  SSN: [id number]    YOB: 1950  Age: 75 y.o. Sex: male      Activity  · As tolerated, walking encouraged, stairs are okay  · Avoid strenuous activities - no lifting anything heavier than ten pounds. · Dressing may be removed in 48 hours, then you may may shower at home, do not soak in a tub  ·     Diet  · Regular diet after nausea from the anesthetic has passed  · Dr Jonas Nava and/or Dr Aldo Adams office will be in touch to arrange tube feedings  · If nausea and vomiting continues, call your doctor    Pain  · Take pain medication as directed by your doctor  ·  Call your doctor if pain is NOT relieved by medication  · DO NOT take aspirin or blood thinners until 48 hours after surgery unless directed by your doctor    Follow-Up Phone Calls  · Call [phone] today to make your follow-up appointment. · If you have any problems, call your doctor as needed    Call your doctor if  · Excessive bleeding that does not stop after holding mild pressure over the area  · Temperature of 101 degrees F or above  · Redness,excessive swelling or bruising, and/or green or yellow, smelly discharge from incision    After Anesthesia  · For the first 24 hours: DO NOT Drive, Drink alcoholic beverages, or make important decisions  · Be aware of dizziness following anesthesia and while taking pain medication    Medication changes may have been made by your doctor; see Gisell Chua form. Continue home medications as previously prescribed unless instructed by your doctor. Other Instructions: Call the office to schedule a follow-up appointment    Appointment date/time: 10 to 14 days    Your doctor's phone number: [phone].       DISCHARGE SUMMARY from Nurse    PATIENT INSTRUCTIONS:    After general anesthesia or intravenous sedation, for 24 hours or while taking prescription Narcotics:  · Limit your activities  · Do not drive and operate hazardous machinery  · Do not make important personal or business decisions  · Do  not drink alcoholic beverages  · If you have not urinated within 8 hours after discharge, please contact your surgeon on call. Report the following to your surgeon:  · Excessive pain, swelling, redness or odor of or around the surgical area  · Temperature over 100.5  · Nausea and vomiting lasting longer than 4 hours or if unable to take medications  · Any signs of decreased circulation or nerve impairment to extremity: change in color, persistent  numbness, tingling, coldness or increase pain  · Any questions    What to do at Home:  These are general instructions for a healthy lifestyle:    No smoking/ No tobacco products/ Avoid exposure to second hand smoke  Surgeon General's Warning:  Quitting smoking now greatly reduces serious risk to your health. Obesity, smoking, and sedentary lifestyle greatly increases your risk for illness    A healthy diet, regular physical exercise & weight monitoring are important for maintaining a healthy lifestyle    You may be retaining fluid if you have a history of heart failure or if you experience any of the following symptoms:  Weight gain of 3 pounds or more overnight or 5 pounds in a week, increased swelling in our hands or feet or shortness of breath while lying flat in bed. Please call your doctor as soon as you notice any of these symptoms; do not wait until your next office visit. Recognize signs and symptoms of STROKE:    F-face looks uneven    A-arms unable to move or move unevenly    S-speech slurred or non-existent    T-time-call 911 as soon as signs and symptoms begin-DO NOT go       Back to bed or wait to see if you get better-TIME IS BRAIN. Warning Signs of HEART ATTACK     Call 911 if you have these symptoms:   Chest discomfort.  Most heart attacks involve discomfort in the center of the chest that lasts more than a few minutes, or that goes away and comes back. It can feel like uncomfortable pressure, squeezing, fullness, or pain.  Discomfort in other areas of the upper body. Symptoms can include pain or discomfort in one or both arms, the back, neck, jaw, or stomach.  Shortness of breath with or without chest discomfort.  Other signs may include breaking out in a cold sweat, nausea, or lightheadedness. Don't wait more than five minutes to call 911 - MINUTES MATTER! Fast action can save your life. Calling 911 is almost always the fastest way to get lifesaving treatment. Emergency Medical Services staff can begin treatment when they arrive -- up to an hour sooner than if someone gets to the hospital by car. The discharge information has been reviewed with the patient. The patient verbalized understanding. Discharge medications reviewed with the patient and appropriate educational materials and side effects teaching were provided. ___________________________________________________________________________________________________________________________________    Patient armband removed and given to patient to take home.   Patient was informed of the privacy risks if armband lost or stolen

## 2017-11-28 NOTE — INTERVAL H&P NOTE
H&P Update:  Gen Fernandez Person was seen and examined. History and physical has been reviewed. Significant clinical changes have occurred as noted:  Patient has had progressive dysphagia and is losing weight. We have discussed risks and benefits and likely postoperative course. Patient wishes to undergo open G tube placement today. We will not attempt a PEG as he had a prior attempt which resulted in a perforation.     Signed By: Theresa Jalloh MD     November 28, 2017 8:22 AM

## 2017-11-28 NOTE — BRIEF OP NOTE
BRIEF OPERATIVE NOTE    Date of Procedure: 11/28/2017   Preoperative Diagnosis: distal esophageal cancer causing dysphagia  Postoperative Diagnosis: same  Procedure(s):  G-Tube placement  Surgeon(s) and Role:     * Sohan Can MD - Primary         Assistant Staff:       Surgical Staff:  Circ-1: Cyndee Love RN  Scrub Tech-1: Wendi Montez  Surg Asst-1: Shady Tompkins  Event Time In   Incision Start 1006   Incision Close      Anesthesia: General   Estimated Blood Loss: 5mL  Specimens: * No specimens in log *   Findings: 20Fr G tube placed in stomach   Complications: none  Implants: * No implants in log *

## 2017-11-28 NOTE — H&P (VIEW-ONLY)
Subjective:      David Adorno is a 75 y.o. male presents for postop care 10 days status post mediport placement. Pain is well controlled. Appetite is fair. He is eating some soft foods and protein shakes. He notes his dysphagia is a bit better since starting XRT and chemo    Objective:     Visit Vitals    /74 (BP 1 Location: Right arm, BP Patient Position: Sitting)    Pulse 90    Temp 96.6 °F (35.9 °C) (Oral)    Resp 16    Ht 5' 9\" (1.753 m)    Wt 68.6 kg (151 lb 3.2 oz)    BMI 22.33 kg/m2       General:  alert, cooperative, no distress, fatigued   Abdomen: soft, bowel sounds active, non-tender   Incision:   healing well, no drainage, no erythema, no hernia, no seroma, no swelling, no dehiscence, incision well approximated         Assessment:     Doing well postoperatively. Patient on chemo and radiation for esophageal ca. Have advised him that if he needs enteral access, I am glad to place G tube for him. Patient and family understand and agree. Plan:   As above  Enteral access may be obtained as needed.

## 2017-11-28 NOTE — PERIOP NOTES
Received Patient to PACU. To monitors per protocol. Will continued to monitor.      1140 Friend updated on status  1149 Report to [facility] & [address] RN Phase 2

## 2017-11-28 NOTE — ANESTHESIA POSTPROCEDURE EVALUATION
Post-Anesthesia Evaluation & Assessment    Visit Vitals    BP 97/73 (BP 1 Location: Right arm, BP Patient Position: At rest;Supine)    Pulse 89    Temp 36.9 °C (98.4 °F)    Resp 19    Ht 5' 9\" (1.753 m)    Wt 66.2 kg (146 lb)    SpO2 100%    BMI 21.56 kg/m2       Nausea/Vomiting: no nausea and no vomiting    Post-operative hydration adequate. Pain score (VAS): 5    Mental status & Level of consciousness: alert and oriented x 3    Neurological status: moves all extremities, sensation grossly intact    Pulmonary status: airway patent, no supplemental oxygen required    Complications related to anesthesia: none    Patient has met all discharge requirements.     Additional comments:        Phyllis Looney CRNA

## 2017-11-29 ENCOUNTER — TELEPHONE (OUTPATIENT)
Dept: SURGERY | Age: 67
End: 2017-11-29

## 2017-11-29 ENCOUNTER — HOSPITAL ENCOUNTER (OUTPATIENT)
Dept: RADIATION THERAPY | Age: 67
Discharge: HOME | End: 2017-11-29
Payer: MEDICARE

## 2017-11-29 ENCOUNTER — OFFICE VISIT (OUTPATIENT)
Dept: ONCOLOGY | Age: 67
End: 2017-11-29

## 2017-11-29 ENCOUNTER — TELEPHONE (OUTPATIENT)
Dept: ONCOLOGY | Age: 67
End: 2017-11-29

## 2017-11-29 ENCOUNTER — HOSPITAL ENCOUNTER (OUTPATIENT)
Dept: INFUSION THERAPY | Age: 67
Discharge: HOME | End: 2017-11-29
Payer: MEDICARE

## 2017-11-29 VITALS
HEART RATE: 100 BPM | DIASTOLIC BLOOD PRESSURE: 66 MMHG | SYSTOLIC BLOOD PRESSURE: 96 MMHG | HEIGHT: 69 IN | BODY MASS INDEX: 21.48 KG/M2 | TEMPERATURE: 97.3 F | WEIGHT: 145 LBS | OXYGEN SATURATION: 100 % | RESPIRATION RATE: 18 BRPM

## 2017-11-29 VITALS
TEMPERATURE: 97.3 F | WEIGHT: 145.8 LBS | BODY MASS INDEX: 21.59 KG/M2 | RESPIRATION RATE: 18 BRPM | HEIGHT: 69 IN | DIASTOLIC BLOOD PRESSURE: 66 MMHG | HEART RATE: 100 BPM | SYSTOLIC BLOOD PRESSURE: 96 MMHG

## 2017-11-29 DIAGNOSIS — C15.9 MALIGNANT NEOPLASM OF ESOPHAGUS, UNSPECIFIED LOCATION (HCC): ICD-10-CM

## 2017-11-29 DIAGNOSIS — C15.9 SQUAMOUS CELL ESOPHAGEAL CANCER (HCC): ICD-10-CM

## 2017-11-29 DIAGNOSIS — K22.89 ESOPHAGEAL MASS: Primary | ICD-10-CM

## 2017-11-29 DIAGNOSIS — R52 PAIN AGGRAVATED BY EATING OR DRINKING: ICD-10-CM

## 2017-11-29 LAB
ANION GAP BLD CALC-SCNC: 16 MMOL/L (ref 10–20)
BASO+EOS+MONOS # BLD AUTO: 0.3 K/UL (ref 0–2.3)
BASO+EOS+MONOS # BLD AUTO: 8 % (ref 0.1–17)
BUN BLD-MCNC: 13 MG/DL (ref 7–18)
CA-I BLD-MCNC: 1.2 MMOL/L (ref 1.12–1.32)
CHLORIDE BLD-SCNC: 99 MMOL/L (ref 100–108)
CO2 BLD-SCNC: 26 MMOL/L (ref 19–24)
CREAT UR-MCNC: 0.9 MG/DL (ref 0.6–1.3)
DIFFERENTIAL METHOD BLD: ABNORMAL
ERYTHROCYTE [DISTWIDTH] IN BLOOD BY AUTOMATED COUNT: 13.8 % (ref 11.5–14.5)
GLUCOSE BLD STRIP.AUTO-MCNC: 112 MG/DL (ref 74–106)
HCT VFR BLD AUTO: 30.2 % (ref 36–48)
HCT VFR BLD CALC: 31 % (ref 36–49)
HGB BLD-MCNC: 10 G/DL (ref 12–16)
HGB BLD-MCNC: 10.5 G/DL (ref 12–16)
LYMPHOCYTES # BLD: 0.2 K/UL (ref 1.1–5.9)
LYMPHOCYTES NFR BLD: 7 % (ref 14–44)
MCH RBC QN AUTO: 27.8 PG (ref 25–35)
MCHC RBC AUTO-ENTMCNC: 33.1 G/DL (ref 31–37)
MCV RBC AUTO: 83.9 FL (ref 78–102)
NEUTS SEG # BLD: 3.1 K/UL (ref 1.8–9.5)
NEUTS SEG NFR BLD: 85 % (ref 40–70)
PLATELET # BLD AUTO: 199 K/UL (ref 140–440)
POTASSIUM BLD-SCNC: 4 MMOL/L (ref 3.5–5.5)
RBC # BLD AUTO: 3.6 M/UL (ref 4.1–5.1)
SODIUM BLD-SCNC: 136 MMOL/L (ref 136–145)
WBC # BLD AUTO: 3.6 K/UL (ref 4.5–13)

## 2017-11-29 PROCEDURE — 74011000258 HC RX REV CODE- 258: Performed by: INTERNAL MEDICINE

## 2017-11-29 PROCEDURE — 36591 DRAW BLOOD OFF VENOUS DEVICE: CPT

## 2017-11-29 PROCEDURE — 96413 CHEMO IV INFUSION 1 HR: CPT

## 2017-11-29 PROCEDURE — 74011250636 HC RX REV CODE- 250/636: Performed by: INTERNAL MEDICINE

## 2017-11-29 PROCEDURE — 96375 TX/PRO/DX INJ NEW DRUG ADDON: CPT

## 2017-11-29 PROCEDURE — 77030012965 HC NDL HUBR BBMI -A

## 2017-11-29 PROCEDURE — 74011000250 HC RX REV CODE- 250: Performed by: INTERNAL MEDICINE

## 2017-11-29 PROCEDURE — 80047 BASIC METABLC PNL IONIZED CA: CPT

## 2017-11-29 PROCEDURE — 85025 COMPLETE CBC W/AUTO DIFF WBC: CPT | Performed by: INTERNAL MEDICINE

## 2017-11-29 PROCEDURE — 96367 TX/PROPH/DG ADDL SEQ IV INF: CPT

## 2017-11-29 PROCEDURE — 77386 HC IMRT TRMT DLVR COMPL: CPT

## 2017-11-29 PROCEDURE — 96417 CHEMO IV INFUS EACH ADDL SEQ: CPT

## 2017-11-29 RX ORDER — OXYCODONE AND ACETAMINOPHEN 10; 325 MG/1; MG/1
1 TABLET ORAL
Qty: 40 TAB | Refills: 0 | Status: SHIPPED | OUTPATIENT
Start: 2017-11-29 | End: 2017-12-13 | Stop reason: SDUPTHER

## 2017-11-29 RX ORDER — DIPHENHYDRAMINE HYDROCHLORIDE 50 MG/ML
50 INJECTION, SOLUTION INTRAMUSCULAR; INTRAVENOUS ONCE
Status: COMPLETED | OUTPATIENT
Start: 2017-11-29 | End: 2017-11-29

## 2017-11-29 RX ORDER — HEPARIN 100 UNIT/ML
500 SYRINGE INTRAVENOUS ONCE
Status: COMPLETED | OUTPATIENT
Start: 2017-11-29 | End: 2017-11-29

## 2017-11-29 RX ORDER — FAMOTIDINE 10 MG/ML
20 INJECTION INTRAVENOUS ONCE
Status: COMPLETED | OUTPATIENT
Start: 2017-11-29 | End: 2017-11-29

## 2017-11-29 RX ORDER — PALONOSETRON 0.05 MG/ML
0.25 INJECTION, SOLUTION INTRAVENOUS ONCE
Status: COMPLETED | OUTPATIENT
Start: 2017-11-29 | End: 2017-11-29

## 2017-11-29 RX ORDER — SODIUM CHLORIDE 9 MG/ML
250 INJECTION, SOLUTION INTRAVENOUS CONTINUOUS
Status: DISPENSED | OUTPATIENT
Start: 2017-11-29 | End: 2017-11-30

## 2017-11-29 RX ORDER — SODIUM CHLORIDE 0.9 % (FLUSH) 0.9 %
10-40 SYRINGE (ML) INJECTION AS NEEDED
Status: DISCONTINUED | OUTPATIENT
Start: 2017-11-29 | End: 2017-12-03 | Stop reason: HOSPADM

## 2017-11-29 RX ADMIN — PALONOSETRON HYDROCHLORIDE 0.25 MG: 0.25 INJECTION INTRAVENOUS at 11:43

## 2017-11-29 RX ADMIN — PACLITAXEL 90 MG: 6 INJECTION, SOLUTION INTRAVENOUS at 12:40

## 2017-11-29 RX ADMIN — Medication 10 ML: at 14:12

## 2017-11-29 RX ADMIN — HEPARIN 500 UNITS: 100 SYRINGE at 14:12

## 2017-11-29 RX ADMIN — FAMOTIDINE 20 MG: 10 INJECTION INTRAVENOUS at 11:45

## 2017-11-29 RX ADMIN — CARBOPLATIN 160 MG: 10 INJECTION INTRAVENOUS at 13:42

## 2017-11-29 RX ADMIN — SODIUM CHLORIDE 250 ML/HR: 0.9 INJECTION, SOLUTION INTRAVENOUS at 11:39

## 2017-11-29 RX ADMIN — DIPHENHYDRAMINE HYDROCHLORIDE 50 MG: 50 INJECTION INTRAMUSCULAR; INTRAVENOUS at 11:45

## 2017-11-29 RX ADMIN — DEXAMETHASONE SODIUM PHOSPHATE 12 MG: 4 INJECTION, SOLUTION INTRA-ARTICULAR; INTRALESIONAL; INTRAMUSCULAR; INTRAVENOUS; SOFT TISSUE at 11:51

## 2017-11-29 RX ADMIN — Medication 10 ML: at 11:12

## 2017-11-29 NOTE — PROGRESS NOTES
[facility]:     Patient: Charito Catalan Person Age: 75 y.o.  Sex: male    YOB: 1950 Admit Date: (Not on file) PCP: Asuncion Corral DO   MRN: [id number]  CSN: [id number]       Patient Active Problem List   Diagnosis Code    Essential hypertension, malignant I10    Arthritis M19.90    Proteinuria R80.9    Hyperthyroidism E05.90    Malignant neoplasm of esophagus (HCC) C15.9    Acute gastric perforation ([address] K25.1    Gastric perforation, acute ([address] K25.1    Esophageal mass K22.9    Squamous cell esophageal cancer ([facility] C15.9     Assessment/ Plan:     1.) Advanced Esophageal Cancer-Squamous Cell Carcinoma    -Pathology results reviewed-Squamous Cell Carcinoma  -Status post EGD biopsy per Dr. Maudie Holter 10/12/2017    -s/p evaluation radiation oncology-[facility]  -Complete staging workup with FDG PET CT scan revealed lymph node metastasis (supraclavicular)  -MediPort placement per Dr. Melchor-11/7/2017  -Continue concurrent chemotherapy/radiotherapy  -Week #4 of 5 today-11/29/2017  -Carboplatin dose decreased due lightly decreased renal function  -Return to clinic in approximately 1 week to reevaluate clinically and for further medical decision-making.  -Closely monitor weekly CBCs and CMP's  -Instructed go the emergency room for any worsening of symptoms and or development of fever    2.)  Mild Dehydration  -Decreased p.o. intake last several days  -Rehydrated with 1 L of 0.9% normal saline today  -Oral liquids encouraged  -Status post G-tube placement this week  -G-tube feedings per nutritional services and home health    3.)  Dysphasia  -Continue triple mix oral solution per radiation oncology  -Does report tolerating liquids    4.) Bowel perforation  -Occurred with procedure to place PEG tube  -Recently admission to [facility]  -Improved    5.)  Pain secondary to esophageal cancer/cancer treatment  -I have discontinued his OxyIR  -I gave him prescription for Percocet to use as directed  -Monitor   -Random UDS  -I will wean his medication down as he completes his concurrent chemotherapy radiotherapy and his esophageal pain has improved          FDG-PET scan 10/19/2017:    IMPRESSION: [Malignant activity at the esophageal mass, middle mediastinal lymph nodes,  supraclavicular lymph nodes compatible with metastases. ]     Activity at the right hilum but without enlarged lymph node, could be early  metastasis.     No malignant activity at subcentimeter pulmonary nodules. Potential for false  negative result exists because of their small size. Advise short-term follow-up  with diagnostic CT.     Activity at dental disease also seen in the mandible and maxilla.     Activity posterior to the right side mandible without definite mass or skeletal  abnormality of uncertain significance. Could be a tiny adjacent lymph node. Attention should be paid to this region on subsequent examinations.       Pathology report 10/12/2017:  FINAL DIAGNOSIS:   ESOPHAGEAL MASS, BIOPSY:   INVASIVE, POORLY DIFFERENTIATED SQUAMOUS CELL CARCINOMA. GROSS DESCRIPTION:     The specimen is received in formalin in a container labeled with the patient's name, Paul Sandra, and biopsy esophageal mass, rule out malignancy and consists of two soft tissue fragments, one pale tan and the other pink to dark red, ranging from 0.3 to 0.5 cm in greatest dimension. The specimen is submitted in toto in cassette A1. (cd)   DMM/clr     I have discussed the diagnosis with the patient and the intended plan as seen in the above orders. I have reviewed the plan of care with the patient, accepted their input and they are in agreement with the treatment goals. Patient has provided input and agrees with goals.       History:   Alexandre Sellers is a 75 y.o. male presenting today for:     Distal Esophageal Cancer  -Pathology results reviewed-Squamous cell carcinoma  -Status post EGD biopsy per Dr. Love Krause  -Lost 15 pounds in past 6 months  -No f/c/s/cp or SOB. -Complaining of worsening dysphagia over the past several months-slightly improved  -Complaining of esophageal pain    Current Outpatient Prescriptions   Medication Sig Dispense Refill    oxyCODONE-acetaminophen (PERCOCET 10)  mg per tablet Take 1 Tab by mouth every six (6) hours as needed for Pain. Max Daily Amount: 4 Tabs. 40 Tab 0    sucralfate (CARAFATE) 100 mg/mL suspension Take 1 tsp by mouth three (3) times daily.  lisinopril (PRINIVIL, ZESTRIL) 20 mg tablet Take 1 Tab by mouth daily. 90 Tab 4    amLODIPine (NORVASC) 5 mg tablet Take 1 Tab by mouth daily. 90 Tab 4     Facility-Administered Medications Ordered in Other Visits   Medication Dose Route Frequency Provider Last Rate Last Dose    sodium chloride (NS) flush 10-40 mL  10-40 mL IntraVENous PRN Fiona Manzano MD   10 mL at 11/29/17 1412    0.9% sodium chloride infusion  250 mL/hr IntraVENous CONTINUOUS Fiona Manzano MD   Stopped at 11/29/17 1412       Objective:   VS:    Vitals:    11/29/17 1730   BP: 96/66   Pulse: 100   Resp: 18   Temp: 97.3 °F (36.3 °C)   TempSrc: Oral   SpO2: 100%   Weight: 65.8 kg (145 lb)   Height: 5' 9\" (1.753 m)       Physical Exam:     Constitutional:  no acute distress, alert and oriented x 3    HENT:  atraumatic   Eyes:  EOMI, PERRLA   Neck:  No palpable masses   Cardiovascular:  S1, S2   Pulmonary/Chest Wall:   clear   Abdominal:  Non tender, no palpable masses       Musculoskeletal:  No deformities   Skin:  No rashes or lesions    Peripheral Vascular:  Pulses palpable       Review of Systems:   The remainder of 12 point ROS was otherwise negative except for what was reported in the CC/HPI:      Recent Results (from the past 168 hour(s))   CBC WITH 3 PART DIFF    Collection Time: 11/29/17 11:15 AM   Result Value Ref Range    WBC 3.6 (L) 4.5 - 13.0 K/uL    RBC 3.60 (L) 4.10 - 5.10 M/uL    HGB 10.0 (L) 12.0 - 16.0 g/dL    HCT 30.2 (L) 36 - 48 %    MCV 83.9 78 - 102 FL    MCH 27.8 25.0 - 35.0 PG    MCHC 33.1 31 - 37 g/dL    RDW 13.8 11.5 - 14.5 %    PLATELET 203 118 - 102 K/uL    NEUTROPHILS 85 (H) 40 - 70 %    MIXED CELLS 8 0.1 - 17 %    LYMPHOCYTES 7 (L) 14 - 44 %    ABS. NEUTROPHILS 3.1 1.8 - 9.5 K/UL    ABS. MIXED CELLS 0.3 0.0 - 2.3 K/uL    ABS. LYMPHOCYTES 0.2 (L) 1.1 - 5.9 K/UL    DF AUTOMATED     POC CHEM8    Collection Time: 11/29/17 11:15 AM   Result Value Ref Range    CO2, POC 26 (H) 19 - 24 MMOL/L    Glucose,  (H) 74 - 106 MG/DL    BUN, POC 13 7 - 18 MG/DL    Creatinine, POC 0.9 0.6 - 1.3 MG/DL    GFRAA, POC >60 >60 ml/min/1.73m2    GFRNA, POC >60 >60 ml/min/1.73m2    Sodium,  136 - 145 MMOL/L    Potassium, POC 4.0 3.5 - 5.5 MMOL/L    Calcium, ionized (POC) 1.20 1. 12 - 1.32 MMOL/L    Chloride, POC 99 (L) 100 - 108 MMOL/L    Anion gap, POC 16 10 - 20      Hematocrit, POC 31 (L) 36 - 49 %    Hemoglobin, POC 10.5 (L) 12 - 16 G/DL       Past Medical History:   Diagnosis Date    Arthritis     Essential hypertension, malignant 8/3/2010    Leg pain 8/3/2010    [facility]roat [facility])        Past Surgical History:   Procedure Laterality Date    HX GI      G-tube placement on 11/28/17    AL EGD DELIVER THERMAL ENERGY SPHNCTR/CARDIA GERD         Social History     Social History    Marital status:      Spouse name: N/A    Number of children: N/A    Years of education: N/A     Occupational History    Not on file.      Social History Main Topics    Smoking status: Former Smoker     Packs/day: 0.25     Types: Cigarettes     Start date: 1/17/2017     Quit date: 11/22/2017    Smokeless tobacco: Never Used      Comment: Restarted in Jan 2017    Alcohol use No      Comment: \"NONE IN AWHILE\"    Drug use: No    Sexual activity: No     Other Topics Concern    Not on file     Social History Narrative       Family History   Problem Relation Age of Onset    Heart Disease Mother     Lung Disease Father        No Known Allergies    Follow-up Disposition: Not on File    Filiberto Taveras MD, MPH Hematology-Medical Oncology  November 29, 2017 1:16 PM

## 2017-11-29 NOTE — TELEPHONE ENCOUNTER
Called home health to inquire about gtube teaching, or ordering feedings. [address] stated that orders are required and to contact [address] [place] [address] [phone].  Ms. Kajal Monroemelina stated that because pt had an outpatient procedure, ordering physician is to write orders for gtube feeding rate and care

## 2017-11-29 NOTE — PROGRESS NOTES
Wendy Uriostegui is a 75 y.o. male who presents today c/o pain in abd 3/10. Pt had gtube placement yesterday. States he has not received any teaching in regards to gtube care or feeds. Pt does not have gtube care supplies. Pt educated on reporting symptons, pt verbalized understanding. 1. Have you been to the ER, urgent care clinic since your last visit? Hospitalized since your last visit? NO    2. Have you seen or consulted any other health care providers outside of the [address] [place] [address] since your last visit? Include any pap smears or colon screening. NO    Health Maintenance reviewed.     Health Maintenance Due   Topic Date Due    Pneumococcal 65+ High/Highest Risk (2 of 2 - PPSV23) 01/14/2016

## 2017-11-29 NOTE — TELEPHONE ENCOUNTER
Received call from patient's daughter Enoc Fent with questions regarding her father's g-tube which was placed yesterday. Per daughter, they were unaware of how to use the tube or what was to be placed in it as far as feedings go. During our conversation it was communicated to the caller that her father had already spoken with Dr. Shari Pereyra who is going to assume care of the g-tube. I did explain to the daughter that tube feedings are usually prescription formulas and that teaching is typically done by home health nurse so that the tube is used safely. Patient's daughter verbalized understanding.

## 2017-11-30 ENCOUNTER — TELEPHONE (OUTPATIENT)
Dept: ONCOLOGY | Age: 67
End: 2017-11-30

## 2017-11-30 ENCOUNTER — PATIENT OUTREACH (OUTPATIENT)
Dept: FAMILY MEDICINE CLINIC | Age: 67
End: 2017-11-30

## 2017-11-30 ENCOUNTER — OFFICE VISIT (OUTPATIENT)
Dept: SURGERY | Age: 67
End: 2017-11-30

## 2017-11-30 ENCOUNTER — HOSPITAL ENCOUNTER (OUTPATIENT)
Dept: GENERAL RADIOLOGY | Age: 67
Discharge: HOME | DRG: 327 | End: 2017-11-30
Attending: SURGERY
Payer: MEDICARE

## 2017-11-30 ENCOUNTER — HOSPITAL ENCOUNTER (OUTPATIENT)
Dept: RADIATION THERAPY | Age: 67
Discharge: HOME | End: 2017-11-30
Payer: MEDICARE

## 2017-11-30 ENCOUNTER — TELEPHONE (OUTPATIENT)
Dept: FAMILY MEDICINE CLINIC | Age: 67
End: 2017-11-30

## 2017-11-30 VITALS
DIASTOLIC BLOOD PRESSURE: 71 MMHG | RESPIRATION RATE: 20 BRPM | SYSTOLIC BLOOD PRESSURE: 113 MMHG | TEMPERATURE: 96 F | HEART RATE: 99 BPM

## 2017-11-30 DIAGNOSIS — C15.9 SQUAMOUS CELL ESOPHAGEAL CANCER (HCC): ICD-10-CM

## 2017-11-30 DIAGNOSIS — E43 SEVERE PROTEIN-CALORIE MALNUTRITION (HCC): ICD-10-CM

## 2017-11-30 DIAGNOSIS — C15.9 SQUAMOUS CELL ESOPHAGEAL CANCER (HCC): Primary | ICD-10-CM

## 2017-11-30 DIAGNOSIS — K31.89 GASTRIC PERFORATION, ACUTE: ICD-10-CM

## 2017-11-30 PROBLEM — R52 PAIN AGGRAVATED BY EATING OR DRINKING: Status: RESOLVED | Noted: 2017-11-29 | Resolved: 2017-11-30

## 2017-11-30 PROBLEM — K22.89 ESOPHAGEAL MASS: Status: RESOLVED | Noted: 2017-10-30 | Resolved: 2017-11-30

## 2017-11-30 PROCEDURE — 77386 HC IMRT TRMT DLVR COMPL: CPT

## 2017-11-30 RX ADMIN — IOHEXOL 6 ML: 240 INJECTION, SOLUTION INTRATHECAL; INTRAVASCULAR; INTRAVENOUS; ORAL at 12:34

## 2017-11-30 NOTE — PROGRESS NOTES
Evelyn Sellers is a 75 y.o. male who presents today with   Chief Complaint   Patient presents with    Surgical Follow-up     Pt presents today for follow up for G-tube placement 11/28/2017                1. Have you been to the ER, urgent care clinic since your last visit? Hospitalized since your last visit? No    2. Have you seen or consulted any other health care providers outside of the [address] [place] [address] since your last visit? Include any pap smears or colon screening.  No

## 2017-11-30 NOTE — TELEPHONE ENCOUNTER
2 patient identifiers verified. Spoke with [place] [employer] daughter- Marco Nolasco to provide an update on patient's feeding tube and maintenance. Caller was informed that patient will need to come see Dr. Mega Emerson tomorrow afternoon to be taught how to flush and feed with the feeding tube. Caller reports that she will not be able to attend the education but assures someone will accompany patient for the teaching. Caller verbalized and acknowledge that patient must attend this appointment tomorrow with Dr. Mega Emerson if not the feeding tube could possibly clog. Caller agrees that patient and family member will be at Dr. Dunn [place] office between [id number] on 12/1/17. Care Team will be notified of the above conversation.

## 2017-11-30 NOTE — PROGRESS NOTES
Reviewed notes from Oncology (Dr. Wong Magallon) and Surgery (Dr. Varghese Brown) and also discussed with PCP (Dr. Nate Johnson). Called Dietician Nadiya Brito ([phone]) and discussed with her regarding patient. She informed me nutrition order will not be available until tomorrow. Their office will contact patient to schedule an appt at their office  next week. Also discussed with nurse Ayde Viveros from Oncology. Once nutrition orders received, she will fax to [address] [place] [address] for patient to receive the supplies. Also contacted GI Dr. Bernardo Ham office ([phone]) regarding when patient is to follow up. Spoke to nurse Megan Walker. She informed no follow up appt currently scheduled for the patient, she will send message to provider when patient is to return for a follow up. Contact info provided      Noted patient is scheduled for an appt with Dr. Varghese Brown tomorrow 12/01/17 to assess PEG tube patency.   Patient has appt with PCP Dr. Nate Johnson 12/06/17      Will continue to follow and collaborate care with other specialists' office

## 2017-11-30 NOTE — MR AVS SNAPSHOT
Visit Information Date & Time Provider Department Dept. Phone Encounter #  
 11/30/2017 10:45 AM Gilmar Kincaid MD [facility] [phone] [id number] Your Appointments 12/6/2017  2:00 PM  
ROUTINE CARE with Ramirez James.,  [address] [facility]) Appt Note: Return in about 4 weeks (around 11/27/2017) for EOV. [facility] [address] [place] [address] 222 [facility] [address] [place] [address] [address] Upcoming Health Maintenance Date Due Pneumococcal 65+ High/Highest Risk (2 of 2 - PPSV23) 1/14/2016 GLAUCOMA SCREENING Q2Y 7/14/2018 MEDICARE YEARLY EXAM 8/16/2018 COLONOSCOPY 6/6/2021 DTaP/Tdap/Td series (2 - Td) 8/15/2027 Allergies as of 11/30/2017  Review Complete On: 11/29/2017 By: Baljeet Armstrong No Known Allergies Current Immunizations  Reviewed on 1/4/2017 Name Date Influenza High Dose Vaccine PF 1/4/2017, 11/19/2015 Influenza Vaccine 11/18/2014, 2/4/2013 Influenza Vaccine (Quad) PF 10/22/2017 10:21 PM  
 Influenza Vaccine Split 11/17/2011, 11/22/2010 Pneumococcal Conjugate (PCV-13) 11/19/2015 TD Vaccine 8/3/2006 Tdap 8/15/2017 ZZZ-RETIRED (DO NOT USE) Pneumococcal Vaccine (Unspecified Type) 11/22/2010 Not reviewed this visit You Were Diagnosed With   
  
 Codes Comments Squamous cell esophageal cancer (HCC)    -  Primary ICD-10-CM: C15.9 ICD-9-CM: 150.9 Vitals BP Pulse Temp Resp Smoking Status 113/71 (BP 1 Location: Left arm, BP Patient Position: At rest) 99 96 °F (35.6 °C) (Oral) 20 Former Smoker Preferred Pharmacy Pharmacy Name Phone [facility] [address], [place] [phone] Your Updated Medication List  
  
   
This list is accurate as of: 11/30/17 12:02 PM.  Always use your most recent med list.  
  
  
  
  
 amLODIPine 5 mg tablet Commonly known as:  Elspeth Bakes Take 1 Tab by mouth daily. CARAFATE 100 mg/mL suspension Generic drug:  sucralfate Take 1 tsp by mouth three (3) times daily. lisinopril 20 mg tablet Commonly known as:  Jaelyn Primmer Take 1 Tab by mouth daily. oxyCODONE-acetaminophen  mg per tablet Commonly known as:  PERCOCET 10 Take 1 Tab by mouth every six (6) hours as needed for Pain. Max Daily Amount: 4 Tabs. To-Do List   
 11/30/2017 Imaging:  XR G TUBE REPOSITION   
  
 12/01/2017 8:00 AM  
  Appointment with [address] [place] [address] at [address] [place] [address] ([phone]) This appointment time is simply a place [place] and may not reflect the true appointment time. If patient does not know the appointment time given to them at the time of scheduling, they should contact the Radiation Therapy department for detail. 12/04/2017 8:00 AM  
  Appointment with [address] [place] [address] RADIATION ONCOLOGY at [address] [place] [address] RADIATION THERAPY ([phone]) This appointment time is simply a place [place] and may not reflect the true appointment time. If patient does not know the appointment time given to them at the time of scheduling, they should contact the Radiation Therapy department for detail. 12/05/2017 8:00 AM  
  Appointment with [address] [place] [address] RADIATION ONCOLOGY at [address] [place] [address] RADIATION THERAPY ([phone]) This appointment time is simply a place [place] and may not reflect the true appointment time. If patient does not know the appointment time given to them at the time of scheduling, they should contact the Radiation Therapy department for detail. 12/06/2017 8:00 AM  
  Appointment with [address] [place] [address] RADIATION ONCOLOGY at [address] [place] [address] RADIATION THERAPY ([phone]) This appointment time is simply a place [place] and may not reflect the true appointment time.   If patient does not know the appointment time given to them at the time of scheduling, they should contact the Radiation Therapy department for detail. 12/06/2017 11:00 AM  
  Appointment with [facility] INFUSION NURSE 4 at [facility] OP INFUSION [facility] ([phone]) 12/07/2017 8:00 AM  
  Appointment with [facility] RADIATION ONCOLOGY at [facility] RADIATION THERAPY ([phone]) This appointment time is simply a place [place] and may not reflect the true appointment time. If patient does not know the appointment time given to them at the time of scheduling, they should contact the Radiation Therapy department for detail. 12/08/2017 8:00 AM  
  Appointment with [facility] RADIATION ONCOLOGY at [facility] RADIATION THERAPY ([phone]) This appointment time is simply a place [place] and may not reflect the true appointment time. If patient does not know the appointment time given to them at the time of scheduling, they should contact the Radiation Therapy department for detail. 12/11/2017 8:00 AM  
  Appointment with [address] [place] [address] at [facility] RADIATION THERAPY ([phone]) This appointment time is simply a place [place] and may not reflect the true appointment time. If patient does not know the appointment time given to them at the time of scheduling, they should contact the Radiation Therapy department for detail. 12/12/2017 8:00 AM  
  Appointment with [address] [place] [address] at [facility] RADIATION THERAPY ([phone]) This appointment time is simply a place [place] and may not reflect the true appointment time. If patient does not know the appointment time given to them at the time of scheduling, they should contact the Radiation Therapy department for detail. 12/13/2017 8:00 AM  
  Appointment with [facility] RADIATION ONCOLOGY at [facility] RADIATION THERAPY ([phone]) This appointment time is simply a place [place] and may not reflect the true appointment time. If patient does not know the appointment time given to them at the time of scheduling, they should contact the Radiation Therapy department for detail. 12/14/2017 8:00 AM  
  Appointment with [facility] RADIATION THERAPY ([phone]) This appointment time is simply a place [place] and may not reflect the true appointment time. If patient does not know the appointment time given to them at the time of scheduling, they should contact the Radiation Therapy department for detail. 12/18/2017 8:00 AM  
  Appointment with Celeste Nayak [facility] RADIATION THERAPY ([phone]) This appointment time is simply a place [place] and may not reflect the true appointment time. If patient does not know the appointment time given to them at the time of scheduling, they should contact the Radiation Therapy department for detail. 12/19/2017 8:00 AM  
  Appointment with Celeste Nayak [facility] RADIATION THERAPY ([phone]) This appointment time is simply a place [place] and may not reflect the true appointment time. If patient does not know the appointment time given to them at the time of scheduling, they should contact the Radiation Therapy department for detail. 12/20/2017 8:00 AM  
  Appointment with Celeste Nayak [facility] RADIATION THERAPY ([phone]) This appointment time is simply a place [place] and may not reflect the true appointment time. If patient does not know the appointment time given to them at the time of scheduling, they should contact the Radiation Therapy department for detail. 12/21/2017 8:00 AM  
  Appointment with Celeste Nayak [facility] RADIATION THERAPY ([phone]) This appointment time is simply a place [place] and may not reflect the true appointment time. If patient does not know the appointment time given to them at the time of scheduling, they should contact the Radiation Therapy department for detail. 12/22/2017 8:00 AM  
  Appointment with Celeste Nayak [facility] RADIATION THERAPY ([phone]) This appointment time is simply a place [place] and may not reflect the true appointment time. If patient does not know the appointment time given to them at the time of scheduling, they should contact the Radiation Therapy department for detail. 12/26/2017 8:00 AM  
  Appointment with Celeste Kumar [facility] RADIATION THERAPY ([phone]) This appointment time is simply a place [place] and may not reflect the true appointment time. If patient does not know the appointment time given to them at the time of scheduling, they should contact the Radiation Therapy department for detail. 12/27/2017 8:00 AM  
  Appointment with [facility] RADIATION ONCOLOGY at [facility] RADIATION THERAPY ([phone]) This appointment time is simply a place [place] and may not reflect the true appointment time. If patient does not know the appointment time given to them at the time of scheduling, they should contact the Radiation Therapy department for detail. 12/28/2017 8:00 AM  
  Appointment with Celeste José [facility] RADIATION THERAPY ([phone]) This appointment time is simply a place [place] and may not reflect the true appointment time. If patient does not know the appointment time given to them at the time of scheduling, they should contact the Radiation Therapy department for detail. 12/29/2017 8:00 AM  
  Appointment with Celeste Kumar [facility] RADIATION THERAPY ([phone]) This appointment time is simply a place [place] and may not reflect the true appointment time. If patient does not know the appointment time given to them at the time of scheduling, they should contact the Radiation Therapy department for detail. Please provide this summary of care documentation to your next provider. Your primary care clinician is listed as [address] [facility]. If you have any questions after today's visit, please call [phone].

## 2017-11-30 NOTE — TELEPHONE ENCOUNTER
Called [facility] to inquire about pt's gtube feeding order and teaching. [facility] has not received referral and they can not service pt due to his MEDICARE insurance. [facility] suggested [facility] and/or Rebecca.

## 2017-11-30 NOTE — TELEPHONE ENCOUNTER
Spoke with Son Smith of [facility]. Orders for g-tube care and feeding are in process and she will call or e-mail with plan.

## 2017-12-01 ENCOUNTER — OFFICE VISIT (OUTPATIENT)
Dept: SURGERY | Age: 67
End: 2017-12-01

## 2017-12-01 ENCOUNTER — HOSPITAL ENCOUNTER (OUTPATIENT)
Dept: RADIATION THERAPY | Age: 67
Discharge: HOME | End: 2017-12-01
Payer: MEDICARE

## 2017-12-01 ENCOUNTER — PATIENT OUTREACH (OUTPATIENT)
Dept: FAMILY MEDICINE CLINIC | Age: 67
End: 2017-12-01

## 2017-12-01 ENCOUNTER — HOME HEALTH ADMISSION (OUTPATIENT)
Dept: HOME HEALTH SERVICES | Facility: HOME HEALTH | Age: 67
End: 2017-12-01

## 2017-12-01 ENCOUNTER — TELEPHONE (OUTPATIENT)
Dept: ONCOLOGY | Age: 67
End: 2017-12-01

## 2017-12-01 VITALS
TEMPERATURE: 97.1 F | HEIGHT: 69 IN | WEIGHT: 148 LBS | RESPIRATION RATE: 17 BRPM | HEART RATE: 105 BPM | DIASTOLIC BLOOD PRESSURE: 68 MMHG | BODY MASS INDEX: 21.92 KG/M2 | SYSTOLIC BLOOD PRESSURE: 101 MMHG | OXYGEN SATURATION: 97 %

## 2017-12-01 DIAGNOSIS — C15.9 SQUAMOUS CELL ESOPHAGEAL CANCER (HCC): Primary | ICD-10-CM

## 2017-12-01 DIAGNOSIS — R13.19 ESOPHAGEAL DYSPHAGIA: Primary | ICD-10-CM

## 2017-12-01 DIAGNOSIS — E44.0 MALNUTRITION OF MODERATE DEGREE (HCC): ICD-10-CM

## 2017-12-01 PROCEDURE — 77386 HC IMRT TRMT DLVR COMPL: CPT

## 2017-12-01 RX ORDER — FAMOTIDINE 10 MG/ML
20 INJECTION INTRAVENOUS ONCE
Status: CANCELLED | OUTPATIENT
Start: 2017-12-06 | End: 2017-12-06

## 2017-12-01 RX ORDER — DIPHENHYDRAMINE HYDROCHLORIDE 50 MG/ML
50 INJECTION, SOLUTION INTRAMUSCULAR; INTRAVENOUS ONCE
Status: CANCELLED | OUTPATIENT
Start: 2017-12-06 | End: 2017-12-06

## 2017-12-01 RX ORDER — PALONOSETRON 0.05 MG/ML
0.25 INJECTION, SOLUTION INTRAVENOUS ONCE
Status: CANCELLED | OUTPATIENT
Start: 2017-12-06 | End: 2017-12-06

## 2017-12-01 NOTE — PROGRESS NOTES
Patient is present for G-Tube teaching. 1. Have you been to the ER, urgent care clinic since your last visit? Hospitalized since your last visit? No    2. Have you seen or consulted any other health care providers outside of the [address] [place] [address] since your last visit? Include any pap smears or colon screening. Yes Reason for visit: To speak to radiation and PCP on 12/1/2017.

## 2017-12-01 NOTE — PROGRESS NOTES
-Tube feeding order currently not available. Discussed with Dr. Pilar Callahan, he will write prescription order when he returns on Monday.     -Called and spoke to patient. Pt's identity verified x2. Per patient he is doing well. Pt stated he attended his appt at Dr. Terrell Camara office today. Pt verbalized he understands how to flush his Peg tube, stated he was instructed to flush once a day. Pt stated he is able tolerate and eat soft food at this time. Reviewed with patient red flags (CP, SOB, bleeding, swelling, NVD, fever/chills, increased pain). Pt understands when to seek emergency help. -Pt was informed of his appt with PCP 12/06/17 at 2pm. Pt stated he has a ride to the appt. Pt advised to contact PCP office for any questions or concerns.    -[address] ([phone]). They do accept Enteral feeding orders.  Orders can be faxed along with patient face sheet/demographics, pertinent office notes to [phone]

## 2017-12-01 NOTE — PROGRESS NOTES
Subjective:    *late entry; patient seen and examined at [address] [facility] Madeline is a 75 y.o. male presents for postop care 2 days status post open G tube placement for dysphagia. Patient denies pain, nausea or vomiting. He is feeling fairly well. Home health has not yet been to his home nor have tube feedings been started. Objective:     Visit Vitals    /71 (BP 1 Location: Left arm, BP Patient Position: At rest)    Pulse 99    Temp 96 °F (35.6 °C) (Oral)    Resp 20       General:  alert, cooperative, no distress, appears stated age   Abdomen: soft, bowel sounds active, non-tender, G tube flushed after initial difficulty and re-inflation of balloon   Incision:   healing well, no drainage, no erythema, no hernia, no seroma, no swelling, no dehiscence, incision well approximated         Assessment:     Doing well postoperatively. G tube not flushing easily at first, then flushing well, balloon re-inflated. Patient sent over to hospital for stat G tube study to ensure has not become dislodged. G tube study done in my presence and shows good position of the tube and no extravasation. Discussion held with patient's other physicians. Attempts being made to arrange home health by Dr Gladys Lara; patient must first be seen by dietician. I am concerned that the patient's tube may clog in the absence of being used, therefore, I will have the patient return tomorrow with a family member and will educate them on the proper flushing of the tube until home health can be arranged. Plan:   -as above    Communicated all of the above to Jesse Valencia and Gladys Lara.

## 2017-12-01 NOTE — TELEPHONE ENCOUNTER
Received nutrition recommendations from NEWTON Ferrell at [facility]. Recommendations are as follow:      Use Vital 1.2   Start with 1 can over an hour 3 times per day for 24 hours. If tolerated, advance to 2 cans over 1.5 hours 3x/day: 9am, 12pm, 6pm (follow toleration over 48 hours). Flush tube before & after with 200ml water each time (400ml total with each feeding)  If patient can drink water, flushes can be reduced.      Provides:   1700 calories  106g protein, 156g carbohydrate, 76g fat  2271 ml total water (1149ml free water, 1122ml water flushes)

## 2017-12-01 NOTE — PATIENT INSTRUCTIONS
If you have any questions or concerns about today's appointment, the verbal and/or written instructions you were given for follow up care, please call our office at [phone].     [facility] [place]  [address] [place] [address]    [phone] office  [phone]

## 2017-12-01 NOTE — MR AVS SNAPSHOT
Visit Information Date & Time Provider Department Dept. Phone Encounter #  
 12/1/2017  2:30 PM Landy Chavarria MD [facility] [phone] [id number] Your Appointments 12/6/2017  2:00 PM  
ROUTINE CARE with Asuncion Corral DO [address] [place] [address]) Appt Note: Return in about 4 weeks (around 11/27/2017) for EOV. [address] [place] [address]  
  
   
 [address] [facility] [address] Upcoming Health Maintenance Date Due Pneumococcal 65+ High/Highest Risk (2 of 2 - PPSV23) 1/14/2016 GLAUCOMA SCREENING Q2Y 7/14/2018 MEDICARE YEARLY EXAM 8/16/2018 COLONOSCOPY 6/6/2021 DTaP/Tdap/Td series (2 - Td) 8/15/2027 Allergies as of 12/1/2017  Review Complete On: 12/1/2017 By: Marce Peters LPN No Known Allergies Current Immunizations  Reviewed on 1/4/2017 Name Date Influenza High Dose Vaccine PF 1/4/2017, 11/19/2015 Influenza Vaccine 11/18/2014, 2/4/2013 Influenza Vaccine (Quad) PF 10/22/2017 10:21 PM  
 Influenza Vaccine Split 11/17/2011, 11/22/2010 Pneumococcal Conjugate (PCV-13) 11/19/2015 TD Vaccine 8/3/2006 Tdap 8/15/2017 ZZZ-RETIRED (DO NOT USE) Pneumococcal Vaccine (Unspecified Type) 11/22/2010 Not reviewed this visit Vitals BP Pulse Temp Resp Height(growth percentile) Weight(growth percentile) 101/68 (BP 1 Location: Right arm, BP Patient Position: Sitting) (!) 105 97.1 °F (36.2 °C) (Oral) 17 5' 9\" (1.753 m) 148 lb (67.1 kg) SpO2 BMI Smoking Status 97% 21.86 kg/m2 Former Smoker BMI and BSA Data Body Mass Index Body Surface Area  
 21.86 kg/m 2 1.81 m 2 Preferred Pharmacy Pharmacy Name Phone [facility] [address], [address] [place] [address] [phone] Your Updated Medication List  
  
   
 This list is accurate as of: 12/1/17  2:48 PM.  Always use your most recent med list. amLODIPine 5 mg tablet Commonly known as:  Mendoza Fanti Take 1 Tab by mouth daily. CARAFATE 100 mg/mL suspension Generic drug:  sucralfate Take 1 tsp by mouth three (3) times daily. lisinopril 20 mg tablet Commonly known as:  Tatianna Antunez Take 1 Tab by mouth daily. oxyCODONE-acetaminophen  mg per tablet Commonly known as:  PERCOCET 10 Take 1 Tab by mouth every six (6) hours as needed for Pain. Max Daily Amount: 4 Tabs. To-Do List   
 12/04/2017 8:00 AM  
  Appointment with [address] [place] [address] at [facility] RADIATION [facility] ([phone]) This appointment time is simply a place [place] and may not reflect the true appointment time. If patient does not know the appointment time given to them at the time of scheduling, they should contact the Radiation Therapy department for detail. 12/05/2017 8:00 AM  
  Appointment with [facility] RADIATION ONCOLOGY at [facility] RADIATION [facility] ([phone]) This appointment time is simply a place [place] and may not reflect the true appointment time. If patient does not know the appointment time given to them at the time of scheduling, they should contact the Radiation Therapy department for detail. 12/06/2017 8:00 AM  
  Appointment with [facility] RADIATION ONCOLOGY at [facility] RADIATION [facility] ([phone]) This appointment time is simply a place [place] and may not reflect the true appointment time. If patient does not know the appointment time given to them at the time of scheduling, they should contact the Radiation Therapy department for detail. 12/06/2017 11:00 AM  
  Appointment with [facility] INFUSION NURSE 4 at [facility] OP INFUSION [facility] ([phone]) 12/07/2017 8:00 AM  
  Appointment with [facility] RADIATION ONCOLOGY at [facility] RADIATION [facility] ([phone]) This appointment time is simply a place [place] and may not reflect the true appointment time. If patient does not know the appointment time given to them at the time of scheduling, they should contact the Radiation Therapy department for detail. 12/08/2017 8:00 AM  
  Appointment with [facility] RADIATION ONCOLOGY at [facility] RADIATION THERAPY ([phone]) This appointment time is simply a place [place] and may not reflect the true appointment time. If patient does not know the appointment time given to them at the time of scheduling, they should contact the Radiation Therapy department for detail. 12/11/2017 8:00 AM  
  Appointment with [address] [place] [address] RADIATION THERAPY ([phone]) This appointment time is simply a place [place] and may not reflect the true appointment time. If patient does not know the appointment time given to them at the time of scheduling, they should contact the Radiation Therapy department for detail. 12/12/2017 8:00 AM  
  Appointment with [address] [place] [address] RADIATION THERAPY ([phone]) This appointment time is simply a place [place] and may not reflect the true appointment time. If patient does not know the appointment time given to them at the time of scheduling, they should contact the Radiation Therapy department for detail. 12/13/2017 8:00 AM  
  Appointment with [facility] RADIATION ONCOLOGY at [facility] RADIATION THERAPY ([phone]) This appointment time is simply a place [place] and may not reflect the true appointment time. If patient does not know the appointment time given to them at the time of scheduling, they should contact the Radiation Therapy department for detail. 12/14/2017 8:00 AM  
  Appointment with [address] [place] [address] RADIATION THERAPY ([phone]) This appointment time is simply a place [place] and may not reflect the true appointment time.   If patient does not know the appointment time given to them at the time of scheduling, they should contact the Radiation Therapy department for detail. 12/18/2017 8:00 AM  
  Appointment with Celeste Nayak [facility] RADIATION THERAPY ([phone]) This appointment time is simply a place [place] and may not reflect the true appointment time. If patient does not know the appointment time given to them at the time of scheduling, they should contact the Radiation Therapy department for detail. 12/19/2017 8:00 AM  
  Appointment with Celeste Nayak [facility] RADIATION THERAPY ([phone]) This appointment time is simply a place [place] and may not reflect the true appointment time. If patient does not know the appointment time given to them at the time of scheduling, they should contact the Radiation Therapy department for detail. 12/20/2017 8:00 AM  
  Appointment with Celeste Nayak [facility] RADIATION THERAPY ([phone]) This appointment time is simply a place [place] and may not reflect the true appointment time. If patient does not know the appointment time given to them at the time of scheduling, they should contact the Radiation Therapy department for detail. 12/21/2017 8:00 AM  
  Appointment with Celeste Nayak [facility] RADIATION THERAPY ([phone]) This appointment time is simply a place [place] and may not reflect the true appointment time. If patient does not know the appointment time given to them at the time of scheduling, they should contact the Radiation Therapy department for detail. 12/22/2017 8:00 AM  
  Appointment with Celeste Nayak [facility] RADIATION THERAPY ([phone]) This appointment time is simply a place [place] and may not reflect the true appointment time. If patient does not know the appointment time given to them at the time of scheduling, they should contact the Radiation Therapy department for detail. 12/26/2017 8:00 AM  
  Appointment with Celeste Nayak [facility] RADIATION THERAPY ([phone]) This appointment time is simply a place [place] and may not reflect the true appointment time. If patient does not know the appointment time given to them at the time of scheduling, they should contact the Radiation Therapy department for detail. 12/27/2017 8:00 AM  
  Appointment with [facility] RADIATION ONCOLOGY at [facility] RADIATION THERAPY ([phone]) This appointment time is simply a place [place] and may not reflect the true appointment time. If patient does not know the appointment time given to them at the time of scheduling, they should contact the Radiation Therapy department for detail. 12/28/2017 8:00 AM  
  Appointment with [address] [place] [address] at [facility] RADIATION THERAPY ([phone]) This appointment time is simply a place [place] and may not reflect the true appointment time. If patient does not know the appointment time given to them at the time of scheduling, they should contact the Radiation Therapy department for detail. 12/29/2017 8:00 AM  
  Appointment with [address] [place] [address] at [facility] RADIATION THERAPY ([phone]) This appointment time is simply a place [place] and may not reflect the true appointment time. If patient does not know the appointment time given to them at the time of scheduling, they should contact the Radiation Therapy department for detail. Patient Instructions If you have any questions or concerns about today's appointment, the verbal and/or written instructions you were given for follow up care, please call our office at [phone]. [facility] [address] [place] [address], [address] 
 
[phone] office [phone] Please provide this summary of care documentation to your next provider. Your primary care clinician is listed as [id number] [facility]. If you have any questions after today's visit, please call [phone].

## 2017-12-02 ENCOUNTER — ANESTHESIA (OUTPATIENT)
Dept: SURGERY | Age: 67
DRG: 327 | End: 2017-12-02
Payer: MEDICARE

## 2017-12-02 ENCOUNTER — ANESTHESIA EVENT (OUTPATIENT)
Dept: SURGERY | Age: 67
DRG: 327 | End: 2017-12-02
Payer: MEDICARE

## 2017-12-02 ENCOUNTER — HOME CARE VISIT (OUTPATIENT)
Dept: HOME HEALTH SERVICES | Facility: HOME HEALTH | Age: 67
End: 2017-12-02

## 2017-12-02 ENCOUNTER — HOSPITAL ENCOUNTER (INPATIENT)
Age: 67
LOS: 1 days | Discharge: HOME | DRG: 327 | End: 2017-12-03
Attending: EMERGENCY MEDICINE | Admitting: HOSPITALIST
Payer: MEDICARE

## 2017-12-02 DIAGNOSIS — C15.9 SQUAMOUS CELL ESOPHAGEAL CANCER (HCC): ICD-10-CM

## 2017-12-02 DIAGNOSIS — T85.528A DISLODGED GASTROSTOMY TUBE: Primary | ICD-10-CM

## 2017-12-02 PROBLEM — C78.89: Status: ACTIVE | Noted: 2017-10-17

## 2017-12-02 PROBLEM — I10 HTN (HYPERTENSION): Status: ACTIVE | Noted: 2017-12-02

## 2017-12-02 PROBLEM — K94.23 PEG TUBE MALFUNCTION (HCC): Status: ACTIVE | Noted: 2017-12-02

## 2017-12-02 LAB
ANION GAP SERPL CALC-SCNC: 4 MMOL/L (ref 3–18)
APPEARANCE UR: CLEAR
BASOPHILS # BLD: 0 K/UL (ref 0–0.06)
BASOPHILS NFR BLD: 0 % (ref 0–2)
BILIRUB UR QL: NEGATIVE
BUN SERPL-MCNC: 8 MG/DL (ref 7–18)
BUN/CREAT SERPL: 11 (ref 12–20)
CALCIUM SERPL-MCNC: 9.6 MG/DL (ref 8.5–10.1)
CHLORIDE SERPL-SCNC: 100 MMOL/L (ref 100–108)
CO2 SERPL-SCNC: 29 MMOL/L (ref 21–32)
COLOR UR: YELLOW
CREAT SERPL-MCNC: 0.7 MG/DL (ref 0.6–1.3)
DIFFERENTIAL METHOD BLD: ABNORMAL
EOSINOPHIL # BLD: 0.1 K/UL (ref 0–0.4)
EOSINOPHIL NFR BLD: 3 % (ref 0–5)
ERYTHROCYTE [DISTWIDTH] IN BLOOD BY AUTOMATED COUNT: 13.7 % (ref 11.6–14.5)
GLUCOSE SERPL-MCNC: 87 MG/DL (ref 74–99)
GLUCOSE UR STRIP.AUTO-MCNC: NEGATIVE MG/DL
HCT VFR BLD AUTO: 34.7 % (ref 36–48)
HGB BLD-MCNC: 11.9 G/DL (ref 13–16)
HGB UR QL STRIP: NEGATIVE
INR PPP: 1 (ref 0.8–1.2)
KETONES UR QL STRIP.AUTO: NEGATIVE MG/DL
LEUKOCYTE ESTERASE UR QL STRIP.AUTO: NEGATIVE
LYMPHOCYTES # BLD: 0.2 K/UL (ref 0.9–3.6)
LYMPHOCYTES NFR BLD: 6 % (ref 21–52)
MCH RBC QN AUTO: 27.6 PG (ref 24–34)
MCHC RBC AUTO-ENTMCNC: 34.3 G/DL (ref 31–37)
MCV RBC AUTO: 80.5 FL (ref 74–97)
MONOCYTES # BLD: 0.1 K/UL (ref 0.05–1.2)
MONOCYTES NFR BLD: 2 % (ref 3–10)
NEUTS SEG # BLD: 2.4 K/UL (ref 1.8–8)
NEUTS SEG NFR BLD: 89 % (ref 40–73)
NITRITE UR QL STRIP.AUTO: NEGATIVE
PH UR STRIP: 7 [PH] (ref 5–8)
PLATELET # BLD AUTO: 170 K/UL (ref 135–420)
PMV BLD AUTO: 9.5 FL (ref 9.2–11.8)
POTASSIUM SERPL-SCNC: 3.9 MMOL/L (ref 3.5–5.5)
PROT UR STRIP-MCNC: NEGATIVE MG/DL
PROTHROMBIN TIME: 13.1 SEC (ref 11.5–15.2)
RBC # BLD AUTO: 4.31 M/UL (ref 4.7–5.5)
SODIUM SERPL-SCNC: 133 MMOL/L (ref 136–145)
SP GR UR REFRACTOMETRY: 1.01 (ref 1–1.03)
UROBILINOGEN UR QL STRIP.AUTO: 0.2 EU/DL (ref 0.2–1)
WBC # BLD AUTO: 2.7 K/UL (ref 4.6–13.2)

## 2017-12-02 PROCEDURE — 80048 BASIC METABOLIC PNL TOTAL CA: CPT | Performed by: EMERGENCY MEDICINE

## 2017-12-02 PROCEDURE — 76210000016 HC OR PH I REC 1 TO 1.5 HR: Performed by: SURGERY

## 2017-12-02 PROCEDURE — 77030018842 HC SOL IRR SOD CL 9% BAXT -A: Performed by: SURGERY

## 2017-12-02 PROCEDURE — 77030011265 HC ELECTRD BLD HEX COVD -A: Performed by: SURGERY

## 2017-12-02 PROCEDURE — 77030034850: Performed by: SURGERY

## 2017-12-02 PROCEDURE — 77030032490 HC SLV COMPR SCD KNE COVD -B: Performed by: SURGERY

## 2017-12-02 PROCEDURE — 65270000029 HC RM PRIVATE

## 2017-12-02 PROCEDURE — 77030033827 HC SLV COMPR SCD THGH COVD -B: Performed by: SURGERY

## 2017-12-02 PROCEDURE — 77030008477 HC STYL SATN SLP COVD -A: Performed by: NURSE ANESTHETIST, CERTIFIED REGISTERED

## 2017-12-02 PROCEDURE — 77030005514 HC CATH URETH FOL14 BARD -A

## 2017-12-02 PROCEDURE — 77030008683 HC TU ET CUF COVD -A: Performed by: NURSE ANESTHETIST, CERTIFIED REGISTERED

## 2017-12-02 PROCEDURE — 74011000258 HC RX REV CODE- 258

## 2017-12-02 PROCEDURE — 74011250636 HC RX REV CODE- 250/636

## 2017-12-02 PROCEDURE — 85610 PROTHROMBIN TIME: CPT | Performed by: EMERGENCY MEDICINE

## 2017-12-02 PROCEDURE — 74011000250 HC RX REV CODE- 250

## 2017-12-02 PROCEDURE — 77030031139 HC SUT VCRL2 J&J -A: Performed by: SURGERY

## 2017-12-02 PROCEDURE — 77030002966 HC SUT PDS J&J -A: Performed by: SURGERY

## 2017-12-02 PROCEDURE — 99284 EMERGENCY DEPT VISIT MOD MDM: CPT

## 2017-12-02 PROCEDURE — 75810000109 HC GASTRO TUBE CHANGE

## 2017-12-02 PROCEDURE — 0DH60UZ INSERTION OF FEEDING DEVICE INTO STOMACH, OPEN APPROACH: ICD-10-PCS | Performed by: SURGERY

## 2017-12-02 PROCEDURE — 74011000258 HC RX REV CODE- 258: Performed by: HOSPITALIST

## 2017-12-02 PROCEDURE — 77030005103 HC CATH GASTMY BLN HALY -B: Performed by: SURGERY

## 2017-12-02 PROCEDURE — 85025 COMPLETE CBC W/AUTO DIFF WBC: CPT | Performed by: EMERGENCY MEDICINE

## 2017-12-02 PROCEDURE — 77030020253 HC SOL INJ D545NS .05 DEX .45 SAL

## 2017-12-02 PROCEDURE — 74011250637 HC RX REV CODE- 250/637: Performed by: HOSPITALIST

## 2017-12-02 PROCEDURE — 76010000138 HC OR TIME 0.5 TO 1 HR: Performed by: SURGERY

## 2017-12-02 PROCEDURE — 76060000032 HC ANESTHESIA 0.5 TO 1 HR: Performed by: SURGERY

## 2017-12-02 PROCEDURE — 81003 URINALYSIS AUTO W/O SCOPE: CPT | Performed by: EMERGENCY MEDICINE

## 2017-12-02 PROCEDURE — 77030003029 HC SUT VCRL J&J -B: Performed by: SURGERY

## 2017-12-02 PROCEDURE — 74011250636 HC RX REV CODE- 250/636: Performed by: NURSE ANESTHETIST, CERTIFIED REGISTERED

## 2017-12-02 PROCEDURE — 74011250636 HC RX REV CODE- 250/636: Performed by: SURGERY

## 2017-12-02 PROCEDURE — 77030013079 HC BLNKT BAIR HGGR 3M -A: Performed by: NURSE ANESTHETIST, CERTIFIED REGISTERED

## 2017-12-02 RX ORDER — NEOSTIGMINE METHYLSULFATE 5 MG/5 ML
SYRINGE (ML) INTRAVENOUS AS NEEDED
Status: DISCONTINUED | OUTPATIENT
Start: 2017-12-02 | End: 2017-12-02 | Stop reason: HOSPADM

## 2017-12-02 RX ORDER — ONDANSETRON 2 MG/ML
4 INJECTION INTRAMUSCULAR; INTRAVENOUS
Status: DISCONTINUED | OUTPATIENT
Start: 2017-12-02 | End: 2017-12-02 | Stop reason: HOSPADM

## 2017-12-02 RX ORDER — DEXAMETHASONE SODIUM PHOSPHATE 4 MG/ML
INJECTION, SOLUTION INTRA-ARTICULAR; INTRALESIONAL; INTRAMUSCULAR; INTRAVENOUS; SOFT TISSUE AS NEEDED
Status: DISCONTINUED | OUTPATIENT
Start: 2017-12-02 | End: 2017-12-02 | Stop reason: HOSPADM

## 2017-12-02 RX ORDER — MIDAZOLAM HYDROCHLORIDE 1 MG/ML
INJECTION, SOLUTION INTRAMUSCULAR; INTRAVENOUS AS NEEDED
Status: DISCONTINUED | OUTPATIENT
Start: 2017-12-02 | End: 2017-12-02 | Stop reason: HOSPADM

## 2017-12-02 RX ORDER — GLYCOPYRROLATE 0.2 MG/ML
INJECTION INTRAMUSCULAR; INTRAVENOUS AS NEEDED
Status: DISCONTINUED | OUTPATIENT
Start: 2017-12-02 | End: 2017-12-02 | Stop reason: HOSPADM

## 2017-12-02 RX ORDER — HYDROMORPHONE HCL IN 0.9% NACL 50 MG/50ML
1 PLASTIC BAG, INJECTION (ML) INJECTION
Status: DISCONTINUED | OUTPATIENT
Start: 2017-12-02 | End: 2017-12-03 | Stop reason: HOSPADM

## 2017-12-02 RX ORDER — DIPHENHYDRAMINE HYDROCHLORIDE 50 MG/ML
50 INJECTION, SOLUTION INTRAMUSCULAR; INTRAVENOUS ONCE
Status: DISCONTINUED | OUTPATIENT
Start: 2017-12-06 | End: 2017-12-03 | Stop reason: HOSPADM

## 2017-12-02 RX ORDER — OXYCODONE AND ACETAMINOPHEN 10; 325 MG/1; MG/1
1 TABLET ORAL
Status: DISCONTINUED | OUTPATIENT
Start: 2017-12-02 | End: 2017-12-03 | Stop reason: HOSPADM

## 2017-12-02 RX ORDER — FENTANYL CITRATE 50 UG/ML
25 INJECTION, SOLUTION INTRAMUSCULAR; INTRAVENOUS
Status: DISCONTINUED | OUTPATIENT
Start: 2017-12-02 | End: 2017-12-02 | Stop reason: HOSPADM

## 2017-12-02 RX ORDER — NALOXONE HYDROCHLORIDE 0.4 MG/ML
0.2 INJECTION, SOLUTION INTRAMUSCULAR; INTRAVENOUS; SUBCUTANEOUS AS NEEDED
Status: DISCONTINUED | OUTPATIENT
Start: 2017-12-02 | End: 2017-12-02 | Stop reason: HOSPADM

## 2017-12-02 RX ORDER — FAMOTIDINE 10 MG/ML
20 INJECTION INTRAVENOUS ONCE
Status: DISCONTINUED | OUTPATIENT
Start: 2017-12-06 | End: 2017-12-03 | Stop reason: HOSPADM

## 2017-12-02 RX ORDER — FENTANYL CITRATE 50 UG/ML
INJECTION, SOLUTION INTRAMUSCULAR; INTRAVENOUS AS NEEDED
Status: DISCONTINUED | OUTPATIENT
Start: 2017-12-02 | End: 2017-12-02 | Stop reason: HOSPADM

## 2017-12-02 RX ORDER — SUCRALFATE 1 G/10ML
0.5 SUSPENSION ORAL 3 TIMES DAILY
Status: DISCONTINUED | OUTPATIENT
Start: 2017-12-02 | End: 2017-12-03 | Stop reason: HOSPADM

## 2017-12-02 RX ORDER — DEXTROSE MONOHYDRATE AND SODIUM CHLORIDE 5; .45 G/100ML; G/100ML
75 INJECTION, SOLUTION INTRAVENOUS CONTINUOUS
Status: DISCONTINUED | OUTPATIENT
Start: 2017-12-02 | End: 2017-12-03 | Stop reason: HOSPADM

## 2017-12-02 RX ORDER — HYDROMORPHONE HYDROCHLORIDE 2 MG/ML
0.5 INJECTION, SOLUTION INTRAMUSCULAR; INTRAVENOUS; SUBCUTANEOUS
Status: DISCONTINUED | OUTPATIENT
Start: 2017-12-02 | End: 2017-12-02 | Stop reason: HOSPADM

## 2017-12-02 RX ORDER — PALONOSETRON 0.05 MG/ML
0.25 INJECTION, SOLUTION INTRAVENOUS ONCE
Status: DISCONTINUED | OUTPATIENT
Start: 2017-12-06 | End: 2017-12-03 | Stop reason: HOSPADM

## 2017-12-02 RX ORDER — LIDOCAINE HYDROCHLORIDE 20 MG/ML
INJECTION, SOLUTION EPIDURAL; INFILTRATION; INTRACAUDAL; PERINEURAL AS NEEDED
Status: DISCONTINUED | OUTPATIENT
Start: 2017-12-02 | End: 2017-12-02 | Stop reason: HOSPADM

## 2017-12-02 RX ORDER — ONDANSETRON 2 MG/ML
INJECTION INTRAMUSCULAR; INTRAVENOUS AS NEEDED
Status: DISCONTINUED | OUTPATIENT
Start: 2017-12-02 | End: 2017-12-02 | Stop reason: HOSPADM

## 2017-12-02 RX ORDER — CEFAZOLIN SODIUM 1 G/3ML
INJECTION, POWDER, FOR SOLUTION INTRAMUSCULAR; INTRAVENOUS AS NEEDED
Status: DISCONTINUED | OUTPATIENT
Start: 2017-12-02 | End: 2017-12-02 | Stop reason: HOSPADM

## 2017-12-02 RX ORDER — ONDANSETRON 2 MG/ML
4 INJECTION INTRAMUSCULAR; INTRAVENOUS
Status: DISCONTINUED | OUTPATIENT
Start: 2017-12-02 | End: 2017-12-02 | Stop reason: SDUPTHER

## 2017-12-02 RX ORDER — ENOXAPARIN SODIUM 100 MG/ML
40 INJECTION SUBCUTANEOUS EVERY 24 HOURS
Status: DISCONTINUED | OUTPATIENT
Start: 2017-12-03 | End: 2017-12-03 | Stop reason: HOSPADM

## 2017-12-02 RX ORDER — AMLODIPINE BESYLATE 5 MG/1
5 TABLET ORAL DAILY
Status: DISCONTINUED | OUTPATIENT
Start: 2017-12-03 | End: 2017-12-03 | Stop reason: HOSPADM

## 2017-12-02 RX ORDER — SODIUM CHLORIDE, SODIUM LACTATE, POTASSIUM CHLORIDE, CALCIUM CHLORIDE 600; 310; 30; 20 MG/100ML; MG/100ML; MG/100ML; MG/100ML
50 INJECTION, SOLUTION INTRAVENOUS CONTINUOUS
Status: DISCONTINUED | OUTPATIENT
Start: 2017-12-02 | End: 2017-12-02 | Stop reason: HOSPADM

## 2017-12-02 RX ORDER — SODIUM CHLORIDE, SODIUM LACTATE, POTASSIUM CHLORIDE, CALCIUM CHLORIDE 600; 310; 30; 20 MG/100ML; MG/100ML; MG/100ML; MG/100ML
INJECTION, SOLUTION INTRAVENOUS
Status: DISCONTINUED | OUTPATIENT
Start: 2017-12-02 | End: 2017-12-02 | Stop reason: HOSPADM

## 2017-12-02 RX ORDER — ONDANSETRON 2 MG/ML
6 INJECTION INTRAMUSCULAR; INTRAVENOUS
Status: DISCONTINUED | OUTPATIENT
Start: 2017-12-02 | End: 2017-12-03 | Stop reason: HOSPADM

## 2017-12-02 RX ORDER — PROPOFOL 10 MG/ML
INJECTION, EMULSION INTRAVENOUS AS NEEDED
Status: DISCONTINUED | OUTPATIENT
Start: 2017-12-02 | End: 2017-12-02 | Stop reason: HOSPADM

## 2017-12-02 RX ORDER — SUCCINYLCHOLINE CHLORIDE 20 MG/ML
INJECTION INTRAMUSCULAR; INTRAVENOUS AS NEEDED
Status: DISCONTINUED | OUTPATIENT
Start: 2017-12-02 | End: 2017-12-02 | Stop reason: HOSPADM

## 2017-12-02 RX ORDER — VECURONIUM BROMIDE FOR INJECTION 1 MG/ML
INJECTION, POWDER, LYOPHILIZED, FOR SOLUTION INTRAVENOUS AS NEEDED
Status: DISCONTINUED | OUTPATIENT
Start: 2017-12-02 | End: 2017-12-02 | Stop reason: HOSPADM

## 2017-12-02 RX ORDER — LISINOPRIL 20 MG/1
20 TABLET ORAL DAILY
Status: DISCONTINUED | OUTPATIENT
Start: 2017-12-03 | End: 2017-12-03 | Stop reason: HOSPADM

## 2017-12-02 RX ADMIN — DEXTROSE MONOHYDRATE AND SODIUM CHLORIDE 75 ML/HR: 5; .45 INJECTION, SOLUTION INTRAVENOUS at 21:42

## 2017-12-02 RX ADMIN — DEXAMETHASONE SODIUM PHOSPHATE 4 MG: 4 INJECTION, SOLUTION INTRA-ARTICULAR; INTRALESIONAL; INTRAMUSCULAR; INTRAVENOUS; SOFT TISSUE at 14:36

## 2017-12-02 RX ADMIN — ONDANSETRON 4 MG: 2 INJECTION INTRAMUSCULAR; INTRAVENOUS at 14:36

## 2017-12-02 RX ADMIN — FENTANYL CITRATE 50 MCG: 50 INJECTION, SOLUTION INTRAMUSCULAR; INTRAVENOUS at 14:26

## 2017-12-02 RX ADMIN — SODIUM CHLORIDE, SODIUM LACTATE, POTASSIUM CHLORIDE, CALCIUM CHLORIDE: 600; 310; 30; 20 INJECTION, SOLUTION INTRAVENOUS at 14:21

## 2017-12-02 RX ADMIN — SUCCINYLCHOLINE CHLORIDE 100 MG: 20 INJECTION INTRAMUSCULAR; INTRAVENOUS at 14:26

## 2017-12-02 RX ADMIN — LIDOCAINE HYDROCHLORIDE 60 MG: 20 INJECTION, SOLUTION EPIDURAL; INFILTRATION; INTRACAUDAL; PERINEURAL at 14:26

## 2017-12-02 RX ADMIN — VECURONIUM BROMIDE FOR INJECTION 2 MG: 1 INJECTION, POWDER, LYOPHILIZED, FOR SOLUTION INTRAVENOUS at 14:36

## 2017-12-02 RX ADMIN — GLYCOPYRROLATE 0.4 MG: 0.2 INJECTION INTRAMUSCULAR; INTRAVENOUS at 14:52

## 2017-12-02 RX ADMIN — Medication 3 MG: at 14:52

## 2017-12-02 RX ADMIN — HYDROMORPHONE HYDROCHLORIDE 0.5 MG: 2 INJECTION INTRAMUSCULAR; INTRAVENOUS; SUBCUTANEOUS at 16:05

## 2017-12-02 RX ADMIN — HYDROMORPHONE HYDROCHLORIDE 0.5 MG: 2 INJECTION INTRAMUSCULAR; INTRAVENOUS; SUBCUTANEOUS at 15:25

## 2017-12-02 RX ADMIN — CEFAZOLIN SODIUM 2 G: 1 INJECTION, POWDER, FOR SOLUTION INTRAMUSCULAR; INTRAVENOUS at 14:33

## 2017-12-02 RX ADMIN — MIDAZOLAM HYDROCHLORIDE 1 MG: 1 INJECTION, SOLUTION INTRAMUSCULAR; INTRAVENOUS at 14:25

## 2017-12-02 RX ADMIN — SODIUM CHLORIDE, SODIUM LACTATE, POTASSIUM CHLORIDE, AND CALCIUM CHLORIDE 50 ML/HR: 600; 310; 30; 20 INJECTION, SOLUTION INTRAVENOUS at 16:06

## 2017-12-02 RX ADMIN — MIDAZOLAM HYDROCHLORIDE 1 MG: 1 INJECTION, SOLUTION INTRAMUSCULAR; INTRAVENOUS at 14:20

## 2017-12-02 RX ADMIN — PROPOFOL 70 MG: 10 INJECTION, EMULSION INTRAVENOUS at 14:26

## 2017-12-02 RX ADMIN — VECURONIUM BROMIDE FOR INJECTION 1 MG: 1 INJECTION, POWDER, LYOPHILIZED, FOR SOLUTION INTRAVENOUS at 14:26

## 2017-12-02 RX ADMIN — HYDROMORPHONE HYDROCHLORIDE 0.5 MG: 2 INJECTION INTRAMUSCULAR; INTRAVENOUS; SUBCUTANEOUS at 15:45

## 2017-12-02 RX ADMIN — SUCRALFATE 0.5 G: 1 SUSPENSION ORAL at 21:42

## 2017-12-02 NOTE — BRIEF OP NOTE
BRIEF OPERATIVE NOTE    Date of Procedure: 12/2/2017   Preoperative Diagnosis: ex lap  Postoperative Diagnosis: * No post-op diagnosis entered *    Procedure(s):  LAPAROTOMY EXPLORATORY; G- Tube replacement  Surgeon(s) and Role:     * Priti Jamison MD - Primary         Assistant Staff:       Surgical Staff:  Circ-1: Odette Parham  Scrub Tech-1: Naida Kay  Surg Asst-1: Fermin Kerr  Event Time In   Incision Start 1435   Incision Close      Anesthesia: General   Estimated Blood Loss: Minimal  Specimens: * No specimens in log *   Findings: Displaced G-tube   Complications: None  Implants: * No implants in log *

## 2017-12-02 NOTE — IP AVS SNAPSHOT
[address] Patient: Aly Singh Person MRN: [id number] :1950 My Medications TAKE these medications as instructed Instructions Each Dose to Equal  
 Morning Noon Evening Bedtime  
 amLODIPine 5 mg tablet Commonly known as:  Jayme Martines Your last dose was: Your next dose is: Take 1 Tab by mouth daily. 5 mg CARAFATE 100 mg/mL suspension Generic drug:  sucralfate Your last dose was: Your next dose is: Take 1 tsp by mouth three (3) times daily. 1 tsp  
    
   
   
   
  
 lisinopril 20 mg tablet Commonly known as:  Claribel Lugo Your last dose was: Your next dose is: Take 1 Tab by mouth daily. 20 mg  
    
   
   
   
  
 oxyCODONE-acetaminophen  mg per tablet Commonly known as:  PERCOCET 10 Your last dose was: Your next dose is: Take 1 Tab by mouth every six (6) hours as needed for Pain. Max Daily Amount: 4 Tabs. 1 Tab

## 2017-12-02 NOTE — ED NOTES
20 Sami strauss catheter used by Dr. Cassi Bailey to be inserted into feeding tube site per General Surgery

## 2017-12-02 NOTE — IP AVS SNAPSHOT
[address] Patient: Jesusita Husbands Person MRN: [id number] About your hospitalization You were admitted on:  2017 You last received care in the:  [address] [place] [address],[address] You were discharged on:  December 3, 2017 Why you were hospitalized Your primary diagnosis was:  Peg Tube Malfunction (Hcc) Your diagnoses also included:  Htn (Hypertension), Squamous Cell Esophageal Cancer (Hcc) Things You Need To Do (next 8 weeks) Follow up with Nereida Mckay.,  in 1 week(s)  
call for follow up appointment Phone:  [phone] Where:  [id number] [place] [address] , [place] [phone] Follow up with Harriet Staley MD in 2 week(s) Call for follow up appointment Phone:  [phone] Where:  [id number] [place] [address] 88, 102 [facility], [address] [place] [address] Monday Dec 04, 2017 RADIATION ONCOLOGY with [facility] RADIATION ONCOLOGY at  8:00 AM  
This appointment time is simply a place [place] and may not reflect the true appointment time. If patient does not know the appointment time given to them at the time of scheduling, they should contact the Radiation Therapy department for detail. Where:  [facility] RADIATION THERAPY [address] [place] [address]) Tuesday Dec 05, 2017 RADIATION ONCOLOGY with [facility] RADIATION ONCOLOGY at  8:00 AM  
This appointment time is simply a place [place] and may not reflect the true appointment time. If patient does not know the appointment time given to them at the time of scheduling, they should contact the Radiation Therapy department for detail. Where:  [facility] RADIATION THERAPY [address] [place] [address]) Wednesday Dec 06, 2017 RADIATION ONCOLOGY with [facility] RADIATION ONCOLOGY at  8:00 AM  
This appointment time is simply a place [place] and may not reflect the true appointment time. If patient does not know the appointment time given to them at the time of scheduling, they should contact the Radiation Therapy department for detail. Where:  [facility] RADIATION THERAPY [address] [place] [address]) INFUSION 30 with [facility] INFUSION NURSE 4 at 11:00 AM  
Where:  [facility] OP INFUSION [facility] ([address]) ROUTINE CARE with Anastasia Bal., DO at  2:00 PM  
Where:  [address] [address]) Thursday Dec 07, 2017 RADIATION ONCOLOGY with [facility] RADIATION ONCOLOGY at  8:00 AM  
This appointment time is simply a place [place] and may not reflect the true appointment time. If patient does not know the appointment time given to them at the time of scheduling, they should contact the Radiation Therapy department for detail. Where:  [facility] RADIATION THERAPY [facility]) Friday Dec 08, 2017 RADIATION ONCOLOGY with [facility] RADIATION ONCOLOGY at  8:00 AM  
This appointment time is simply a place [place] and may not reflect the true appointment time. If patient does not know the appointment time given to them at the time of scheduling, they should contact the Radiation Therapy department for detail. Where:  [facility] RADIATION THERAPY [address] [place] [address]) Monday Dec 11, 2017 RADIATION ONCOLOGY with [facility] RADIATION ONCOLOGY at  8:00 AM  
This appointment time is simply a place [place] and may not reflect the true appointment time. If patient does not know the appointment time given to them at the time of scheduling, they should contact the Radiation Therapy department for detail. Where:  [facility] RADIATION THERAPY [address] [place] [address]) Tuesday Dec 12, 2017 RADIATION ONCOLOGY with [facility] RADIATION ONCOLOGY at  8:00 AM  
This appointment time is simply a place [place] and may not reflect the true appointment time.   If patient does not know the appointment time given to them at the time of scheduling, they should contact the Radiation Therapy department for detail. Where:  [facility]) Wednesday Dec 13, 2017 RADIATION ONCOLOGY with [facility] RADIATION ONCOLOGY at  8:00 AM  
This appointment time is simply a place [place] and may not reflect the true appointment time. If patient does not know the appointment time given to them at the time of scheduling, they should contact the Radiation Therapy department for detail. Where:  [facility]) Thursday Dec 14, 2017 RADIATION ONCOLOGY with [facility] RADIATION ONCOLOGY at  8:00 AM  
This appointment time is simply a place [place] and may not reflect the true appointment time. If patient does not know the appointment time given to them at the time of scheduling, they should contact the Radiation Therapy department for detail. Where:  [facility]) Monday Dec 18, 2017 RADIATION ONCOLOGY with [facility] RADIATION ONCOLOGY at  8:00 AM  
This appointment time is simply a place [place] and may not reflect the true appointment time. If patient does not know the appointment time given to them at the time of scheduling, they should contact the Radiation Therapy department for detail. Where:  [facility]) Tuesday Dec 19, 2017 RADIATION ONCOLOGY with [facility] RADIATION ONCOLOGY at  8:00 AM  
This appointment time is simply a place [place] and may not reflect the true appointment time. If patient does not know the appointment time given to them at the time of scheduling, they should contact the Radiation Therapy department for detail. Where:  [facility]) Wednesday Dec 20, 2017 RADIATION ONCOLOGY with [facility] RADIATION ONCOLOGY at  8:00 AM  
This appointment time is simply a place [place] and may not reflect the true appointment time. If patient does not know the appointment time given to them at the time of scheduling, they should contact the Radiation Therapy department for detail. Where:  [facility] RADIATION THERAPY [address] [place] [address]) Thursday Dec 21, 2017 RADIATION ONCOLOGY with [facility] RADIATION ONCOLOGY at  8:00 AM  
This appointment time is simply a place [place] and may not reflect the true appointment time. If patient does not know the appointment time given to them at the time of scheduling, they should contact the Radiation Therapy department for detail. Where:  [facility] RADIATION THERAPY [address] [place] [address]) Friday Dec 22, 2017 RADIATION ONCOLOGY with [facility] RADIATION ONCOLOGY at  8:00 AM  
This appointment time is simply a place [place] and may not reflect the true appointment time. If patient does not know the appointment time given to them at the time of scheduling, they should contact the Radiation Therapy department for detail. Where:  [address]) Tuesday Dec 26, 2017 RADIATION ONCOLOGY with [facility] RADIATION ONCOLOGY at  8:00 AM  
This appointment time is simply a place [place] and may not reflect the true appointment time. If patient does not know the appointment time given to them at the time of scheduling, they should contact the Radiation Therapy department for detail. Where:  [facility] THERAPY [address] [place] [address]) Wednesday Dec 27, 2017 RADIATION ONCOLOGY with [facility] RADIATION ONCOLOGY at  8:00 AM  
This appointment time is simply a place [place] and may not reflect the true appointment time. If patient does not know the appointment time given to them at the time of scheduling, they should contact the Radiation Therapy department for detail. Where:  [facility] RADIATION THERAPY [address] [place] [address]) Thursday Dec 28, 2017 RADIATION ONCOLOGY with [facility] RADIATION ONCOLOGY at  8:00 AM  
 This appointment time is simply a place [place] and may not reflect the true appointment time. If patient does not know the appointment time given to them at the time of scheduling, they should contact the Radiation Therapy department for detail. Where:  [facility] RADIATION THERAPY [facility]) Friday Dec 29, 2017 RADIATION ONCOLOGY with [facility] RADIATION ONCOLOGY at  8:00 AM  
This appointment time is simply a place [place] and may not reflect the true appointment time. If patient does not know the appointment time given to them at the time of scheduling, they should contact the Radiation Therapy department for detail. Where:  [facility] RADIATION THERAPY [facility]) Discharge Orders None A check wale indicates which time of day the medication should be taken. My Medications TAKE these medications as instructed Instructions Each Dose to Equal  
 Morning Noon Evening Bedtime  
 amLODIPine 5 mg tablet Commonly known as:  Newberry Mend Your last dose was: Your next dose is: Take 1 Tab by mouth daily. 5 mg CARAFATE 100 mg/mL suspension Generic drug:  sucralfate Your last dose was: Your next dose is: Take 1 tsp by mouth three (3) times daily. 1 tsp  
    
   
   
   
  
 lisinopril 20 mg tablet Commonly known as:  Judy Needy Your last dose was: Your next dose is: Take 1 Tab by mouth daily. 20 mg  
    
   
   
   
  
 oxyCODONE-acetaminophen  mg per tablet Commonly known as:  PERCOCET 10 Your last dose was: Your next dose is: Take 1 Tab by mouth every six (6) hours as needed for Pain. Max Daily Amount: 4 Tabs. 1 Tab Discharge Instructions DISCHARGE SUMMARY from Nurse PATIENT INSTRUCTIONS: 
 
 After general anesthesia or intravenous sedation, for 24 hours or while taking prescription Narcotics: · Limit your activities · Do not drive and operate hazardous machinery · Do not make important personal or business decisions · Do  not drink alcoholic beverages · If you have not urinated within 8 hours after discharge, please contact your surgeon on call. Report the following to your surgeon: 
· Excessive pain, swelling, redness or odor of or around the surgical area · Temperature over 100.5 · Nausea and vomiting lasting longer than 4 hours or if unable to take medications · Any signs of decreased circulation or nerve impairment to extremity: change in color, persistent  numbness, tingling, coldness or increase pain · Any questions What to do at Home: 
Recommended activity: Activity as tolerated. If you experience any of the following symptoms nausea and vomiting lasting 4 hours or more, pain unrelieved by pain medication, chills, temperature greater than 100.1, please follow up with MD . 
 
*  Please give a list of your current medications to your Primary Care Provider. *  Please update this list whenever your medications are discontinued, doses are 
    changed, or new medications (including over-the-counter products) are added. *  Please carry medication information at all times in case of emergency situations. These are general instructions for a healthy lifestyle: No smoking/ No tobacco products/ Avoid exposure to second hand smoke Surgeon General's Warning:  Quitting smoking now greatly reduces serious risk to your health. Obesity, smoking, and sedentary lifestyle greatly increases your risk for illness A healthy diet, regular physical exercise & weight monitoring are important for maintaining a healthy lifestyle You may be retaining fluid if you have a history of heart failure or if you experience any of the following symptoms:  Weight gain of 3 pounds or more overnight or 5 pounds in a week, increased swelling in our hands or feet or shortness of breath while lying flat in bed. Please call your doctor as soon as you notice any of these symptoms; do not wait until your next office visit. Recognize signs and symptoms of STROKE: 
 
F-face looks uneven A-arms unable to move or move unevenly S-speech slurred or non-existent T-time-call 911 as soon as signs and symptoms begin-DO NOT go Back to bed or wait to see if you get better-TIME IS BRAIN. Warning Signs of HEART ATTACK Call 911 if you have these symptoms: 
? Chest discomfort. Most heart attacks involve discomfort in the center of the chest that lasts more than a few minutes, or that goes away and comes back. It can feel like uncomfortable pressure, squeezing, fullness, or pain. ? Discomfort in other areas of the upper body. Symptoms can include pain or discomfort in one or both arms, the back, neck, jaw, or stomach. ? Shortness of breath with or without chest discomfort. ? Other signs may include breaking out in a cold sweat, nausea, or lightheadedness. Don't wait more than five minutes to call [address]! Fast action can save your life. Calling 911 is almost always the fastest way to get lifesaving treatment. Emergency Medical Services staff can begin treatment when they arrive  up to an hour sooner than if someone gets to the hospital by car. The discharge information has been reviewed with the patient. The patient verbalized understanding. Discharge medications reviewed with the patient and appropriate educational materials and side effects teaching were provided. ___________________________________________________________________________________________________________________________________ ACO Transitions of Care Introducing [employer] [address] offers a voluntary care coordination program to provide high quality service and care to Psychiatric fee-for-service beneficiaries. Aram Arriaga was designed to help you enhance your health and well-being through the following services: ? Transitions of Care  support for individuals who are transitioning from one care setting to another (example: Hospital to home). ? Chronic and Complex Care Coordination  support for individuals and caregivers of those with serious or chronic illnesses or with more than one chronic (ongoing) condition and those who take a number of different medications. If you meet specific medical criteria, a [facility] [address] may call you directly to coordinate your care with your primary care physician and your other care providers. For questions about the [facility] programs, please, contact your physicians office. For general questions or additional information about Accountable Care Organizations: 
Please visit www.medicare.gov/acos. html or call 1-800-MEDICARE (9-175.559.5773) TTY users should call 5-726.842.8707. [employer] Announcement We are excited to announce that we are making your provider's discharge notes available to you in [employer]. You will see these notes when they are completed and signed by the physician that discharged you from your recent hospital stay. If you have any questions or concerns about any information you see in [employer], please call the Health Information Department where you were seen or reach out to your Primary Care Provider for more information about your plan of care. Providers Seen During Your Hospitalization Provider Specialty Primary office phone Ynes Lemus MD Emergency Medicine [phone] Georgette Nguyen MD Internal Medicine [phone] Your Primary Care Physician (PCP) Primary Care Physician Office Phone Office Fax Jamel Valencia [phone] You are allergic to the following No active allergies Recent Documentation Height Weight BMI Smoking Status 1.753 m 67.1 kg 21.86 kg/m2 Former Smoker Emergency Contacts Name Discharge Info Relation Home Work Mobile Natali Angelo CAREGIVER [3] Daughter [21]   [phone] Mikey Aparicio N/A  AT THIS TIME [6] Friend [5] [phone] Natali Angelo CAREGIVER [3] Child [2] [phone] Patient Belongings The following personal items are in your possession at time of discharge: 
  Dental Appliances: None  Visual Aid: None      Home Medications: None   Jewelry: None  Clothing: None (Clothing in locker in ED. Will obtain)    Other Valuables: Cell Phone, [employer] Items Sent to [place]:  (Patient declined to send wallet to safe.) Discharge Instructions Attachments/References PEG (PERCUTANEOUS ENDOSCOPIC GASTROSTOMY): POST-OP (ENGLISH) TUBE FEEDING: HOME (ENGLISH) Patient Handouts Percutaneous Endoscopic Gastrostomy: What to Expect at [facility] Your Recovery A percutaneous endoscopic gastrostomy (PEG) is a procedure to make an opening between the skin of your belly and your stomach. The doctor put a thin tube called a gastrostomy tube (G-tube or feeding tube) into your stomach through the opening. The tube can put liquid nutrition, fluid, and medicines directly into your stomach. The tube also may be used to drain liquid or air from the stomach. Your belly may feel sore, like you pulled a muscle, for several days. Your doctor will give you pain medicine for this. It will take about a week for the skin around your feeding tube to heal. You may have some yellowish mucus where the feeding tube comes out of your belly. This is normal and is not a sign of infection. You will need to learn how to use and care for your feeding tube.  Your doctor may recommend that you have a nurse or dietitian visit you at home to help you get started with your feeding tube. At first you may need a friend or family member to help you with your tube feedings. But with practice, you may be able to do it yourself. A feeding tube can break down over time. If this happens, the tube will be removed and replaced. Sometimes a tube is removed if you have an infection that is getting worse. Sometimes a tube will come out by itself. Your doctor will give your instructions about what to do if this happens. This care sheet gives you a general idea about how long it will take for you to recover. But each person recovers at a different pace. Follow the steps below to feel better as quickly as possible. How can you care for yourself at home? Activity ? · Rest when you feel tired. Getting enough sleep will help you recover. ? · Try to walk each day. Start by walking a little more than you did the day before. Bit by bit, increase the amount you walk. Walking boosts blood flow and helps prevent pneumonia and constipation. ? · Ask your doctor when you can drive again. ? · Until your doctor says it is okay, avoid lifting anything that would make you strain. This may include heavy grocery bags and milk containers, a heavy briefcase or backpack, cat litter or dog food bags, a vacuum , or a child. ? · You can shower as usual. Pat dry the skin around your feeding tube. Do not take a bath unless your doctor tells you it is okay. Diet ? · Follow your doctor's instructions about eating and drinking. ? · Follow your doctor's instructions about what nutrition and fluids to feed through your tube. Medicines ? · Your doctor will tell you if and when you can restart your medicines. He or she will also give you instructions about taking any new medicines. ? · If you take blood thinners, such as warfarin (Coumadin), clopidogrel (Plavix), or aspirin, be sure to talk to your doctor.  He or she will tell you if and when to start taking those medicines again. Make sure that you understand exactly what your doctor wants you to do. ? · If you take medicine through your feeding tube: ¨ Follow exactly your doctor's instructions about how to do this. Do not try to put whole pills in your feeding tube. They may get stuck. Ask your doctor if liquid medicine is available. ¨ Do not mix your medicine with tube-feeding formula. This can cause a clog in the feeding tube. ¨ Do not put more than one medicine down your feeding tube at a time. ¨ Flush the tube with water before and after you put each medicine down your tube. ? · Be safe with medicines. Take pain medicines exactly as directed. ¨ If the doctor gave you a prescription medicine for pain, take it as prescribed. ¨ If you are not taking a prescription pain medicine, ask your doctor if you can take an over-the-counter medicine. ¨ Do not take two or more pain medicines at the same time unless the doctor told you to. Many pain medicines have acetaminophen, which is Tylenol. Too much acetaminophen (Tylenol) can be harmful. ? · If you think your pain medicine is making you sick to your stomach: 
¨ Take your pain medicine after meals (unless your doctor has told you not to). ¨ Ask your doctor for a different pain medicine. ? · If your doctor prescribed antibiotics, take them as directed. Do not stop taking them just because you feel better. You need to take the full course of antibiotics. Incision care ? · Your doctor or nurse will show you how to care for the skin around the tube. Be sure to follow the instructions on keeping the area clean. ? · Wash the skin around your feeding tube daily with warm, soapy water, and pat it dry. Other cleaning products, such as hydrogen peroxide, can make the wound heal more slowly. You may cover the area with a gauze bandage if it weeps or rubs against clothing. Change the bandage every day. ? · Keep the area clean and dry. ? Using your feeding tube ? · Follow your doctor's instructions about using the feeding tube. Your doctor or nurse will: ¨ Tell you what to put through the tube. ¨ Teach you how to watch for a leaking tube, infection at the tube site, or a clog in the tube. ¨ Tell you what activities you can do.  
? · Keep your feeding tube clamped unless you are using it. ? · Keep the tube taped to your skin at all times, and leave some slack in the tube. This helps prevent pain and keeps the tube from coming out. ? · Wash your hands before you handle the tube and tube-feeding formula. Wash the top of the can of formula before you open it. ? · Keep the formula in the refrigerator after you open it. Do not let the formula sit at room temperature for more than 8 hours. ? · Sit up or keep your head up during the feeding and for 30 minutes after. ? · If you feel sick to your stomach or have stomach cramps during the tube feeding, slow the rate that the formula comes through the tube. Then gradually increase the rate as you can tolerate it. Follow-up care is a key part of your treatment and safety. Be sure to make and go to all appointments, and call your doctor if you are having problems. It's also a good idea to know your test results and keep a list of the medicines you take. When should you call for help? Call 911 anytime you think you may need emergency care. For example, call if: 
? · You pass out (lose consciousness). ? · You have severe trouble breathing. ? · You have sudden chest pain and shortness of breath, or you cough up blood. ? · You have severe belly pain. ?Call your doctor now or seek immediate medical care if: 
? · You have pain that does not get better after you take pain medicine. ? · You have a fever over 100°F.  
? · You have signs of infection, such as: 
¨ Increased pain, swelling, warmth, or redness. ¨ Red streaks leading from the area. ¨ Pus draining from the area. ¨ A fever. ? · The stitches that hold the feeding tube in place are loose or come out. ? · Your feeding tube comes out. ? · Your feeding tube is clogged, or liquid will not go down the tube. ? · You cough a lot or have other trouble during tube feedings. ? · You have nausea, vomiting, or diarrhea. ? Watch closely for any changes in your health, and be sure to contact your doctor if: 
? · You have any problems with your feeding tube. Where can you learn more? Go to http://hima-wilton.info/. Enter G931 in the search box to learn more about \"Percutaneous Endoscopic Gastrostomy: What to Expect at Home. \" Current as of: May 12, 2017 Content Version: 11.4 © 2419-4969 [employer]. Care instructions adapted under license by [employer] (which disclaims liability or warranty for this information). If you have questions about a medical condition or this instruction, always ask your healthcare professional. [place] [address] any warranty or liability for your use of this information. Home Tube Feeding: Care Instructions Your Care Instructions Tube feeding is a way of providing nutrition and fluids through a tube into the stomach or intestines. The tube may be inserted through the skin and into the stomach during surgery, or it may go through the mouth or nose, down the throat, and then into the stomach. Tube feeding can nourish people who have a short illness that makes swallowing difficult or people who have a severe illness, and it may prolong life. Follow-up care is a key part of your treatment and safety. Be sure to make and go to all appointments, and call your doctor if you are having problems. It's also a good idea to know your test results and keep a list of the medicines you take. How can you care for yourself at home? · Follow your doctor's instructions for use and care of the feeding tube. Your doctor will: ¨ Tell you what tube feeding formula and fluids to put through the tube. ¨ Show you how to care for the skin around the tube. Be sure to follow instructions on keeping the area clean. ¨ Teach you how to watch for infection or blockage of the tube. ¨ Tell you what activities you can do. · Keep the formula in the refrigerator after opening it. Do not let the formula in the hanging bag sit out at room temperature for more than 8 hours. For the caregiver · Wash your hands before handling the tube and formula. Wash the top of the can of formula before you open it. · Tube feedings that go into the stomach: The person you are caring for needs to be sitting up or have his or her head up during the feeding and for 30 minutes afterward. These feedings can be given in about 30 minutes, five or six times throughout a day. · Tube feedings that go into the intestine: The person you are caring for will have a pump that slowly pushes the formula into the intestine over several hours. This is often done at night. · If nausea, diarrhea, or stomach cramps happen during feeding, slow the rate that the formula comes through the tube. Then gradually increase the amount as the person can tolerate it. · Flush the tube with plain water after each feeding to keep it clean. Do not put anything other than formula or water through the tube unless your doctor has told you to. · Take care of yourself. ¨ Do not try to do everything yourself. Ask other family members to help, and find out what other types of help may be available. ¨ Eat well and get enough rest. Make sure you do not ignore your own health while you are caring for your loved one. ¨ Schedule time for yourself. Get out of the house to do things you enjoy, run errands, or go shopping. When should you call for help? Call your doctor now or seek immediate medical care if: 
? · You have signs of infection, such as: ¨ Increased pain, swelling, warmth, or redness around the tube. ¨ Red streaks leading from the area where the tube is inserted. ¨ Pus draining from the tube area. ¨ A fever. ? · The tube comes out or becomes blocked. ? · You have nausea, vomiting, or diarrhea. ? Watch closely for changes in your health, and be sure to contact your doctor if: 
? · You have any problems with your feeding. Where can you learn more? Go to http://hima-wilton.info/. Enter L671 in the search box to learn more about \"Home Tube Feeding: Care Instructions. \" Current as of: May 12, 2017 Content Version: 11.4 © 6158-6519 [employer]. Care instructions adapted under license by [employer] (which disclaims liability or warranty for this information). If you have questions about a medical condition or this instruction, always ask your healthcare professional. [address] any warranty or liability for your use of this information. Please provide this summary of care documentation to your next provider. Signatures-by signing, you are acknowledging that this After Visit Summary has been reviewed with you and you have received a copy. Patient Signature:  ____________________________________________________________ Date:  ____________________________________________________________  
  
Cristofer Gonzalez Provider Signature:  ____________________________________________________________ Date:  ____________________________________________________________

## 2017-12-02 NOTE — ED PROVIDER NOTES
EMERGENCY DEPARTMENT HISTORY AND PHYSICAL EXAM    8:36 AM      Date: 12/2/2017  Patient Name: Lenore Sellers    History of Presenting Illness     Chief Complaint   Patient presents with    Feeding Tube Problem         History Provided By: Patient    Chief Complaint: Feeding tube problem (removal)  Duration: 1.5 Hours  Timing:  Acute  Location: Abdomen  Quality: N/A  Severity: N/A  Modifying Factors: none reported  Associated Symptoms:       Additional History (Context): Luigi Stevens is a 75 y.o. male with throat CA who presents with feeding tube problem occurring 1.5 hr PTA. Pt states his feeding tube fell out of its site and he needs it put back into place. Dr. Gerardo Hernandez (General Surgery) placed it on 11/28. He has been flushing it with water. Pt is otherwise asymptomatic. PCP: Consuelo Pimentel, DO    Current Outpatient Prescriptions   Medication Sig Dispense Refill    oxyCODONE-acetaminophen (PERCOCET 10)  mg per tablet Take 1 Tab by mouth every six (6) hours as needed for Pain. Max Daily Amount: 4 Tabs. 40 Tab 0    sucralfate (CARAFATE) 100 mg/mL suspension Take 1 tsp by mouth three (3) times daily.  lisinopril (PRINIVIL, ZESTRIL) 20 mg tablet Take 1 Tab by mouth daily. 90 Tab 4    amLODIPine (NORVASC) 5 mg tablet Take 1 Tab by mouth daily.  90 Tab 4     Facility-Administered Medications Ordered in Other Encounters   Medication Dose Route Frequency Provider Last Rate Last Dose    [START ON 12/6/2017] dexamethasone (DECADRON) 12 mg in 0.9% sodium chloride 50 mL IVPB  12 mg IntraVENous ONCE Madiha Chow MD        [START ON 12/6/2017] CARBOplatin (PARAPLATIN) 160 mg in 0.9% sodium chloride 250 mL chemo infusion  160 mg IntraVENous ONCE MD Maria A Kat ON 12/6/2017] PACLitaxel (TAXOL) 90 mg in 0.9% sodium chloride 250 mL chemo infusion  90 mg IntraVENous ONCE Madiha Chow MD        sodium chloride (NS) flush 10-40 mL  10-40 mL IntraVENous PRN Madiha Chow MD   10 mL at 11/29/17 1412       Past History     Past Medical History:  Past Medical History:   Diagnosis Date    Arthritis     Essential hypertension, malignant 8/3/2010    Leg pain 8/3/2010    Throat cancer [facility])        Past Surgical History:  Past Surgical History:   Procedure Laterality Date    HX GI      G-tube placement on 11/28/17    ME EGD DELIVER THERMAL ENERGY SPHNCTR/CARDIA GERD         Family History:  Family History   Problem Relation Age of Onset    Heart Disease Mother     Lung Disease Father        Social History:  Social History   Substance Use Topics    Smoking status: Former Smoker     Packs/day: 0.25     Types: Cigarettes     Start date: 1/17/2017     Quit date: 11/22/2017    Smokeless tobacco: Never Used      Comment: Restarted in Jan 2017    Alcohol use No      Comment: \"NONE IN AWHILE\"       Allergies:  No Known Allergies      Review of Systems     Review of Systems   Constitutional: Negative for chills. HENT: Negative for congestion and sore throat. Respiratory: Negative for cough and shortness of breath. Cardiovascular: Negative for chest pain. Gastrointestinal: Negative for abdominal pain, diarrhea, nausea and vomiting. Positive for problem at feeding tube site (removal of tube)   Genitourinary: Negative for dysuria. Musculoskeletal: Negative for back pain. Skin: Negative for rash. Neurological: Negative for dizziness and headaches. All other systems reviewed and are negative. Physical Exam     Visit Vitals    /74 (BP 1 Location: Right arm, BP Patient Position: At rest)    Pulse 60    Temp 97.5 °F (36.4 °C)    Resp 16    Ht 5' 9\" (1.753 m)    Wt 67.1 kg (148 lb)    SpO2 100%    BMI 21.86 kg/m2       Physical Exam   Constitutional: He is oriented to person, place, and time. He appears well-developed and well-nourished. HENT:   Head: Normocephalic and atraumatic.    Nose: Nose normal.   Mouth/Throat: Oropharynx is clear and moist. Eyes: Conjunctivae and EOM are normal. Pupils are equal, round, and reactive to light. Corneal arcus bilaterally  PERRL 3mm   Neck: Normal range of motion. Neck supple. Cardiovascular: Normal rate, regular rhythm, normal heart sounds and intact distal pulses. Pulmonary/Chest: Effort normal and breath sounds normal.   Abdominal: Soft. Bowel sounds are normal.   Musculoskeletal:   No pretibial swelling   Neurological: He is alert and oriented to person, place, and time. Skin: Skin is warm and dry. 8 staples incision which is clean dry intact  Very minimal white fluid in GI tube site. Psychiatric: He has a normal mood and affect. His behavior is normal. Judgment and thought content normal.   Nursing note and vitals reviewed. Diagnostic Study Results     Labs -  Recent Results (from the past 12 hour(s))   PROTHROMBIN TIME + INR    Collection Time: 12/02/17 10:15 AM   Result Value Ref Range    Prothrombin time 13.1 11.5 - 15.2 sec    INR 1.0 0.8 - 1.2     CBC WITH AUTOMATED DIFF    Collection Time: 12/02/17 10:15 AM   Result Value Ref Range    WBC 2.7 (L) 4.6 - 13.2 K/uL    RBC 4.31 (L) 4.70 - 5.50 M/uL    HGB 11.9 (L) 13.0 - 16.0 g/dL    HCT 34.7 (L) 36.0 - 48.0 %    MCV 80.5 74.0 - 97.0 FL    MCH 27.6 24.0 - 34.0 PG    MCHC 34.3 31.0 - 37.0 g/dL    RDW 13.7 11.6 - 14.5 %    PLATELET 891 145 - 014 K/uL    MPV 9.5 9.2 - 11.8 FL    NEUTROPHILS 89 (H) 40 - 73 %    LYMPHOCYTES 6 (L) 21 - 52 %    MONOCYTES 2 (L) 3 - 10 %    EOSINOPHILS 3 0 - 5 %    BASOPHILS 0 0 - 2 %    ABS. NEUTROPHILS 2.4 1.8 - 8.0 K/UL    ABS. LYMPHOCYTES 0.2 (L) 0.9 - 3.6 K/UL    ABS. MONOCYTES 0.1 0.05 - 1.2 K/UL    ABS. EOSINOPHILS 0.1 0.0 - 0.4 K/UL    ABS.  BASOPHILS 0.0 0.0 - 0.06 K/UL    DF AUTOMATED     METABOLIC PANEL, BASIC    Collection Time: 12/02/17 10:15 AM   Result Value Ref Range    Sodium 133 (L) 136 - 145 mmol/L    Potassium 3.9 3.5 - 5.5 mmol/L    Chloride 100 100 - 108 mmol/L    CO2 29 21 - 32 mmol/L    Anion gap 4 3.0 - 18 mmol/L    Glucose 87 74 - 99 mg/dL    BUN 8 7.0 - 18 MG/DL    Creatinine 0.70 0.6 - 1.3 MG/DL    BUN/Creatinine ratio 11 (L) 12 - 20      GFR est AA >60 >60 ml/min/1.73m2    GFR est non-AA >60 >60 ml/min/1.73m2    Calcium 9.6 8.5 - 10.1 MG/DL       Radiologic Studies -   No orders to display         Medical Decision Making   I am the first provider for this patient. I reviewed the vital signs, available nursing notes, past medical history, past surgical history, family history and social history. Vital Signs-Reviewed the patient's vital signs. Pulse Oximetry Analysis -  100% nml    Cardiac Monitor:  Rate: 60  Rhythm: Regular      Records Reviewed: Nursing Notes, Old Medical Records, Previous Radiology Studies, Previous Laboratory Studies and OR records (Time of Review: 8:36 AM)    ED Course: Progress Notes, Reevaluation, and Consults:    9:22 Consult:  Discussed care with Dr. Ernestina Morales (Surgery). Standard discussion; including history of patients chief complaint, available diagnostic results, and treatment course. Dr. Ernestina Morales said to attempt to pass new strauss. 09:54 Dr. Ernestina Morales asks that pt be admitted to hospitalist and that he will take him to the OR. Pt had stated he would prefer the feeding tube be replaced today instead of following up with Dr. Lori Rg a later time. 11:03 Call placed to Hospitalist.     11:21 Consult:  Discussed care with Dr. Vandana Myers (Hospitalist). Standard discussion; including history of patients chief complaint, available diagnostic results, and treatment course. He accepts the pt. Provider Notes (Medical Decision Making):             Critical Care Time:     For Hospitalized Patients:    1. Hospitalization Decision Time:  The decision to hospitalize the patient was made by Dr. Ernestina Morales at 09:58 on 12/2/2017    2. Aspirin: Aspirin was not given because the patient is going to the operating room. Diagnosis     Clinical Impression:   1.  Dislodged gastrostomy tube ([address]    2. Squamous cell esophageal cancer (HCC)        Disposition: Admit    Follow-up Information     None           Patient's Medications   Start Taking    No medications on file   Continue Taking    AMLODIPINE (NORVASC) 5 MG TABLET    Take 1 Tab by mouth daily. LISINOPRIL (PRINIVIL, ZESTRIL) 20 MG TABLET    Take 1 Tab by mouth daily. OXYCODONE-ACETAMINOPHEN (PERCOCET 10)  MG PER TABLET    Take 1 Tab by mouth every six (6) hours as needed for Pain. Max Daily Amount: 4 Tabs. SUCRALFATE (CARAFATE) 100 MG/ML SUSPENSION    Take 1 tsp by mouth three (3) times daily. These Medications have changed    No medications on file   Stop Taking    No medications on file     _______________________________    Attestations:  [address] [place] [address] acting as a scribe for and in the presence of Yadi Shane MD      December 02, 2017 at 11:21 AM       Provider Attestation:      I personally performed the services described in the documentation, reviewed the documentation, as recorded by the scribe in my presence, and it accurately and completely records my words and actions.  December 02, 2017 at 11:21 AM - Yadi Shane MD    _______________________________

## 2017-12-02 NOTE — H&P
Medicine History and Physical    Patient: Jatinder Sellers   Age:  75 y.o. 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 year old male who presents with peg tube falling out. Unable to get in through fresh track in ED. Surgery consulted and plans for OR. Patient has hx of esophageal cancer with LN spread. Follows with Dr. Alejandro Quach for chemo and radiation. He is otherwise in his normal state of health. Review of Systems - positive responses in bold type   Constitutional: Negative for fever, chills, diaphoresis and unexpected weight change. HENT: Negative for ear pain, congestion, sore throat, rhinorrhea, drooling, trouble swallowing, neck pain and tinnitus. Eyes: Negative for photophobia, pain, redness and visual disturbance. Respiratory: negative for shortness of breath, cough, choking, chest tightness, wheezing or stridor. Cardiovascular: Negative for chest pain, palpitations and leg swelling. Gastrointestinal: Negative for nausea, vomiting, abdominal pain, diarrhea, constipation, blood in stool, abdominal distention and anal bleeding. Genitourinary: Negative for dysuria, urgency, frequency, hematuria, flank pain and difficulty urinating. Musculoskeletal: Negative for back pain and arthralgias. Skin: Negative for color change, rash and wound. Neurological: Negative for dizziness, seizures, syncope, speech difficulty, light-headedness or headaches. Hematological: Does not bruise/bleed easily. Psychiatric/Behavioral: Negative for suicidal ideas, hallucinations, behavioral problems, self-injury or agitation       Past Medical History:  Past Medical History:   Diagnosis Date    Arthritis     Essential hypertension, malignant 8/3/2010    Leg pain 8/3/2010    Throat cancer [facility])        Past Surgical History:  Past Surgical History:   Procedure Laterality Date    HX GI      G-tube placement on 11/28/17    TX EGD DELIVER THERMAL ENERGY SPHNCTR/CARDIA GERD         Family History:  Family History   Problem Relation Age of Onset    Heart Disease Mother     Lung Disease Father        Social History:  Social History     Social History    Marital status:      Spouse name: N/A    Number of children: N/A    Years of education: N/A     Social History Main Topics    Smoking status: Former Smoker     Packs/day: 0.25     Types: Cigarettes     Start date: 1/17/2017     Quit date: 11/22/2017    Smokeless tobacco: Never Used      Comment: Restarted in Jan 2017    Alcohol use No      Comment: \"NONE IN AWHILE\"    Drug use: No    Sexual activity: No     Other Topics Concern    None     Social History Narrative       Home Medications:  Prior to Admission medications    Medication Sig Start Date End Date Taking? Authorizing Provider   oxyCODONE-acetaminophen (PERCOCET 10)  mg per tablet Take 1 Tab by mouth every six (6) hours as needed for Pain. Max Daily Amount: 4 Tabs. 11/29/17   Eli Rivas MD   sucralfate (CARAFATE) 100 mg/mL suspension Take 1 tsp by mouth three (3) times daily. Historical Provider   lisinopril (PRINIVIL, ZESTRIL) 20 mg tablet Take 1 Tab by mouth daily.  8/15/17   Asuncion Corral, DO   amLODIPine [facility]) 5 mg tablet Take 1 Tab by mouth daily. 8/15/17   Oksana Locks., DO       Allergies:  No Known Allergies        Physical Exam:     Visit Vitals    /74    Pulse 60    Temp 97.5 °F (36.4 °C)    Resp 16    Ht 5' 9\" (1.753 m)    Wt 67.1 kg (148 lb)    SpO2 94%    BMI 21.86 kg/m2       Physical Exam:  General appearance: alert, cooperative, no distress, appears stated age  Head: Normocephalic, without obvious abnormality, atraumatic  Neck: supple, trachea midline  Lungs: clear to auscultation bilaterally  Heart: regular rate and rhythm, S1, S2 normal, no murmur, click, rub or gallop  Abdomen: soft, non-tender.  Bowel sounds normal. No masses,  no organomegaly  Extremities: extremities normal, atraumatic, no cyanosis or edema  Skin: Skin color, texture, turgor normal. No rashes or lesions    Intake and Output:  Current Shift:     Last three shifts:       Lab/Data Reviewed:  CMP:   Lab Results   Component Value Date/Time     (L) 12/02/2017 10:15 AM    K 3.9 12/02/2017 10:15 AM     12/02/2017 10:15 AM    CO2 29 12/02/2017 10:15 AM    AGAP 4 12/02/2017 10:15 AM    GLU 87 12/02/2017 10:15 AM    BUN 8 12/02/2017 10:15 AM    CREA 0.70 12/02/2017 10:15 AM    GFRAA >60 12/02/2017 10:15 AM    GFRNA >60 12/02/2017 10:15 AM    CA 9.6 12/02/2017 10:15 AM     CBC:   Lab Results   Component Value Date/Time    WBC 2.7 (L) 12/02/2017 10:15 AM    HGB 11.9 (L) 12/02/2017 10:15 AM    HCT 34.7 (L) 12/02/2017 10:15 AM     12/02/2017 10:15 AM     All Cardiac Markers in the last 24 hours: No results found for: CPK, CK, CKMMB, CKMB, RCK3, CKMBT, CKNDX, CKND1, SRIDHAR, TROPT, TROIQ, FRANCHESCA, TROPT, TNIPOC, BNP, BNPP      Jean Ballard MD    December 2, 2017

## 2017-12-02 NOTE — CONSULTS
General Surgery Consult    Tai Sellers  Admit date: 2017    MRN: [id number]     : 1950     Age: 75 y.o. Attending Physician: Rachel Kessler MD, [facility]      History of Present Illness:      Tai Sellers is a 75 y.o. male who presented with displaced G-tube. The tube was placed by Dr. Ayesha Smalls late this week and it was pulled last night. The ER tried to replace it with a strauss but was not successful. The patient has dysphagia, so will take the patient back for replacement of the tube. Patient Active Problem List    Diagnosis Date Noted    PEG tube malfunction ([facility] 2017    HTN (hypertension) 2017    Squamous cell esophageal cancer ([facility] 10/30/2017    Gastric perforation, acute ([facility] 10/20/2017    Metastatic squamous cell carcinoma to esophagus (HCC) 10/17/2017    Proteinuria 2014    Hyperthyroidism 2014    Arthritis 2011    Essential hypertension, malignant 2010     Past Medical History:   Diagnosis Date    Arthritis     Essential hypertension, malignant 8/3/2010    Leg pain 8/3/2010    Throat cancer [facility])       Past Surgical History:   Procedure Laterality Date    HX GI      G-tube placement on 17    RI EGD DELIVER THERMAL ENERGY SPHNCTR/CARDIA GERD        Social History   Substance Use Topics    Smoking status: Former Smoker     Packs/day: 0.25     Types: Cigarettes     Start date: 2017     Quit date: 2017    Smokeless tobacco: Never Used      Comment: Restarted in 2017    Alcohol use No      Comment: \"NONE IN AWHILE\"      History   Smoking Status    Former Smoker    Packs/day: 0.25    Types: Cigarettes    Start date: 2017   Trev Ramos Quit date: 2017   Smokeless Tobacco    Never Used     Comment: Restarted in 2017     Family History   Problem Relation Age of Onset    Heart Disease Mother     Lung Disease Father       No current facility-administered medications for this encounter. Current Outpatient Prescriptions   Medication Sig    oxyCODONE-acetaminophen (PERCOCET 10)  mg per tablet Take 1 Tab by mouth every six (6) hours as needed for Pain. Max Daily Amount: 4 Tabs.  sucralfate (CARAFATE) 100 mg/mL suspension Take 1 tsp by mouth three (3) times daily.  lisinopril (PRINIVIL, ZESTRIL) 20 mg tablet Take 1 Tab by mouth daily.  amLODIPine (NORVASC) 5 mg tablet Take 1 Tab by mouth daily. Facility-Administered Medications Ordered in Other Encounters   Medication Dose Route Frequency    [START ON 12/6/2017] dexamethasone (DECADRON) 12 mg in 0.9% sodium chloride 50 mL IVPB  12 mg IntraVENous ONCE    [START ON 12/6/2017] CARBOplatin (PARAPLATIN) 160 mg in 0.9% sodium chloride 250 mL chemo infusion  160 mg IntraVENous ONCE    [START ON 12/6/2017] PACLitaxel (TAXOL) 90 mg in 0.9% sodium chloride 250 mL chemo infusion  90 mg IntraVENous ONCE    sodium chloride (NS) flush 10-40 mL  10-40 mL IntraVENous PRN      No Known Allergies       Review of Systems:  A comprehensive review of systems was negative. Objective:     Visit Vitals    BP 96/72    Pulse 60    Temp 97.5 °F (36.4 °C)    Resp 16    Ht 5' 9\" (1.753 m)    Wt 67.1 kg (148 lb)    SpO2 100%    BMI 21.86 kg/m2       Physical Exam:      General:  in no apparent distress                   Abdomen:   rounded, soft, nontender, nondistended, no masses or organomegaly. G tube site is intact.     Extremities: extremities normal, atraumatic, no cyanosis or edema   Skin: Normal.       Imaging and Lab Review:     CBC:   Lab Results   Component Value Date/Time    WBC 2.7 12/02/2017 10:15 AM    RBC 4.31 12/02/2017 10:15 AM    HGB 11.9 12/02/2017 10:15 AM    HCT 34.7 12/02/2017 10:15 AM    PLATELET 203 95/66/2861 10:15 AM     BMP:   Lab Results   Component Value Date/Time    Glucose 87 12/02/2017 10:15 AM    Sodium 133 12/02/2017 10:15 AM    Potassium 3.9 12/02/2017 10:15 AM    Chloride 100 12/02/2017 10:15 AM    CO2 29 12/02/2017 10:15 AM    BUN 8 12/02/2017 10:15 AM    Creatinine 0.70 12/02/2017 10:15 AM    Calcium 9.6 12/02/2017 10:15 AM     CMP:  Lab Results   Component Value Date/Time    Glucose 87 12/02/2017 10:15 AM    Sodium 133 12/02/2017 10:15 AM    Potassium 3.9 12/02/2017 10:15 AM    Chloride 100 12/02/2017 10:15 AM    CO2 29 12/02/2017 10:15 AM    BUN 8 12/02/2017 10:15 AM    Creatinine 0.70 12/02/2017 10:15 AM    Calcium 9.6 12/02/2017 10:15 AM    Anion gap 4 12/02/2017 10:15 AM    BUN/Creatinine ratio 11 12/02/2017 10:15 AM    Alk. phosphatase 42 11/22/2017 11:35 AM    Protein, total 7.4 11/22/2017 11:35 AM    Albumin 3.3 11/22/2017 11:35 AM    Globulin 4.1 11/22/2017 11:35 AM    A-G Ratio 0.8 11/22/2017 11:35 AM       Recent Results (from the past 24 hour(s))   PROTHROMBIN TIME + INR    Collection Time: 12/02/17 10:15 AM   Result Value Ref Range    Prothrombin time 13.1 11.5 - 15.2 sec    INR 1.0 0.8 - 1.2     CBC WITH AUTOMATED DIFF    Collection Time: 12/02/17 10:15 AM   Result Value Ref Range    WBC 2.7 (L) 4.6 - 13.2 K/uL    RBC 4.31 (L) 4.70 - 5.50 M/uL    HGB 11.9 (L) 13.0 - 16.0 g/dL    HCT 34.7 (L) 36.0 - 48.0 %    MCV 80.5 74.0 - 97.0 FL    MCH 27.6 24.0 - 34.0 PG    MCHC 34.3 31.0 - 37.0 g/dL    RDW 13.7 11.6 - 14.5 %    PLATELET 520 653 - 942 K/uL    MPV 9.5 9.2 - 11.8 FL    NEUTROPHILS 89 (H) 40 - 73 %    LYMPHOCYTES 6 (L) 21 - 52 %    MONOCYTES 2 (L) 3 - 10 %    EOSINOPHILS 3 0 - 5 %    BASOPHILS 0 0 - 2 %    ABS. NEUTROPHILS 2.4 1.8 - 8.0 K/UL    ABS. LYMPHOCYTES 0.2 (L) 0.9 - 3.6 K/UL    ABS. MONOCYTES 0.1 0.05 - 1.2 K/UL    ABS. EOSINOPHILS 0.1 0.0 - 0.4 K/UL    ABS.  BASOPHILS 0.0 0.0 - 0.06 K/UL    DF AUTOMATED     METABOLIC PANEL, BASIC    Collection Time: 12/02/17 10:15 AM   Result Value Ref Range    Sodium 133 (L) 136 - 145 mmol/L    Potassium 3.9 3.5 - 5.5 mmol/L    Chloride 100 100 - 108 mmol/L    CO2 29 21 - 32 mmol/L    Anion gap 4 3.0 - 18 mmol/L    Glucose 87 74 - 99 mg/dL    BUN 8 7.0 - 18 MG/DL    Creatinine 0.70 0.6 - 1.3 MG/DL    BUN/Creatinine ratio 11 (L) 12 - 20      GFR est AA >60 >60 ml/min/1.73m2    GFR est non-AA >60 >60 ml/min/1.73m2    Calcium 9.6 8.5 - 10.1 MG/DL   URINALYSIS W/ RFLX MICROSCOPIC    Collection Time: 12/02/17 11:11 AM   Result Value Ref Range    Color YELLOW      Appearance CLEAR      Specific gravity 1.014 1.005 - 1.030      pH (UA) 7.0 5.0 - 8.0      Protein NEGATIVE  NEG mg/dL    Glucose NEGATIVE  NEG mg/dL    Ketone NEGATIVE  NEG mg/dL    Bilirubin NEGATIVE  NEG      Blood NEGATIVE  NEG      Urobilinogen 0.2 0.2 - 1.0 EU/dL    Nitrites NEGATIVE  NEG      Leukocyte Esterase NEGATIVE  NEG         images and reports reviewed    Assessment:   Love Tomas is a 75 y.o. male is presenting with displaced G tube. Has dysphagia and esophageal cancer. Will replace the tube in the OR. Plan:     Laparotomy and placement of G-tube.      Please call me if you have any questions (cell phone: [phone])     Signed By: Vaibhav Gaston MD     December 2, 2017

## 2017-12-02 NOTE — ANESTHESIA POSTPROCEDURE EVALUATION
Post-Anesthesia Evaluation and Assessment    Patient: Veena Carr Person MRN: [id number]  SSN: [id number]    YOB: 1950  Age: 75 y.o. Sex: male       Cardiovascular Function/Vital Signs  Visit Vitals    /74 (BP 1 Location: Right arm, BP Patient Position: At rest;Supine)    Pulse 85    Temp 36.6 °C (97.9 °F)    Resp 24    Ht 5' 9\" (1.753 m)    Wt 67.1 kg (148 lb)    SpO2 99%    BMI 21.86 kg/m2       Patient is status post general anesthesia for Procedure(s):  LAPAROTOMY EXPLORATORY; G- Tube placement. Nausea/Vomiting: None    Postoperative hydration reviewed and adequate. Pain:  Pain Scale 1: Numeric (0 - 10) (12/02/17 1545)  Pain Intensity 1: 8 (12/02/17 1545)   Managed    Neurological Status:   Neuro (WDL): Within Defined Limits (12/02/17 1535)   At baseline    Mental Status and Level of Consciousness: Arousable    Pulmonary Status:   O2 Device: Room air (12/02/17 1535)   Adequate oxygenation and airway patent    Complications related to anesthesia: None    Post-anesthesia assessment completed.  No concerns    Signed By: Everardo Martines CRNA     December 2, 2017

## 2017-12-02 NOTE — PERIOP NOTES
Rec'd care of pt from OR via stretcher. Resp even and unlabored. Attached to monitor. VSS. OR,  MAR and anesthesia report acknowledged. C/o having to use the bathroom. Stated he needed to have a bowel movement. Will follow-up. 1515  Pt placed on bed pan for BM.    1530  Pt unable to have bowel movement at this time. [phone] with nursing supervisor regarding a room assignment. This was my 2nd attempt to obtain a room. Was informed that 2 central had lost 2 nurses at 3pm, 2W and 3S were full. Stated she will check with Rosaura Lemus when he returns to the office. 5  Was contacted by Pearl Lemus Supervisor that the pt will not be able to go to room 2214 until after 7pm - change of shift. Pt lying quietly watching TV.    1739  Pt transferred to a regular bed from the stretcher. Watching [employer] football. No c/o pain or discomfort at this time. Will cont to monitor. 1930  TRANSFER - OUT REPORT:    Verbal report given to Scotty Damon RN (name) on Oneta Severance Person  being transferred to 2200 (unit) for routine post - op       Report consisted of patients Situation, Background, Assessment and   Recommendations(SBAR). Information from the following report(s) SBAR, Kardex, OR Summary, Intake/Output, MAR and Cardiac Rhythm sinus rhythm was reviewed with the receiving nurse. Lines:   Venous Access Device Mediport 11/08/17 Upper chest (subclavicular area, right (Active)       Peripheral IV 12/02/17 Left Antecubital (Active)   Site Assessment Clean, dry, & intact 12/2/2017  3:10 PM   Phlebitis Assessment 0 12/2/2017  3:10 PM   Infiltration Assessment 0 12/2/2017  3:10 PM   Dressing Status Clean, dry, & intact 12/2/2017  3:10 PM   Dressing Type Transparent;Tape 12/2/2017  3:10 PM   Hub Color/Line Status Green; Infusing 12/2/2017  3:10 PM   Action Taken Open ports on tubing capped 12/2/2017  3:10 PM   Alcohol Cap Used Yes 12/2/2017  3:10 PM        Opportunity for questions and clarification was provided.       Patient transported with:   Registered Nurse

## 2017-12-02 NOTE — ANESTHESIA PREPROCEDURE EVALUATION
Anesthetic History   No history of anesthetic complications            Review of Systems / Medical History  Patient summary reviewed, nursing notes reviewed and pertinent labs reviewed    Pulmonary          Smoker         Neuro/Psych   Within defined limits           Cardiovascular    Hypertension: well controlled              Exercise tolerance: >4 METS     GI/Hepatic/Renal  Within defined limits              Endo/Other      Hypothyroidism: well controlled  Arthritis and cancer     Other Findings   Comments: Risk Factors for Postoperative nausea/vomiting:       History of postoperative nausea/vomiting? NO       Female? NO       Motion sickness? NO       Intended opioid administration for postoperative analgesia? NO      Smoking Abstinence  Current Smoker? YES  Elective Surgery? NO  Seen preoperatively by anesthesiologist or proxy prior to day of surgery? YES  Pt abstained from smoking 24 hours prior to anesthesia?  NO           Physical Exam    Airway  Mallampati: II  TM Distance: 4 - 6 cm  Neck ROM: normal range of motion, short neck   Mouth opening: Normal     Cardiovascular  Regular rate and rhythm,  S1 and S2 normal,  no murmur, click, rub, or gallop             Dental    Dentition: Poor dentition     Pulmonary  Breath sounds clear to auscultation               Abdominal  GI exam deferred       Other Findings            Anesthetic Plan    ASA: 3, emergent  Anesthesia type: general          Induction: Intravenous  Anesthetic plan and risks discussed with: Patient

## 2017-12-02 NOTE — ED NOTES
Patient has 1 bag of belongings in [place] [address] and his wallet, money and cellphone locked up with security

## 2017-12-02 NOTE — ED TRIAGE NOTES
Found feeding tube in bed with him. Was placed last Tuesday by Dr. Aashish Hilliard. Home health nurse was to show him how to use it today.  Can take food and meds orally

## 2017-12-02 NOTE — ED NOTES
Patient and family updated at this time that surgery will be this afternoon but no specific time has been given

## 2017-12-03 ENCOUNTER — HOME HEALTH ADMISSION (OUTPATIENT)
Dept: HOME HEALTH SERVICES | Facility: HOME HEALTH | Age: 67
End: 2017-12-03
Payer: MEDICARE

## 2017-12-03 VITALS
DIASTOLIC BLOOD PRESSURE: 65 MMHG | SYSTOLIC BLOOD PRESSURE: 99 MMHG | BODY MASS INDEX: 21.92 KG/M2 | WEIGHT: 148 LBS | HEART RATE: 109 BPM | OXYGEN SATURATION: 96 % | HEIGHT: 69 IN | TEMPERATURE: 98.2 F | RESPIRATION RATE: 18 BRPM

## 2017-12-03 LAB
ANION GAP SERPL CALC-SCNC: 10 MMOL/L (ref 3–18)
BUN SERPL-MCNC: 7 MG/DL (ref 7–18)
BUN/CREAT SERPL: 14 (ref 12–20)
CALCIUM SERPL-MCNC: 8.5 MG/DL (ref 8.5–10.1)
CHLORIDE SERPL-SCNC: 103 MMOL/L (ref 100–108)
CO2 SERPL-SCNC: 23 MMOL/L (ref 21–32)
CREAT SERPL-MCNC: 0.49 MG/DL (ref 0.6–1.3)
ERYTHROCYTE [DISTWIDTH] IN BLOOD BY AUTOMATED COUNT: 13.5 % (ref 11.6–14.5)
GLUCOSE SERPL-MCNC: 120 MG/DL (ref 74–99)
HCT VFR BLD AUTO: 29.1 % (ref 36–48)
HGB BLD-MCNC: 10 G/DL (ref 13–16)
MCH RBC QN AUTO: 27.4 PG (ref 24–34)
MCHC RBC AUTO-ENTMCNC: 34.4 G/DL (ref 31–37)
MCV RBC AUTO: 79.7 FL (ref 74–97)
PLATELET # BLD AUTO: 153 K/UL (ref 135–420)
PMV BLD AUTO: 9.5 FL (ref 9.2–11.8)
POTASSIUM SERPL-SCNC: 4.3 MMOL/L (ref 3.5–5.5)
RBC # BLD AUTO: 3.65 M/UL (ref 4.7–5.5)
SODIUM SERPL-SCNC: 136 MMOL/L (ref 136–145)
WBC # BLD AUTO: 3.6 K/UL (ref 4.6–13.2)

## 2017-12-03 PROCEDURE — 80048 BASIC METABOLIC PNL TOTAL CA: CPT | Performed by: HOSPITALIST

## 2017-12-03 PROCEDURE — 74011250636 HC RX REV CODE- 250/636: Performed by: SURGERY

## 2017-12-03 PROCEDURE — 77030018846 HC SOL IRR STRL H20 ICUM -A

## 2017-12-03 PROCEDURE — 74011250637 HC RX REV CODE- 250/637: Performed by: HOSPITALIST

## 2017-12-03 PROCEDURE — 85027 COMPLETE CBC AUTOMATED: CPT | Performed by: HOSPITALIST

## 2017-12-03 PROCEDURE — 74011250636 HC RX REV CODE- 250/636: Performed by: HOSPITALIST

## 2017-12-03 PROCEDURE — 36415 COLL VENOUS BLD VENIPUNCTURE: CPT | Performed by: HOSPITALIST

## 2017-12-03 RX ADMIN — LISINOPRIL 20 MG: 20 TABLET ORAL at 09:22

## 2017-12-03 RX ADMIN — OXYCODONE HYDROCHLORIDE AND ACETAMINOPHEN 1 TABLET: 10; 325 TABLET ORAL at 14:18

## 2017-12-03 RX ADMIN — ENOXAPARIN SODIUM 40 MG: 40 INJECTION SUBCUTANEOUS at 00:20

## 2017-12-03 RX ADMIN — Medication 1 MG: at 05:28

## 2017-12-03 RX ADMIN — AMLODIPINE BESYLATE 5 MG: 5 TABLET ORAL at 09:22

## 2017-12-03 RX ADMIN — SUCRALFATE 0.5 G: 1 SUSPENSION ORAL at 09:22

## 2017-12-03 NOTE — OP NOTES
Valeriy Puentes    Name:  Suze Lyons  MR#:  [id number]  :  1950  Account #:  [de-identified]  Date of Adm:  2017  Date of Surgery:  2017      PREOPERATIVE DIAGNOSIS: Displaced gastrostomy tube. POSTOPERATIVE DIAGNOSIS: Displaced gastrostomy tube. PROCEDURES PERFORMED:  1. Exploratory laparotomy. 2. Placement of gastrostomy tube. ANESTHESIA: General.    COMPLICATIONS: None. SPECIMENS REMOVED: None. ESTIMATED BLOOD LOSS: Minimal.    DESCRIPTION OF PROCEDURE: The patient was brought to the  operating room, anesthesia was induced. Scrubbing and draping of the  abdomen was done in the usual manner. A timeout was performed. A  skin incision was performed after removing the old staplers and the  incision was extended slightly lower about 2 cm. At this point, the  sutures were identified in the fascia. These were taken down. The  abdomen was entered. There was no succuss or gastric done in the  abdomen. At this point, we tried to attempt to place a G-tube with  manipulation of the stomach without taking the stomach from the  abdominal wall. I was not able to. I had to take the stomach completely  down and at this point, we passed the G-tube through the abdominal  wall. The only G-tube we had was an 18-Divehi. This was passed  through the stomach wall after taking the old pursestring sutures and  after placing the tube inside the stomach, we placed new pursestring  sutures and at this point, the balloon was insufflated and it was pulled  at around 4 cm at the skin level. At this point, gastropexy was  performed using 3-0 Vicryl sutures interrupted, multiple ones to fix  stomach at the anterior abdominal wall next to the G-tube site. At this  point, the fascia was closed with the #1 PDS suture in a running  fashion and the skin was closed with a skin stapler.  The G-tube was  fixed in place with multiple interrupted 3-0 Vicryl sutures, as well  as adhesive tape.         MD FORREST Vidales / ERLINDA  D:  12/02/2017   15:04  T:  12/03/2017   05:26  Job #:  [id number]

## 2017-12-03 NOTE — DISCHARGE INSTRUCTIONS
DISCHARGE SUMMARY from Nurse    PATIENT INSTRUCTIONS:    After general anesthesia or intravenous sedation, for 24 hours or while taking prescription Narcotics:  · Limit your activities  · Do not drive and operate hazardous machinery  · Do not make important personal or business decisions  · Do  not drink alcoholic beverages  · If you have not urinated within 8 hours after discharge, please contact your surgeon on call. Report the following to your surgeon:  · Excessive pain, swelling, redness or odor of or around the surgical area  · Temperature over 100.5  · Nausea and vomiting lasting longer than 4 hours or if unable to take medications  · Any signs of decreased circulation or nerve impairment to extremity: change in color, persistent  numbness, tingling, coldness or increase pain  · Any questions    What to do at Home:  Recommended activity: Activity as tolerated. If you experience any of the following symptoms nausea and vomiting lasting 4 hours or more, pain unrelieved by pain medication, chills, temperature greater than 100.1, please follow up with MD .    *  Please give a list of your current medications to your Primary Care Provider. *  Please update this list whenever your medications are discontinued, doses are      changed, or new medications (including over-the-counter products) are added. *  Please carry medication information at all times in case of emergency situations. These are general instructions for a healthy lifestyle:    No smoking/ No tobacco products/ Avoid exposure to second hand smoke  Surgeon General's Warning:  Quitting smoking now greatly reduces serious risk to your health.     Obesity, smoking, and sedentary lifestyle greatly increases your risk for illness    A healthy diet, regular physical exercise & weight monitoring are important for maintaining a healthy lifestyle    You may be retaining fluid if you have a history of heart failure or if you experience any of the following symptoms:  Weight gain of 3 pounds or more overnight or 5 pounds in a week, increased swelling in our hands or feet or shortness of breath while lying flat in bed. Please call your doctor as soon as you notice any of these symptoms; do not wait until your next office visit. Recognize signs and symptoms of STROKE:    F-face looks uneven    A-arms unable to move or move unevenly    S-speech slurred or non-existent    T-time-call 911 as soon as signs and symptoms begin-DO NOT go       Back to bed or wait to see if you get better-TIME IS BRAIN. Warning Signs of HEART ATTACK     Call 911 if you have these symptoms:   Chest discomfort. Most heart attacks involve discomfort in the center of the chest that lasts more than a few minutes, or that goes away and comes back. It can feel like uncomfortable pressure, squeezing, fullness, or pain.  Discomfort in other areas of the upper body. Symptoms can include pain or discomfort in one or both arms, the back, neck, jaw, or stomach.  Shortness of breath with or without chest discomfort.  Other signs may include breaking out in a cold sweat, nausea, or lightheadedness. Don't wait more than five minutes to call 911 - MINUTES MATTER! Fast action can save your life. Calling 911 is almost always the fastest way to get lifesaving treatment. Emergency Medical Services staff can begin treatment when they arrive -- up to an hour sooner than if someone gets to the hospital by car. The discharge information has been reviewed with the patient. The patient verbalized understanding. Discharge medications reviewed with the patient and appropriate educational materials and side effects teaching were provided.   ___________________________________________________________________________________________________________________________________

## 2017-12-03 NOTE — DISCHARGE SUMMARY
Discharge Summary    Patient: Fartun Chavez Person               Sex: male          DOA: 12/2/2017         YOB: 1950      Age:  75 y.o.        LOS:  LOS: 1 day                Admit Date: 12/2/2017    Discharge Date: 12/3/2017    Discharge Medications:     Current Discharge Medication List      CONTINUE these medications which have NOT CHANGED    Details   oxyCODONE-acetaminophen (PERCOCET 10)  mg per tablet Take 1 Tab by mouth every six (6) hours as needed for Pain. Max Daily Amount: 4 Tabs. Qty: 40 Tab, Refills: 0      sucralfate (CARAFATE) 100 mg/mL suspension Take 1 tsp by mouth three (3) times daily. lisinopril (PRINIVIL, ZESTRIL) 20 mg tablet Take 1 Tab by mouth daily. Qty: 90 Tab, Refills: 4    Associated Diagnoses: Proteinuria, unspecified type; Arthritis; Essential hypertension, malignant; Annual physical exam      amLODIPine (NORVASC) 5 mg tablet Take 1 Tab by mouth daily. Qty: 90 Tab, Refills: 4    Associated Diagnoses: Essential hypertension, malignant; Arthritis; Proteinuria, unspecified type; Annual physical exam             Follow-up:  PCP, surgery    Discharge Condition: Stable    Activity: Activity as tolerated    Diet: TF, full liquid diet    Labs:  Labs: Results:       Chemistry Recent Labs      12/03/17   0300  12/02/17   1015   GLU  120*  87   NA  136  133*   K  4.3  3.9   CL  103  100   CO2  23  29   BUN  7  8   CREA  0.49*  0.70   CA  8.5  9.6   AGAP  10  4   BUCR  14  11*      CBC w/Diff Recent Labs      12/03/17   0300  12/02/17   1015   WBC  3.6*  2.7*   RBC  3.65*  4.31*   HGB  10.0*  11.9*   HCT  29.1*  34.7*   PLT  153  170   GRANS   --   89*   LYMPH   --   6*   EOS   --   3      Cardiac Enzymes No results for input(s): CPK, CKND1, SRIDHAR in the last 72 hours.     No lab exists for component: CKRMB, TROIP   Coagulation Recent Labs      12/02/17   1015   PTP  13.1   INR  1.0       Lipid Panel Lab Results   Component Value Date/Time    Cholesterol, total 184 07/17/2017 09:38 AM    HDL Cholesterol 47 07/17/2017 09:38 AM    LDL, calculated 118.2 07/17/2017 09:38 AM    VLDL, calculated 18.8 07/17/2017 09:38 AM    Triglyceride 94 07/17/2017 09:38 AM    CHOL/HDL Ratio 3.9 07/17/2017 09:38 AM      BNP No results for input(s): BNPP in the last 72 hours. Liver Enzymes No results for input(s): TP, ALB, TBIL, AP, SGOT, GPT in the last 72 hours. No lab exists for component: DBIL   Thyroid Studies Lab Results   Component Value Date/Time    TSH 0.47 07/17/2017 09:38 AM          Consults: General Surgery    Treatment Team: Treatment Team: Attending Provider: Tonny Mcgraw MD; Consulting Provider: Marylene Habermann, MD; Consulting Provider:  Jenifer Preciado MD; Consulting Provider: Tonny Mcgraw MD    Significant Diagnostic Studies: labs:   Recent Results (from the past 24 hour(s))   METABOLIC PANEL, BASIC    Collection Time: 12/03/17  3:00 AM   Result Value Ref Range    Sodium 136 136 - 145 mmol/L    Potassium 4.3 3.5 - 5.5 mmol/L    Chloride 103 100 - 108 mmol/L    CO2 23 21 - 32 mmol/L    Anion gap 10 3.0 - 18 mmol/L    Glucose 120 (H) 74 - 99 mg/dL    BUN 7 7.0 - 18 MG/DL    Creatinine 0.49 (L) 0.6 - 1.3 MG/DL    BUN/Creatinine ratio 14 12 - 20      GFR est AA >60 >60 ml/min/1.73m2    GFR est non-AA >60 >60 ml/min/1.73m2    Calcium 8.5 8.5 - 10.1 MG/DL   CBC W/O DIFF    Collection Time: 12/03/17  3:00 AM   Result Value Ref Range    WBC 3.6 (L) 4.6 - 13.2 K/uL    RBC 3.65 (L) 4.70 - 5.50 M/uL    HGB 10.0 (L) 13.0 - 16.0 g/dL    HCT 29.1 (L) 36.0 - 48.0 %    MCV 79.7 74.0 - 97.0 FL    MCH 27.4 24.0 - 34.0 PG    MCHC 34.4 31.0 - 37.0 g/dL    RDW 13.5 11.6 - 14.5 %    PLATELET 324 605 - 392 K/uL    MPV 9.5 9.2 - 11.8 FL         Discharge diagnoses:    Problem List as of 12/3/2017  Date Reviewed: 11/22/2017          Codes Class Noted - Resolved    * (Principal)PEG tube malfunction ([facility] ICD-10-CM: H58.38  ICD-9-CM: 536.42  12/2/2017 - Present        HTN (hypertension) ICD-10-CM: I10  ICD-9-CM: 401.9  12/2/2017 - Present        Squamous cell esophageal cancer ([facility] ICD-10-CM: C15.9  ICD-9-CM: 150.9  10/30/2017 - Present        Gastric perforation, acute ([facility] ICD-10-CM: K25.1  ICD-9-CM: 531.10  10/20/2017 - Present        Metastatic squamous cell carcinoma to esophagus [facility]) ICD-10-CM: C78.89  ICD-9-CM: 197.8  10/17/2017 - Present        Proteinuria ICD-10-CM: R80.9  ICD-9-CM: 791.0  5/30/2014 - Present        Hyperthyroidism ICD-10-CM: E05.90  ICD-9-CM: 242.90  5/30/2014 - Present        Arthritis ICD-10-CM: M19.90  ICD-9-CM: 716.90  6/6/2011 - Present        Essential hypertension, malignant ICD-10-CM: I10  ICD-9-CM: 401.0  8/3/2010 - Present        RESOLVED: Pain aggravated by eating or drinking ICD-10-CM: R52  ICD-9-CM: 780.96  11/29/2017 - 11/30/2017        RESOLVED: Esophageal mass ICD-10-CM: K22.9  ICD-9-CM: 530.9  10/30/2017 - 11/30/2017        RESOLVED: Acute gastric perforation ([facility] ICD-10-CM: K25.1  ICD-9-CM: 531.10  10/20/2017 - 11/30/2017        RESOLVED: Leg pain ICD-10-CM: M79.606  ICD-9-CM: 729.5  8/3/2010 - 2/4/2013            Hospital Course:   Major issues addressed during hospitalization outlined  below. Fredo Bergman is a 75y.o. year old male who presents with peg tube falling out. Unable to get in through fresh track in ED. Surgery consulted and plans for OR. Patient has hx of esophageal cancer with LN spread. Follows with Dr. Giovany Ramirez and Laura for chemo and radiation. He is otherwise in his normal state of health. Patient underwent ex laparotomy same day with G tube placement. Patient did well overnight and was cleared for dc home the following day. TF can begin as OP. F/u with surgery and PCP as OP.     Georgette Nguyen MD  December 3, 2017        Total time spent 35 minutes

## 2017-12-03 NOTE — PROGRESS NOTES
[facility] primary care of patient    [phone] patient cleared by [phone] discharge instruction given, patient verbalized understanding, teach back utilized.   Patient stated   \"I have a appointment with doctor Ev Silveira tomorrow and I was told someone will come by tomorrow to set up my feedings\"    [phone] patient left unit with belongings, cellphone, and discharge instruction

## 2017-12-03 NOTE — PROGRESS NOTES
1612 Bedside and Verbal shift change report given to Griselda Rodarte RN (oncoming nurse) by Harrison Knapp RN (offgoing nurse). Report included the following information SBAR, Kardex and MAR.     2884 Due meds administered and shift assessment completed pt a/o and voiced no respiratory distress/discomfort at this time. 1418 Patient requested for pain med and prn percocet administered per order. 1520 Patient discharged home and all belongings sent with patient.

## 2017-12-03 NOTE — PROGRESS NOTES
[facility]   Discharge Planning/ Assessment    Reasons for Intervention: Initial discharge planning interview. Face sheet data reviewed with pt. All information confirmed as correct. Pt designates his daughter, Carolynn Sanchez, as his emergency contact and gives permission to discuss discharge planning with her if needed. Contact #: [phone]. Pt with Medicare primary and Medicaid secondary. PCP is Dr. Alvah Libman. Pt states he was independent with ambulation and ADL's prior to hospitalization. He states he lives with his brother and brother is physically able to be of assistance to pt. No DME at home. Anticipated discharge plan is home with home health for [facility] for RRAT= 24.     High Risk Criteria  [x] Yes  []No   Physician Referral  [] Yes  []No        Date    Nursing Referral  [] Yes  []No        Date    Patient/Family Request  [] Yes  []No        Date       Resources:    Medicare  [x] Yes  []No   Medicaid  [x] Yes  []No   No Resources  [] Yes  []No   Private Insurance  [] Yes  []No    Name/Phone Number    Other  [] Yes  []No        (i.e. Workman's Comp)         Prior Services:    Prior Services  [] Yes  []No   Home Health  [] Yes  []No   [address] [place]  [] Yes  []No        Number of [address]  [] Yes  []No       Meals on Wheels  [] Yes  []No   Office on Aging  [] Yes  []No   Transportation Services  [] Yes  []No   Nursing Home  [] Yes  []No        Nursing Home Name    [address]  [] Yes  []No        [address] Name    Other       Information Source:      Information obtained from  [x] Patient  [] Parent   [] [employer]  [] Child  [] Spouse   [] Significant Other/Partner   [] Friend      [] EMS    [] Nursing Home Chart          [] Other:   Chart Review  [x] Yes  []No     Family/Support System:    Patient lives with  [] Alone    [] Spouse   [] Significant Other  [] Children  [] Caretaker   [] Parent  [x] Sibling     [] Other       Other Support System:    Is the patient responsible for care of others  [] Yes  [x]No   Information of person caring for patient on  discharge    Managers financial affairs independently  [x] Yes  []No   If no, explain:      Status Prior to Admission:    Mental Status  [x] Awake  [x] Alert  [x] Oriented  [] Quiet/Calm [] Lethargic/Sedated   [] Disoriented  [] Restless/Anxious  [] Combative   Personal Care  [] Dependent  [x] [address]  [] Requires Assistance   Meal Preparation Ability  [x] Independent   [] Standby Assistance   [] Minimal Assistance   [] Moderate Assistance  [] Maximum Assistance     [] Total Assistance   Chores  [x] Independent with Chores   [] [address] Resident   [] Requires Assistance   Bowel/Bladder  [] Continent  [] Catheter  [] Incontinent  [] Ostomy Self-Care    [] Urine Diversion Self-Care  [] Maximum Assistance     [] Total Assistance   Number of Persons needed for assistance    DME at home  [] Ishmael Akhtar  [] Lam Akhtar   [] Commode    [] Bathroom/Grab Bars  [] Hospital Bed  [] Nebulizer  [] Oxygen           [] Raised Toilet Seat  [] Shower Chair  [] Side Rails for Bed   [] Tub Transfer Bench   [] Mode Cage  [] Leonides Cheung      [] Other:   Vendor      Treatment Presently Receiving:    Current Treatments  [] Chemotherapy  [] Dialysis  [] Insulin  [] IVAB [] IVF   [] O2  [] PCA   [] PT   [] RT   [] Tube Feedings   [] Wound Care     Psychosocial Evaluation:    Verbalized Knowledge of Disease Process  [] Patient  []Family   Coping with Disease Process  [] Patient  []Family   Requires Further Counseling Coping with Disease Process  [] Patient  []Family     Identified Projected Needs:    Home Health Aid  [] Yes  []No   Transportation  [] Yes  []No   Education  [] Yes  []No        Specific Education     Financial Counseling  [] Yes  []No   Inability to Care for Self/Will Require 24 hour care  [] Yes  []No   Pain Management  [] Yes  []No   Home Infusion Therapy  [] Yes  []No   Oxygen Therapy  [] Yes  []No   DME  [] Yes  []No   Long Term Care Placement  [] Yes  []No   Rehab  [] Yes  []No   Physical Therapy  [] Yes  []No   Needs Anticipated At This Time  [] Yes  [x]No     Intra-Hospital Referral:    [facility]  [] Yes  []No     [] Yes  []No   Patient Representative  [] Yes  []No   Staff for Teaching Needs  [] Yes  []No   Specialty Teaching Needs     Diabetic Educator  [] Yes  []No   Referral for Diabetic Educator Needed  [] Yes  []No  If Yes, place order for Nutritionist or Diabetic Consult     Tentative Discharge Plan:    Home with No Services  [] Yes  []No   Home with Home Health Follow-up  [x] Yes  []No        If Yes, specify type    [address]  [] Yes  []No        If Yes, specify type    Meals on Wheels  [] Yes  []No   Office of Aging  [] Yes  []No   NHP  [] Yes  []No   Return to the Nursing Home  [] Yes  []No   Rehab Therapy  [] Yes  []No   Acute Rehab  [] Yes  []No   Subacute Rehab  [] Yes  []No   Private Care  [] Yes  []No   Substance Abuse Referral  [] Yes  []No   Transportation  [] Yes  []No   Chore Service  [] Yes  []No   Inpatient Hospice  [] Yes  []No   OP RT  [] Yes  [] No   OP Hemo  [] Yes  [] No   OP PT  [] Yes  []No   Support Group  [] Yes  []No   Reach to Recovery  [] Yes  []No   OP Oncology Clinic  [] Yes  []No   Clinic Appointment  [] Yes  []No   DME  [] Yes  []No   Comments    Name of D/C Planner or  Given to Patient or Family    Phone Number         Extension    Date 12/3/17   Time    If you are discharged home, whom do you designate to participate in your discharge plan and receive any information needed?      Enter name of Le Arn- daughter    Phone number: [phone]        Phone # of designee         Address of designee         Updated         Patient refused to designate any           individual

## 2017-12-03 NOTE — PROGRESS NOTES
1953:  Patient received in bed, watching television. Complaints of pain 4/10. Abdominal dressing, 4x4 and medipore tape, CDI. G-tube clamped; no signs of bleeding or drainage noted. 2112:  Spoke with Charisma Christina in ED. Patient belongings are locked in locker #2. Cash amount of approximately $500 locked up with security. 0210:  Patient sleeping. No signs of distress. Will continue to monitor. Bedside shift change report given to Nancie Lopez RN (oncoming nurse) by Abbi Baker RN (offgoing nurse). Report included the following information SBAR, Procedure Summary, MAR, Recent Results and Med Rec Status.

## 2017-12-03 NOTE — PROGRESS NOTES
Care Management Interventions  PCP Verified by CM: Yes  Transition of Care Consult (CM Consult): Discharge Planning  Discharge Durable Medical Equipment: No  Physical Therapy Consult: No  Occupational Therapy Consult: No  Speech Therapy Consult: No  Current Support Network:  Other (lives with brother)  Plan discussed with Pt/Family/Caregiver: Yes  Discharge Location  Discharge Placement: Home

## 2017-12-03 NOTE — PROGRESS NOTES
Patient has designated _daughter__ to participate in his/her discharge plan and to receive any needed information.      Name: Jordan Olivas  Address:  Phone number: [phone]

## 2017-12-03 NOTE — PROGRESS NOTES
Patient seen and examined. Doing very well. Feeling thirsty. Abdomen is soft and non-tender. Dressing intact. G-tube in place.     Plan:  Allow PO diet as tolerated (Probably liquid)  Can start tube feed when needed (Probably as outpatient)  Can be discharge today if okay with primary team.

## 2017-12-03 NOTE — PROGRESS NOTES
Nutrition initial assessment/Plan of care      RECOMMENDATIONS:     1. Full liquids  2. When TF indicated to meet nutrition needs recommend: Osmolite 1.2 initiated at 20 ml/hr increased as tolerated to goal of 70 ml/hr to provide 2016 Kcal, 93 g protein and 1400 ml free water. 150 ml free water flushes Q 6 hours. 3. Monitor weight, labs, PO intake and tolerance of enteral nutrition when indicated  4. RD to follow     GOALS:     1. PO intake/Enteral nutrition meets >75% of protein/calorie needs by 12/6  2. Weight Maintenance (+/- 1-2 lb by 12/10)      ASSESSMENT:     Weight status is classified as normal per BMI of 21.8. However, patient is at nutrition risk due to BMI below 23 with patient above 71years of age. 4% weight loss since 10/20/17 per documented weights. 16% weight loss over past year per prior RD note in 10/2017. Labs noted. Nutrition recommendations listed. RD to follow. Nutrition Diagnoses:   Difficulty swallowing related to esophageal cancer as evidenced by need for G-tube placement to help meet nutrition needs. Nutrition Risk:  [x] High  [] Moderate []  Low    SUBJECTIVE/OBJECTIVE:      Patient with esophageal cancer and dysphagia. TF fell out of its site and admitted to put back in place. TF originally placed on 11/28. G-tube replaced on 12/2. Received nutrition consult for management of tube feeding. However, per MD plan below plan appears to only start TF when needed as an outpatient. See below. Will monitor. Plan: (12/3/17 per Dr. Lexie Rothman)  Allow PO diet as tolerated (Probably liquid)  Can start tube feed when needed (Probably as outpatient)      Information Obtained from:    [x] Chart Review   [] Patient   [] Family/Caregiver   [] Nurse/Physician   [] Interdisciplinary Meeting/Rounds    Diet:Full liquids  Medications: [x] Reviewed    Allergies: [x] Reviewed   Encounter Diagnoses     ICD-10-CM ICD-9-CM   1. [facility] Z43.1 V55.1   2.  Squamous cell esophageal cancer ([facility] C15.9 150.9     Past Medical History:   Diagnosis Date    Arthritis     Essential hypertension, malignant 8/3/2010    Leg pain 8/3/2010    Throat cancer ([facility]       Labs:  Lab Results   Component Value Date/Time    Sodium 136 12/03/2017 03:00 AM    Potassium 4.3 12/03/2017 03:00 AM    Chloride 103 12/03/2017 03:00 AM    CO2 23 12/03/2017 03:00 AM    Anion gap 10 12/03/2017 03:00 AM    Glucose 120 12/03/2017 03:00 AM    BUN 7 12/03/2017 03:00 AM    Creatinine 0.49 12/03/2017 03:00 AM    Calcium 8.5 12/03/2017 03:00 AM    Albumin 3.3 11/22/2017 11:35 AM     Anthropometrics: BMI (calculated): 21.8  Last 3 Recorded Weights in this Encounter    12/02/17 0816   Weight: 67.1 kg (148 lb)    Ht Readings from Last 1 Encounters:   12/02/17 5' 9\" (1.753 m)     Patient Vitals for the past 100 hrs:   % Diet Eaten   12/03/17 0913 100 %     IBW: 160 lb %IBW: 92%    Estimated Nutrition Needs: [x] MSJ    Calories: 2050 Kcal Based on:   [x] Actual BW    Protein:   80-90 g Based on:   [x] Actual BW    Fluid:       2100 ml Based on:   [x] Actual BW      Wt Status:  [x] Normal (18.6 - 24.9) [] Underweight (<18.5) [] Overweight (25 - 29.9) [] Mild Obesity (30 - 34.9)  [] Moderate Obesity (35 - 39.9) [] Morbid Obesity (40+)     Nutrition Problems Identified:   [] Suboptimal PO intake   [] Food Allergies  [x] Difficulty chewing/swallowing/poor dentition  [] Constipation/Diarrhea   [] Nausea/Vomiting   [] None  [x] Other: G- tube placement    Plan:   [x] Therapeutic Diet  []  Obtained/adjusted food preferences/tolerances and/or snacks options   []  Supplements added   [] Occupational therapy following for feeding techniques  []  HS snack added   []  Modify diet texture   []  Modify diet for food allergies   []  Assist with menu selection   []  Monitor PO intake on meal rounds   [x]  Continue inpatient monitoring and intervention   []  Participated in discharge planning/Interdisciplinary rounds/Team meetings   [x] Other: Enteral nutrition recommendations listed    Education Needs:   [x] Not appropriate for teaching at this time  [] Identified and addressed    Nutrition Monitoring and Evaluation:  [x] Continue ongoing monitoring and intervention  [] Other    Kacey Jain

## 2017-12-03 NOTE — PROGRESS NOTES
Problem: Discharge Planning  Goal: *Discharge to safe environment  Outcome: Progressing Towards Goal  Home with home health for [facility] for RRAT= 24

## 2017-12-04 ENCOUNTER — TELEPHONE (OUTPATIENT)
Dept: FAMILY MEDICINE CLINIC | Age: 67
End: 2017-12-04

## 2017-12-04 ENCOUNTER — HOME CARE VISIT (OUTPATIENT)
Dept: HOME HEALTH SERVICES | Facility: HOME HEALTH | Age: 67
End: 2017-12-04

## 2017-12-04 ENCOUNTER — HOSPITAL ENCOUNTER (OUTPATIENT)
Dept: RADIATION THERAPY | Age: 67
Discharge: HOME | End: 2017-12-04
Payer: MEDICARE

## 2017-12-04 DIAGNOSIS — E46 MALNUTRITION, UNSPECIFIED TYPE (HCC): ICD-10-CM

## 2017-12-04 DIAGNOSIS — C78.89 METASTATIC SQUAMOUS CELL CARCINOMA TO ESOPHAGUS (HCC): Primary | ICD-10-CM

## 2017-12-04 DIAGNOSIS — C15.9 SQUAMOUS CELL ESOPHAGEAL CANCER (HCC): Primary | ICD-10-CM

## 2017-12-04 PROCEDURE — 77386 HC IMRT TRMT DLVR COMPL: CPT

## 2017-12-04 PROCEDURE — 77336 RADIATION PHYSICS CONSULT: CPT

## 2017-12-04 NOTE — PROGRESS NOTES
Patient directed to come to office on 12/1 to allow for teaching of family member in care and flushing of G tube. NO family accompanied patient as directed; he states no one was available. No pain, nausea or vomiting. Home health has not yet been out. Patient taught how to flush central port of G tube with Syringe of saline. Directed to avoid manipulating balloon port. Patient voiced understanding. Given syringe and directed to call office iwht concerns or questions. Nurse Anant White present for teaching.

## 2017-12-04 NOTE — TELEPHONE ENCOUNTER
Trish James from [employer] requesting a call back regarding referral received for patient this morning.    Trish James can be reached at [phone]

## 2017-12-04 NOTE — TELEPHONE ENCOUNTER
Destini Rank with [facility] who explains that they are unable to care for this patient due to his insurance. Caller recommends sending order to [employer] or Sudie Bosworth.

## 2017-12-05 ENCOUNTER — TELEPHONE (OUTPATIENT)
Dept: FAMILY MEDICINE CLINIC | Age: 67
End: 2017-12-05

## 2017-12-05 ENCOUNTER — HOME CARE VISIT (OUTPATIENT)
Dept: HOME HEALTH SERVICES | Facility: HOME HEALTH | Age: 67
End: 2017-12-05

## 2017-12-05 ENCOUNTER — HOME CARE VISIT (OUTPATIENT)
Dept: SCHEDULING | Facility: HOME HEALTH | Age: 67
End: 2017-12-05
Payer: MEDICARE

## 2017-12-05 ENCOUNTER — HOSPITAL ENCOUNTER (OUTPATIENT)
Dept: RADIATION THERAPY | Age: 67
Discharge: HOME | End: 2017-12-05
Payer: MEDICARE

## 2017-12-05 ENCOUNTER — PATIENT OUTREACH (OUTPATIENT)
Dept: FAMILY MEDICINE CLINIC | Age: 67
End: 2017-12-05

## 2017-12-05 VITALS
RESPIRATION RATE: 18 BRPM | SYSTOLIC BLOOD PRESSURE: 104 MMHG | DIASTOLIC BLOOD PRESSURE: 62 MMHG | TEMPERATURE: 98.6 F | HEART RATE: 75 BPM

## 2017-12-05 PROCEDURE — 77386 HC IMRT TRMT DLVR COMPL: CPT

## 2017-12-05 PROCEDURE — 400013 HH SOC

## 2017-12-05 PROCEDURE — 3331090002 HH PPS REVENUE DEBIT

## 2017-12-05 PROCEDURE — 3331090001 HH PPS REVENUE CREDIT

## 2017-12-05 PROCEDURE — G0299 HHS/HOSPICE OF RN EA 15 MIN: HCPCS

## 2017-12-05 NOTE — PROGRESS NOTES
[facility]  -12/02/17 Admitted at [facility] for PEG tube replacement  -12/03/17 Discharged to Home with Home Health           Attempted to reach patient. Unable to reach. [phone] (H) - No answer. Left message on voicemail with contact info  [phone] (M) - number disconnect  [phone] (M) - Nancy Mcnamara, pt's daughter. Spoke to daughter. She informed me patient aware of his appt with PCP Dr. Arlene Stevens tomorrow at 2pm. She stated that he has 4 appts tomorrow. She stated patient will be driving himself to the appt as she has work tomorrow. Left message to the daughter for the patient to bring his supplies/materials to the appt for review. Daughter verbalized understanding      Noted supplies delivered to patient's home. No pump available. Noted dietician's recommendation for nutrition without pump      Will follow up with patient tomorrow              This note will not be viewable in [address]

## 2017-12-05 NOTE — TELEPHONE ENCOUNTER
Attempted to contact [phone] regarding clarification of current enteral tube feeding for patient by use of gravity feeding and also to clarify total daily by mouth and g- tube flushes. Message left requesting a call back to further clarify patients enteral tube feedings.

## 2017-12-05 NOTE — TELEPHONE ENCOUNTER
Received a call from [facility] requesting clarification for patient enteral tube feeding as the current order is written for a patient using a enteral pump. Caller also reports that patients tube feeding flushes are not clear. Caller placed on hold while obtaining clarification of current order from [facility]- nutritionist who evaluated and recommended patient's enteral feeding order.

## 2017-12-05 NOTE — TELEPHONE ENCOUNTER
Spoke with Anuj Jama at [address] [facility] [address] PT who clarified that if patient is receiving his enteral tube feeding as gravity feedings then the recommended diet is as follows: patient is to consume 1 can of Vital-AF 1.2 ty 0.08gram-1.2kcal/ml liquid 3 times per day by gravity feeding. Then to advance to 2 cans 3 times per day as the ultimate nutritional goal. Patient to have g-tube flushed before and after gravity feeding schedule to equate to 400ml per feeding. Patient reported that he was educated by Dr. Varghese Brown to flush the g-tube with the 20cc syringe before and after each feeding. Per Nutritionist if patient can drink water by mouth than flushes can be reduced.

## 2017-12-05 NOTE — TELEPHONE ENCOUNTER
Pts home health nurse called asking if peg tub feeding with pump or gravity feeding. Patient doesn't have pump at home.  Please advise

## 2017-12-05 NOTE — TELEPHONE ENCOUNTER
Attempted to contact caregiver Veena Carr Person regarding peg tube feeding. Left message that per Dr. Esquivel Session patient is ok to have gravity feeding as patient does not have a feeding pump. Call back number left for any further questions or concerns.

## 2017-12-06 ENCOUNTER — OFFICE VISIT (OUTPATIENT)
Dept: ONCOLOGY | Age: 67
End: 2017-12-06

## 2017-12-06 ENCOUNTER — OFFICE VISIT (OUTPATIENT)
Dept: FAMILY MEDICINE CLINIC | Age: 67
End: 2017-12-06

## 2017-12-06 ENCOUNTER — HOSPITAL ENCOUNTER (OUTPATIENT)
Dept: RADIATION THERAPY | Age: 67
Discharge: HOME | End: 2017-12-06
Payer: MEDICARE

## 2017-12-06 ENCOUNTER — HOSPITAL ENCOUNTER (OUTPATIENT)
Dept: INFUSION THERAPY | Age: 67
Discharge: HOME | End: 2017-12-06
Payer: MEDICARE

## 2017-12-06 VITALS
BODY MASS INDEX: 21.8 KG/M2 | HEIGHT: 69 IN | RESPIRATION RATE: 16 BRPM | HEART RATE: 95 BPM | DIASTOLIC BLOOD PRESSURE: 83 MMHG | SYSTOLIC BLOOD PRESSURE: 120 MMHG | TEMPERATURE: 96.7 F | WEIGHT: 147.2 LBS | OXYGEN SATURATION: 94 %

## 2017-12-06 VITALS
HEIGHT: 69 IN | WEIGHT: 146.2 LBS | RESPIRATION RATE: 18 BRPM | SYSTOLIC BLOOD PRESSURE: 112 MMHG | DIASTOLIC BLOOD PRESSURE: 79 MMHG | TEMPERATURE: 97.7 F | HEART RATE: 100 BPM | BODY MASS INDEX: 21.66 KG/M2

## 2017-12-06 DIAGNOSIS — C78.89 METASTATIC SQUAMOUS CELL CARCINOMA TO ESOPHAGUS (HCC): ICD-10-CM

## 2017-12-06 DIAGNOSIS — E44.0 MALNUTRITION OF MODERATE DEGREE (HCC): ICD-10-CM

## 2017-12-06 DIAGNOSIS — I10 ESSENTIAL HYPERTENSION, MALIGNANT: Primary | ICD-10-CM

## 2017-12-06 DIAGNOSIS — R52 PAIN AGGRAVATED BY EATING OR DRINKING: ICD-10-CM

## 2017-12-06 DIAGNOSIS — E05.90 HYPERTHYROIDISM: ICD-10-CM

## 2017-12-06 DIAGNOSIS — K22.89 ESOPHAGEAL MASS: ICD-10-CM

## 2017-12-06 DIAGNOSIS — E46 MALNUTRITION, UNSPECIFIED TYPE (HCC): ICD-10-CM

## 2017-12-06 DIAGNOSIS — C15.9 SQUAMOUS CELL ESOPHAGEAL CANCER (HCC): Primary | ICD-10-CM

## 2017-12-06 DIAGNOSIS — M19.90 ARTHRITIS: ICD-10-CM

## 2017-12-06 PROBLEM — K94.23 PEG TUBE MALFUNCTION (HCC): Status: RESOLVED | Noted: 2017-12-02 | Resolved: 2017-12-06

## 2017-12-06 PROBLEM — K31.89 GASTRIC PERFORATION, ACUTE: Status: RESOLVED | Noted: 2017-10-20 | Resolved: 2017-12-06

## 2017-12-06 LAB
ANION GAP BLD CALC-SCNC: 16 MMOL/L (ref 10–20)
BASO+EOS+MONOS # BLD AUTO: 0.2 K/UL (ref 0–2.3)
BASO+EOS+MONOS # BLD AUTO: 9 % (ref 0.1–17)
BUN BLD-MCNC: 9 MG/DL (ref 7–18)
CA-I BLD-MCNC: 1.22 MMOL/L (ref 1.12–1.32)
CHLORIDE BLD-SCNC: 102 MMOL/L (ref 100–108)
CO2 BLD-SCNC: 25 MMOL/L (ref 19–24)
CREAT UR-MCNC: 0.8 MG/DL (ref 0.6–1.3)
DIFFERENTIAL METHOD BLD: ABNORMAL
ERYTHROCYTE [DISTWIDTH] IN BLOOD BY AUTOMATED COUNT: 14.1 % (ref 11.5–14.5)
GLUCOSE BLD STRIP.AUTO-MCNC: 99 MG/DL (ref 74–106)
HCT VFR BLD AUTO: 26.8 % (ref 36–48)
HCT VFR BLD CALC: 25 % (ref 36–49)
HGB BLD-MCNC: 8.5 G/DL (ref 12–16)
HGB BLD-MCNC: 8.8 G/DL (ref 12–16)
LYMPHOCYTES # BLD: 0.1 K/UL (ref 1.1–5.9)
LYMPHOCYTES NFR BLD: 5 % (ref 14–44)
MCH RBC QN AUTO: 27.3 PG (ref 25–35)
MCHC RBC AUTO-ENTMCNC: 32.8 G/DL (ref 31–37)
MCV RBC AUTO: 83.2 FL (ref 78–102)
NEUTS SEG # BLD: 1.9 K/UL (ref 1.8–9.5)
NEUTS SEG NFR BLD: 87 % (ref 40–70)
PLATELET # BLD AUTO: 165 K/UL (ref 140–440)
POTASSIUM BLD-SCNC: 4 MMOL/L (ref 3.5–5.5)
RBC # BLD AUTO: 3.22 M/UL (ref 4.1–5.1)
SODIUM BLD-SCNC: 137 MMOL/L (ref 136–145)
WBC # BLD AUTO: 2.2 K/UL (ref 4.5–13)

## 2017-12-06 PROCEDURE — 3331090001 HH PPS REVENUE CREDIT

## 2017-12-06 PROCEDURE — 96413 CHEMO IV INFUSION 1 HR: CPT

## 2017-12-06 PROCEDURE — 36591 DRAW BLOOD OFF VENOUS DEVICE: CPT

## 2017-12-06 PROCEDURE — 74011250636 HC RX REV CODE- 250/636: Performed by: INTERNAL MEDICINE

## 2017-12-06 PROCEDURE — 74011000258 HC RX REV CODE- 258: Performed by: INTERNAL MEDICINE

## 2017-12-06 PROCEDURE — 80047 BASIC METABLC PNL IONIZED CA: CPT

## 2017-12-06 PROCEDURE — 96367 TX/PROPH/DG ADDL SEQ IV INF: CPT

## 2017-12-06 PROCEDURE — 3331090002 HH PPS REVENUE DEBIT

## 2017-12-06 PROCEDURE — 85025 COMPLETE CBC W/AUTO DIFF WBC: CPT | Performed by: INTERNAL MEDICINE

## 2017-12-06 PROCEDURE — 77386 HC IMRT TRMT DLVR COMPL: CPT

## 2017-12-06 PROCEDURE — 77030012965 HC NDL HUBR BBMI -A

## 2017-12-06 PROCEDURE — 96375 TX/PRO/DX INJ NEW DRUG ADDON: CPT

## 2017-12-06 PROCEDURE — 96417 CHEMO IV INFUS EACH ADDL SEQ: CPT

## 2017-12-06 PROCEDURE — 74011000250 HC RX REV CODE- 250: Performed by: INTERNAL MEDICINE

## 2017-12-06 RX ORDER — FAMOTIDINE 10 MG/ML
20 INJECTION INTRAVENOUS ONCE
Status: COMPLETED | OUTPATIENT
Start: 2017-12-06 | End: 2017-12-06

## 2017-12-06 RX ORDER — HEPARIN 100 UNIT/ML
500 SYRINGE INTRAVENOUS ONCE
Status: COMPLETED | OUTPATIENT
Start: 2017-12-06 | End: 2017-12-06

## 2017-12-06 RX ORDER — SODIUM CHLORIDE 0.9 % (FLUSH) 0.9 %
10-40 SYRINGE (ML) INJECTION AS NEEDED
Status: DISCONTINUED | OUTPATIENT
Start: 2017-12-06 | End: 2017-12-10 | Stop reason: HOSPADM

## 2017-12-06 RX ORDER — DIPHENHYDRAMINE HYDROCHLORIDE 50 MG/ML
50 INJECTION, SOLUTION INTRAMUSCULAR; INTRAVENOUS ONCE
Status: COMPLETED | OUTPATIENT
Start: 2017-12-06 | End: 2017-12-06

## 2017-12-06 RX ORDER — SODIUM CHLORIDE 9 MG/ML
250 INJECTION, SOLUTION INTRAVENOUS CONTINUOUS
Status: DISPENSED | OUTPATIENT
Start: 2017-12-06 | End: 2017-12-07

## 2017-12-06 RX ORDER — PALONOSETRON 0.05 MG/ML
0.25 INJECTION, SOLUTION INTRAVENOUS ONCE
Status: COMPLETED | OUTPATIENT
Start: 2017-12-06 | End: 2017-12-06

## 2017-12-06 RX ADMIN — PALONOSETRON HYDROCHLORIDE 0.25 MG: 0.25 INJECTION INTRAVENOUS at 10:35

## 2017-12-06 RX ADMIN — Medication 10 ML: at 13:27

## 2017-12-06 RX ADMIN — Medication 10 ML: at 10:25

## 2017-12-06 RX ADMIN — CARBOPLATIN 160 MG: 10 INJECTION INTRAVENOUS at 12:43

## 2017-12-06 RX ADMIN — Medication 20 ML: at 10:28

## 2017-12-06 RX ADMIN — HEPARIN 500 UNITS: 100 SYRINGE at 13:27

## 2017-12-06 RX ADMIN — PACLITAXEL 90 MG: 6 INJECTION, SOLUTION INTRAVENOUS at 11:37

## 2017-12-06 RX ADMIN — FAMOTIDINE 20 MG: 10 INJECTION INTRAVENOUS at 10:35

## 2017-12-06 RX ADMIN — SODIUM CHLORIDE 250 ML/HR: 900 INJECTION, SOLUTION INTRAVENOUS at 10:32

## 2017-12-06 RX ADMIN — DEXAMETHASONE SODIUM PHOSPHATE 12 MG: 4 INJECTION, SOLUTION INTRA-ARTICULAR; INTRALESIONAL; INTRAMUSCULAR; INTRAVENOUS; SOFT TISSUE at 10:40

## 2017-12-06 RX ADMIN — DIPHENHYDRAMINE HYDROCHLORIDE 50 MG: 50 INJECTION INTRAMUSCULAR; INTRAVENOUS at 10:35

## 2017-12-06 NOTE — MR AVS SNAPSHOT
Visit Information Date & Time Provider Department Dept. Phone Encounter #  
 12/6/2017  2:00 PM Cecile Figueroa, Eh [facility] [phone] [id number] Follow-up Instructions Return in about 4 weeks (around 1/3/2018) for EOV, labs at next visit. Upcoming Health Maintenance Date Due Pneumococcal 65+ High/Highest Risk (2 of 2 - PPSV23) 1/14/2016 GLAUCOMA SCREENING Q2Y 7/14/2018 MEDICARE YEARLY EXAM 8/16/2018 COLONOSCOPY 6/6/2021 DTaP/Tdap/Td series (2 - Td) 8/15/2027 Allergies as of 12/6/2017  Review Complete On: 12/6/2017 By: Cecile Figueroa, DO No Known Allergies Current Immunizations  Reviewed on 1/4/2017 Name Date Influenza High Dose Vaccine PF 1/4/2017, 11/19/2015 Influenza Vaccine 11/18/2014, 2/4/2013 Influenza Vaccine (Quad) PF 10/22/2017 10:21 PM  
 Influenza Vaccine Split 11/17/2011, 11/22/2010 Pneumococcal Conjugate (PCV-13) 11/19/2015 TD Vaccine 8/3/2006 Tdap 8/15/2017 ZZZ-RETIRED (DO NOT USE) Pneumococcal Vaccine (Unspecified Type) 11/22/2010 Not reviewed this visit You Were Diagnosed With   
  
 Codes Comments Essential hypertension, malignant    -  Primary ICD-10-CM: I10 
ICD-9-CM: 401.0 Mal[facility], unspecified [facility]     ICD-10-CM: E46 
ICD-9-CM: 263.9 Metastatic squamous cell carcinoma to esophagus [facility])     ICD-10-CM: C78.89 ICD-9-CM: 197.8 Hyperthyroidism     ICD-10-CM: E05.90 ICD-9-CM: 242.90 Arthritis     ICD-10-CM: M19.90 ICD-9-CM: 716.90 Vitals BP Pulse Temp Resp Height(growth percentile) Weight(growth percentile) 120/83 (BP 1 Location: Right arm, BP Patient Position: Sitting) 95 96.7 °F (35.9 °C) (Oral) 16 5' 9\" (1.753 m) 147 lb 3.2 oz (66.8 kg) SpO2 BMI Smoking Status 94% 21.74 kg/m2 Former Smoker BMI and BSA Data Body Mass Index Body Surface Area 21.74 kg/m 2 1.8 m 2 Preferred Pharmacy Pharmacy Name Phone [facility] [address], [place] [phone] Your Updated Medication List  
  
   
This list is accurate as of: 12/6/17  2:05 PM.  Always use your most recent med list. amLODIPine 5 mg tablet Commonly known as:  Dundee Aurelio Take 1 Tab by mouth daily. lisinopril 20 mg tablet Commonly known as:  Nico Marie Take 1 Tab by mouth daily. magic mouthwash solution Take 5 mL by mouth three (3) times daily.  
  
 nut.tx.impaired dige fxn-fiber 0.08 gram- 1.2 kcal/mL Liqd Commonly known as:  VITAL AF 1.2 MAYKEL Start with 1 can over an hour 3 times per day for 24 hours. If tolerated, advance to 2 cans over 1.5 hours 3x/day: 9am, 12pm, 6pm (follow toleration over 48 hours). Flush tube before & after with 200ml water each time (400ml total with each feeding) If patient can drink water, flushes can be re  
  
 oxyCODONE-acetaminophen  mg per tablet Commonly known as:  PERCOCET 10 Take 1 Tab by mouth every six (6) hours as needed for Pain. Max Daily Amount: 4 Tabs. polyethylene glycol 17 gram packet Commonly known as:  Demarco Lex Take 17 g by mouth daily. Follow-up Instructions Return in about 4 weeks (around 1/3/2018) for EOV, labs at next visit. To-Do List   
 12/07/2017 To Be Determined Appointment with Shade Madrigal RN at [address] [place] [address] CTR  
  
 12/07/2017 8:00 AM  
  Appointment with [address] [place] [address] at [facility] RADIATION THERAPY ([phone]) This appointment time is simply a place [place] and may not reflect the true appointment time. If patient does not know the appointment time given to them at the time of scheduling, they should contact the Radiation Therapy department for detail. 12/07/2017 3:30 PM  
  Appointment with Eliazar Sandhoff, RD at [facility] ([phone])  12/08/2017 8:00 AM  
 [facility] RAD[facility] THERAP[facility] ([phone]) This appointment time is simply a place [place] and may not reflect the true appointment time. If patient does not know the appointment time given to them at the time of scheduling, they should contact the Radiation Therapy department for detail. 12/09/2017 To Be Determined Appointment with Jenifer Vines RN at [address] [place] [address] CTR  
  
 12/11/2017 To Be Determined Appointment with Jenifer Vines RN at [address] [place] [address] CTR  
  
 12/11/2017 8:00 AM  
  [facility] RAD[facility]ATION THERAPY ([phone]) This appointment time is simply a place [place] and may not reflect the true appointment time. If patient does not know the appointment time given to them at the time of scheduling, they should contact the Radiation Therapy department for detail. 12/12/2017 8:00 AM  
  [facility] RADIATION THERAPY ([phone]) This appointment time is simply a place [place] and may not reflect the true appointment time. If patient does not know the appointment time given to them at the time of scheduling, they should contact the Radiation Therapy department for detail. 12/13/2017 To Be Determined Appointment with Jenifer Vines RN at [address] [place] [address] CTR  
  
 12/13/2017 8:00 AM  
  [facility] RAD[facility]ATION THERAPY ([phone]) This appointment time is simply a place [place] and may not reflect the true appointment time. If patient does not know the appointment time given to them at the time of scheduling, they should contact the Radiation Therapy department for detail. 12/14/2017 8:00 AM  
  Appointment with  José Nayak [facility] RADIATION THERAPY ([phone]) This appointment time is simply a place [place] and may not reflect the true appointment time. If patient does not know the appointment time given to them at the time of scheduling, they should contact the Radiation Therapy department for detail. 12/15/2017 To Be Determined Appointment with Georges Cheng RN at [address] [place] [address]  
  
 12/18/2017 8:00 AM  
  Appointment with Celeste [facility] RADIATION THERAPY ([phone]) This appointment time is simply a place [place] and may not reflect the true appointment time. If patient does not know the appointment time given to them at the time of scheduling, they should contact the Radiation Therapy department for detail. 12/19/2017 8:00 AM  
  [facility] ([phone]) This appointment time is simply a place [place] and may not reflect the true appointment time. If patient does not know the appointment time given to them at the time of scheduling, they should contact the Radiation Therapy department for detail. 12/20/2017 8:00 AM  
  [facility] RAD[facility] THERAPY ([phone]) This appointment time is simply a place [place] and may not reflect the true appointment time. If patient does not know the appointment time given to them at the time of scheduling, they should contact the Radiation Therapy department for detail. 12/21/2017 8:00 AM  
  Appointment with Celeste Kumar [facility] RADIATION THERAPY ([phone]) This appointment time is simply a place [place] and may not reflect the true appointment time. If patient does not know the appointment time given to them at the time of scheduling, they should contact the Radiation Therapy department for detail. 12/22/2017 8:00 AM  
  [facility] RAD[facility]AP[facility] ([phone]) This appointment time is simply a place [place] and may not reflect the true appointment time. If patient does not know the appointment time given to them at the time of scheduling, they should contact the Radiation Therapy department for detail. 12/26/2017 8:00 AM  
  Appointment with [address] [place] [address] at [facility] RADIATION THERAPY ([phone]) This appointment time is simply a place [place] and may not reflect the true appointment time. If patient does not know the appointment time given to them at the time of scheduling, they should contact the Radiation Therapy department for detail. 12/27/2017 8:00 AM  
  Appointment with [facility] RADIATION ONCOLOGY at [facility] RADIATION [facility] ([phone]) This appointment time is simply a place [place] and may not reflect the true appointment time. If patient does not know the appointment time given to them at the time of scheduling, they should contact the Radiation Therapy department for detail. 12/28/2017 8:00 AM  
  Appointment with [address] [place] [address] RADIATION THERAPY ([phone]) This appointment time is simply a place [place] and may not reflect the true appointment time. If patient does not know the appointment time given to them at the time of scheduling, they should contact the Radiation Therapy department for detail. 12/29/2017 8:00 AM  
  Appointment with [address] [place] [address] RADIATION THERAPY ([phone]) This appointment time is simply a place [place] and may not reflect the true appointment time. If patient does not know the appointment time given to them at the time of scheduling, they should contact the Radiation Therapy department for detail. Please provide this summary of care documentation to your next provider. Your primary care clinician is listed as [phone] [place] [address]. If you have any questions after today's visit, please call [phone].

## 2017-12-06 NOTE — PROGRESS NOTES
Estefania Taylor is a 75 y.o.  male and presents with    Chief Complaint   Patient presents with    Other     sq cell ca of esophagus    Thyroid Problem    Hypertension    Arthritis     Most recent adm 12/2  D/c 12/3  Nn 12/4  Here for delia   Problems lists updated  Med lists updated        Subjective:    Cardiovascular Review:  The patient has hypertension. Diet and Lifestyle: not attempting to follow a low fat, low cholesterol diet, not attempting to follow a low sodium diet  Home BP Monitoring: is not measured at home. Pertinent ROS: taking medications as instructed, no medication side effects noted, no TIA's, no chest pain on exertion, no dyspnea on exertion, no swelling of ankles. Thyroid Review:  Patient is seen for followup of hypothyroidism. Thyroid ROS: denies fatigue, weight changes, heat/cold intolerance, bowel/skin changes or CVS symptoms. Additional Concerns:          Patient Active Problem List    Diagnosis Date Noted    Malnutrition ([facility] 12/04/2017    Metastatic squamous cell carcinoma [facility] esophagus ([facility] 10/17/2017    Hyperthyroidism 05/30/2014    Arthritis 06/06/2011    Essential hypertension, malignant 08/03/2010     Current Outpatient Prescriptions   Medication Sig Dispense Refill    polyethylene glycol (MIRALAX) 17 gram packet Take 17 g by mouth daily.  magic mouthwash solution Take 5 mL by mouth three (3) times daily.  nut.tx.impaired dige fxn-fiber (VITAL AF 1.2 MAYKEL) 0.08 gram- 1.2 kcal/mL liqd Start with 1 can over an hour 3 times per day for 24 hours. If tolerated, advance to 2 cans over 1.5 hours 3x/day: 9am, 12pm, 6pm (follow toleration over 48 hours). Flush tube before & after with 200ml water each time (400ml total with each feeding)  If patient can drink water, flushes can be re 90 Can 12    oxyCODONE-acetaminophen (PERCOCET 10)  mg per tablet Take 1 Tab by mouth every six (6) hours as needed for Pain.  Max Daily Amount: 4 Tabs. 40 Tab 0    lisinopril (PRINIVIL, ZESTRIL) 20 mg tablet Take 1 Tab by mouth daily. 90 Tab 4    amLODIPine (NORVASC) 5 mg tablet Take 1 Tab by mouth daily. 90 Tab 4     Facility-Administered Medications Ordered in Other Visits   Medication Dose Route Frequency Provider Last Rate Last Dose    sodium chloride (NS) flush 10-40 mL  10-40 mL IntraVENous PRN Martita Shaw MD   10 mL at 12/06/17 1327    0.9% sodium chloride infusion  250 mL/hr IntraVENous CONTINUOUS Martita Shaw MD   Stopped at 12/06/17 1327     No Known Allergies  Past Medical History:   Diagnosis Date    Arthritis     Essential hypertension, malignant 8/3/2010    Leg pain 8/3/2010    Throat [facility])      Past Surgical History:   Procedure Laterality Date    HX GI      G-tube placement on 11/28/17    AK EGD DELIVER THERMAL ENERGY SPHNCTR/CARDIA GERD       Family History   Problem Relation Age of Onset    Heart Disease Mother     Lung Disease Father      Social History   Substance Use Topics    Smoking status: Former Smoker     Packs/day: 0.25     Types: Cigarettes     Start date: 1/17/2017     Quit date: 11/22/2017    Smokeless tobacco: Never Used      Comment: Restarted in Jan 2017    Alcohol use No      Comment: \"NONE IN AWHILE\"       ROS       All other systems reviewed and are negative.       Objective:  Vitals:    12/06/17 1355   BP: 120/83   Pulse: 95   Resp: 16   Temp: 96.7 °F (35.9 °C)   TempSrc: Oral   SpO2: 94%   Weight: 147 lb 3.2 oz (66.8 kg)   Height: 5' 9\" (1.753 m)   PainSc:   0 - No pain                 alert, well appearing, and in no distress, oriented to person, place, and time and normal appearing weight  Chest - clear to auscultation, no wheezes, rales or rhonchi, symmetric air entry  Heart - normal rate, regular rhythm, normal S1, S2, no murmurs, rubs, clicks or gallops  Abdomen - soft, nontender, nondistended, no masses or organomegaly        LABS   Component      Latest Ref Rng & Units 12/6/2017 12/6/2017 12/3/2017 12/3/2017          10:27 AM 10:27 AM  3:00 AM  3:00 AM   WBC      4.5 - 13.0 K/uL  2.2 (L)  3.6 (L)   RBC      4.10 - 5.10 M/uL  3.22 (L)  3.65 (L)   HGB      12.0 - 16.0 g/dL  8.8 (L)  10.0 (L)   HCT      36 - 48 %  26.8 (L)  29.1 (L)   MCV      78 - 102 FL  83.2  79.7   MCH      25.0 - 35.0 PG  27.3  27.4   MCHC      31 - 37 g/dL  32.8  34.4   RDW      11.5 - 14.5 %  14.1  13.5   PLATELET      552 - 518 K/uL  165  153   NEUTROPHILS      40 - 70 %  87 (H)     MIXED CELLS      0.1 - 17 %  9     LYMPHOCYTES      14 - 44 %  5 (L)     ABS. NEUTROPHILS      1.8 - 9.5 K/UL  1.9     ABS. MIXED CELLS      0.0 - 2.3 K/uL  0.2     ABS.  LYMPHOCYTES      1.1 - 5.9 K/UL  0.1 (L)     DF        AUTOMATED     CO2, POC      19 - 24 MMOL/L 25 (H)      GLUCOSE,FAST - POC      74 - 106 MG/DL 99      BUN, POC      7 - 18 MG/DL 9      Creatinine, POC      0.6 - 1.3 MG/DL 0.8      GFRAA, POC      >60 ml/min/1.73m2 >60      GFRNA, POC      >60 ml/min/1.73m2 >60      Sodium (POC)      136 - 145 MMOL/L 137      Potassium (POC)      3.5 - 5.5 MMOL/L 4.0      Calcium, ionized (POC)      1.12 - 1.32 MMOL/L 1.22      Chloride, POC      100 - 108 MMOL/L 102      Anion gap, POC      10 - 20   16      Hematocrit (POC)      36 - 49 % 25 (L)      Hemoglobin (POC)      12 - 16 G/DL 8.5 (L)      Color             Appearance             Specific gravity      1.005 - 1.030         pH (UA)      5.0 - 8.0         Protein      NEG mg/dL       Glucose      NEG mg/dL       Ketone      NEG mg/dL       Bilirubin      NEG         Blood      NEG         Urobilinogen      0.2 - 1.0 EU/dL       Nitrites      NEG         Leukocyte Esterase      NEG         Sodium      136 - 145 mmol/L   136    Potassium      3.5 - 5.5 mmol/L   4.3    Chloride      100 - 108 mmol/L   103    CO2      21 - 32 mmol/L   23    Anion gap      3.0 - 18 mmol/L   10    Glucose      74 - 99 mg/dL   120 (H)    BUN      7.0 - 18 MG/DL   7    Creatinine      0.6 - 1.3 MG/DL 0.49 (L)    BUN/Creatinine ratio      12 - 20     14    GFR est AA      >60 ml/min/1.73m2   >60    GFR est non-AA      >60 ml/min/1.73m2   >60    Calcium      8.5 - 10.1 MG/DL   8.5    MPV      9.2 - 11.8 FL    9.5     Component      Latest Ref Rng & Units 12/2/2017          11:11 AM   WBC      4.5 - 13.0 K/uL    RBC      4.10 - 5.10 M/uL    HGB      12.0 - 16.0 g/dL    HCT      36 - 48 %    MCV      78 - 102 FL    MCH      25.0 - 35.0 PG    MCHC      31 - 37 g/dL    RDW      11.5 - 14.5 %    PLATELET      624 - 279 K/uL    NEUTROPHILS      40 - 70 %    MIXED CELLS      0.1 - 17 %    LYMPHOCYTES      14 - 44 %    ABS. NEUTROPHILS      1.8 - 9.5 K/UL    ABS. MIXED CELLS      0.0 - 2.3 K/uL    ABS.  LYMPHOCYTES      1.1 - 5.9 K/UL    DF          CO2, POC      19 - 24 MMOL/L    GLUCOSE,FAST - POC      74 - 106 MG/DL    BUN, POC      7 - 18 MG/DL    Creatinine, POC      0.6 - 1.3 MG/DL    GFRAA, POC      >60 ml/min/1.73m2    GFRNA, POC      >60 ml/min/1.73m2    Sodium (POC)      136 - 145 MMOL/L    Potassium (POC)      3.5 - 5.5 MMOL/L    Calcium, ionized (POC)      1.12 - 1.32 MMOL/L    Chloride, POC      100 - 108 MMOL/L    Anion gap, POC      10 - 20      Hematocrit (POC)      36 - 49 %    Hemoglobin (POC)      12 - 16 G/DL    Color       YELLOW   Appearance       CLEAR   Specific gravity      1.005 - 1.030   1.014   pH (UA)      5.0 - 8.0   7.0   Protein      NEG mg/dL NEGATIVE   Glucose      NEG mg/dL NEGATIVE   Ketone      NEG mg/dL NEGATIVE   Bilirubin      NEG   NEGATIVE   Blood      NEG   NEGATIVE   Urobilinogen      0.2 - 1.0 EU/dL 0.2   Nitrites      NEG   NEGATIVE   Leukocyte Esterase      NEG   NEGATIVE   Sodium      136 - 145 mmol/L    Potassium      3.5 - 5.5 mmol/L    Chloride      100 - 108 mmol/L    CO2      21 - 32 mmol/L    Anion gap      3.0 - 18 mmol/L    Glucose      74 - 99 mg/dL    BUN      7.0 - 18 MG/DL    Creatinine      0.6 - 1.3 MG/DL    BUN/Creatinine ratio      12 - 20      GFR est AA >60 ml/min/1.73m2    GFR est non-AA      >60 ml/min/1.73m2    Calcium      8.5 - 10.1 MG/DL    MPV      9.2 - 11.8 FL      TESTS      Assessment/Plan:    Hypertension - stable  Thyroid stable  Sq cell ca getting chemo and rad  Various orders reviweed and signed re home health for g tube      Lab review: labs are reviewed, up to date and normal    Diagnoses and all orders for this visit:    1. Essential hypertension, malignant    2. Malnutrition, unspecified type ([facility]    3. Metastatic squamous cell carcinoma to esophagus (HCC)    4. Hyperthyroidism    5. Arthritis          I have discussed the diagnosis with the patient and the intended plan as seen in the above orders. The patient has received an after-visit summary and questions were answered concerning future plans. I have discussed medication side effects and warnings with the patient as well. I have reviewed the plan of care with the patient, accepted their input and they are in agreement with the treatment goals. Follow-up Disposition:  Return in about 4 weeks (around 1/3/2018) for EOV, labs at next visit.

## 2017-12-06 NOTE — PROGRESS NOTES
[facility]:     Patient: Guevara Carrasquillo Person Age: 75 y.o. Sex: male    YOB: 1950 Admit Date: (Not on file) PCP: Willow Mandujano DO   MRN: [id number]  CSN: [id number]       Patient Active Problem List   Diagnosis Code    Essential hypertension, malignant I10    Arthritis M19.90    Proteinuria R80.9    Hyperthyroidism E05.90    Metastatic squamous cell carcinoma to esophagus ([address] C78.89    Gastric perforation, acute ([address] K25.1    Squamous cell esophageal cancer (HCC) C15.9    PEG tube malfunction ([address] K94.23    HTN (hypertension) I10    Malnutrition ([address] E46     Assessment/ Plan:     1.) Advanced Esophageal Cancer-Squamous Cell Carcinoma    -Pathology results reviewed-Squamous Cell Carcinoma  -Status post EGD biopsy per Dr. Liliana Werner 10/12/2017    -s/p evaluation radiation oncology-Dr. Mari Mcpherson [place]  -Complete staging workup with FDG PET CT scan revealed lymph node metastasis (supraclavicular)  -MediPort placement per Dr. Melchor-11/7/2017    -Continue concurrent chemotherapy/radiotherapy    -Week #5 of 5 today-12/6/2017  -Rtc. In 1 week    -Return to clinic in approximately 1 week to reevaluate clinically and for further medical decision-making.  -Closely monitor weekly CBCs and CMP's  -Instructed go the emergency room for any worsening of symptoms and or development of fever    2.)  Mild Dehydration/ WT.  Loss  -Oral liquids encouraged  -Status post G-tube placement  -G-tube feedings per nutritional services and home health    3.)  Dysphasia  -Continue triple mix oral solution per radiation oncology  -Does report tolerating liquids    4.)  Pain secondary to esophageal cancer/cancer treatment  -Discontinued his OxyIR  -Percocet to use as directed  -Monitor   -Random UDS  -Wean his medication down as he completes his concurrent chemotherapy radiotherapy and his esophageal pain has improved      FDG-PET scan 10/19/2017:    IMPRESSION: [Malignant activity at the esophageal mass, middle mediastinal lymph nodes,  supraclavicular lymph nodes compatible with metastases. ]     Activity at the right hilum but without enlarged lymph node, could be early  metastasis.     No malignant activity at subcentimeter pulmonary nodules. Potential for false  negative result exists because of their small size. Advise short-term follow-up  with diagnostic CT.     Activity at dental disease also seen in the mandible and maxilla.     Activity posterior to the right side mandible without definite mass or skeletal  abnormality of uncertain significance. Could be a tiny adjacent lymph node. Attention should be paid to this region on subsequent examinations.       Pathology report 10/12/2017:  FINAL DIAGNOSIS:   ESOPHAGEAL MASS, BIOPSY:   INVASIVE, POORLY DIFFERENTIATED SQUAMOUS CELL CARCINOMA. GROSS DESCRIPTION:     The specimen is received in formalin in a container labeled with the patient's name, Paul Sandra, and biopsy esophageal mass, rule out malignancy and consists of two soft tissue fragments, one pale tan and the other pink to dark red, ranging from 0.3 to 0.5 cm in greatest dimension. The specimen is submitted in toto in cassette A1. (cd)   DMM/clr     I have discussed the diagnosis with the patient and the intended plan as seen in the above orders. I have reviewed the plan of care with the patient, accepted their input and they are in agreement with the treatment goals. Patient has provided input and agrees with goals. History:   Alexandre Sellers is a 75 y.o. male presenting today for:     Distal Esophageal Cancer  -Pathology results reviewed-Squamous cell carcinoma  -Status post EGD biopsy per Dr. Love Krause  -Lost 15 pounds in past 6 months  -No f/c/s/cp or SOB.     -Complaining of worsening dysphagia over the past several months-slightly improved  -Complaining of esophageal pain    Current Outpatient Prescriptions   Medication Sig Dispense Refill    polyethylene glycol (MIRALAX) 17 gram packet Take 17 g by mouth daily.  magic mouthwash solution Take 5 mL by mouth three (3) times daily.  nut.tx.impaired dige fxn-fiber (VITAL AF 1.2 MAYKEL) 0.08 gram- 1.2 kcal/mL liqd Start with 1 can over an hour 3 times per day for 24 hours. If tolerated, advance to 2 cans over 1.5 hours 3x/day: 9am, 12pm, 6pm (follow toleration over 48 hours). Flush tube before & after with 200ml water each time (400ml total with each feeding)  If patient can drink water, flushes can be re 90 Can 12    oxyCODONE-acetaminophen (PERCOCET 10)  mg per tablet Take 1 Tab by mouth every six (6) hours as needed for Pain. Max Daily Amount: 4 Tabs. 40 Tab 0    lisinopril (PRINIVIL, ZESTRIL) 20 mg tablet Take 1 Tab by mouth daily. 90 Tab 4    amLODIPine (NORVASC) 5 mg tablet Take 1 Tab by mouth daily.  90 Tab 4     Facility-Administered Medications Ordered in Other Visits   Medication Dose Route Frequency Provider Last Rate Last Dose    sodium chloride (NS) flush 10-40 mL  10-40 mL IntraVENous PRN Mary Cortez MD   20 mL at 12/06/17 1028    heparin (porcine) pf 500 Units  500 Units InterCATHeter ONCE Mary Cortez MD        0.9% sodium chloride infusion  250 mL/hr IntraVENous CONTINUOUS Mary Cortez  mL/hr at 12/06/17 1032 250 mL/hr at 12/06/17 1032    CARBOplatin (PARAPLATIN) 160 mg in 0.9% sodium chloride 250 mL chemo infusion  160 mg IntraVENous ONCE Mary Cortez MD        PACLitaxel (TAXOL) 90 mg in 0.9% sodium chloride 250 mL chemo infusion  90 mg IntraVENous ONCE Mary Cortez MD           Objective:   VS:      Date/Time Temp Pulse BP MAP (Calculated) Arterial Line 1 BP (mmHg) BP Patient Position Resp SpO2 O2 Device O2 Flow Rate (L/min) Pre/Post Ductal Weight      12/06/17 1012 97.7 °F (36.5 °C) 100 102/69 80 -- Sitting 18 -- Room air -- -- 66.3 kg (146 lb 3.2 oz)             Physical Exam:     Constitutional:  no acute distress, alert and oriented x 3    HENT:  atraumatic   Eyes:  EOMI, PERRLA   Neck:  No palpable masses   Cardiovascular:  S1, S2   Pulmonary/Chest Wall:   clear   Abdominal:  Non tender, no palpable masses       Musculoskeletal:  No deformities   Skin:  No rashes or lesions    Peripheral Vascular:  Pulses palpable       Review of Systems: The remainder of 12 point ROS was otherwise negative except for what was reported in the CC/HPI:      Recent Results (from the past 168 hour(s))   PROTHROMBIN TIME + INR    Collection Time: 12/02/17 10:15 AM   Result Value Ref Range    Prothrombin time 13.1 11.5 - 15.2 sec    INR 1.0 0.8 - 1.2     CBC WITH AUTOMATED DIFF    Collection Time: 12/02/17 10:15 AM   Result Value Ref Range    WBC 2.7 (L) 4.6 - 13.2 K/uL    RBC 4.31 (L) 4.70 - 5.50 M/uL    HGB 11.9 (L) 13.0 - 16.0 g/dL    HCT 34.7 (L) 36.0 - 48.0 %    MCV 80.5 74.0 - 97.0 FL    MCH 27.6 24.0 - 34.0 PG    MCHC 34.3 31.0 - 37.0 g/dL    RDW 13.7 11.6 - 14.5 %    PLATELET 441 566 - 209 K/uL    MPV 9.5 9.2 - 11.8 FL    NEUTROPHILS 89 (H) 40 - 73 %    LYMPHOCYTES 6 (L) 21 - 52 %    MONOCYTES 2 (L) 3 - 10 %    EOSINOPHILS 3 0 - 5 %    BASOPHILS 0 0 - 2 %    ABS. NEUTROPHILS 2.4 1.8 - 8.0 K/UL    ABS. LYMPHOCYTES 0.2 (L) 0.9 - 3.6 K/UL    ABS. MONOCYTES 0.1 0.05 - 1.2 K/UL    ABS. EOSINOPHILS 0.1 0.0 - 0.4 K/UL    ABS.  BASOPHILS 0.0 0.0 - 0.06 K/UL    DF AUTOMATED     METABOLIC PANEL, BASIC    Collection Time: 12/02/17 10:15 AM   Result Value Ref Range    Sodium 133 (L) 136 - 145 mmol/L    Potassium 3.9 3.5 - 5.5 mmol/L    Chloride 100 100 - 108 mmol/L    CO2 29 21 - 32 mmol/L    Anion gap 4 3.0 - 18 mmol/L    Glucose 87 74 - 99 mg/dL    BUN 8 7.0 - 18 MG/DL    Creatinine 0.70 0.6 - 1.3 MG/DL    BUN/Creatinine ratio 11 (L) 12 - 20      GFR est AA >60 >60 ml/min/1.73m2    GFR est non-AA >60 >60 ml/min/1.73m2    Calcium 9.6 8.5 - 10.1 MG/DL   URINALYSIS W/ RFLX MICROSCOPIC    Collection Time: 12/02/17 11:11 AM   Result Value Ref Range    Color YELLOW      Appearance CLEAR Specific gravity 1.014 1.005 - 1.030      pH (UA) 7.0 5.0 - 8.0      Protein NEGATIVE  NEG mg/dL    Glucose NEGATIVE  NEG mg/dL    Ketone NEGATIVE  NEG mg/dL    Bilirubin NEGATIVE  NEG      Blood NEGATIVE  NEG      Urobilinogen 0.2 0.2 - 1.0 EU/dL    Nitrites NEGATIVE  NEG      Leukocyte Esterase NEGATIVE  NEG     METABOLIC PANEL, BASIC    Collection Time: 12/03/17  3:00 AM   Result Value Ref Range    Sodium 136 136 - 145 mmol/L    Potassium 4.3 3.5 - 5.5 mmol/L    Chloride 103 100 - 108 mmol/L    CO2 23 21 - 32 mmol/L    Anion gap 10 3.0 - 18 mmol/L    Glucose 120 (H) 74 - 99 mg/dL    BUN 7 7.0 - 18 MG/DL    Creatinine 0.49 (L) 0.6 - 1.3 MG/DL    BUN/Creatinine ratio 14 12 - 20      GFR est AA >60 >60 ml/min/1.73m2    GFR est non-AA >60 >60 ml/min/1.73m2    Calcium 8.5 8.5 - 10.1 MG/DL   CBC W/O DIFF    Collection Time: 12/03/17  3:00 AM   Result Value Ref Range    WBC 3.6 (L) 4.6 - 13.2 K/uL    RBC 3.65 (L) 4.70 - 5.50 M/uL    HGB 10.0 (L) 13.0 - 16.0 g/dL    HCT 29.1 (L) 36.0 - 48.0 %    MCV 79.7 74.0 - 97.0 FL    MCH 27.4 24.0 - 34.0 PG    MCHC 34.4 31.0 - 37.0 g/dL    RDW 13.5 11.6 - 14.5 %    PLATELET 478 390 - 866 K/uL    MPV 9.5 9.2 - 11.8 FL   CBC WITH 3 PART DIFF    Collection Time: 12/06/17 10:27 AM   Result Value Ref Range    WBC 2.2 (L) 4.5 - 13.0 K/uL    RBC 3.22 (L) 4.10 - 5.10 M/uL    HGB 8.8 (L) 12.0 - 16.0 g/dL    HCT 26.8 (L) 36 - 48 %    MCV 83.2 78 - 102 FL    MCH 27.3 25.0 - 35.0 PG    MCHC 32.8 31 - 37 g/dL    RDW 14.1 11.5 - 14.5 %    PLATELET 264 226 - 769 K/uL    NEUTROPHILS 87 (H) 40 - 70 %    MIXED CELLS 9 0.1 - 17 %    LYMPHOCYTES 5 (L) 14 - 44 %    ABS. NEUTROPHILS 1.9 1.8 - 9.5 K/UL    ABS. MIXED CELLS 0.2 0.0 - 2.3 K/uL    ABS.  LYMPHOCYTES 0.1 (L) 1.1 - 5.9 K/UL    DF AUTOMATED     POC CHEM8    Collection Time: 12/06/17 10:27 AM   Result Value Ref Range    CO2, POC 25 (H) 19 - 24 MMOL/L    Glucose, POC 99 74 - 106 MG/DL    BUN, POC 9 7 - 18 MG/DL    Creatinine, POC 0.8 0.6 - 1.3 MG/DL    GFRAA, POC >60 >60 ml/min/1.73m2    GFRNA, POC >60 >60 ml/min/1.73m2    Sodium,  136 - 145 MMOL/L    Potassium, POC 4.0 3.5 - 5.5 MMOL/L    Calcium, ionized (POC) 1.22 1.12 - 1.32 MMOL/L    Chloride,  100 - 108 MMOL/L    Anion gap, POC 16 10 - 20      Hematocrit, POC 25 (L) 36 - 49 %    Hemoglobin, POC 8.5 (L) 12 - 16 G/DL       Past Medical History:   Diagnosis Date    Arthritis     Essential hypertension, malignant 8/3/2010    Leg pain 8/3/2010    Throat cancer [facility])        Past Surgical History:   Procedure Laterality Date    HX GI      G-tube placement on 11/28/17    MI EGD DELIVER THERMAL ENERGY SPHNCTR/CARDIA GERD         Social History     Social History    Marital status:      Spouse name: N/A    Number of children: N/A    Years of education: N/A     Occupational History    Not on file. Social History Main Topics    Smoking status: Former Smoker     Packs/day: 0.25     Types: Cigarettes     Start date: 1/17/2017     Quit date: 11/22/2017    Smokeless tobacco: Never Used      Comment: Restarted in Jan 2017    Alcohol use No      Comment: \"NONE IN AWHILE\"    Drug use: No    Sexual activity: No     Other Topics Concern    Not on file     Social History Narrative       Family History   Problem Relation Age of Onset    Heart Disease Mother     Lung Disease Father        No Known Allergies    Follow-up Disposition: Not on File    Pretty Ramirez MD, MPH Hematology-Medical Oncology  December 6, 2017 1:16 PM

## 2017-12-06 NOTE — PROGRESS NOTES
Pharmacy Note     Name: Julia Ahmadi Person  : 1950  Estimated body surface area is 1.8 meters squared as calculated from the following:    Height as of this encounter: 175.3 cm (69\"). Weight as of this encounter: 66.3 kg (146 lb 3.2 oz). Diagnosis: esophageal cancer  Treatment Plan: carboplatin/paclitaxel/XRT esophageal   Cycle/Day: Cycle 5 day 1  Cycle Start Date: 17    Lab Results   Component Value Date/Time    WBC 2.2 2017 10:27 AM    PLATELET 543 /15/0263 10:27 AM     Lab Results   Component Value Date/Time    ABS. NEUTROPHILS 1.9 2017 10:27 AM     Lab Results   Component Value Date/Time    Creatinine, POC 0.8 2017 10:27 AM    Creatinine 0.49 2017 03:00 AM     Most Recent Creatinine Clearance:  CrCl: 140.7 mL/min (based on Adjusted Body Weight)  Creatinine: 0.49 mg/dL (12/3/2017)        Pharmacy Intervention: Patient's s.cr improved and new carboplatin dose >10% from current ordered dose. Carboplatin dose does not need to be adjusted per Dr. Cathy Simmons. Today is last cycle of treatment. Continue current carboplatin dose of 160 mg.      Pharmacist: Tang Echevarria

## 2017-12-06 NOTE — MR AVS SNAPSHOT
Visit Information Date & Time Provider Department Dept. Phone Encounter #  
 12/6/2017 11:15 AM Anthony Aaron MD [address] [place] [address] Oncology [phone] [id number] Follow-up Instructions Return in about 1 week (around 12/13/2017), or if symptoms worsen or fail to improve, for reeval.. Routing History Follow-up and Disposition History Your Appointments 1/15/2018 12:30 PM  
ROUTINE CARE with Keith Galicia [place] [address] [address] [address] [place] [address]) Appt Note: Return in about 4 weeks (around 1/3/2018) for EOV, labs at next visit. [address] [facility] [address]  
  
   
 [address] [place] [address] [address] Upcoming Health Maintenance Date Due Pneumococcal 65+ High/Highest Risk (2 of 2 - PPSV23) 1/14/2016 GLAUCOMA SCREENING Q2Y 7/14/2018 MEDICARE YEARLY EXAM 8/16/2018 COLONOSCOPY 6/6/2021 DTaP/Tdap/Td series (2 - Td) 8/15/2027 Allergies as of 12/6/2017  Review Complete On: 12/6/2017 By: Keith Galicia DO No Known Allergies Current Immunizations  Reviewed on 1/4/2017 Name Date Influenza High Dose Vaccine PF 1/4/2017, 11/19/2015 Influenza Vaccine 11/18/2014, 2/4/2013 Influenza Vaccine (Quad) PF 10/22/2017 10:21 PM  
 Influenza Vaccine Split 11/17/2011, 11/22/2010 Pneumococcal Conjugate (PCV-13) 11/19/2015 TD Vaccine 8/3/2006 Tdap 8/15/2017 ZZZ-RETIRED (DO NOT USE) Pneumococcal Vaccine (Unspecified Type) 11/22/2010 Not reviewed this visit You Were Diagnosed With   
  
 Codes Comments Squamous cell esophageal cancer (HCC)    -  Primary ICD-10-CM: C15.9 ICD-9-CM: 150.9 Malnutrition of moderate degree (HCC)     ICD-10-CM: E44.0 ICD-9-CM: 263.0 Pain aggravated by eating or drinking     ICD-10-CM: R52 ICD-9-CM: 780.96 Esophageal mass     ICD-10-CM: K22.9 ICD-9-CM: 530.9 Vitals Smoking Status Former Smoker Preferred Pharmacy Pharmacy Name Phone [facility] [address] [place] [facility] [phone] Your Updated Medication List  
  
   
This list is accurate as of: 12/6/17  4:24 PM.  Always use your most recent med list. amLODIPine 5 mg tablet Commonly known as:  Wandra Bianka Take 1 Tab by mouth daily. lisinopril 20 mg tablet Commonly known as:  Azalia Darrell Take 1 Tab by mouth daily. magic mouthwash solution Take 5 mL by mouth three (3) times daily.  
  
 nut.tx.impaired dige fxn-fiber 0.08 gram- 1.2 kcal/mL Liqd Commonly known as:  VITAL AF 1.2 MAYKEL Start with 1 can over an hour 3 times per day for 24 hours. If tolerated, advance to 2 cans over 1.5 hours 3x/day: 9am, 12pm, 6pm (follow toleration over 48 hours). Flush tube before & after with 200ml water each time (400ml total with each feeding) If patient can drink water, flushes can be re  
  
 oxyCODONE-acetaminophen  mg per tablet Commonly known as:  PERCOCET 10 Take 1 Tab by mouth every six (6) hours as needed for Pain. Max Daily Amount: 4 Tabs. polyethylene glycol 17 gram packet Commonly known as:  Jacinta Lamprey Take 17 g by mouth daily. Follow-up Instructions Return in about 1 week (around 12/13/2017), or if symptoms worsen or fail to improve, for reeval.. To-Do List   
 12/07/2017 To Be Determined Appointment with Yoly Goff RN at [address] [place] [address] MED CTR  
  
 12/07/2017 8:00 AM  
  Appointment with [address] [place] [address] RADIATION THERAPY ([phone]) This appointment time is simply a place [place] and may not reflect the true appointment time. If patient does not know the appointment time given to them at the time of scheduling, they should contact the Radiation Therapy department for detail.   
  
 12/07/2017 3:30 PM  
 Appointment with Harman Kim RD at [facility] ([phone]) 12/08/2017 8:00 AM  
  Appointment with [facility] RADIATION ONCOLOGY at [facility] RADIATION THERAPY ([phone]) This appointment time is simply a place [place] and may not reflect the true appointment time. If patient does not know the appointment time given to them at the time of scheduling, they should contact the Radiation Therapy department for detail. 12/09/2017 To Be Determined Appointment with Renae Ordonez RN at [facility] MED CTR  
  
 12/11/2017 To Be Determined Appointment with Renae Ordonez RN at [address] [place] [address] MED CTR  
  
 12/11/2017 8:00 AM  
  Appointment with Celeste Nayak [place] at [facility] RADIATION THERAPY ([phone]) This appointment time is simply a place [place] and may not reflect the true appointment time. If patient does not know the appointment time given to them at the time of scheduling, they should contact the Radiation Therapy department for detail. 12/12/2017 8:00 AM  
  Appointment with Celeste Nayak [place] at [facility] RADIATION THERAPY ([phone]) This appointment time is simply a place [place] and may not reflect the true appointment time. If patient does not know the appointment time given to them at the time of scheduling, they should contact the Radiation Therapy department for detail. 12/13/2017 To Be Determined Appointment with Renae Ordonez RN at [facility] MED CTR  
  
 12/13/2017 8:00 AM  
  Appointment with Celeste Nayak [facility] RADIATION THERAPY ([phone]) This appointment time is simply a place [place] and may not reflect the true appointment time. If patient does not know the appointment time given to them at the time of scheduling, they should contact the Radiation Therapy department for detail.   
  
 12/14/2017 8:00 AM  
 Appointment with Celeste Nayak [facility] RADIATION THERAPY ([phone]) This appointment time is simply a place [place] and may not reflect the true appointment time. If patient does not know the appointment time given to them at the time of scheduling, they should contact the Radiation Therapy department for detail. 12/15/2017 To Be Determined Appointment with Shade Madrigal RN at [address] [facility]  
  
 12/18/2017 8:00 AM  
  Appointment with Celeste Nayak [facility] RADIATION THERAPY ([phone]) This appointment time is simply a place [place] and may not reflect the true appointment time. If patient does not know the appointment time given to them at the time of scheduling, they should contact the Radiation Therapy department for detail. 12/19/2017 8:00 AM  
  Appointment with Celeste Nayak [facility] RADIATION THERAPY ([phone]) This appointment time is simply a place [place] and may not reflect the true appointment time. If patient does not know the appointment time given to them at the time of scheduling, they should contact the Radiation Therapy department for detail. 12/20/2017 8:00 AM  
  Appointment with Celeste Nayak [facility] RADIATION THERAPY ([phone]) This appointment time is simply a place [place] and may not reflect the true appointment time. If patient does not know the appointment time given to them at the time of scheduling, they should contact the Radiation Therapy department for detail. 12/21/2017 8:00 AM  
  Appointment with Celeste Kumar [facility] RADIATION THERAPY ([phone]) This appointment time is simply a place [place] and may not reflect the true appointment time. If patient does not know the appointment time given to them at the time of scheduling, they should contact the Radiation Therapy department for detail.   
  
 12/22/2017 8:00 AM  
 [facility] RAD[facility] THERAP[facility] ([phone]) This appointment time is simply a place [place] and may not reflect the true appointment time. If patient does not know the appointment time given to them at the time of scheduling, they should contact the Radiation Therapy department for detail. 12/26/2017 8:00 AM  
  [facility] RADIATION THERAPY ([phone]) This appointment time is simply a place [place] and may not reflect the true appointment time. If patient does not know the appointment time given to them at the time of scheduling, they should contact the Radiation Therapy department for detail. 12/27/2017 8:00 AM  
  Appointment with [facility] RADIATION ONCOLOGY at [facility] RADIATION THERAPY ([phone]) This appointment time is simply a place [place] and may not reflect the true appointment time. If patient does not know the appointment time given to them at the time of scheduling, they should contact the Radiation Therapy department for detail. 12/28/2017 8:00 AM  
  [facility] RADIATION THERAPY ([phone]) This appointment time is simply a place [place] and may not reflect the true appointment time. If patient does not know the appointment time given to them at the time of scheduling, they should contact the Radiation Therapy department for detail. 12/29/2017 8:00 AM  
  [facility] RADIATION THERAPY ([phone]) This appointment time is simply a place [place] and may not reflect the true appointment time. If patient does not know the appointment time given to them at the time of scheduling, they should contact the Radiation Therapy department for detail. Please provide this summary of care documentation to your next provider. Your primary care clinician is listed as [address] [facility].  If you have any questions after today's visit, please call [phone].

## 2017-12-06 NOTE — PROGRESS NOTES
[facility] Progress Note    Date: 2017    Name: Deyvi Schwab Person              MRN: [id number]              : 1950    Chemotherapy Cycle: Paclitaxel/Carboplatin C1D1       Pt to [facility], ambulatory, at 1010. Mr. Honorio Mclain was assessed and education was provided. Mr. Sellers's vitals were reviewed. Visit Vitals    /69 (BP 1 Location: Left arm, BP Patient Position: Sitting)    Pulse 100    Temp 97.7 °F (36.5 °C)    Resp 18    Ht 5' 9\" (1.753 m)    Wt 66.3 kg (146 lb 3.2 oz)    BMI 21.59 kg/m2       Right chest mediport accessed with 20 g 3/4 inch rios needle. Port flushed easily and had brisk blood return. Blood drawn off and sent for CBC, and BMP per written orders after 10 ml waste. NS initiated @ [facility]. Lab results were obtained and reviewed. Recent Results (from the past 12 hour(s))   CBC WITH 3 PART DIFF    Collection Time: 17 10:27 AM   Result Value Ref Range    WBC 2.2 (L) 4.5 - 13.0 K/uL    RBC 3.22 (L) 4.10 - 5.10 M/uL    HGB 8.8 (L) 12.0 - 16.0 g/dL    HCT 26.8 (L) 36 - 48 %    MCV 83.2 78 - 102 FL    MCH 27.3 25.0 - 35.0 PG    MCHC 32.8 31 - 37 g/dL    RDW 14.1 11.5 - 14.5 %    PLATELET 324 638 - 888 K/uL    NEUTROPHILS 87 (H) 40 - 70 %    MIXED CELLS 9 0.1 - 17 %    LYMPHOCYTES 5 (L) 14 - 44 %    ABS. NEUTROPHILS 1.9 1.8 - 9.5 K/UL    ABS. MIXED CELLS 0.2 0.0 - 2.3 K/uL    ABS.  LYMPHOCYTES 0.1 (L) 1.1 - 5.9 K/UL    DF AUTOMATED     POC CHEM8    Collection Time: 17 10:27 AM   Result Value Ref Range    CO2, POC 25 (H) 19 - 24 MMOL/L    Glucose, POC 99 74 - 106 MG/DL    BUN, POC 9 7 - 18 MG/DL    Creatinine, POC 0.8 0.6 - 1.3 MG/DL    GFRAA, POC >60 >60 ml/min/1.73m2    GFRNA, POC >60 >60 ml/min/1.73m2    Sodium,  136 - 145 MMOL/L    Potassium, POC 4.0 3.5 - 5.5 MMOL/L    Calcium, ionized (POC) 1.22 1.12 - 1.32 MMOL/L    Chloride,  100 - 108 MMOL/L    Anion gap, POC 16 10 - 20      Hematocrit, POC 25 (L) 36 - 49 %    Hemoglobin, POC 8.5 (L) 12 - 16 G/DL       Lab results within ordered parameters to give chemo today. ANC = 1.9, PLT = 165. Chemo dosages verified with today's BSA and found to be within 10% of ordered dosages. Pre-medications (Aloxi 0.25 mg, Decadron 12 mg, Pepcid 20 mg, & Benadryl 50 mg) were administered as ordered and chemotherapy was initiated after blood return from port re-verified. Reviewed expected side effects of premeds with patient. Taxol 90 mg was infused at  285 ml/hr over 60 minutes. VS stable at end of infusion and pt denied complaints. Line flushed with NS and blood return from port re-verified. Carboplatin 200 mg was infused at  590 ml/hr over 30 minutes. VS stable at end of infusion and pt denied complaints. Line flushed with NS and blood return from port re-verified. Mr. Lauren Rothman tolerated infusion, and had no complaints at this time. Mediport flushed with NS 20 ml and Heparin 500 units then de-accessed. No irritation or bleeding noted. Bandaid applied. Patient armband removed and shredded. Mr. Lauren Rothman was discharged from [place] [address] in stable condition at 1330. He is to return next week for his next appointment.     Xiao Andrews RN  December 6, 2017

## 2017-12-07 ENCOUNTER — HOME CARE VISIT (OUTPATIENT)
Dept: SCHEDULING | Facility: HOME HEALTH | Age: 67
End: 2017-12-07
Payer: MEDICARE

## 2017-12-07 ENCOUNTER — HOME CARE VISIT (OUTPATIENT)
Dept: HOME HEALTH SERVICES | Facility: HOME HEALTH | Age: 67
End: 2017-12-07
Payer: MEDICARE

## 2017-12-07 ENCOUNTER — HOSPITAL ENCOUNTER (OUTPATIENT)
Dept: RADIATION THERAPY | Age: 67
Discharge: HOME | End: 2017-12-07
Payer: MEDICARE

## 2017-12-07 PROCEDURE — G0299 HHS/HOSPICE OF RN EA 15 MIN: HCPCS

## 2017-12-07 PROCEDURE — 77386 HC IMRT TRMT DLVR COMPL: CPT

## 2017-12-07 PROCEDURE — 3331090001 HH PPS REVENUE CREDIT

## 2017-12-07 PROCEDURE — 3331090002 HH PPS REVENUE DEBIT

## 2017-12-08 ENCOUNTER — HOSPITAL ENCOUNTER (OUTPATIENT)
Dept: RADIATION THERAPY | Age: 67
Discharge: HOME | End: 2017-12-08
Payer: MEDICARE

## 2017-12-08 ENCOUNTER — TELEPHONE (OUTPATIENT)
Dept: FAMILY MEDICINE CLINIC | Age: 67
End: 2017-12-08

## 2017-12-08 VITALS
RESPIRATION RATE: 16 BRPM | SYSTOLIC BLOOD PRESSURE: 110 MMHG | TEMPERATURE: 98.5 F | HEART RATE: 88 BPM | DIASTOLIC BLOOD PRESSURE: 62 MMHG

## 2017-12-08 PROCEDURE — 3331090001 HH PPS REVENUE CREDIT

## 2017-12-08 PROCEDURE — 77386 HC IMRT TRMT DLVR COMPL: CPT

## 2017-12-08 PROCEDURE — 3331090002 HH PPS REVENUE DEBIT

## 2017-12-08 NOTE — TELEPHONE ENCOUNTER
Sangita Win from [facility]   requesting a call back regarding patient     She can be reached at [phone] ext

## 2017-12-08 NOTE — TELEPHONE ENCOUNTER
Attempted to call back Maricruz Burnette from [address]  Their office is currently closed for the day    Will reach out again at another time

## 2017-12-09 ENCOUNTER — HOME CARE VISIT (OUTPATIENT)
Dept: SCHEDULING | Facility: HOME HEALTH | Age: 67
End: 2017-12-09
Payer: MEDICARE

## 2017-12-09 PROCEDURE — 3331090001 HH PPS REVENUE CREDIT

## 2017-12-09 PROCEDURE — G0299 HHS/HOSPICE OF RN EA 15 MIN: HCPCS

## 2017-12-09 PROCEDURE — 3331090002 HH PPS REVENUE DEBIT

## 2017-12-10 PROCEDURE — 3331090002 HH PPS REVENUE DEBIT

## 2017-12-10 PROCEDURE — 3331090001 HH PPS REVENUE CREDIT

## 2017-12-11 ENCOUNTER — HOME CARE VISIT (OUTPATIENT)
Dept: SCHEDULING | Facility: HOME HEALTH | Age: 67
End: 2017-12-11
Payer: MEDICARE

## 2017-12-11 ENCOUNTER — HOSPITAL ENCOUNTER (OUTPATIENT)
Dept: RADIATION THERAPY | Age: 67
Discharge: HOME | End: 2017-12-11
Payer: MEDICARE

## 2017-12-11 PROCEDURE — 3331090002 HH PPS REVENUE DEBIT

## 2017-12-11 PROCEDURE — 3331090001 HH PPS REVENUE CREDIT

## 2017-12-11 PROCEDURE — G0299 HHS/HOSPICE OF RN EA 15 MIN: HCPCS

## 2017-12-11 PROCEDURE — 77386 HC IMRT TRMT DLVR COMPL: CPT

## 2017-12-11 PROCEDURE — 77336 RADIATION PHYSICS CONSULT: CPT

## 2017-12-12 ENCOUNTER — HOSPITAL ENCOUNTER (OUTPATIENT)
Dept: RADIATION THERAPY | Age: 67
Discharge: HOME | End: 2017-12-12
Payer: MEDICARE

## 2017-12-12 VITALS
OXYGEN SATURATION: 95 % | TEMPERATURE: 96.9 F | RESPIRATION RATE: 20 BRPM | SYSTOLIC BLOOD PRESSURE: 100 MMHG | HEART RATE: 78 BPM | DIASTOLIC BLOOD PRESSURE: 60 MMHG

## 2017-12-12 PROCEDURE — 3331090002 HH PPS REVENUE DEBIT

## 2017-12-12 PROCEDURE — 77386 HC IMRT TRMT DLVR COMPL: CPT

## 2017-12-12 PROCEDURE — 3331090001 HH PPS REVENUE CREDIT

## 2017-12-13 ENCOUNTER — OFFICE VISIT (OUTPATIENT)
Dept: ONCOLOGY | Age: 67
End: 2017-12-13

## 2017-12-13 ENCOUNTER — HOSPITAL ENCOUNTER (OUTPATIENT)
Dept: RADIATION THERAPY | Age: 67
Discharge: HOME | End: 2017-12-13
Payer: MEDICARE

## 2017-12-13 ENCOUNTER — HOME CARE VISIT (OUTPATIENT)
Dept: SCHEDULING | Facility: HOME HEALTH | Age: 67
End: 2017-12-13
Payer: MEDICARE

## 2017-12-13 VITALS
HEART RATE: 81 BPM | SYSTOLIC BLOOD PRESSURE: 123 MMHG | WEIGHT: 147 LBS | TEMPERATURE: 98 F | BODY MASS INDEX: 21.77 KG/M2 | RESPIRATION RATE: 17 BRPM | OXYGEN SATURATION: 100 % | DIASTOLIC BLOOD PRESSURE: 81 MMHG | HEIGHT: 69 IN

## 2017-12-13 DIAGNOSIS — K22.89 ESOPHAGEAL MASS: ICD-10-CM

## 2017-12-13 DIAGNOSIS — C15.9 SQUAMOUS CELL ESOPHAGEAL CANCER (HCC): Primary | ICD-10-CM

## 2017-12-13 DIAGNOSIS — R52 PAIN AGGRAVATED BY EATING OR DRINKING: ICD-10-CM

## 2017-12-13 DIAGNOSIS — C15.9 MALIGNANT NEOPLASM OF ESOPHAGUS, UNSPECIFIED LOCATION (HCC): ICD-10-CM

## 2017-12-13 DIAGNOSIS — E44.0 MALNUTRITION OF MODERATE DEGREE (HCC): ICD-10-CM

## 2017-12-13 PROCEDURE — 3331090001 HH PPS REVENUE CREDIT

## 2017-12-13 PROCEDURE — 3331090002 HH PPS REVENUE DEBIT

## 2017-12-13 PROCEDURE — 77386 HC IMRT TRMT DLVR COMPL: CPT

## 2017-12-13 RX ORDER — OXYCODONE AND ACETAMINOPHEN 10; 325 MG/1; MG/1
1 TABLET ORAL
Qty: 40 TAB | Refills: 0 | Status: SHIPPED | OUTPATIENT
Start: 2017-12-13 | End: 2017-12-28 | Stop reason: SDUPTHER

## 2017-12-13 NOTE — MR AVS SNAPSHOT
Visit Information Date & Time Provider Department Dept. Phone Encounter #  
 12/13/2017  9:30 AM Juanita Murray, [address] [address] Oncology [phone] [id number] Follow-up Instructions Return in about 2 weeks (around 12/27/2017), or if symptoms worsen or fail to improve, for reeval.. Your Appointments 1/15/2018 12:30 PM  
ROUTINE CARE with Allie Castillomartha.,  [facility] [address] [place] [address]) Appt Note: Return in about 4 weeks (around 1/3/2018) for EOV, labs at next visit. [address] [place] [address]  
  
   
 [address] [place] [address] [address] Upcoming Health Maintenance Date Due Pneumococcal 65+ High/Highest Risk (2 of 2 - PPSV23) 1/14/2016 GLAUCOMA SCREENING Q2Y 7/14/2018 MEDICARE YEARLY EXAM 8/16/2018 COLONOSCOPY 6/6/2021 DTaP/Tdap/Td series (2 - Td) 8/15/2027 Allergies as of 12/13/2017  Review Complete On: 12/13/2017 By: Juanita Murray MD  
 No Known Allergies Current Immunizations  Reviewed on 1/4/2017 Name Date Influenza High Dose Vaccine PF 1/4/2017, 11/19/2015 Influenza Vaccine 11/18/2014, 2/4/2013 Influenza Vaccine (Quad) PF 10/22/2017 10:21 PM  
 Influenza Vaccine Split 11/17/2011, 11/22/2010 Pneumococcal Conjugate (PCV-13) 11/19/2015 TD Vaccine 8/3/2006 Tdap 8/15/2017 ZZZ-RETIRED (DO NOT USE) Pneumococcal Vaccine (Unspecified Type) 11/22/2010 Not reviewed this visit You Were Diagnosed With   
  
 Codes Comments Squamous cell esophageal cancer (HCC)    -  Primary ICD-10-CM: C15.9 ICD-9-CM: 150.9 Malnutrition of moderate degree (HCC)     ICD-10-CM: E44.0 ICD-9-CM: 263.0 Pain aggravated by eating or drinking     ICD-10-CM: R52 ICD-9-CM: 780.96 Esophageal mass     ICD-10-CM: K22.9 ICD-9-CM: 530.9 Malignant neoplasm of esophagus, unspecified location [facility])     ICD-10-CM: C15.9 ICD-9-CM: 150.9 Vitals Resp Height(growth percentile) Weight(growth percentile) SpO2 BMI Smoking Status 17 5' 9\" (1.753 m) 147 lb (66.7 kg) 100% 21.71 kg/m2 Former Smoker BMI and BSA Data Body Mass Index Body Surface Area 21.71 kg/m 2 1.8 m 2 Preferred Pharmacy Pharmacy Name Phone [facility] [address], [place] [phone] Your Updated Medication List  
  
   
This list is accurate as of: 12/13/17  9:58 AM.  Always use your most recent med list. amLODIPine 5 mg tablet Commonly known as:  Anisha Fraction Take 1 Tab by mouth daily. lisinopril 20 mg tablet Commonly known as:  [place] Take 1 Tab by mouth daily. magic mouthwash solution Take 5 mL by mouth three (3) times daily.  
  
 nut.tx.impaired dige fxn-fiber 0.08 gram- 1.2 kcal/mL Liqd Commonly known as:  VITAL AF 1.2 MAYKEL Start with 1 can over an hour 3 times per day for 24 hours. If tolerated, advance to 2 cans over 1.5 hours 3x/day: 9am, 12pm, 6pm (follow toleration over 48 hours). Flush tube before & after with 200ml water each time (400ml total with each feeding) If patient can drink water, flushes can be re  
  
 oxyCODONE-acetaminophen  mg per tablet Commonly known as:  PERCOCET 10 Take 1 Tab by mouth every six (6) hours as needed for Pain. Max Daily Amount: 4 Tabs. polyethylene glycol 17 gram packet Commonly known as:  Reda Randolph Take 17 g by mouth daily. Prescriptions Printed Refills  
 oxyCODONE-acetaminophen (PERCOCET 10)  mg per tablet 0 Sig: Take 1 Tab by mouth every six (6) hours as needed for Pain. Max Daily Amount: 4 Tabs. Class: Print Route: Oral  
  
Follow-up Instructions Return in about 2 weeks (around 12/27/2017), or if symptoms worsen or fail to improve, for reeval.. To-Do List   
 12/14/2017 8:00 AM  
  Appointment with [address] [place] [address] RADIATION THERAPY ([phone]) This appointment time is simply a place [place] and may not reflect the true appointment time. If patient does not know the appointment time given to them at the time of scheduling, they should contact the Radiation Therapy department for detail. 12/15/2017 To Be Determined Appointment with Tila Hope RN at [address] [facility]  
  
 12/18/2017 8:00 AM  
  Appointment with [facility] RADIATION THERAPY ([phone]) This appointment time is simply a place [place] and may not reflect the true appointment time. If patient does not know the appointment time given to them at the time of scheduling, they should contact the Radiation Therapy department for detail. 12/19/2017 To Be Determined Appointment with Tila Hope RN at [address] [facility]  
  
 12/19/2017 8:00 AM  
  Appointment with [facility] RADIATION THERAPY ([phone]) This appointment time is simply a place [place] and may not reflect the true appointment time. If patient does not know the appointment time given to them at the time of scheduling, they should contact the Radiation Therapy department for detail. 12/20/2017 8:00 AM  
  Appointment with [facility] RADIATION THERAPY ([phone]) This appointment time is simply a place [place] and may not reflect the true appointment time. If patient does not know the appointment time given to them at the time of scheduling, they should contact the Radiation Therapy department for detail. 12/21/2017 8:00 AM  
  Appointment with [facility] RADIATION THERAPY ([phone]) This appointment time is simply a place [place] and may not reflect the true appointment time. If patient does not know the appointment time given to them at the time of scheduling, they should contact the Radiation Therapy department for detail. 12/22/2017 To Be Determined Appointment with Leticia Larson RN at [address] [facility]  
  
 12/22/2017 8:00 AM  
  [facility] RAD[facility]ATION THERAP[facility] ([phone]) This appointment time is simply a place [place] and may not reflect the true appointment time. If patient does not know the appointment time given to them at the time of scheduling, they should contact the Radiation Therapy department for detail. 12/26/2017 To Be Determined Appointment with Leticia Larson RN at [address] [facility]  
  
 12/26/2017 8:00 AM  
  Appointment with [address] [place] [address] RADIATION THERAPY ([phone]) This appointment time is simply a place [place] and may not reflect the true appointment time. If patient does not know the appointment time given to them at the time of scheduling, they should contact the Radiation Therapy department for detail. 12/27/2017 8:00 AM  
  Appointment with [facility] RADIATION ONCOLOGY at [facility] RADIATION THERAPY ([phone]) This appointment time is simply a place [place] and may not reflect the true appointment time. If patient does not know the appointment time given to them at the time of scheduling, they should contact the Radiation Therapy department for detail. 12/28/2017 8:00 AM  
  Appointment with [address] [place] [address] RADIATION THERAPY ([phone]) This appointment time is simply a place [place] and may not reflect the true appointment time. If patient does not know the appointment time given to them at the time of scheduling, they should contact the Radiation Therapy department for detail. 12/29/2017 To Be Determined Appointment with Leticia Larson RN at [address] [facility]  
  
 12/29/2017 8:00 AM  
  Appointment with [address] [place] [address] RADIATION THERAPY ([phone]) This appointment time is simply a place [place] and may not reflect the true appointment time. If patient does not know the appointment time given to them at the time of scheduling, they should contact the Radiation Therapy department for detail. Please provide this summary of care documentation to your next provider. Your primary care clinician is listed as [address] [place] [address]. If you have any questions after today's visit, please call [phone].

## 2017-12-13 NOTE — PROGRESS NOTES
[facility]:     Patient: Aly Singh Person Age: 75 y.o. Sex: male    YOB: 1950 Admit Date: (Not on file) PCP: Rosalia Pratik    MRN: [id number]  CSN: [id number]       Patient Active Problem List   Diagnosis Code    Essential hypertension, malignant I10    Arthritis M19.90    Hyperthyroidism E05.90    Metastatic squamous cell carcinoma to esophagus ([facility] C78.89    Malnutrition ([facility] E46     Assessment/ Plan:     1.) Advanced Esophageal Cancer-Squamous Cell Carcinoma    -Pathology results reviewed-Squamous Cell Carcinoma  -Status post EGD biopsy per Dr. Bakari Huff 10/12/2017    -s/p evaluation radiation oncology-Dr. Rissa Olson  -Complete staging workup with FDG PET CT scan revealed lymph node metastasis (supraclavicular)  -MediPort placement per Dr. Melchor-11/7/2017    -Continue concurrent chemotherapy/radiotherapy    -s/p treatment with week #5 of 5 -12/6/2017  -Rtc. In 2 weeks to reevaluate clinically, repeat labs and for further medical decision making    -Return to clinic in approximately 1 week to reevaluate clinically and for further medical decision-making.  -Closely monitor weekly CBCs and CMP's  -Instructed go the emergency room for any worsening of symptoms and or development of fever    2.)  Mild Dehydration/ WT.  Loss  -Improved  -Oral liquids encouraged  -Status post G-tube placement  -G-tube feedings per nutritional services and home health-tube feedings going well    3.)  Dysphasia  -Continue triple mix oral solution per radiation oncology  -Tolerating liquids well    4.)  Pain secondary to esophageal cancer/cancer treatment  -Discontinued his OxyIR  -Percocet to use as directed  -Percocet refilled today  -Monitor   -Random UDS  -Wean his medication down as he completes his concurrent chemotherapy radiotherapy and his esophageal pain has improved      FDG-PET scan 10/19/2017:    IMPRESSION: [Malignant activity at the esophageal mass, middle mediastinal lymph nodes,  supraclavicular lymph nodes compatible with metastases. ]     Activity at the right hilum but without enlarged lymph node, could be early  metastasis.     No malignant activity at subcentimeter pulmonary nodules. Potential for false  negative result exists because of their small size. Advise short-term follow-up  with diagnostic CT.     Activity at dental disease also seen in the mandible and maxilla.     Activity posterior to the right side mandible without definite mass or skeletal  abnormality of uncertain significance. Could be a tiny adjacent lymph node. Attention should be paid to this region on subsequent examinations.       Pathology report 10/12/2017:  FINAL DIAGNOSIS:   ESOPHAGEAL MASS, BIOPSY:   INVASIVE, POORLY DIFFERENTIATED SQUAMOUS CELL CARCINOMA. GROSS DESCRIPTION:     The specimen is received in formalin in a container labeled with the patient's name, José Miguel Gibbs, and biopsy esophageal mass, rule out malignancy and consists of two soft tissue fragments, one pale tan and the other pink to dark red, ranging from 0.3 to 0.5 cm in greatest dimension. The specimen is submitted in toto in cassette A1. (cd)   DMM/clr     I have discussed the diagnosis with the patient and the intended plan as seen in the above orders. I have reviewed the plan of care with the patient, accepted their input and they are in agreement with the treatment goals. Patient has provided input and agrees with goals. History:   Tai Aguilar Person is a 75 y.o. male presenting today for:     Distal Esophageal Cancer  -Pathology results reviewed-Squamous cell carcinoma  -Status post EGD biopsy per Dr. Dayna Marc  -Lost 15 pounds in past 6 months  -No f/c/s/cp or SOB.     -Complaining of worsening dysphagia over the past several months-now improved  -Complaining of esophageal pain-well-controlled with pain medications    Current Outpatient Prescriptions   Medication Sig Dispense Refill    polyethylene glycol (MIRALAX) 17 gram packet Take 17 g by mouth daily.  magic mouthwash solution Take 5 mL by mouth three (3) times daily.  nut.tx.impaired dige fxn-fiber (VITAL AF 1.2 MAYKEL) 0.08 gram- 1.2 kcal/mL liqd Start with 1 can over an hour 3 times per day for 24 hours. If tolerated, advance to 2 cans over 1.5 hours 3x/day: 9am, 12pm, 6pm (follow toleration over 48 hours). Flush tube before & after with 200ml water each time (400ml total with each feeding)  If patient can drink water, flushes can be re 90 Can 12    oxyCODONE-acetaminophen (PERCOCET 10)  mg per tablet Take 1 Tab by mouth every six (6) hours as needed for Pain. Max Daily Amount: 4 Tabs. 40 Tab 0    lisinopril (PRINIVIL, ZESTRIL) 20 mg tablet Take 1 Tab by mouth daily. 90 Tab 4    amLODIPine (NORVASC) 5 mg tablet Take 1 Tab by mouth daily. 90 Tab 4       Objective:   VS:      Vitals:    12/13/17 0957   BP: 123/81   Pulse: 81   Resp: 17   Temp: 98 °F (36.7 °C)   TempSrc: Oral   SpO2: 100%   Weight: 66.7 kg (147 lb)   Height: 5' 9\" (1.753 m)       Physical Exam:     Constitutional:  no acute distress, alert and oriented x 3    HENT:  atraumatic   Eyes:  EOMI, PERRLA   Neck:  No palpable masses   Cardiovascular:  S1, S2   Pulmonary/Chest Wall:   clear   Abdominal:  Non tender, no palpable masses       Musculoskeletal:  No deformities   Skin:  No rashes or lesions    Peripheral Vascular:  Pulses palpable       Review of Systems:   The remainder of 12 point ROS was otherwise negative except for what was reported in the CC/HPI:      Recent Results (from the past 168 hour(s))   CBC WITH 3 PART DIFF    Collection Time: 12/06/17 10:27 AM   Result Value Ref Range    WBC 2.2 (L) 4.5 - 13.0 K/uL    RBC 3.22 (L) 4.10 - 5.10 M/uL    HGB 8.8 (L) 12.0 - 16.0 g/dL    HCT 26.8 (L) 36 - 48 %    MCV 83.2 78 - 102 FL    MCH 27.3 25.0 - 35.0 PG    MCHC 32.8 31 - 37 g/dL    RDW 14.1 11.5 - 14.5 %    PLATELET 173 320 - 366 K/uL    NEUTROPHILS 87 (H) 40 - 70 % MIXED CELLS 9 0.1 - 17 %    LYMPHOCYTES 5 (L) 14 - 44 %    ABS. NEUTROPHILS 1.9 1.8 - 9.5 K/UL    ABS. MIXED CELLS 0.2 0.0 - 2.3 K/uL    ABS. LYMPHOCYTES 0.1 (L) 1.1 - 5.9 K/UL    DF AUTOMATED     POC CHEM8    Collection Time: 12/06/17 10:27 AM   Result Value Ref Range    CO2, POC 25 (H) 19 - 24 MMOL/L    Glucose, POC 99 74 - 106 MG/DL    BUN, POC 9 7 - 18 MG/DL    Creatinine, POC 0.8 0.6 - 1.3 MG/DL    GFRAA, POC >60 >60 ml/min/1.73m2    GFRNA, POC >60 >60 ml/min/1.73m2    Sodium,  136 - 145 MMOL/L    Potassium, POC 4.0 3.5 - 5.5 MMOL/L    Calcium, ionized (POC) 1.22 1.12 - 1.32 MMOL/L    Chloride,  100 - 108 MMOL/L    Anion gap, POC 16 10 - 20      Hematocrit, POC 25 (L) 36 - 49 %    Hemoglobin, POC 8.5 (L) 12 - 16 G/DL       Past Medical History:   Diagnosis Date    Arthritis     Essential hypertension, malignant 8/3/2010    Leg pain 8/3/2010    Throat cancer [facility])        Past Surgical History:   Procedure Laterality Date    HX GI      G-tube placement on 11/28/17    TX EGD DELIVER THERMAL ENERGY SPHNCTR/CARDIA GERD         Social History     Social History    Marital status:      Spouse name: N/A    Number of children: N/A    Years of education: N/A     Occupational History    Not on file. Social History Main Topics    Smoking status: Former Smoker     Packs/day: 0.25     Types: Cigarettes     Start date: 1/17/2017     Quit date: 11/22/2017    Smokeless tobacco: Never Used      Comment: Restarted in Jan 2017    Alcohol use No      Comment: \"NONE IN AWHILE\"    Drug use: No    Sexual activity: No     Other Topics Concern    Not on file     Social History Narrative       Family History   Problem Relation Age of Onset    Heart Disease Mother     Lung Disease Father        No Known Allergies    Follow-up Disposition:  Return in about 2 weeks (around 12/27/2017), or if symptoms worsen or fail to improve, for reeval..    Filiberto Mattson MD, MPH Hematology-Medical Oncology  December 13, 2017 1:16 PM

## 2017-12-14 ENCOUNTER — HOSPITAL ENCOUNTER (OUTPATIENT)
Dept: RADIATION THERAPY | Age: 67
Discharge: HOME | End: 2017-12-14
Payer: MEDICARE

## 2017-12-14 PROCEDURE — 3331090002 HH PPS REVENUE DEBIT

## 2017-12-14 PROCEDURE — 77386 HC IMRT TRMT DLVR COMPL: CPT

## 2017-12-14 PROCEDURE — 3331090001 HH PPS REVENUE CREDIT

## 2017-12-15 ENCOUNTER — HOSPITAL ENCOUNTER (OUTPATIENT)
Dept: RADIATION THERAPY | Age: 67
Discharge: HOME | End: 2017-12-15
Payer: MEDICARE

## 2017-12-15 PROCEDURE — 3331090002 HH PPS REVENUE DEBIT

## 2017-12-15 PROCEDURE — 77386 HC IMRT TRMT DLVR COMPL: CPT

## 2017-12-15 PROCEDURE — 3331090001 HH PPS REVENUE CREDIT

## 2017-12-16 ENCOUNTER — HOME CARE VISIT (OUTPATIENT)
Dept: SCHEDULING | Facility: HOME HEALTH | Age: 67
End: 2017-12-16
Payer: MEDICARE

## 2017-12-16 PROCEDURE — 3331090002 HH PPS REVENUE DEBIT

## 2017-12-16 PROCEDURE — 3331090001 HH PPS REVENUE CREDIT

## 2017-12-16 PROCEDURE — G0299 HHS/HOSPICE OF RN EA 15 MIN: HCPCS

## 2017-12-17 PROCEDURE — 3331090001 HH PPS REVENUE CREDIT

## 2017-12-17 PROCEDURE — 3331090002 HH PPS REVENUE DEBIT

## 2017-12-18 ENCOUNTER — HOSPITAL ENCOUNTER (OUTPATIENT)
Dept: RADIATION THERAPY | Age: 67
Discharge: HOME | End: 2017-12-18
Payer: MEDICARE

## 2017-12-18 PROCEDURE — 77386 HC IMRT TRMT DLVR COMPL: CPT

## 2017-12-18 PROCEDURE — 3331090002 HH PPS REVENUE DEBIT

## 2017-12-18 PROCEDURE — 3331090001 HH PPS REVENUE CREDIT

## 2017-12-18 PROCEDURE — 77336 RADIATION PHYSICS CONSULT: CPT

## 2017-12-19 ENCOUNTER — HOME CARE VISIT (OUTPATIENT)
Dept: SCHEDULING | Facility: HOME HEALTH | Age: 67
End: 2017-12-19
Payer: MEDICARE

## 2017-12-19 ENCOUNTER — HOSPITAL ENCOUNTER (OUTPATIENT)
Dept: RADIATION THERAPY | Age: 67
Discharge: HOME | End: 2017-12-19
Payer: MEDICARE

## 2017-12-19 VITALS
RESPIRATION RATE: 20 BRPM | HEART RATE: 100 BPM | DIASTOLIC BLOOD PRESSURE: 60 MMHG | OXYGEN SATURATION: 97 % | SYSTOLIC BLOOD PRESSURE: 100 MMHG | TEMPERATURE: 97 F

## 2017-12-19 PROCEDURE — 3331090002 HH PPS REVENUE DEBIT

## 2017-12-19 PROCEDURE — G0299 HHS/HOSPICE OF RN EA 15 MIN: HCPCS

## 2017-12-19 PROCEDURE — 77386 HC IMRT TRMT DLVR COMPL: CPT

## 2017-12-19 PROCEDURE — 3331090001 HH PPS REVENUE CREDIT

## 2017-12-20 ENCOUNTER — HOSPITAL ENCOUNTER (OUTPATIENT)
Dept: RADIATION THERAPY | Age: 67
Discharge: HOME | End: 2017-12-20
Payer: MEDICARE

## 2017-12-20 PROCEDURE — 3331090001 HH PPS REVENUE CREDIT

## 2017-12-20 PROCEDURE — 3331090002 HH PPS REVENUE DEBIT

## 2017-12-20 PROCEDURE — 77386 HC IMRT TRMT DLVR COMPL: CPT

## 2017-12-21 ENCOUNTER — HOSPITAL ENCOUNTER (OUTPATIENT)
Dept: RADIATION THERAPY | Age: 67
Discharge: HOME | End: 2017-12-21
Payer: MEDICARE

## 2017-12-21 PROCEDURE — 3331090002 HH PPS REVENUE DEBIT

## 2017-12-21 PROCEDURE — 77386 HC IMRT TRMT DLVR COMPL: CPT

## 2017-12-21 PROCEDURE — 3331090001 HH PPS REVENUE CREDIT

## 2017-12-22 ENCOUNTER — HOSPITAL ENCOUNTER (OUTPATIENT)
Dept: RADIATION THERAPY | Age: 67
Discharge: HOME | End: 2017-12-22
Payer: MEDICARE

## 2017-12-22 PROCEDURE — 3331090001 HH PPS REVENUE CREDIT

## 2017-12-22 PROCEDURE — 77386 HC IMRT TRMT DLVR COMPL: CPT

## 2017-12-22 PROCEDURE — 3331090002 HH PPS REVENUE DEBIT

## 2017-12-23 ENCOUNTER — HOME CARE VISIT (OUTPATIENT)
Dept: HOME HEALTH SERVICES | Facility: HOME HEALTH | Age: 67
End: 2017-12-23
Payer: MEDICARE

## 2017-12-23 VITALS
RESPIRATION RATE: 18 BRPM | DIASTOLIC BLOOD PRESSURE: 60 MMHG | SYSTOLIC BLOOD PRESSURE: 104 MMHG | TEMPERATURE: 98.3 F | OXYGEN SATURATION: 98 % | HEART RATE: 100 BPM

## 2017-12-23 PROCEDURE — 3331090002 HH PPS REVENUE DEBIT

## 2017-12-23 PROCEDURE — 3331090001 HH PPS REVENUE CREDIT

## 2017-12-24 PROCEDURE — 3331090002 HH PPS REVENUE DEBIT

## 2017-12-24 PROCEDURE — 3331090001 HH PPS REVENUE CREDIT

## 2017-12-25 PROCEDURE — 3331090001 HH PPS REVENUE CREDIT

## 2017-12-25 PROCEDURE — 3331090002 HH PPS REVENUE DEBIT

## 2017-12-26 ENCOUNTER — HOME CARE VISIT (OUTPATIENT)
Dept: HOME HEALTH SERVICES | Facility: HOME HEALTH | Age: 67
End: 2017-12-26
Payer: MEDICARE

## 2017-12-26 ENCOUNTER — HOSPITAL ENCOUNTER (OUTPATIENT)
Dept: RADIATION THERAPY | Age: 67
Discharge: HOME | End: 2017-12-26
Payer: MEDICARE

## 2017-12-26 PROCEDURE — 77386 HC IMRT TRMT DLVR COMPL: CPT

## 2017-12-26 PROCEDURE — 3331090001 HH PPS REVENUE CREDIT

## 2017-12-26 PROCEDURE — 3331090002 HH PPS REVENUE DEBIT

## 2017-12-26 PROCEDURE — 77336 RADIATION PHYSICS CONSULT: CPT

## 2017-12-27 ENCOUNTER — HOSPITAL ENCOUNTER (OUTPATIENT)
Dept: RADIATION THERAPY | Age: 67
Discharge: HOME | End: 2017-12-27
Payer: MEDICARE

## 2017-12-27 PROCEDURE — 3331090001 HH PPS REVENUE CREDIT

## 2017-12-27 PROCEDURE — 77386 HC IMRT TRMT DLVR COMPL: CPT

## 2017-12-27 PROCEDURE — 3331090002 HH PPS REVENUE DEBIT

## 2017-12-28 ENCOUNTER — OFFICE VISIT (OUTPATIENT)
Dept: ONCOLOGY | Age: 67
End: 2017-12-28

## 2017-12-28 ENCOUNTER — HOSPITAL ENCOUNTER (OUTPATIENT)
Dept: RADIATION THERAPY | Age: 67
Discharge: HOME | End: 2017-12-28
Payer: MEDICARE

## 2017-12-28 VITALS
WEIGHT: 147 LBS | HEART RATE: 59 BPM | RESPIRATION RATE: 18 BRPM | HEIGHT: 69 IN | DIASTOLIC BLOOD PRESSURE: 57 MMHG | OXYGEN SATURATION: 100 % | SYSTOLIC BLOOD PRESSURE: 88 MMHG | TEMPERATURE: 98 F | BODY MASS INDEX: 21.77 KG/M2

## 2017-12-28 DIAGNOSIS — C78.89 METASTATIC SQUAMOUS CELL CARCINOMA TO ESOPHAGUS (HCC): Primary | ICD-10-CM

## 2017-12-28 DIAGNOSIS — D64.9 ANEMIA, UNSPECIFIED TYPE: ICD-10-CM

## 2017-12-28 DIAGNOSIS — R52 PAIN AGGRAVATED BY EATING OR DRINKING: ICD-10-CM

## 2017-12-28 DIAGNOSIS — E44.0 MALNUTRITION OF MODERATE DEGREE (HCC): ICD-10-CM

## 2017-12-28 DIAGNOSIS — C15.9 SQUAMOUS CELL ESOPHAGEAL CANCER (HCC): ICD-10-CM

## 2017-12-28 PROCEDURE — 77386 HC IMRT TRMT DLVR COMPL: CPT

## 2017-12-28 PROCEDURE — 3331090001 HH PPS REVENUE CREDIT

## 2017-12-28 PROCEDURE — 3331090002 HH PPS REVENUE DEBIT

## 2017-12-28 RX ORDER — OXYCODONE AND ACETAMINOPHEN 10; 325 MG/1; MG/1
1 TABLET ORAL
Qty: 40 TAB | Refills: 0 | Status: SHIPPED | OUTPATIENT
Start: 2017-12-28 | End: 2018-01-15 | Stop reason: SDUPTHER

## 2017-12-28 NOTE — PROGRESS NOTES
[facility]:     Patient: Marylou Nascimento Person Age: 75 y.o. Sex: male    YOB: 1950 Admit Date: (Not on file) PCP: Cecile Figueroa DO   MRN: [id number]  CSN: [id number]       Patient Active Problem List   Diagnosis Code    Essential hypertension, malignant I10    Arthritis M19.90    Hyperthyroidism E05.90    Metastatic squamous cell carcinoma to esophagus ([facility] [address] C78.89    Malnutrition ([facility] [address] E46    Squamous cell esophageal cancer (HCC) C15.9    Malnutrition of moderate degree ([address] E44.0    Pain aggravated by eating or drinking R52    Esophageal mass K22.9    Malignant neoplasm of esophagus (HCC) C15.9    Anemia D64.9     Assessment/ Plan:     1.) Advanced Esophageal Cancer-Squamous Cell Carcinoma    -Pathology results reviewed-Squamous Cell Carcinoma  -Status post EGD biopsy per Dr. Martha Chacon 10/12/2017    -s/p evaluation radiation oncology-Dr. Bernice Olson  -Complete staging workup with FDG PET CT scan revealed lymph node metastasis (supraclavicular)  -MediPort placement per Dr. Melchor-11/7/2017    -Completed concurrent chemotherapy/radiotherapy 12/2017    -s/p treatment with week #5 of 5-12/6/2017    -Restaging CT scans in the next week per Rad./Onc. Service    -Rtc. in 2 weeks to reevaluate clinically, repeat labs and for further medical decision making and consider possible further palliative systemic therapy    -Check for PD-L1    -Return to clinic in approximately 1 week to reevaluate clinically and for further medical decision-making.    -Closely monitor weekly CBCs and CMP's    -Instructed go the emergency room for any worsening of symptoms and or development of fever    2.)  Mild Dehydration/ WT.  Loss  -Improved  -Oral liquids encouraged  -Status post G-tube placement  -G-tube feedings per nutritional services and home health-tube feedings going well    3.)  Dysphasia  -Continue triple mix oral solution per radiation oncology  -Tolerating liquids well    4.)  Pain secondary to esophageal cancer/cancer treatment  -Discontinued his OxyIR  -Percocet to use as directed  -Percocet refilled today  -Monitor   -Random UDS  -Wean his medication down as he completes his concurrent chemotherapy radiotherapy and his esophageal pain has improved      FDG-PET scan 10/19/2017:    IMPRESSION: [Malignant activity at the esophageal mass, middle mediastinal lymph nodes,  supraclavicular lymph nodes compatible with metastases. ]     Activity at the right hilum but without enlarged lymph node, could be early  metastasis.     No malignant activity at subcentimeter pulmonary nodules. Potential for false  negative result exists because of their small size. Advise short-term follow-up  with diagnostic CT.     Activity at dental disease also seen in the mandible and maxilla.     Activity posterior to the right side mandible without definite mass or skeletal  abnormality of uncertain significance. Could be a tiny adjacent lymph node. Attention should be paid to this region on subsequent examinations.       Pathology report 10/12/2017:  FINAL DIAGNOSIS:   ESOPHAGEAL MASS, BIOPSY:   INVASIVE, POORLY DIFFERENTIATED SQUAMOUS CELL CARCINOMA. GROSS DESCRIPTION:     The specimen is received in formalin in a container labeled with the patient's name, Saulo Hansen, and biopsy esophageal mass, rule out malignancy and consists of two soft tissue fragments, one pale tan and the other pink to dark red, ranging from 0.3 to 0.5 cm in greatest dimension. The specimen is submitted in toto in cassette A1. (cd)   DMM/clr     I have discussed the diagnosis with the patient and the intended plan as seen in the above orders. I have reviewed the plan of care with the patient, accepted their input and they are in agreement with the treatment goals. Patient has provided input and agrees with goals.       History:   Hood Sellers is a 75 y.o. male presenting today for:     Distal Esophageal Cancer  -Pathology results reviewed-Squamous cell carcinoma  -No f/c/s/cp or SOB. -Worsening dysphagia-now improved  -Complaining of esophageal pain-well-controlled with pain medications    Current Outpatient Prescriptions   Medication Sig Dispense Refill    oxyCODONE-acetaminophen (PERCOCET 10)  mg per tablet Take 1 Tab by mouth every six (6) hours as needed for Pain. Max Daily Amount: 4 Tabs. 40 Tab 0    aluminum-magnesium hydroxide 200-200 mg/5 mL susp 5 mL, diphenhydrAMINE 12.5 mg/5 mL liqd 5 mL, lidocaine 2 % soln 5 mL Take 5 mL by mouth three (3) times daily. Swish and swallow 1 or 2 teaspoonsful by mouth 2-3 times every day      polyethylene glycol (MIRALAX) 17 gram packet Take 17 g by mouth daily.  magic mouthwash solution Take 5 mL by mouth three (3) times daily.  nut.tx.impaired dige fxn-fiber (VITAL AF 1.2 MAYKEL) 0.08 gram- 1.2 kcal/mL liqd Start with 1 can over an hour 3 times per day for 24 hours. If tolerated, advance to 2 cans over 1.5 hours 3x/day: 9am, 12pm, 6pm (follow toleration over 48 hours). Flush tube before & after with 200ml water each time (400ml total with each feeding)  If patient can drink water, flushes can be re 90 Can 12    lisinopril (PRINIVIL, ZESTRIL) 20 mg tablet Take 1 Tab by mouth daily. 90 Tab 4    amLODIPine (NORVASC) 5 mg tablet Take 1 Tab by mouth daily. 90 Tab 4       Objective:   VS:      Vitals:    12/28/17 0922   BP: (!) 88/57   Pulse: (!) 59   Resp: 18   Temp: 98 °F (36.7 °C)   TempSrc: Oral   SpO2: 100%   Weight: 66.7 kg (147 lb)   Height: 5' 9\" (1.753 m)       Physical Exam:     Constitutional:  no acute distress  alert and oriented x 3    HENT:  atraumatic   Eyes:  EOMI, PERRLA   Neck:  No masses   Cardiovascular:  S1, S2   Pulmonary/Chest Wall:   clear   Abdominal:  Non tender, no palpable masses  PEG  soft       Musculoskeletal:  No deformities   Skin:  No rashes     Peripheral Vascular:  Pulses palpable       Review of Systems:   The remainder of 12 point ROS was otherwise negative except for what was reported in the CC/HPI:      No results found for this or any previous visit (from the past 168 hour(s)). Past Medical History:   Diagnosis Date    Arthritis     Essential hypertension, malignant 8/3/2010    Leg pain 8/3/2010    Throat cancer [facility])        Past Surgical History:   Procedure Laterality Date    HX GI      G-tube placement on 11/28/17    [place] EGD DELIVER THERMAL ENERGY SPHNCTR/CARDIA GERD         Social History     Social History    Marital status:      Spouse name: N/A    Number of children: N/A    Years of education: N/A     Occupational History    Not on file. Social History Main Topics    Smoking status: Former Smoker     Packs/day: 0.25     Types: Cigarettes     Start date: 1/17/2017     Quit date: 11/22/2017    Smokeless tobacco: Never Used      Comment: Restarted in Jan 2017    Alcohol use No      Comment: \"NONE IN AWHILE\"    Drug use: No    Sexual activity: No     Other Topics Concern    Not on file     Social History Narrative       Family History   Problem Relation Age of Onset    Heart Disease Mother     Lung Disease Father        No Known Allergies    Follow-up Disposition: Not on File    David Farmer MD, MPH Hematology-Medical Oncology  December 28, 2017 1:16 PM

## 2017-12-28 NOTE — MR AVS SNAPSHOT
Visit Information Date & Time Provider Department Dept. Phone Encounter #  
 12/28/2017  9:15 AM Oleksandr Moreno, [place] [address] Oncology [phone] [id number] Your Appointments 1/15/2018 12:30 PM  
ROUTINE CARE with Lyn Montejo.[place] [address] [facility] [place]) Appt Note: Return in about 4 weeks (around 1/3/2018) for EOV, labs at next visit. [address] [facility] [address] [facility]  
  
   
 [address] [place] [address] [address] Upcoming Health Maintenance Date Due Pneumococcal 65+ High/Highest Risk (2 of 2 - PPSV23) 1/14/2016 GLAUCOMA SCREENING Q2Y 7/14/2018 MEDICARE YEARLY EXAM 8/16/2018 COLONOSCOPY 6/6/2021 DTaP/Tdap/Td series (2 - Td) 8/15/2027 Allergies as of 12/28/2017  Review Complete On: 12/28/2017 By: Oleksandr Moreno MD  
 No Known Allergies Current Immunizations  Reviewed on 1/4/2017 Name Date Influenza High Dose Vaccine PF 1/4/2017, 11/19/2015 Influenza Vaccine 11/18/2014, 2/4/2013 Influenza Vaccine (Quad) PF 10/22/2017 10:21 PM  
 Influenza Vaccine Split 11/17/2011, 11/22/2010 Pneumococcal Conjugate (PCV-13) 11/19/2015 TD Vaccine 8/3/2006 Tdap 8/15/2017 ZZZ-RETIRED (DO NOT USE) Pneumococcal Vaccine (Unspecified Type) 11/22/2010 Not reviewed this visit You Were Diagnosed With   
  
 Codes Comments Metastatic squamous cell carcinoma to esophagus [facility])    -  Primary ICD-10-CM: C78.89 ICD-9-CM: 197.8 Squamous cell esophageal cancer (HCC)     ICD-10-CM: C15.9 ICD-9-CM: 150.9 Anemia, unspecified type     ICD-10-CM: D64.9 ICD-9-CM: 285.9 Malnutrition of moderate degree (HCC)     ICD-10-CM: E44.0 ICD-9-CM: 263.0 Pain aggravated by eating or drinking     ICD-10-CM: R52 ICD-9-CM: 780.96 Vitals BP Pulse Temp Resp Height(growth percentile) Weight(growth percentile) (!) 88/57 (BP 1 Location: Right arm, BP Patient Position: Sitting) (!) 59 98 °F (36.7 °C) (Oral) 18 5' 9\" (1.753 m) 147 lb (66.7 kg) SpO2 BMI Smoking Status 100% 21.71 kg/m2 Former Smoker Vitals History BMI and BSA Data Body Mass Index Body Surface Area 21.71 kg/m 2 1.8 m 2 Preferred Pharmacy Pharmacy Name Phone [facility] [address], [place] [phone] Your Updated Medication List  
  
   
This list is accurate as of: 12/28/17 10:05 AM.  Always use your most recent med list.  
  
  
  
  
 aluminum-magnesium hydroxide 200-200 mg/5 mL susp 5 mL, diphenhydrAMINE 12.5 mg/5 mL liqd 5 mL, lidocaine 2 % soln 5 mL Take 5 mL by mouth three (3) times daily. Swish and swallow 1 or 2 teaspoonsful by mouth 2-3 times every day  
  
 amLODIPine 5 mg tablet Commonly known as:  Rossana Evelyn Take 1 Tab by mouth daily. lisinopril 20 mg tablet Commonly known as:  Arnold Files Take 1 Tab by mouth daily. magic mouthwash solution Take 5 mL by mouth three (3) times daily.  
  
 nut.tx.impaired dige fxn-fiber 0.08 gram- 1.2 kcal/mL Liqd Commonly known as:  VITAL AF 1.2 MAYKEL Start with 1 can over an hour 3 times per day for 24 hours. If tolerated, advance to 2 cans over 1.5 hours 3x/day: 9am, 12pm, 6pm (follow toleration over 48 hours). Flush tube before & after with 200ml water each time (400ml total with each feeding) If patient can drink water, flushes can be re  
  
 oxyCODONE-acetaminophen  mg per tablet Commonly known as:  PERCOCET 10 Take 1 Tab by mouth every six (6) hours as needed for Pain. Max Daily Amount: 4 Tabs. polyethylene glycol 17 gram packet Commonly known as:  Sheria Bud Take 17 g by mouth daily. Prescriptions Printed Refills  
 oxyCODONE-acetaminophen (PERCOCET 10)  mg per tablet 0 Sig: Take 1 Tab by mouth every six (6) hours as needed for Pain.  Max Daily Amount: 4 Tabs. Class: Print Route: Oral  
  
To-Do List   
 12/28/2017 Lab:  CBC WITH AUTOMATED DIFF   
  
 12/29/2017 To Be Determined Appointment with Nancy Osorio RN at [facility]  
  
 12/29/2017 8:00 AM  
  Appointment with [address] [facility] RADIATION THERAPY ([phone]) This appointment time is simply a place [place] and may not reflect the true appointment time. If patient does not know the appointment time given to them at the time of scheduling, they should contact the Radiation Therapy department for detail. Please provide this summary of care documentation to your next provider. Your primary care clinician is listed as [id number] [facility]. If you have any questions after today's visit, please call [phone].

## 2017-12-29 ENCOUNTER — HOME CARE VISIT (OUTPATIENT)
Dept: HOME HEALTH SERVICES | Facility: HOME HEALTH | Age: 67
End: 2017-12-29
Payer: MEDICARE

## 2017-12-29 ENCOUNTER — HOSPITAL ENCOUNTER (OUTPATIENT)
Dept: RADIATION THERAPY | Age: 67
Discharge: HOME | End: 2017-12-29
Payer: MEDICARE

## 2017-12-29 ENCOUNTER — HOSPITAL ENCOUNTER (OUTPATIENT)
Dept: INFUSION THERAPY | Age: 67
Discharge: HOME | End: 2017-12-29
Payer: MEDICARE

## 2017-12-29 LAB
BASOPHILS # BLD: 0 K/UL (ref 0–0.06)
BASOPHILS NFR BLD: 0 % (ref 0–2)
DIFFERENTIAL METHOD BLD: ABNORMAL
EOSINOPHIL # BLD: 0.1 K/UL (ref 0–0.4)
EOSINOPHIL NFR BLD: 1 % (ref 0–5)
ERYTHROCYTE [DISTWIDTH] IN BLOOD BY AUTOMATED COUNT: 16.9 % (ref 11.6–14.5)
HCT VFR BLD AUTO: 29.2 % (ref 36–48)
HGB BLD-MCNC: 9.7 G/DL (ref 13–16)
LYMPHOCYTES # BLD: 0.3 K/UL (ref 0.9–3.6)
LYMPHOCYTES NFR BLD: 7 % (ref 21–52)
MCH RBC QN AUTO: 27.6 PG (ref 24–34)
MCHC RBC AUTO-ENTMCNC: 33.2 G/DL (ref 31–37)
MCV RBC AUTO: 83 FL (ref 74–97)
MONOCYTES # BLD: 0.6 K/UL (ref 0.05–1.2)
MONOCYTES NFR BLD: 16 % (ref 3–10)
NEUTS SEG # BLD: 2.9 K/UL (ref 1.8–8)
NEUTS SEG NFR BLD: 76 % (ref 40–73)
PLATELET # BLD AUTO: 214 K/UL (ref 135–420)
PMV BLD AUTO: 9.2 FL (ref 9.2–11.8)
RBC # BLD AUTO: 3.52 M/UL (ref 4.7–5.5)
WBC # BLD AUTO: 3.9 K/UL (ref 4.6–13.2)

## 2017-12-29 PROCEDURE — 85025 COMPLETE CBC W/AUTO DIFF WBC: CPT | Performed by: INTERNAL MEDICINE

## 2017-12-29 PROCEDURE — 3331090002 HH PPS REVENUE DEBIT

## 2017-12-29 PROCEDURE — 3331090001 HH PPS REVENUE CREDIT

## 2017-12-29 PROCEDURE — 36415 COLL VENOUS BLD VENIPUNCTURE: CPT

## 2017-12-29 PROCEDURE — 77386 HC IMRT TRMT DLVR COMPL: CPT

## 2017-12-29 NOTE — PROGRESS NOTES
[facility] Progress Note    Date: 2017    Name: Julia Ahmadi Person    MRN: [id number]         : 1950    Peripheral Lab Draw      Mr. Amos Madden to [facility], ambulatory at 0945. Pt was assessed and education was provided. Mr. Erick Phillips vitals were reviewed and patient was observed for 5 minutes prior to treatment. Recent Results (from the past 12 hour(s))   CBC WITH AUTOMATED DIFF    Collection Time: 17 10:00 AM   Result Value Ref Range    WBC 3.9 (L) 4.6 - 13.2 K/uL    RBC 3.52 (L) 4.70 - 5.50 M/uL    HGB 9.7 (L) 13.0 - 16.0 g/dL    HCT 29.2 (L) 36.0 - 48.0 %    MCV 83.0 74.0 - 97.0 FL    MCH 27.6 24.0 - 34.0 PG    MCHC 33.2 31.0 - 37.0 g/dL    RDW 16.9 (H) 11.6 - 14.5 %    PLATELET 628 634 - 933 K/uL    MPV 9.2 9.2 - 11.8 FL    NEUTROPHILS 76 (H) 40 - 73 %    LYMPHOCYTES 7 (L) 21 - 52 %    MONOCYTES 16 (H) 3 - 10 %    EOSINOPHILS 1 0 - 5 %    BASOPHILS 0 0 - 2 %    ABS. NEUTROPHILS 2.9 1.8 - 8.0 K/UL    ABS. LYMPHOCYTES 0.3 (L) 0.9 - 3.6 K/UL    ABS. MONOCYTES 0.6 0.05 - 1.2 K/UL    ABS. EOSINOPHILS 0.1 0.0 - 0.4 K/UL    ABS. BASOPHILS 0.0 0.0 - 0.06 K/UL    DF AUTOMATED           Blood obtained peripherally from LAC x 1 attempt with butterfly needle and sent to lab for CBC per written orders. No bleeding or hematoma noted at site. Guaze and coban applied. Mr. Amos Madden tolerated the phlebotomy, and had no complaints. Patient armband removed and shredded. Mr. Amos Madden was discharged from [place] [address] in stable condition at 1005. He is to return to physician for follow up.     Vamshi Oconnor RN  2017

## 2017-12-30 PROCEDURE — 3331090001 HH PPS REVENUE CREDIT

## 2017-12-30 PROCEDURE — 3331090002 HH PPS REVENUE DEBIT

## 2017-12-31 PROCEDURE — 3331090002 HH PPS REVENUE DEBIT

## 2017-12-31 PROCEDURE — 3331090001 HH PPS REVENUE CREDIT

## 2018-01-01 ENCOUNTER — HOME CARE VISIT (OUTPATIENT)
Dept: SCHEDULING | Facility: HOME HEALTH | Age: 68
End: 2018-01-01
Payer: MEDICARE

## 2018-01-01 ENCOUNTER — HOME CARE VISIT (OUTPATIENT)
Dept: HOME HEALTH SERVICES | Facility: HOME HEALTH | Age: 68
End: 2018-01-01
Payer: MEDICARE

## 2018-01-01 ENCOUNTER — HOSPITAL ENCOUNTER (OUTPATIENT)
Dept: PET IMAGING | Age: 68
Discharge: HOME | End: 2018-08-08
Attending: PHYSICIAN ASSISTANT
Payer: MEDICARE

## 2018-01-01 ENCOUNTER — PATIENT OUTREACH (OUTPATIENT)
Dept: FAMILY MEDICINE CLINIC | Age: 68
End: 2018-01-01

## 2018-01-01 ENCOUNTER — OFFICE VISIT (OUTPATIENT)
Dept: FAMILY MEDICINE CLINIC | Age: 68
End: 2018-01-01

## 2018-01-01 ENCOUNTER — HOSPITAL ENCOUNTER (OUTPATIENT)
Dept: LAB | Age: 68
Discharge: HOME | End: 2018-06-05
Payer: MEDICARE

## 2018-01-01 ENCOUNTER — TELEPHONE (OUTPATIENT)
Dept: FAMILY MEDICINE CLINIC | Age: 68
End: 2018-01-01

## 2018-01-01 ENCOUNTER — HOSPITAL ENCOUNTER (EMERGENCY)
Age: 68
Discharge: HOME | End: 2018-10-31
Attending: EMERGENCY MEDICINE
Payer: MEDICARE

## 2018-01-01 ENCOUNTER — HOSPITAL ENCOUNTER (OUTPATIENT)
Dept: LAB | Age: 68
Discharge: HOME | End: 2018-11-26
Payer: MEDICARE

## 2018-01-01 ENCOUNTER — HOSPITAL ENCOUNTER (OUTPATIENT)
Dept: CARDIAC CATH/INVASIVE PROCEDURES | Age: 68
Discharge: HOME | End: 2018-07-09
Attending: RADIOLOGY | Admitting: RADIOLOGY
Payer: MEDICARE

## 2018-01-01 ENCOUNTER — HOME CARE VISIT (OUTPATIENT)
Dept: HOME HEALTH SERVICES | Facility: HOME HEALTH | Age: 68
End: 2018-01-01

## 2018-01-01 ENCOUNTER — HOME HEALTH ADMISSION (OUTPATIENT)
Dept: HOME HEALTH SERVICES | Facility: HOME HEALTH | Age: 68
End: 2018-01-01
Payer: MEDICARE

## 2018-01-01 VITALS
HEART RATE: 93 BPM | OXYGEN SATURATION: 98 % | TEMPERATURE: 98.4 F | SYSTOLIC BLOOD PRESSURE: 125 MMHG | WEIGHT: 176.2 LBS | HEIGHT: 69 IN | DIASTOLIC BLOOD PRESSURE: 75 MMHG | RESPIRATION RATE: 16 BRPM | BODY MASS INDEX: 26.1 KG/M2

## 2018-01-01 VITALS
OXYGEN SATURATION: 94 % | HEART RATE: 85 BPM | TEMPERATURE: 98.1 F | SYSTOLIC BLOOD PRESSURE: 148 MMHG | DIASTOLIC BLOOD PRESSURE: 71 MMHG

## 2018-01-01 VITALS
RESPIRATION RATE: 20 BRPM | OXYGEN SATURATION: 97 % | SYSTOLIC BLOOD PRESSURE: 149 MMHG | TEMPERATURE: 97 F | HEART RATE: 65 BPM | DIASTOLIC BLOOD PRESSURE: 95 MMHG

## 2018-01-01 VITALS
DIASTOLIC BLOOD PRESSURE: 76 MMHG | TEMPERATURE: 98 F | OXYGEN SATURATION: 100 % | WEIGHT: 168.2 LBS | BODY MASS INDEX: 24.91 KG/M2 | SYSTOLIC BLOOD PRESSURE: 138 MMHG | RESPIRATION RATE: 20 BRPM | HEIGHT: 69 IN | HEART RATE: 74 BPM

## 2018-01-01 VITALS
DIASTOLIC BLOOD PRESSURE: 72 MMHG | OXYGEN SATURATION: 96 % | HEART RATE: 66 BPM | SYSTOLIC BLOOD PRESSURE: 115 MMHG | TEMPERATURE: 97.8 F

## 2018-01-01 VITALS
OXYGEN SATURATION: 97 % | SYSTOLIC BLOOD PRESSURE: 128 MMHG | RESPIRATION RATE: 16 BRPM | TEMPERATURE: 98.2 F | HEART RATE: 55 BPM | DIASTOLIC BLOOD PRESSURE: 80 MMHG

## 2018-01-01 VITALS
SYSTOLIC BLOOD PRESSURE: 118 MMHG | OXYGEN SATURATION: 96 % | DIASTOLIC BLOOD PRESSURE: 64 MMHG | HEART RATE: 83 BPM | TEMPERATURE: 98.5 F

## 2018-01-01 VITALS
TEMPERATURE: 97.5 F | OXYGEN SATURATION: 97 % | BODY MASS INDEX: 26.72 KG/M2 | WEIGHT: 180.4 LBS | SYSTOLIC BLOOD PRESSURE: 127 MMHG | HEIGHT: 69 IN | RESPIRATION RATE: 17 BRPM | HEART RATE: 100 BPM | DIASTOLIC BLOOD PRESSURE: 85 MMHG

## 2018-01-01 VITALS
HEIGHT: 69 IN | SYSTOLIC BLOOD PRESSURE: 116 MMHG | WEIGHT: 167.4 LBS | RESPIRATION RATE: 19 BRPM | HEART RATE: 89 BPM | OXYGEN SATURATION: 98 % | BODY MASS INDEX: 24.79 KG/M2 | TEMPERATURE: 97.9 F | DIASTOLIC BLOOD PRESSURE: 75 MMHG

## 2018-01-01 VITALS
RESPIRATION RATE: 16 BRPM | OXYGEN SATURATION: 97 % | SYSTOLIC BLOOD PRESSURE: 112 MMHG | HEART RATE: 71 BPM | TEMPERATURE: 99.3 F | DIASTOLIC BLOOD PRESSURE: 78 MMHG

## 2018-01-01 VITALS
WEIGHT: 167 LBS | RESPIRATION RATE: 16 BRPM | BODY MASS INDEX: 24.66 KG/M2 | TEMPERATURE: 99.1 F | HEART RATE: 86 BPM | OXYGEN SATURATION: 96 % | DIASTOLIC BLOOD PRESSURE: 84 MMHG | SYSTOLIC BLOOD PRESSURE: 121 MMHG

## 2018-01-01 VITALS
HEART RATE: 68 BPM | DIASTOLIC BLOOD PRESSURE: 84 MMHG | SYSTOLIC BLOOD PRESSURE: 120 MMHG | OXYGEN SATURATION: 98 % | TEMPERATURE: 97.7 F | RESPIRATION RATE: 16 BRPM

## 2018-01-01 VITALS
TEMPERATURE: 97.5 F | SYSTOLIC BLOOD PRESSURE: 151 MMHG | OXYGEN SATURATION: 98 % | DIASTOLIC BLOOD PRESSURE: 85 MMHG | RESPIRATION RATE: 16 BRPM | HEART RATE: 77 BPM

## 2018-01-01 VITALS
OXYGEN SATURATION: 97 % | HEART RATE: 111 BPM | DIASTOLIC BLOOD PRESSURE: 68 MMHG | TEMPERATURE: 97.8 F | RESPIRATION RATE: 18 BRPM | SYSTOLIC BLOOD PRESSURE: 124 MMHG

## 2018-01-01 VITALS
DIASTOLIC BLOOD PRESSURE: 63 MMHG | TEMPERATURE: 96.9 F | OXYGEN SATURATION: 97 % | HEART RATE: 76 BPM | BODY MASS INDEX: 25.89 KG/M2 | RESPIRATION RATE: 20 BRPM | HEIGHT: 69 IN | WEIGHT: 174.8 LBS | SYSTOLIC BLOOD PRESSURE: 114 MMHG

## 2018-01-01 VITALS
BODY MASS INDEX: 26.67 KG/M2 | OXYGEN SATURATION: 97 % | HEART RATE: 96 BPM | DIASTOLIC BLOOD PRESSURE: 77 MMHG | TEMPERATURE: 97.8 F | RESPIRATION RATE: 14 BRPM | WEIGHT: 180.06 LBS | SYSTOLIC BLOOD PRESSURE: 143 MMHG | HEIGHT: 69 IN

## 2018-01-01 VITALS
SYSTOLIC BLOOD PRESSURE: 129 MMHG | DIASTOLIC BLOOD PRESSURE: 79 MMHG | TEMPERATURE: 98.3 F | HEART RATE: 87 BPM | RESPIRATION RATE: 18 BRPM | OXYGEN SATURATION: 97 %

## 2018-01-01 VITALS
RESPIRATION RATE: 16 BRPM | SYSTOLIC BLOOD PRESSURE: 122 MMHG | DIASTOLIC BLOOD PRESSURE: 80 MMHG | TEMPERATURE: 98 F | OXYGEN SATURATION: 97 % | HEART RATE: 62 BPM

## 2018-01-01 VITALS
TEMPERATURE: 98.7 F | HEART RATE: 65 BPM | OXYGEN SATURATION: 98 % | SYSTOLIC BLOOD PRESSURE: 135 MMHG | DIASTOLIC BLOOD PRESSURE: 76 MMHG | RESPIRATION RATE: 18 BRPM

## 2018-01-01 VITALS
HEART RATE: 90 BPM | WEIGHT: 170 LBS | TEMPERATURE: 97.7 F | HEIGHT: 69 IN | SYSTOLIC BLOOD PRESSURE: 118 MMHG | OXYGEN SATURATION: 100 % | RESPIRATION RATE: 12 BRPM | DIASTOLIC BLOOD PRESSURE: 70 MMHG | BODY MASS INDEX: 25.18 KG/M2

## 2018-01-01 VITALS
SYSTOLIC BLOOD PRESSURE: 119 MMHG | HEIGHT: 69 IN | RESPIRATION RATE: 16 BRPM | WEIGHT: 182.4 LBS | HEART RATE: 106 BPM | OXYGEN SATURATION: 96 % | TEMPERATURE: 98.3 F | BODY MASS INDEX: 27.02 KG/M2 | DIASTOLIC BLOOD PRESSURE: 88 MMHG

## 2018-01-01 VITALS
BODY MASS INDEX: 23.92 KG/M2 | DIASTOLIC BLOOD PRESSURE: 70 MMHG | RESPIRATION RATE: 16 BRPM | SYSTOLIC BLOOD PRESSURE: 118 MMHG | HEART RATE: 60 BPM | OXYGEN SATURATION: 98 % | TEMPERATURE: 98.1 F | WEIGHT: 162 LBS

## 2018-01-01 VITALS
SYSTOLIC BLOOD PRESSURE: 122 MMHG | OXYGEN SATURATION: 91 % | HEART RATE: 104 BPM | DIASTOLIC BLOOD PRESSURE: 74 MMHG | TEMPERATURE: 98.2 F

## 2018-01-01 VITALS
DIASTOLIC BLOOD PRESSURE: 64 MMHG | HEART RATE: 83 BPM | OXYGEN SATURATION: 96 % | TEMPERATURE: 98.5 F | SYSTOLIC BLOOD PRESSURE: 118 MMHG

## 2018-01-01 VITALS
WEIGHT: 183 LBS | DIASTOLIC BLOOD PRESSURE: 78 MMHG | HEIGHT: 69 IN | SYSTOLIC BLOOD PRESSURE: 125 MMHG | BODY MASS INDEX: 27.11 KG/M2 | TEMPERATURE: 97.9 F | HEART RATE: 107 BPM | OXYGEN SATURATION: 99 % | RESPIRATION RATE: 20 BRPM

## 2018-01-01 VITALS
OXYGEN SATURATION: 98 % | SYSTOLIC BLOOD PRESSURE: 115 MMHG | HEART RATE: 60 BPM | TEMPERATURE: 98 F | DIASTOLIC BLOOD PRESSURE: 66 MMHG | RESPIRATION RATE: 20 BRPM

## 2018-01-01 VITALS
DIASTOLIC BLOOD PRESSURE: 74 MMHG | HEART RATE: 66 BPM | SYSTOLIC BLOOD PRESSURE: 110 MMHG | TEMPERATURE: 97 F | RESPIRATION RATE: 18 BRPM | OXYGEN SATURATION: 98 %

## 2018-01-01 VITALS
HEART RATE: 69 BPM | DIASTOLIC BLOOD PRESSURE: 64 MMHG | TEMPERATURE: 98.7 F | OXYGEN SATURATION: 95 % | SYSTOLIC BLOOD PRESSURE: 131 MMHG

## 2018-01-01 VITALS
OXYGEN SATURATION: 97 % | DIASTOLIC BLOOD PRESSURE: 65 MMHG | HEART RATE: 83 BPM | TEMPERATURE: 99.5 F | SYSTOLIC BLOOD PRESSURE: 138 MMHG

## 2018-01-01 VITALS
HEIGHT: 69 IN | WEIGHT: 180.4 LBS | SYSTOLIC BLOOD PRESSURE: 133 MMHG | BODY MASS INDEX: 26.72 KG/M2 | OXYGEN SATURATION: 100 % | DIASTOLIC BLOOD PRESSURE: 72 MMHG | TEMPERATURE: 97.1 F | RESPIRATION RATE: 19 BRPM | HEART RATE: 123 BPM

## 2018-01-01 VITALS
DIASTOLIC BLOOD PRESSURE: 80 MMHG | HEART RATE: 16 BPM | RESPIRATION RATE: 16 BRPM | TEMPERATURE: 97.3 F | SYSTOLIC BLOOD PRESSURE: 118 MMHG

## 2018-01-01 VITALS
SYSTOLIC BLOOD PRESSURE: 148 MMHG | TEMPERATURE: 98.1 F | DIASTOLIC BLOOD PRESSURE: 72 MMHG | RESPIRATION RATE: 22 BRPM | HEART RATE: 85 BPM | OXYGEN SATURATION: 94 %

## 2018-01-01 DIAGNOSIS — C78.89 METASTATIC SQUAMOUS CELL CARCINOMA TO ESOPHAGUS (HCC): ICD-10-CM

## 2018-01-01 DIAGNOSIS — K94.13 MALFUNCTION OF JEJUNOSTOMY TUBE (HCC): Primary | ICD-10-CM

## 2018-01-01 DIAGNOSIS — M19.90 ARTHRITIS: ICD-10-CM

## 2018-01-01 DIAGNOSIS — I10 HYPERTENSION, UNSPECIFIED TYPE: Primary | ICD-10-CM

## 2018-01-01 DIAGNOSIS — R74.8 ABNORMAL LEVELS OF OTHER SERUM ENZYMES: ICD-10-CM

## 2018-01-01 DIAGNOSIS — Z93.1 STATUS POST GASTROSTOMY (HCC): Primary | ICD-10-CM

## 2018-01-01 DIAGNOSIS — Z93.1 STATUS POST GASTROSTOMY (HCC): ICD-10-CM

## 2018-01-01 DIAGNOSIS — K31.89 GASTRIC MASS: ICD-10-CM

## 2018-01-01 DIAGNOSIS — I10 HYPERTENSION, UNSPECIFIED TYPE: ICD-10-CM

## 2018-01-01 DIAGNOSIS — C15.5 CANCER OF LOWER THIRD OF ESOPHAGUS (HCC): ICD-10-CM

## 2018-01-01 DIAGNOSIS — E44.0 MALNUTRITION OF MODERATE DEGREE (HCC): ICD-10-CM

## 2018-01-01 DIAGNOSIS — C78.89 METASTATIC SQUAMOUS CELL CARCINOMA TO ESOPHAGUS (HCC): Primary | ICD-10-CM

## 2018-01-01 DIAGNOSIS — K92.0 GASTROINTESTINAL HEMORRHAGE WITH HEMATEMESIS: ICD-10-CM

## 2018-01-01 DIAGNOSIS — D64.9 ANEMIA, UNSPECIFIED TYPE: ICD-10-CM

## 2018-01-01 DIAGNOSIS — K92.2 GASTROINTESTINAL HEMORRHAGE, UNSPECIFIED GASTROINTESTINAL HEMORRHAGE TYPE: Primary | ICD-10-CM

## 2018-01-01 DIAGNOSIS — M85.9 DISORDER OF BONE DENSITY AND STRUCTURE, UNSPECIFIED: ICD-10-CM

## 2018-01-01 DIAGNOSIS — E44.0 MALNUTRITION OF MODERATE DEGREE (HCC): Primary | ICD-10-CM

## 2018-01-01 LAB
1,25(OH)2D3 SERPL-MCNC: 49.1 PG/ML (ref 19.9–79.3)
ALBUMIN SERPL-MCNC: 3.7 G/DL (ref 3.4–5)
ALBUMIN SERPL-MCNC: 3.9 G/DL (ref 3.4–5)
ALBUMIN/GLOB SERPL: 0.7 {RATIO} (ref 0.8–1.7)
ALBUMIN/GLOB SERPL: 1 {RATIO} (ref 0.8–1.7)
ALP SERPL-CCNC: 41 U/L (ref 45–117)
ALP SERPL-CCNC: 67 U/L (ref 45–117)
ALT SERPL-CCNC: 113 U/L (ref 16–61)
ALT SERPL-CCNC: 35 U/L (ref 16–61)
ANION GAP SERPL CALC-SCNC: 6 MMOL/L (ref 3–18)
ANION GAP SERPL CALC-SCNC: 9 MMOL/L (ref 3–18)
ANION GAP SERPL CALC-SCNC: 9 MMOL/L (ref 3–18)
APPEARANCE UR: CLEAR
APPEARANCE UR: CLEAR
APTT PPP: 38.5 SEC (ref 23–36.4)
AST SERPL-CCNC: 22 U/L (ref 15–37)
AST SERPL-CCNC: 48 U/L (ref 15–37)
BACTERIA URNS QL MICRO: NEGATIVE /HPF
BACTERIA URNS QL MICRO: NEGATIVE /HPF
BASOPHILS # BLD: 0 K/UL (ref 0–0.06)
BASOPHILS # BLD: 0 K/UL (ref 0–0.1)
BASOPHILS NFR BLD: 0 % (ref 0–2)
BASOPHILS NFR BLD: 0 % (ref 0–2)
BILIRUB SERPL-MCNC: 0.3 MG/DL (ref 0.2–1)
BILIRUB SERPL-MCNC: 0.3 MG/DL (ref 0.2–1)
BILIRUB UR QL: NEGATIVE
BILIRUB UR QL: NEGATIVE
BUN SERPL-MCNC: 14 MG/DL (ref 7–18)
BUN SERPL-MCNC: 19 MG/DL (ref 7–18)
BUN SERPL-MCNC: 20 MG/DL (ref 7–18)
BUN/CREAT SERPL: 20 (ref 12–20)
BUN/CREAT SERPL: 23 (ref 12–20)
BUN/CREAT SERPL: 23 (ref 12–20)
CALCIUM SERPL-MCNC: 9 MG/DL (ref 8.5–10.1)
CALCIUM SERPL-MCNC: 9.1 MG/DL (ref 8.5–10.1)
CALCIUM SERPL-MCNC: 9.3 MG/DL (ref 8.5–10.1)
CHLORIDE SERPL-SCNC: 104 MMOL/L (ref 100–108)
CHLORIDE SERPL-SCNC: 106 MMOL/L (ref 100–108)
CHLORIDE SERPL-SCNC: 107 MMOL/L (ref 100–108)
CHOLEST SERPL-MCNC: 147 MG/DL
CO2 SERPL-SCNC: 25 MMOL/L (ref 21–32)
CO2 SERPL-SCNC: 25 MMOL/L (ref 21–32)
CO2 SERPL-SCNC: 29 MMOL/L (ref 21–32)
COLOR UR: YELLOW
COLOR UR: YELLOW
CREAT SERPL-MCNC: 0.7 MG/DL (ref 0.6–1.3)
CREAT SERPL-MCNC: 0.84 MG/DL (ref 0.6–1.3)
CREAT SERPL-MCNC: 0.86 MG/DL (ref 0.6–1.3)
DIFFERENTIAL METHOD BLD: ABNORMAL
DIFFERENTIAL METHOD BLD: ABNORMAL
EOSINOPHIL # BLD: 0.2 K/UL (ref 0–0.4)
EOSINOPHIL # BLD: 0.3 K/UL (ref 0–0.4)
EOSINOPHIL NFR BLD: 3 % (ref 0–5)
EOSINOPHIL NFR BLD: 5 % (ref 0–5)
EPITH CASTS URNS QL MICRO: NORMAL /LPF (ref 0–5)
EPITH CASTS URNS QL MICRO: NORMAL /LPF (ref 0–5)
ERYTHROCYTE [DISTWIDTH] IN BLOOD BY AUTOMATED COUNT: 15.2 % (ref 11.6–14.5)
ERYTHROCYTE [DISTWIDTH] IN BLOOD BY AUTOMATED COUNT: 16.1 % (ref 11.6–14.5)
ERYTHROCYTE [DISTWIDTH] IN BLOOD BY AUTOMATED COUNT: 20.2 % (ref 11.6–14.5)
FERRITIN SERPL-MCNC: 165 NG/ML (ref 8–388)
FOLATE SERPL-MCNC: >20 NG/ML (ref 3.1–17.5)
GGT SERPL-CCNC: 69 IU/L (ref 0–65)
GLOBULIN SER CALC-MCNC: 4.1 G/DL (ref 2–4)
GLOBULIN SER CALC-MCNC: 5 G/DL (ref 2–4)
GLUCOSE SERPL-MCNC: 115 MG/DL (ref 74–99)
GLUCOSE SERPL-MCNC: 78 MG/DL (ref 74–99)
GLUCOSE SERPL-MCNC: 82 MG/DL (ref 74–99)
GLUCOSE UR STRIP.AUTO-MCNC: NEGATIVE MG/DL
GLUCOSE UR STRIP.AUTO-MCNC: NEGATIVE MG/DL
HCT VFR BLD AUTO: 25.1 % (ref 36–48)
HCT VFR BLD AUTO: 34.1 % (ref 36–48)
HCT VFR BLD AUTO: 34.4 % (ref 36–48)
HDLC SERPL-MCNC: 52 MG/DL (ref 40–60)
HDLC SERPL: 2.8 {RATIO} (ref 0–5)
HGB BLD-MCNC: 10.7 G/DL (ref 13–16)
HGB BLD-MCNC: 11.2 G/DL (ref 13–16)
HGB BLD-MCNC: 8.4 G/DL (ref 13–16)
HGB UR QL STRIP: NEGATIVE
HGB UR QL STRIP: NEGATIVE
INR PPP: 1.1 (ref 0.8–1.2)
KETONES UR QL STRIP.AUTO: NEGATIVE MG/DL
KETONES UR QL STRIP.AUTO: NEGATIVE MG/DL
LDLC SERPL CALC-MCNC: 75.8 MG/DL (ref 0–100)
LEUKOCYTE ESTERASE UR QL STRIP.AUTO: ABNORMAL
LEUKOCYTE ESTERASE UR QL STRIP.AUTO: NEGATIVE
LIPID PROFILE,FLP: NORMAL
LYMPHOCYTES # BLD: 0.8 K/UL (ref 0.9–3.6)
LYMPHOCYTES # BLD: 1.4 K/UL (ref 0.9–3.6)
LYMPHOCYTES NFR BLD: 13 % (ref 21–52)
LYMPHOCYTES NFR BLD: 26 % (ref 21–52)
MCH RBC QN AUTO: 25.3 PG (ref 24–34)
MCH RBC QN AUTO: 26.3 PG (ref 24–34)
MCH RBC QN AUTO: 26.5 PG (ref 24–34)
MCHC RBC AUTO-ENTMCNC: 31.1 G/DL (ref 31–37)
MCHC RBC AUTO-ENTMCNC: 32.8 G/DL (ref 31–37)
MCHC RBC AUTO-ENTMCNC: 33.5 G/DL (ref 31–37)
MCV RBC AUTO: 79.2 FL (ref 74–97)
MCV RBC AUTO: 80 FL (ref 74–97)
MCV RBC AUTO: 81.3 FL (ref 74–97)
MONOCYTES # BLD: 0.3 K/UL (ref 0.05–1.2)
MONOCYTES # BLD: 0.7 K/UL (ref 0.05–1.2)
MONOCYTES NFR BLD: 11 % (ref 3–10)
MONOCYTES NFR BLD: 5 % (ref 3–10)
NEUTS SEG # BLD: 3.6 K/UL (ref 1.8–8)
NEUTS SEG # BLD: 4.8 K/UL (ref 1.8–8)
NEUTS SEG NFR BLD: 64 % (ref 40–73)
NEUTS SEG NFR BLD: 73 % (ref 40–73)
NITRITE UR QL STRIP.AUTO: NEGATIVE
NITRITE UR QL STRIP.AUTO: NEGATIVE
PH UR STRIP: 5.5 [PH] (ref 5–8)
PH UR STRIP: 8 [PH] (ref 5–8)
PLATELET # BLD AUTO: 231 K/UL (ref 135–420)
PLATELET # BLD AUTO: 308 K/UL (ref 135–420)
PLATELET # BLD AUTO: 332 K/UL (ref 135–420)
PMV BLD AUTO: 9.1 FL (ref 9.2–11.8)
PMV BLD AUTO: 9.7 FL (ref 9.2–11.8)
PMV BLD AUTO: 9.8 FL (ref 9.2–11.8)
POTASSIUM SERPL-SCNC: 3.8 MMOL/L (ref 3.5–5.5)
POTASSIUM SERPL-SCNC: 4.1 MMOL/L (ref 3.5–5.5)
POTASSIUM SERPL-SCNC: 4.1 MMOL/L (ref 3.5–5.5)
PROT SERPL-MCNC: 8 G/DL (ref 6.4–8.2)
PROT SERPL-MCNC: 8.7 G/DL (ref 6.4–8.2)
PROT UR STRIP-MCNC: 30 MG/DL
PROT UR STRIP-MCNC: ABNORMAL MG/DL
PROTHROMBIN TIME: 13.2 SEC (ref 11.5–15.2)
RBC # BLD AUTO: 3.17 M/UL (ref 4.7–5.5)
RBC # BLD AUTO: 4.23 M/UL (ref 4.7–5.5)
RBC # BLD AUTO: 4.26 M/UL (ref 4.7–5.5)
RBC #/AREA URNS HPF: 0 /HPF (ref 0–5)
RBC #/AREA URNS HPF: NORMAL /HPF (ref 0–5)
SODIUM SERPL-SCNC: 139 MMOL/L (ref 136–145)
SODIUM SERPL-SCNC: 140 MMOL/L (ref 136–145)
SODIUM SERPL-SCNC: 141 MMOL/L (ref 136–145)
SP GR UR REFRACTOMETRY: 1.02 (ref 1–1.03)
SP GR UR REFRACTOMETRY: 1.02 (ref 1–1.03)
TRIGL SERPL-MCNC: 96 MG/DL (ref ?–150)
TSH SERPL DL<=0.05 MIU/L-ACNC: 2.4 UIU/ML (ref 0.36–3.74)
UROBILINOGEN UR QL STRIP.AUTO: 1 EU/DL (ref 0.2–1)
UROBILINOGEN UR QL STRIP.AUTO: 1 EU/DL (ref 0.2–1)
VIT B12 SERPL-MCNC: 671 PG/ML (ref 211–911)
VLDLC SERPL CALC-MCNC: 19.2 MG/DL
WBC # BLD AUTO: 5.6 K/UL (ref 4.6–13.2)
WBC # BLD AUTO: 6.6 K/UL (ref 4.6–13.2)
WBC # BLD AUTO: 6.8 K/UL (ref 4.6–13.2)
WBC URNS QL MICRO: NORMAL /HPF (ref 0–4)
WBC URNS QL MICRO: NORMAL /HPF (ref 0–4)

## 2018-01-01 PROCEDURE — 85730 THROMBOPLASTIN TIME PARTIAL: CPT | Performed by: PHYSICIAN ASSISTANT

## 2018-01-01 PROCEDURE — 3331090002 HH PPS REVENUE DEBIT

## 2018-01-01 PROCEDURE — 3331090001 HH PPS REVENUE CREDIT

## 2018-01-01 PROCEDURE — 85610 PROTHROMBIN TIME: CPT | Performed by: PHYSICIAN ASSISTANT

## 2018-01-01 PROCEDURE — 74011250636 HC RX REV CODE- 250/636: Performed by: RADIOLOGY

## 2018-01-01 PROCEDURE — G0300 HHS/HOSPICE OF LPN EA 15 MIN: HCPCS

## 2018-01-01 PROCEDURE — G0299 HHS/HOSPICE OF RN EA 15 MIN: HCPCS

## 2018-01-01 PROCEDURE — 80053 COMPREHEN METABOLIC PANEL: CPT

## 2018-01-01 PROCEDURE — 36415 COLL VENOUS BLD VENIPUNCTURE: CPT | Performed by: PHYSICIAN ASSISTANT

## 2018-01-01 PROCEDURE — 80053 COMPREHEN METABOLIC PANEL: CPT | Performed by: FAMILY MEDICINE

## 2018-01-01 PROCEDURE — 81001 URINALYSIS AUTO W/SCOPE: CPT

## 2018-01-01 PROCEDURE — 77030031139 HC SUT VCRL2 J&J -A

## 2018-01-01 PROCEDURE — 82652 VIT D 1 25-DIHYDROXY: CPT

## 2018-01-01 PROCEDURE — 74011000250 HC RX REV CODE- 250: Performed by: RADIOLOGY

## 2018-01-01 PROCEDURE — 99152 MOD SED SAME PHYS/QHP 5/>YRS: CPT

## 2018-01-01 PROCEDURE — G0157 HHC PT ASSISTANT EA 15: HCPCS

## 2018-01-01 PROCEDURE — G0152 HHCP-SERV OF OT,EA 15 MIN: HCPCS

## 2018-01-01 PROCEDURE — G0151 HHCP-SERV OF PT,EA 15 MIN: HCPCS

## 2018-01-01 PROCEDURE — 81001 URINALYSIS AUTO W/SCOPE: CPT | Performed by: FAMILY MEDICINE

## 2018-01-01 PROCEDURE — G0496 LPN CARE TRAIN/EDU IN HH: HCPCS

## 2018-01-01 PROCEDURE — 85027 COMPLETE CBC AUTOMATED: CPT | Performed by: PHYSICIAN ASSISTANT

## 2018-01-01 PROCEDURE — 82607 VITAMIN B-12: CPT

## 2018-01-01 PROCEDURE — 80048 BASIC METABOLIC PNL TOTAL CA: CPT | Performed by: PHYSICIAN ASSISTANT

## 2018-01-01 PROCEDURE — 85025 COMPLETE CBC W/AUTO DIFF WBC: CPT

## 2018-01-01 PROCEDURE — 36415 COLL VENOUS BLD VENIPUNCTURE: CPT | Performed by: FAMILY MEDICINE

## 2018-01-01 PROCEDURE — 36590 REMOVAL TUNNELED CV CATH: CPT

## 2018-01-01 PROCEDURE — 77030039266 HC ADH SKN EXOFIN S2SG -A

## 2018-01-01 PROCEDURE — 85025 COMPLETE CBC W/AUTO DIFF WBC: CPT | Performed by: FAMILY MEDICINE

## 2018-01-01 PROCEDURE — 99284 EMERGENCY DEPT VISIT MOD MDM: CPT

## 2018-01-01 PROCEDURE — 84443 ASSAY THYROID STIM HORMONE: CPT

## 2018-01-01 PROCEDURE — 36415 COLL VENOUS BLD VENIPUNCTURE: CPT

## 2018-01-01 PROCEDURE — 99153 MOD SED SAME PHYS/QHP EA: CPT

## 2018-01-01 PROCEDURE — 80061 LIPID PANEL: CPT

## 2018-01-01 PROCEDURE — 82728 ASSAY OF FERRITIN: CPT

## 2018-01-01 PROCEDURE — A9552 F18 FDG: HCPCS

## 2018-01-01 PROCEDURE — 400013 HH SOC

## 2018-01-01 PROCEDURE — 82977 ASSAY OF GGT: CPT

## 2018-01-01 PROCEDURE — 74011250636 HC RX REV CODE- 250/636

## 2018-01-01 RX ORDER — OXYCODONE AND ACETAMINOPHEN 10; 325 MG/1; MG/1
1 TABLET ORAL
Qty: 90 TAB | Refills: 0 | OUTPATIENT
Start: 2018-01-01

## 2018-01-01 RX ORDER — HEPARIN SODIUM 200 [USP'U]/100ML
1000 INJECTION, SOLUTION INTRAVENOUS ONCE
Status: COMPLETED | OUTPATIENT
Start: 2018-01-01 | End: 2018-01-01

## 2018-01-01 RX ORDER — OXYCODONE AND ACETAMINOPHEN 10; 325 MG/1; MG/1
1 TABLET ORAL
Qty: 90 TAB | Refills: 0 | Status: SHIPPED | OUTPATIENT
Start: 2018-01-01 | End: 2018-01-01 | Stop reason: SDUPTHER

## 2018-01-01 RX ORDER — PANTOPRAZOLE SODIUM 40 MG/1
40 TABLET, DELAYED RELEASE ORAL DAILY
COMMUNITY
End: 2018-01-01

## 2018-01-01 RX ORDER — LIDOCAINE HYDROCHLORIDE 10 MG/ML
10-30 INJECTION INFILTRATION; PERINEURAL
Status: DISCONTINUED | OUTPATIENT
Start: 2018-01-01 | End: 2018-01-01 | Stop reason: HOSPADM

## 2018-01-01 RX ORDER — SODIUM CHLORIDE 9 MG/ML
20 INJECTION, SOLUTION INTRAVENOUS CONTINUOUS
Status: DISCONTINUED | OUTPATIENT
Start: 2018-01-01 | End: 2018-01-01 | Stop reason: HOSPADM

## 2018-01-01 RX ORDER — LIDOCAINE HYDROCHLORIDE 10 MG/ML
1-60 INJECTION INFILTRATION; PERINEURAL
Status: DISCONTINUED | OUTPATIENT
Start: 2018-01-01 | End: 2018-01-01 | Stop reason: HOSPADM

## 2018-01-01 RX ORDER — FENTANYL CITRATE 50 UG/ML
25-100 INJECTION, SOLUTION INTRAMUSCULAR; INTRAVENOUS
Status: DISCONTINUED | OUTPATIENT
Start: 2018-01-01 | End: 2018-01-01 | Stop reason: HOSPADM

## 2018-01-01 RX ORDER — AMLODIPINE BESYLATE 5 MG/1
5 TABLET ORAL DAILY
Qty: 90 TAB | Refills: 4 | Status: SHIPPED | OUTPATIENT
Start: 2018-01-01 | End: 2018-01-01

## 2018-01-01 RX ORDER — METOPROLOL TARTRATE 100 MG/1
100 TABLET ORAL 2 TIMES DAILY
Qty: 180 TAB | Refills: 4 | Status: SHIPPED | OUTPATIENT
Start: 2018-01-01 | End: 2019-01-01 | Stop reason: SDUPTHER

## 2018-01-01 RX ORDER — OXYCODONE AND ACETAMINOPHEN 10; 325 MG/1; MG/1
1 TABLET ORAL
Qty: 90 TAB | Refills: 0 | Status: SHIPPED | OUTPATIENT
Start: 2018-01-01 | End: 2019-01-01 | Stop reason: SDUPTHER

## 2018-01-01 RX ORDER — MIDAZOLAM HYDROCHLORIDE 1 MG/ML
1-2 INJECTION, SOLUTION INTRAMUSCULAR; INTRAVENOUS
Status: DISCONTINUED | OUTPATIENT
Start: 2018-01-01 | End: 2018-01-01 | Stop reason: HOSPADM

## 2018-01-01 RX ADMIN — HEPARIN SODIUM 2000 UNITS: 200 INJECTION, SOLUTION INTRAVENOUS at 08:39

## 2018-01-01 RX ADMIN — LIDOCAINE HYDROCHLORIDE 10 ML: 10 INJECTION, SOLUTION INFILTRATION; PERINEURAL at 08:52

## 2018-01-01 RX ADMIN — MIDAZOLAM HYDROCHLORIDE 1 MG: 1 INJECTION, SOLUTION INTRAMUSCULAR; INTRAVENOUS at 08:51

## 2018-01-01 RX ADMIN — MIDAZOLAM HYDROCHLORIDE 1 MG: 1 INJECTION, SOLUTION INTRAMUSCULAR; INTRAVENOUS at 08:47

## 2018-01-01 RX ADMIN — FENTANYL CITRATE 50 MCG: 50 INJECTION, SOLUTION INTRAMUSCULAR; INTRAVENOUS at 08:46

## 2018-01-01 RX ADMIN — SODIUM CHLORIDE 20 ML/HR: 900 INJECTION, SOLUTION INTRAVENOUS at 07:26

## 2018-01-01 RX ADMIN — FENTANYL CITRATE 50 MCG: 50 INJECTION, SOLUTION INTRAMUSCULAR; INTRAVENOUS at 08:52

## 2018-01-02 PROCEDURE — 3331090002 HH PPS REVENUE DEBIT

## 2018-01-02 PROCEDURE — 3331090001 HH PPS REVENUE CREDIT

## 2018-01-03 PROCEDURE — 3331090002 HH PPS REVENUE DEBIT

## 2018-01-03 PROCEDURE — 3331090001 HH PPS REVENUE CREDIT

## 2018-01-04 ENCOUNTER — HOME CARE VISIT (OUTPATIENT)
Dept: HOME HEALTH SERVICES | Facility: HOME HEALTH | Age: 68
End: 2018-01-04
Payer: MEDICARE

## 2018-01-04 PROCEDURE — 3331090002 HH PPS REVENUE DEBIT

## 2018-01-04 PROCEDURE — 3331090001 HH PPS REVENUE CREDIT

## 2018-01-05 PROCEDURE — 3331090002 HH PPS REVENUE DEBIT

## 2018-01-05 PROCEDURE — 3331090001 HH PPS REVENUE CREDIT

## 2018-01-06 PROCEDURE — 3331090001 HH PPS REVENUE CREDIT

## 2018-01-06 PROCEDURE — 3331090002 HH PPS REVENUE DEBIT

## 2018-01-07 PROCEDURE — 3331090002 HH PPS REVENUE DEBIT

## 2018-01-07 PROCEDURE — 3331090001 HH PPS REVENUE CREDIT

## 2018-01-08 PROCEDURE — 3331090002 HH PPS REVENUE DEBIT

## 2018-01-08 PROCEDURE — 3331090001 HH PPS REVENUE CREDIT

## 2018-01-09 ENCOUNTER — HOME CARE VISIT (OUTPATIENT)
Dept: HOME HEALTH SERVICES | Facility: HOME HEALTH | Age: 68
End: 2018-01-09
Payer: MEDICARE

## 2018-01-09 PROCEDURE — 3331090001 HH PPS REVENUE CREDIT

## 2018-01-09 PROCEDURE — 3331090002 HH PPS REVENUE DEBIT

## 2018-01-10 PROCEDURE — 3331090001 HH PPS REVENUE CREDIT

## 2018-01-10 PROCEDURE — 3331090002 HH PPS REVENUE DEBIT

## 2018-01-11 ENCOUNTER — HOME CARE VISIT (OUTPATIENT)
Dept: HOME HEALTH SERVICES | Facility: HOME HEALTH | Age: 68
End: 2018-01-11
Payer: MEDICARE

## 2018-01-11 ENCOUNTER — HOSPITAL ENCOUNTER (EMERGENCY)
Age: 68
Discharge: HOME | End: 2018-01-11
Attending: EMERGENCY MEDICINE
Payer: MEDICARE

## 2018-01-11 ENCOUNTER — OFFICE VISIT (OUTPATIENT)
Dept: ONCOLOGY | Age: 68
End: 2018-01-11

## 2018-01-11 VITALS
HEIGHT: 69 IN | BODY MASS INDEX: 22.66 KG/M2 | HEART RATE: 82 BPM | SYSTOLIC BLOOD PRESSURE: 104 MMHG | OXYGEN SATURATION: 100 % | WEIGHT: 153 LBS | DIASTOLIC BLOOD PRESSURE: 69 MMHG | RESPIRATION RATE: 16 BRPM | TEMPERATURE: 98.7 F

## 2018-01-11 VITALS
RESPIRATION RATE: 17 BRPM | SYSTOLIC BLOOD PRESSURE: 78 MMHG | WEIGHT: 150 LBS | OXYGEN SATURATION: 100 % | DIASTOLIC BLOOD PRESSURE: 49 MMHG | TEMPERATURE: 98.1 F | HEART RATE: 94 BPM | BODY MASS INDEX: 22.15 KG/M2

## 2018-01-11 DIAGNOSIS — I95.9 HYPOTENSION, UNSPECIFIED HYPOTENSION TYPE: Primary | ICD-10-CM

## 2018-01-11 DIAGNOSIS — D64.9 ANEMIA, UNSPECIFIED TYPE: ICD-10-CM

## 2018-01-11 DIAGNOSIS — R52 PAIN AGGRAVATED BY EATING OR DRINKING: ICD-10-CM

## 2018-01-11 DIAGNOSIS — C78.89 METASTATIC SQUAMOUS CELL CARCINOMA TO ESOPHAGUS (HCC): Primary | ICD-10-CM

## 2018-01-11 DIAGNOSIS — E44.0 MALNUTRITION OF MODERATE DEGREE (HCC): ICD-10-CM

## 2018-01-11 DIAGNOSIS — C15.9 SQUAMOUS CELL ESOPHAGEAL CANCER (HCC): ICD-10-CM

## 2018-01-11 LAB
ANION GAP SERPL CALC-SCNC: 8 MMOL/L (ref 3–18)
ATRIAL RATE: 88 BPM
BASOPHILS # BLD: 0 K/UL (ref 0–0.06)
BASOPHILS NFR BLD: 1 % (ref 0–2)
BUN SERPL-MCNC: 22 MG/DL (ref 7–18)
BUN/CREAT SERPL: 33 (ref 12–20)
CALCIUM SERPL-MCNC: 9.1 MG/DL (ref 8.5–10.1)
CALCULATED P AXIS, ECG09: 66 DEGREES
CALCULATED R AXIS, ECG10: 31 DEGREES
CALCULATED T AXIS, ECG11: 44 DEGREES
CHLORIDE SERPL-SCNC: 104 MMOL/L (ref 100–108)
CO2 SERPL-SCNC: 28 MMOL/L (ref 21–32)
CREAT SERPL-MCNC: 0.66 MG/DL (ref 0.6–1.3)
DIAGNOSIS, 93000: NORMAL
DIFFERENTIAL METHOD BLD: ABNORMAL
EOSINOPHIL # BLD: 0.2 K/UL (ref 0–0.4)
EOSINOPHIL NFR BLD: 3 % (ref 0–5)
ERYTHROCYTE [DISTWIDTH] IN BLOOD BY AUTOMATED COUNT: 17.4 % (ref 11.6–14.5)
GLUCOSE SERPL-MCNC: 71 MG/DL (ref 74–99)
HCT VFR BLD AUTO: 30.4 % (ref 36–48)
HGB BLD-MCNC: 10.3 G/DL (ref 13–16)
LYMPHOCYTES # BLD: 1.2 K/UL (ref 0.9–3.6)
LYMPHOCYTES NFR BLD: 19 % (ref 21–52)
MCH RBC QN AUTO: 28.1 PG (ref 24–34)
MCHC RBC AUTO-ENTMCNC: 33.9 G/DL (ref 31–37)
MCV RBC AUTO: 83.1 FL (ref 74–97)
MONOCYTES # BLD: 0.6 K/UL (ref 0.05–1.2)
MONOCYTES NFR BLD: 9 % (ref 3–10)
NEUTS SEG # BLD: 4.3 K/UL (ref 1.8–8)
NEUTS SEG NFR BLD: 68 % (ref 40–73)
P-R INTERVAL, ECG05: 140 MS
PLATELET # BLD AUTO: 231 K/UL (ref 135–420)
PMV BLD AUTO: 9.4 FL (ref 9.2–11.8)
POTASSIUM SERPL-SCNC: 3.9 MMOL/L (ref 3.5–5.5)
Q-T INTERVAL, ECG07: 368 MS
QRS DURATION, ECG06: 70 MS
QTC CALCULATION (BEZET), ECG08: 445 MS
RBC # BLD AUTO: 3.66 M/UL (ref 4.7–5.5)
SODIUM SERPL-SCNC: 140 MMOL/L (ref 136–145)
VENTRICULAR RATE, ECG03: 88 BPM
WBC # BLD AUTO: 6.3 K/UL (ref 4.6–13.2)

## 2018-01-11 PROCEDURE — 80048 BASIC METABOLIC PNL TOTAL CA: CPT

## 2018-01-11 PROCEDURE — 99283 EMERGENCY DEPT VISIT LOW MDM: CPT

## 2018-01-11 PROCEDURE — 74011250636 HC RX REV CODE- 250/636: Performed by: EMERGENCY MEDICINE

## 2018-01-11 PROCEDURE — 3331090002 HH PPS REVENUE DEBIT

## 2018-01-11 PROCEDURE — 85025 COMPLETE CBC W/AUTO DIFF WBC: CPT

## 2018-01-11 PROCEDURE — 93005 ELECTROCARDIOGRAM TRACING: CPT

## 2018-01-11 PROCEDURE — 3331090001 HH PPS REVENUE CREDIT

## 2018-01-11 RX ORDER — ACETAMINOPHEN 500 MG
TABLET ORAL
COMMUNITY
End: 2018-01-15

## 2018-01-11 RX ADMIN — SODIUM CHLORIDE 500 ML: 900 INJECTION, SOLUTION INTRAVENOUS at 11:53

## 2018-01-11 NOTE — PROGRESS NOTES
[facility]:     Patient: Noni Chávez Person Age: 75 y.o. Sex: male    YOB: 1950 Admit Date: (Not on file) PCP: Mikala Harry DO   MRN: [id number]  CSN: [id number]       Patient Active Problem List   Diagnosis Code    Essential hypertension, malignant I10    Arthritis M19.90    Hyperthyroidism E05.90    Metastatic squamous cell carcinoma to esophagus ([facility] [address] C78.89    Malnutrition ([address] E46    Squamous cell esophageal cancer (HCC) C15.9    Malnutrition of moderate degree ([address] E44.0    Pain aggravated by eating or drinking R52    Esophageal mass K22.9    Malignant neoplasm of esophagus (HCC) C15.9    Anemia D64.9     Assessment/ Plan:       1.)  Symptomatic hypotension  -Patient referred to emergency room for IV fluids and further management    2.) Advanced Esophageal Cancer-Squamous Cell Carcinoma    -Pathology results reviewed-Squamous Cell Carcinoma  -EGD biopsy per Dr. Anisha Shepherd 10/12/2017  -radiation oncology-Dr. Cate Garcia  -Complete staging workup with FDG PET CT scan revealed lymph node metastasis (supraclavicular)  -MediPort placement per Dr. Melchor-11/7/2017    -Completed concurrent chemotherapy/radiotherapy 12/2017    -s/p treatment with week #5 of 5-12/6/2017    -Restaging CT scans in the per Rad./Onc. Service    -Rtc. in 1-2 weeks to reevaluate clinically, repeat labs and for further medical decision making and consider possible further palliative systemic therapy versus continued observation    -Check for PD-L1    -His overall prognosis remains poor    -Return to clinic in approximately 1 week to reevaluate clinically and for further medical decision-making.    -Closely monitor weekly CBCs and CMP's    -Instructed go the emergency room for any worsening of symptoms and or development of fever    3.)  Mild Dehydration/ WT.  Loss  -Improved  -Oral liquids encouraged  -Status post G-tube placement  -G-tube feedings per nutritional services and home health-tube feedings going well    4.)  Dysphasia  -Continue triple mix oral solution per radiation oncology  -Tolerating liquids well    5.)  Pain secondary to esophageal cancer/cancer treatment  -Discontinued his OxyIR  -Percocet to use as directed  -Percocet refilled today  -Monitor   -Random UDS  -Wean his medication down as he completes his concurrent chemotherapy radiotherapy and his esophageal pain has improved      FDG-PET scan 10/19/2017:    IMPRESSION: [Malignant activity at the esophageal mass, middle mediastinal lymph nodes,  supraclavicular lymph nodes compatible with metastases. ]     Activity at the right hilum but without enlarged lymph node, could be early  metastasis.     No malignant activity at subcentimeter pulmonary nodules. Potential for false  negative result exists because of their small size. Advise short-term follow-up  with diagnostic CT.     Activity at dental disease also seen in the mandible and maxilla.     Activity posterior to the right side mandible without definite mass or skeletal  abnormality of uncertain significance. Could be a tiny adjacent lymph node. Attention should be paid to this region on subsequent examinations.       Pathology report 10/12/2017:  FINAL DIAGNOSIS:   ESOPHAGEAL MASS, BIOPSY:   INVASIVE, POORLY DIFFERENTIATED SQUAMOUS CELL CARCINOMA. GROSS DESCRIPTION:     The specimen is received in formalin in a container labeled with the patient's name, Marry Bowen, and biopsy esophageal mass, rule out malignancy and consists of two soft tissue fragments, one pale tan and the other pink to dark red, ranging from 0.3 to 0.5 cm in greatest dimension. The specimen is submitted in toto in cassette A1. (cd)   DMM/clr     I have discussed the diagnosis with the patient and the intended plan as seen in the above orders. I have reviewed the plan of care with the patient, accepted their input and they are in agreement with the treatment goals.  Patient has provided input and agrees with goals. History:   Eward Schilder Person is a 75 y.o. male presenting today for:     Distal Esophageal Cancer  -Pathology results reviewed-Squamous cell carcinoma  -No f/c/s/cp or SOB. -Dysphagia-now improved  -Esophageal pain-well-controlled with pain medications    Current Outpatient Prescriptions   Medication Sig Dispense Refill    acetaminophen (TYLENOL) 500 mg tablet Take  by mouth every six (6) hours as needed for Pain.  oxyCODONE-acetaminophen (PERCOCET 10)  mg per tablet Take 1 Tab by mouth every six (6) hours as needed for Pain. Max Daily Amount: 4 Tabs. 40 Tab 0    aluminum-magnesium hydroxide 200-200 mg/5 mL susp 5 mL, diphenhydrAMINE 12.5 mg/5 mL liqd 5 mL, lidocaine 2 % soln 5 mL Take 5 mL by mouth three (3) times daily. Swish and swallow 1 or 2 teaspoonsful by mouth 2-3 times every day      polyethylene glycol (MIRALAX) 17 gram packet Take 17 g by mouth daily.  magic mouthwash solution Take 5 mL by mouth three (3) times daily.  nut.tx.impaired dige fxn-fiber (VITAL AF 1.2 MAYKEL) 0.08 gram- 1.2 kcal/mL liqd Start with 1 can over an hour 3 times per day for 24 hours. If tolerated, advance to 2 cans over 1.5 hours 3x/day: 9am, 12pm, 6pm (follow toleration over 48 hours). Flush tube before & after with 200ml water each time (400ml total with each feeding)  If patient can drink water, flushes can be re 90 Can 12    lisinopril (PRINIVIL, ZESTRIL) 20 mg tablet Take 1 Tab by mouth daily. 90 Tab 4    amLODIPine (NORVASC) 5 mg tablet Take 1 Tab by mouth daily.  90 Tab 4       Objective:   VS:      Vitals:    01/11/18 0942 01/11/18 0943   BP: (!) 86/67 (!) 78/49   Pulse: 94 94   Resp: 17    Temp: 98.1 °F (36.7 °C)    TempSrc: Oral    SpO2: 100%    Weight: 68 kg (150 lb)        Physical Exam:     Constitutional:  no acute distress  alert and oriented x 3    HENT:  atraumatic   Eyes:  EOMI   Neck:  No masses   Cardiovascular:  S1, S2   Pulmonary/Chest Wall:   clear   Abdominal:  Non tender, no palpable masses  PEG  soft       Musculoskeletal:  No deformities   Skin:  No rashes     Peripheral Vascular:  Pulses palpable       Review of Systems: The remainder of 12 point ROS was otherwise negative except for what was reported in the CC/HPI:      Recent Results (from the past 168 hour(s))   EKG, 12 LEAD, INITIAL    Collection Time: 01/11/18 11:12 AM   Result Value Ref Range    Ventricular Rate 88 BPM    Atrial Rate 88 BPM    P-R Interval 140 ms    QRS Duration 70 ms    Q-T Interval 368 ms    QTC Calculation (Bezet) 445 ms    Calculated P Axis 66 degrees    Calculated R Axis 31 degrees    Calculated T Axis 44 degrees    Diagnosis       Sinus rhythm with premature atrial complexes  Otherwise normal ECG  When compared with ECG of 18-AUG-2017 11:14,  premature atrial complexes are now present  Confirmed by Mario Acevedo ([id number]) on 1/11/2018 4:01:57 PM     CBC WITH AUTOMATED DIFF    Collection Time: 01/11/18 11:36 AM   Result Value Ref Range    WBC 6.3 4.6 - 13.2 K/uL    RBC 3.66 (L) 4.70 - 5.50 M/uL    HGB 10.3 (L) 13.0 - 16.0 g/dL    HCT 30.4 (L) 36.0 - 48.0 %    MCV 83.1 74.0 - 97.0 FL    MCH 28.1 24.0 - 34.0 PG    MCHC 33.9 31.0 - 37.0 g/dL    RDW 17.4 (H) 11.6 - 14.5 %    PLATELET 881 740 - 248 K/uL    MPV 9.4 9.2 - 11.8 FL    NEUTROPHILS 68 40 - 73 %    LYMPHOCYTES 19 (L) 21 - 52 %    MONOCYTES 9 3 - 10 %    EOSINOPHILS 3 0 - 5 %    BASOPHILS 1 0 - 2 %    ABS. NEUTROPHILS 4.3 1.8 - 8.0 K/UL    ABS. LYMPHOCYTES 1.2 0.9 - 3.6 K/UL    ABS. MONOCYTES 0.6 0.05 - 1.2 K/UL    ABS. EOSINOPHILS 0.2 0.0 - 0.4 K/UL    ABS.  BASOPHILS 0.0 0.0 - 0.06 K/UL    DF AUTOMATED     METABOLIC PANEL, BASIC    Collection Time: 01/11/18 11:36 AM   Result Value Ref Range    Sodium 140 136 - 145 mmol/L    Potassium 3.9 3.5 - 5.5 mmol/L    Chloride 104 100 - 108 mmol/L    CO2 28 21 - 32 mmol/L    Anion gap 8 3.0 - 18 mmol/L    Glucose 71 (L) 74 - 99 mg/dL    BUN 22 (H) 7.0 - 18 MG/DL Creatinine 0.66 0.6 - 1.3 MG/DL    BUN/Creatinine ratio 33 (H) 12 - 20      GFR est AA >60 >60 ml/min/1.73m2    GFR est non-AA >60 >60 ml/min/1.73m2    Calcium 9.1 8.5 - 10.1 MG/DL       Past Medical History:   Diagnosis Date    Arthritis     Essential hypertension, malignant 8/3/2010    Leg pain 8/3/2010    Throat [facility])        Past Surgical History:   Procedure Laterality Date    HX GI      G-tube placement on 11/28/17    [place] EGD DELIVER THERMAL ENERGY SPHNCTR/CARDIA GERD         Social History     Social History    Marital status:      Spouse name: N/A    Number of children: N/A    Years of education: N/A     Occupational History    Not on file. Social History Main Topics    Smoking status: Former Smoker     Packs/day: 0.25     Types: Cigarettes     Start date: 1/17/2017     Quit date: 11/22/2017    Smokeless tobacco: Never Used      Comment: Restarted in Jan 2017    Alcohol use No      Comment: \"NONE IN AWHILE\"    Drug use: No    Sexual activity: No     Other Topics Concern    Not on file     Social History Narrative       Family History   Problem Relation Age of Onset    Heart Disease Mother     Lung Disease Father        No Known Allergies    Follow-up Disposition: Not on File    Uma Mccracken MD, MPH Hematology-Medical Oncology  January 11, 2018 1:16 PM

## 2018-01-11 NOTE — ED PROVIDER NOTES
EMERGENCY DEPARTMENT HISTORY AND PHYSICAL EXAM    12:20 PM      Date: 1/11/2018  Patient Name: Lila Mendoza Person    History of Presenting Illness     Chief Complaint   Patient presents with    Hypotension    Dizziness         History Provided By: Patient    Chief Complaint: hypotension  Duration:  Days  Timing:  Constant  Location: n/a  Quality: n/a  Severity: Mild  Modifying Factors: moving makes it go higher  Associated Symptoms: denies any other associated signs or symptoms      Additional History (Context): Latanya Prince is a 75 y.o. male with hypertension , throat cancer who presents with hypotension. Pt was ay oncologist and had a low blood pressure reading and was sent to ED. Denies any complaints. Denies dizziness. PCP: Brad Pascual., DO    Current Outpatient Prescriptions   Medication Sig Dispense Refill    acetaminophen (TYLENOL) 500 mg tablet Take  by mouth every six (6) hours as needed for Pain.  oxyCODONE-acetaminophen (PERCOCET 10)  mg per tablet Take 1 Tab by mouth every six (6) hours as needed for Pain. Max Daily Amount: 4 Tabs. 40 Tab 0    aluminum-magnesium hydroxide 200-200 mg/5 mL susp 5 mL, diphenhydrAMINE 12.5 mg/5 mL liqd 5 mL, lidocaine 2 % soln 5 mL Take 5 mL by mouth three (3) times daily. Swish and swallow 1 or 2 teaspoonsful by mouth 2-3 times every day      polyethylene glycol (MIRALAX) 17 gram packet Take 17 g by mouth daily.  magic mouthwash solution Take 5 mL by mouth three (3) times daily.  nut.tx.impaired dige fxn-fiber (VITAL AF 1.2 MAYKEL) 0.08 gram- 1.2 kcal/mL liqd Start with 1 can over an hour 3 times per day for 24 hours. If tolerated, advance to 2 cans over 1.5 hours 3x/day: 9am, 12pm, 6pm (follow toleration over 48 hours).    Flush tube before & after with 200ml water each time (400ml total with each feeding)  If patient can drink water, flushes can be re 90 Can 12    lisinopril (PRINIVIL, ZESTRIL) 20 mg tablet Take 1 Tab by mouth daily. 90 Tab 4    amLODIPine (NORVASC) 5 mg tablet Take 1 Tab by mouth daily. 80 Tab 4       Past History     Past Medical History:  Past Medical History:   Diagnosis Date    Arthritis     Essential hypertension, malignant 8/3/2010    Leg pain 8/3/2010    Throat cancer [facility])        Past Surgical History:  Past Surgical History:   Procedure Laterality Date    HX GI      G-tube placement on 11/28/17    [place] EGD DELIVER THERMAL ENERGY SPHNCTR/CARDIA GERD         Family History:  Family History   Problem Relation Age of Onset    Heart Disease Mother     Lung Disease Father        Social History:  Social History   Substance Use Topics    Smoking status: Former Smoker     Packs/day: 0.25     Types: Cigarettes     Start date: 1/17/2017     Quit date: 11/22/2017    Smokeless tobacco: Never Used      Comment: Restarted in Jan 2017    Alcohol use No      Comment: \"NONE IN AWHILE\"       Allergies:  No Known Allergies      Review of Systems     Constitutional:  Denies malaise, fever, chills. Head:  Denies injury. Face:  Denies injury or pain. ENMT:  Denies sore throat. Neck:  Denies injury or pain. Chest:  Denies injury. Cardiac:  Denies chest pain or palpitations. Respiratory:  Denies cough, wheezing, difficulty breathing, shortness of breath. GI/ABD:  Denies injury, pain, distention, nausea, vomiting, diarrhea. :  Denies injury, pain, dysuria or urgency. Back:  Denies injury or pain. Pelvis:  Denies injury or pain. Extremity/MS:  Denies injury or pain. Neuro:  Denies headache, LOC, dizziness, neurologic symptoms/deficits/paresthesias. Skin: Denies injury, rash, itching or skin changes. Physical Exam     Visit Vitals    BP 90/56    Pulse 87    Temp 98.7 °F (37.1 °C)    Resp 16    Ht 5' 9\" (1.753 m)    Wt 69.4 kg (153 lb)    SpO2 100%    BMI 22.59 kg/m2       CONSTITUTIONAL: Alert, in no apparent distress; well-developed; well-nourished. HEAD:  Normocephalic, atraumatic. EYES: PERRL; EOM's intact. ENTM: Nose: No rhinorrhea; Throat: mucous membranes moist. Posterior pharynx-normal.  Neck:  No JVD, supple without lymphadenopathy. RESP: Chest clear, equal breath sounds. CV: S1 and S2 WNL; No murmurs, gallops or rubs. GI: Abdomen soft and non-tender. No masses or organomegaly. UPPER EXT:  Normal inspection. LOWER EXT: Normal inspection. NEURO: strength 5/5 and sym, sensation intact. SKIN: No rashes; Normal for age and stage. PSYCH:  Alert and oriented, normal affect. Diagnostic Study Results     Labs -  Recent Results (from the past 12 hour(s))   EKG, 12 LEAD, INITIAL    Collection Time: 01/11/18 11:12 AM   Result Value Ref Range    Ventricular Rate 88 BPM    Atrial Rate 88 BPM    P-R Interval 140 ms    QRS Duration 70 ms    Q-T Interval 368 ms    QTC Calculation (Bezet) 445 ms    Calculated P Axis 66 degrees    Calculated R Axis 31 degrees    Calculated T Axis 44 degrees    Diagnosis       Sinus rhythm with premature atrial complexes  Otherwise normal ECG  When compared with ECG of 18-AUG-2017 11:14,  premature atrial complexes are now present     CBC WITH AUTOMATED DIFF    Collection Time: 01/11/18 11:36 AM   Result Value Ref Range    WBC 6.3 4.6 - 13.2 K/uL    RBC 3.66 (L) 4.70 - 5.50 M/uL    HGB 10.3 (L) 13.0 - 16.0 g/dL    HCT 30.4 (L) 36.0 - 48.0 %    MCV 83.1 74.0 - 97.0 FL    MCH 28.1 24.0 - 34.0 PG    MCHC 33.9 31.0 - 37.0 g/dL    RDW 17.4 (H) 11.6 - 14.5 %    PLATELET 961 494 - 514 K/uL    MPV 9.4 9.2 - 11.8 FL    NEUTROPHILS 68 40 - 73 %    LYMPHOCYTES 19 (L) 21 - 52 %    MONOCYTES 9 3 - 10 %    EOSINOPHILS 3 0 - 5 %    BASOPHILS 1 0 - 2 %    ABS. NEUTROPHILS 4.3 1.8 - 8.0 K/UL    ABS. LYMPHOCYTES 1.2 0.9 - 3.6 K/UL    ABS. MONOCYTES 0.6 0.05 - 1.2 K/UL    ABS. EOSINOPHILS 0.2 0.0 - 0.4 K/UL    ABS.  BASOPHILS 0.0 0.0 - 0.06 K/UL    DF AUTOMATED     METABOLIC PANEL, BASIC    Collection Time: 01/11/18 11:36 AM   Result Value Ref Range    Sodium 140 136 - 145 mmol/L    Potassium 3.9 3.5 - 5.5 mmol/L    Chloride 104 100 - 108 mmol/L    CO2 28 21 - 32 mmol/L    Anion gap 8 3.0 - 18 mmol/L    Glucose 71 (L) 74 - 99 mg/dL    BUN 22 (H) 7.0 - 18 MG/DL    Creatinine 0.66 0.6 - 1.3 MG/DL    BUN/Creatinine ratio 33 (H) 12 - 20      GFR est AA >60 >60 ml/min/1.73m2    GFR est non-AA >60 >60 ml/min/1.73m2    Calcium 9.1 8.5 - 10.1 MG/DL       Radiologic Studies -   No orders to display         Medical Decision Making   I am the first provider for this patient. I reviewed the vital signs, available nursing notes, past medical history, past surgical history, family history and social history. Vital Signs-Reviewed the patient's vital signs. Pulse Oximetry Analysis -  100 on room air (Interpretation)        Records Reviewed: Nursing Notes, Old Medical Records, Previous Radiology Studies and Previous Laboratory Studies (Time of Review: 12:20 PM)    ED Course: Progress Notes, Reevaluation, and Consults:      Provider Notes (Medical Decision Making): Will check labs, hydration, EKG  IMPRESSION AND MEDICAL DECISION MAKING:  Based upon the patient's presentation with noted HPI and PE, along with the work up done in the emergency department, I believe that the patient has chronic hypotension. Review of previous charts shows that Blood pressure readings have been consistent. Will have him follow up with his PCP. Labs are reassuring at this time. The patient will be discharged home. Warning signs of worsening condition were discussed and understood by the patient. Based on patient's age, coexisting illness, exam, and the results of this ED evaluation, the decision to treat as an outpatient was made. Based on the information available at time of discharge, acute pathology requiring immediate intervention was deemed relative unlikely.  While it is impossible to completely exclude the possibility of underlying serious disease or worsening of condition, I feel the relative likelihood is extremely low. I discussed this uncertainty with the patient, who understood ED evaluation and treatment and felt comfortable with the outpatient treatment plan. All questions regarding care, test results, and follow up were answered. The patient is stable and appropriate to discharge. They understand that they should return to the emergency department for any new or worsening symptoms. I stressed the importance of follow up for repeat assessment and possibly further evaluation/treatment. Diagnosis     Clinical Impression:   1.  Hypotension, unspecified hypotension type      _______________________________

## 2018-01-11 NOTE — ED NOTES
Discharging patient for primary nurse Leticia España RN. Discharge instructions given to patient, patient verbalizes understanding of instructions. Patient alert and oriented x3, denies pain or shortness of breath at this time. Patient ambulatory, gait steady. Patient armband removed and given to patient to take home.   Patient was informed of the privacy risks if armband lost or stolen

## 2018-01-12 ENCOUNTER — HOSPITAL ENCOUNTER (EMERGENCY)
Age: 68
Discharge: HOME | End: 2018-01-12
Attending: EMERGENCY MEDICINE | Admitting: EMERGENCY MEDICINE
Payer: MEDICARE

## 2018-01-12 VITALS
OXYGEN SATURATION: 100 % | HEART RATE: 106 BPM | HEIGHT: 69 IN | RESPIRATION RATE: 16 BRPM | TEMPERATURE: 98.5 F | WEIGHT: 153 LBS | DIASTOLIC BLOOD PRESSURE: 64 MMHG | SYSTOLIC BLOOD PRESSURE: 96 MMHG | BODY MASS INDEX: 22.66 KG/M2

## 2018-01-12 DIAGNOSIS — Z93.1 GASTROSTOMY TUBE IN PLACE (HCC): Primary | ICD-10-CM

## 2018-01-12 DIAGNOSIS — E44.0 MALNUTRITION OF MODERATE DEGREE (HCC): ICD-10-CM

## 2018-01-12 DIAGNOSIS — C15.9 SQUAMOUS CELL ESOPHAGEAL CANCER (HCC): ICD-10-CM

## 2018-01-12 PROCEDURE — 3331090001 HH PPS REVENUE CREDIT

## 2018-01-12 PROCEDURE — 3331090002 HH PPS REVENUE DEBIT

## 2018-01-12 PROCEDURE — 99283 EMERGENCY DEPT VISIT LOW MDM: CPT

## 2018-01-12 PROCEDURE — 74011000250 HC RX REV CODE- 250

## 2018-01-12 PROCEDURE — 77030008027

## 2018-01-12 RX ORDER — BACITRACIN 500 UNIT/G
PACKET (EA) TOPICAL
Status: COMPLETED
Start: 2018-01-12 | End: 2018-01-12

## 2018-01-12 RX ORDER — BACITRACIN 500 UNIT/G
PACKET (EA) TOPICAL
Status: DISCONTINUED | OUTPATIENT
Start: 2018-01-12 | End: 2018-01-12 | Stop reason: HOSPADM

## 2018-01-12 RX ADMIN — BACITRACIN 1 PACKET: 500 OINTMENT TOPICAL at 11:19

## 2018-01-12 NOTE — ED NOTES
I have reviewed discharge instruction and prescriptions with patient left without instructions due to delay in them being processed. . verbalized understanding and has no further questions at this time. Education taught and patient verbalized understanding of education. Teach back method used. Dr Micki Triplett aware and states he saw pt leave and asked pt if he had any questions and pt denied.

## 2018-01-12 NOTE — DISCHARGE INSTRUCTIONS
Esophagectomy: What to Expect at Home  Your Recovery  An esophagectomy (say \"ee-sof-uh-JEK-tuh-will\") is surgery to remove all or part of the esophagus. When you leave the hospital, the area around the cuts (incisions) may still be swollen or bruised. It may also feel numb. This is normal and may continue for a few weeks. You will probably need to take pain medicine for a few weeks. You will have to be very careful about what you eat for several months after surgery and possibly for the rest of your life. You will probably have a feeding tube (J-tube) in your belly. This will come out when you are eating normally and getting enough nutrition. This could be about 4 to 6 weeks after surgery, but it could take longer. Your doctor will give you detailed information on what you can eat, how you should eat, and how to use the feeding tube. You may also have digestive problems for a few months. These include weight loss, a lot of gas, and a problem called dumping syndrome. Dumping syndrome usually occurs after eating rich or fatty meals. It may cause you to feel lightheaded or sick to your stomach, or to have cramps and diarrhea. Most people go back to work or their normal routine after 6 to 12 weeks. You will need more time to get better if you need other treatment for cancer, such as chemotherapy. It will take 3 to 4 months to get back to your usual activities. This care sheet gives you a general idea about how long it will take for you to recover. But each person recovers at a different pace. How can you care for yourself at home? Activity  ? · Rest when you feel tired. ? · Be active. Walking is a good choice. ? · You may be able to take showers (unless you have a drain in your incision). If you have a drain, follow your doctor's instructions to empty and care for it. Keep your feeding tube taped to your skin so it will not fall off. Do not soak in a tub or use a hot tub.    ? · Do not lean your head back quickly or for a long period of time. This puts pressure on the neck and may slow your healing. ? · You can do your normal activities when it feels okay to do so. ? · Allow your body to heal. Don't move quickly or lift anything heavy until you are feeling better. ? · Ask your doctor when you can drive again. Diet  ? Your diet will go from a clear liquid diet, to a full liquid diet, and then to a soft diet before you can eat normally. This generally takes 1 to 2 months. Your doctor will give you specific information about what you can eat. In general:  ? · If you are on a clear liquid diet, eat or drink clear, fat-free broth or bouillon soup. You can also eat flavored ice pops and gelatin desserts, such as Jell-O.   ? · If you are on a full liquid diet, you can have everything on the clear liquid diet. You can also have creamy soups, cooked cereals, milk, protein shakes, fruit juices, puddings, and ice cream.   ? · If you are on a soft diet, eat foods that are easy to swallow and digest. These include:  ¨ Most cereals. ¨ Scrambled eggs and omelets. ¨ Canned or cooked fruits. ¨ Finely ground beef, chicken, turkey, and pork. ¨ Mild cheeses. ¨ Mashed potatoes. ¨ Cooked or pureed vegetables. ¨ Yogurt. Medicines  ? · Your doctor will tell you if and when you can restart your medicines. He or she will also give you instructions about taking any new medicines. ? · If you take blood thinners, such as warfarin (Coumadin), clopidogrel (Plavix), or aspirin, be sure to talk to your doctor. He or she will tell you if and when to start taking those medicines again. Make sure that you understand exactly what your doctor wants you to do. ? · Be safe with medicines. Read and follow all instructions on the label. ¨ If the doctor gave you a prescription medicine for pain, take it as prescribed. ¨ If you are not taking a prescription pain medicine, ask your doctor if you can take an over-the-counter medicine.    ? · If your doctor prescribed antibiotics, take them as directed. Do not stop taking them just because you feel better. You need to take the full course of antibiotics. Incision care  ? · If you have strips of tape on the incision, leave the tape on for a week or until it falls off.   ? · Wash the area daily with warm, soapy water, and pat it dry. Your doctor will tell you how to take care of this. Don't use hydrogen peroxide or alcohol, which can slow healing. ? · You may cover the area with a gauze bandage if it weeps or rubs against clothing. Change the bandage every day. Other instructions  ? · It is normal to have some yellowish mucus around your feeding tube. This is not a sign of infection. ? · Keep your feeding tube clamped unless you are using it. ? · Keep the tube taped to your skin at all times, and leave some slack in the tube. This helps prevent pain and keeps the tube from coming out. ? · If you are using your feeding tube:  ¨ Clean around the tube with water before and after you use it. ¨ Flush the tube daily as your doctor tells you to. ¨ Do not put any pills through the tube. They can clog it. Follow-up care is a key part of your treatment and safety. Be sure to make and go to all appointments, and call your doctor if you are having problems. It's also a good idea to know your test results and keep a list of the medicines you take. When should you call for help? Call 911 anytime you think you may need emergency care. For example, call if:  ? · You passed out (lost consciousness). ? · You are short of breath. ?Call your doctor now or seek immediate medical care if:  ? · You have pain that does not get better after you take pain medicine. ? · You have signs of infection, such as:  ¨ Increased pain, swelling, warmth, or redness. ¨ Red streaks leading from the incision. ¨ Pus draining from the incision. ¨ A fever. ? · You have loose stitches, or your incision comes open.    ? · Bright red blood has soaked through your bandage. ? · You are sick to your stomach or cannot keep fluids down. ? · You have signs of a blood clot in your leg (called a deep vein thrombosis), such as:  ¨ Pain in your calf, back of the knee, thigh, or groin. ¨ Redness and swelling in your leg or groin. ? · You cannot pass stools or gas. ? Watch closely for changes in your health, and be sure to contact your doctor if you have any problems. Where can you learn more? Go to http://hima-wilton.info/. Enter E449 in the search box to learn more about \"Esophagectomy: What to Expect at Home. \"  Current as of: May 12, 2017  Content Version: 11.4  © 9840-1315 [employer]. Care instructions adapted under license by [employer] (which disclaims liability or warranty for this information). If you have questions about a medical condition or this instruction, always ask your healthcare professional. [place] [address] any warranty or liability for your use of this information. Learning About Stitches and Staples Removal  When are stitches and staples removed? Your doctor will tell you when to have your stitches or staples removed, usually in 7 to 14 days. How long you'll be told to wait will depend on things like where the wound is located, how big and how deep the wound is, and what your general health is like. Do not remove the stitches on your own. Stitches on the face are usually removed within a week. But stitches and staples on other areas of the body, such as on the back or belly or over a joint, may need to stay in place longer, often a week or two. Be sure to follow your doctor's instructions. How are stitches and staples removed? It usually doesn't hurt when the doctor removes the stitches or staples. You may feel a tug as each stitch or staple is removed. · You will either be seated or lying down.   · To remove stitches, the doctor will use scissors to cut each of the knots and then pull the threads out. · To remove staples, the doctor will use a tool to take out the staples one at a time. · The area may still feel tender after the stitches or staples are gone. But it should feel better within a few minutes or up to a few hours. What can you expect after stitches and staples are removed? Depending on the type and location of the cut, you will have a scar. Scars usually fade over time. Keep the area clean, but you won't need a bandage. When should you call for help? Call your doctor now or seek immediate medical care if :  · You have new pain, or your pain gets worse. · You have trouble moving the area near the scar. · You have symptoms of infection, such as:  ¨ Increased pain, swelling, warmth, or redness around the scar. ¨ Red streaks leading from the scar. ¨ Pus draining from the scar. ¨ A fever. Watch closely for changes in your health, and be sure to contact your doctor if:  · The scar opens. · You do not get better as expected. Follow-up care is a key part of your treatment and safety. Be sure to make and go to all appointments, and call your doctor if you do not get better as expected. It's also a good idea to keep a list of the medicines you take. Where can you learn more? Go to http://hima-wilton.info/. Enter R566 in the search box to learn more about \"Learning About Stitches and Staples Removal.\"  Current as of: March 20, 2017  Content Version: 11.4  © 3922-2361 [employer]. Care instructions adapted under license by [employer] (which disclaims liability or warranty for this information). If you have questions about a medical condition or this instruction, always ask your healthcare professional. [address] any warranty or liability for your use of this information.

## 2018-01-12 NOTE — ED PROVIDER NOTES
EMERGENCY DEPARTMENT HISTORY AND PHYSICAL EXAM    10:24 AM      Date: 1/12/2018  Patient Name: Lila Sellers    History of Presenting Illness     Chief Complaint   Patient presents with    Feeding Tube Problem         History Provided By: Patient    Chief Complaint: Loose G-tube   Duration: 1 Days  Timing:  Acute  Location: GI  Quality: N/A  Severity: Mild  Modifying Factors: None  Associated Symptoms: denies any other associated signs or symptoms      Additional History (Context): Lila Sellers is a 75 y.o. male with throat cancer, essential HTN and arthritis who presents to the ED with a chief complaint of acute concern with the stitching of G-tube since yesterday with no other associated symptoms. Patient states \"My feeding tube stitches are loose. Last time that happened it came out and I had to go to surgery to get it fixed. \" Patient reports he eats lightly by mouth, but mostly feeds through the G-tube. Patient does not state any alleviating or precipitating factors. Patient denies any other symptoms or complaints. PCP: Gayle Peng., DO    Current Outpatient Prescriptions   Medication Sig Dispense Refill    acetaminophen (TYLENOL) 500 mg tablet Take  by mouth every six (6) hours as needed for Pain.  oxyCODONE-acetaminophen (PERCOCET 10)  mg per tablet Take 1 Tab by mouth every six (6) hours as needed for Pain. Max Daily Amount: 4 Tabs. 40 Tab 0    aluminum-magnesium hydroxide 200-200 mg/5 mL susp 5 mL, diphenhydrAMINE 12.5 mg/5 mL liqd 5 mL, lidocaine 2 % soln 5 mL Take 5 mL by mouth three (3) times daily. Swish and swallow 1 or 2 teaspoonsful by mouth 2-3 times every day      polyethylene glycol (MIRALAX) 17 gram packet Take 17 g by mouth daily.  magic mouthwash solution Take 5 mL by mouth three (3) times daily.  nut.tx.impaired dige fxn-fiber (VITAL AF 1.2 MAYKEL) 0.08 gram- 1.2 kcal/mL liqd Start with 1 can over an hour 3 times per day for 24 hours.    If tolerated, advance to 2 cans over 1.5 hours 3x/day: 9am, 12pm, 6pm (follow toleration over 48 hours). Flush tube before & after with 200ml water each time (400ml total with each feeding)  If patient can drink water, flushes can be re 90 Can 12    lisinopril (PRINIVIL, ZESTRIL) 20 mg tablet Take 1 Tab by mouth daily. 90 Tab 4    amLODIPine (NORVASC) 5 mg tablet Take 1 Tab by mouth daily. 80 Tab 4       Past History     Past Medical History:  Past Medical History:   Diagnosis Date    Arthritis     Essential hypertension, malignant 8/3/2010    Leg pain 8/3/2010    Throat cancer [facility])        Past Surgical History:  Past Surgical History:   Procedure Laterality Date    HX GI      G-tube placement on 11/28/17    [facility] EGD DELIVER THERMAL ENERGY SPHNCTR/CARDIA GERD         Family History:  Family History   Problem Relation Age of Onset    Heart Disease Mother     Lung Disease Father        Social History:  Social History   Substance Use Topics    Smoking status: Former Smoker     Packs/day: 0.25     Types: Cigarettes     Start date: 1/17/2017     Quit date: 11/22/2017    Smokeless tobacco: Never Used      Comment: Restarted in Jan 2017    Alcohol use No      Comment: \"NONE IN AWHILE\"       Allergies:  No Known Allergies      Review of Systems       Review of Systems   Constitutional: Negative for activity change, fatigue and fever. HENT: Negative for congestion and rhinorrhea. Eyes: Negative for visual disturbance. Respiratory: Negative for shortness of breath. Cardiovascular: Negative for chest pain and palpitations. Gastrointestinal: Negative for abdominal pain, diarrhea, nausea and vomiting. Genitourinary: Negative for dysuria and hematuria. Musculoskeletal: Negative for back pain. Skin: Positive for wound (loose stitching around the G-Tube). Negative for rash. Neurological: Negative for dizziness, weakness and light-headedness. Psychiatric/Behavioral: Negative for agitation.    All other systems reviewed and are negative. Physical Exam     Patient Vitals for the past 12 hrs:   Temp Pulse Resp BP SpO2   01/12/18 1115 - - - 96/64 -   01/12/18 1100 - - - 102/69 -   01/12/18 1045 - - - 92/69 -   01/12/18 1030 - - - 92/47 -   01/12/18 1015 - - - 100/68 -   01/12/18 1004 - - - 90/61 100 %   01/12/18 1000 - - - (!) 87/59 100 %   01/12/18 0924 - - - - 100 %   01/12/18 0905 98.5 °F (36.9 °C) (!) 106 16 129/70 100 %       Physical Exam   Constitutional: He appears well-developed and well-nourished. HENT:   Head: Normocephalic and atraumatic. Eyes: Conjunctivae are normal. Pupils are equal, round, and reactive to light. Neck: Normal range of motion. Neck supple. Cardiovascular: Normal rate and regular rhythm. Pulmonary/Chest: Effort normal and breath sounds normal.   Abdominal: Soft. Bowel sounds are normal. There is no tenderness. There is no rigidity and no rebound. No hernia. G-Tube in left upper abdominal area with no signs of infection. Stiches in place. Staples on abdominal wall along the Amanda Hopping from recent surgery. Musculoskeletal: Normal range of motion. Right sided chest port. Lymphadenopathy:     He has no cervical adenopathy. Neurological: He is alert. Skin: Skin is warm. Psychiatric: He has a normal mood and affect. Diagnostic Study Results     Labs -  No results found for this or any previous visit (from the past 12 hour(s)). Medical Decision Making     Provider Notes (Medical Decision Making): Remington Sellers is a 75 y.o. male who presents to the ED for evaluation of G-tube. Patient is otherwise asymptomatic with no other complaints and is in his usual state of health. I am going to consult the surgeon for evaluation of the G-tube and possibly remove the staples along the abdominal wall. I am the first provider for this patient.     I reviewed the vital signs, available nursing notes, past medical history, past surgical history, family history and social history. Vital Signs-Reviewed the patient's vital signs. Pulse Oximetry Analysis -  100% on room air (Interpretation)    Records Reviewed: Nursing Notes and Old Medical Records (Time of Review: 10:24 AM)    ED Course: Progress Notes, Reevaluation, and Consults:  11:21 AM Consult: I discussed care with Dr. Mackenzie Lujan (General Surgery). It was a standard discussion including patient history, chief complaint, available diagnostic results, and predicted treatment course. Dr. Mackenzie Lujan agrees that the G-tube can remain in place without any sutures. Procedures:   Suture/Staple Removal  Date/Time: 1/12/2018 11:19 AM  Performed by: Mariam Noble  Authorized by: Mariam Noble     Consent:     Consent obtained:  Verbal    Consent given by:  Patient    Risks discussed:  Bleeding, pain and wound separation    Alternatives discussed:  Delayed treatment and alternative treatment  Universal protocol:     Procedure explained and questions answered to patient or proxy's satisfaction: yes      Relevant documents present and verified: yes      Test results available and properly labeled: yes      Imaging studies available: yes      Required blood products, implants, devices, and special equipment available: yes      Site/side marked: yes      Immediately prior to procedure, a time out was called: yes      Patient identity confirmed:  Verbally with patient and arm band  Location:     Location:  Trunk    Trunk location:  Abdomen  Procedure details:     Wound appearance:  No signs of infection and clean    Number of staples removed:  8  Post-procedure details:     Post-removal:  Antibiotic ointment applied and dressing applied (Bacitracin )    Patient tolerance of procedure: Tolerated well, no immediate complications  Comments:      Post surgical staples, 39days old. Diagnosis     Clinical Impression:   1. Gastrostomy tube in place [facility])    2. Malnutrition of moderate degree (HCC)    3.  Squamous cell esophageal cancer [facility])        Disposition: Discharge     Follow-up Information     Follow up With Details Comments [address]., DO Call in 1 day Follow Up From Emergency Department [id number] [address]  [phone]      [facility] EMERGENCY DEPT  If symptoms worsen, As needed [address]  [phone]           Discharge Medication List as of 1/12/2018 11:27 AM      CONTINUE these medications which have NOT CHANGED    Details   acetaminophen (TYLENOL) 500 mg tablet Take  by mouth every six (6) hours as needed for Pain., Historical Med      oxyCODONE-acetaminophen (PERCOCET 10)  mg per tablet Take 1 Tab by mouth every six (6) hours as needed for Pain. Max Daily Amount: 4 Tabs., Print, Disp-40 Tab, R-0      aluminum-magnesium hydroxide 200-200 mg/5 mL susp 5 mL, diphenhydrAMINE 12.5 mg/5 mL liqd 5 mL, lidocaine 2 % soln 5 mL Take 5 mL by mouth three (3) times daily. Swish and swallow 1 or 2 teaspoonsful by mouth 2-3 times every day, Historical Med      polyethylene glycol (MIRALAX) 17 gram packet Take 17 g by mouth daily. , Historical Med      magic mouthwash solution Take 5 mL by mouth three (3) times daily. , Historical Med      nut.tx.impaired dige fxn-fiber (VITAL AF 1.2 MAYKEL) 0.08 gram- 1.2 kcal/mL liqd Start with 1 can over an hour 3 times per day for 24 hours. If tolerated, advance to 2 cans over 1.5 hours 3x/day: 9am, 12pm, 6pm (follow toleration over 48 hours). Flush tube before & after with 200ml water each time (400ml total with each feeding)   If patient can drink water, flushes can be re, Print, Disp-90 Can, R-12      lisinopril (PRINIVIL, ZESTRIL) 20 mg tablet Take 1 Tab by mouth daily. , Normal, Disp-90 Tab, R-4      amLODIPine (NORVASC) 5 mg tablet Take 1 Tab by mouth daily. , Normal, Disp-90 Tab, R-4           _______________________________    Attestations:  Toni Alvarez Jay acting as a scribe for and in the presence of Tamra Vizcarra MD      January 12, 2018 at 10:24 AM       Provider Attestation:      I personally performed the services described in the documentation, reviewed the documentation, as recorded by the scribe in my presence, and it accurately and completely records my words and actions.  January 12, 2018 at 10:24 AM - Tamra Vizcarra MD    _______________________________

## 2018-01-12 NOTE — ED TRIAGE NOTES
\"My feeding tube stitches are loose. Last time that happened it came out and I had to go to surgery to get it fixed. \"

## 2018-01-13 PROCEDURE — 3331090001 HH PPS REVENUE CREDIT

## 2018-01-13 PROCEDURE — 3331090002 HH PPS REVENUE DEBIT

## 2018-01-14 PROCEDURE — 3331090001 HH PPS REVENUE CREDIT

## 2018-01-14 PROCEDURE — 3331090002 HH PPS REVENUE DEBIT

## 2018-01-15 ENCOUNTER — OFFICE VISIT (OUTPATIENT)
Dept: ONCOLOGY | Age: 68
End: 2018-01-15

## 2018-01-15 ENCOUNTER — HOSPITAL ENCOUNTER (OUTPATIENT)
Dept: INFUSION THERAPY | Age: 68
End: 2018-01-15

## 2018-01-15 ENCOUNTER — OFFICE VISIT (OUTPATIENT)
Dept: FAMILY MEDICINE CLINIC | Age: 68
End: 2018-01-15

## 2018-01-15 VITALS
HEART RATE: 90 BPM | WEIGHT: 157.6 LBS | TEMPERATURE: 96.2 F | DIASTOLIC BLOOD PRESSURE: 78 MMHG | RESPIRATION RATE: 18 BRPM | BODY MASS INDEX: 23.34 KG/M2 | HEIGHT: 69 IN | SYSTOLIC BLOOD PRESSURE: 115 MMHG | OXYGEN SATURATION: 100 %

## 2018-01-15 VITALS
RESPIRATION RATE: 17 BRPM | BODY MASS INDEX: 22.89 KG/M2 | HEART RATE: 99 BPM | SYSTOLIC BLOOD PRESSURE: 102 MMHG | WEIGHT: 155 LBS | DIASTOLIC BLOOD PRESSURE: 64 MMHG | TEMPERATURE: 97.4 F | OXYGEN SATURATION: 100 %

## 2018-01-15 DIAGNOSIS — C78.89 METASTATIC SQUAMOUS CELL CARCINOMA TO ESOPHAGUS (HCC): ICD-10-CM

## 2018-01-15 DIAGNOSIS — E44.0 MALNUTRITION OF MODERATE DEGREE (HCC): ICD-10-CM

## 2018-01-15 DIAGNOSIS — R52 PAIN AGGRAVATED BY EATING OR DRINKING: ICD-10-CM

## 2018-01-15 DIAGNOSIS — E44.0 MALNUTRITION OF MODERATE DEGREE (HCC): Primary | ICD-10-CM

## 2018-01-15 DIAGNOSIS — M19.90 ARTHRITIS: ICD-10-CM

## 2018-01-15 DIAGNOSIS — E05.90 HYPERTHYROIDISM: ICD-10-CM

## 2018-01-15 DIAGNOSIS — C15.9 SQUAMOUS CELL ESOPHAGEAL CANCER (HCC): ICD-10-CM

## 2018-01-15 DIAGNOSIS — D64.9 ANEMIA, UNSPECIFIED TYPE: ICD-10-CM

## 2018-01-15 PROBLEM — E46 MALNUTRITION (HCC): Status: RESOLVED | Noted: 2017-12-04 | Resolved: 2018-01-15

## 2018-01-15 PROBLEM — K22.89 ESOPHAGEAL MASS: Status: RESOLVED | Noted: 2017-12-13 | Resolved: 2018-01-15

## 2018-01-15 PROCEDURE — 3331090001 HH PPS REVENUE CREDIT

## 2018-01-15 PROCEDURE — 3331090002 HH PPS REVENUE DEBIT

## 2018-01-15 RX ORDER — OXYCODONE AND ACETAMINOPHEN 10; 325 MG/1; MG/1
1 TABLET ORAL
Qty: 40 TAB | Refills: 0 | Status: SHIPPED | OUTPATIENT
Start: 2018-01-15 | End: 2018-02-13 | Stop reason: SDUPTHER

## 2018-01-15 NOTE — PROGRESS NOTES
[facility]:     Patient: Lila Carreon Person Age: 75 y.o.  Sex: male    YOB: 1950 Admit Date: (Not on file) PCP: Gayle PengAdele    MRN: [id number]  CSN: [id number]       Patient Active Problem List   Diagnosis Code    Essential hypertension, malignant I10    Arthritis M19.90    Hyperthyroidism E05.90    Metastatic squamous cell carcinoma to esophagus ([facility] [address] C78.89    Malnutrition ([address] E46    Squamous cell esophageal cancer (HCC) C15.9    Malnutrition of moderate degree ([address] E44.0    Pain aggravated by eating or drinking R52    Esophageal mass K22.9    Malignant neoplasm of esophagus (HCC) C15.9    Anemia D64.9     Assessment/ Plan:     1.)  Symptomatic hypotension  -Resolved with IV fluids  -Monitor    2.) Advanced Esophageal Cancer-Squamous Cell Carcinoma    -Pathology results reviewed-Squamous Cell Carcinoma  -EGD biopsy per Dr. Vargas  10/12/2017  -radiation oncology-Dr. Adarsh Burrows  -Complete staging workup with FDG PET CT scan revealed lymph node metastasis (supraclavicular)  -MediPort placement per Dr. Melchor-11/7/2017  -Completed concurrent chemotherapy/radiotherapy 12/2017  -s/p treatment with week #5 of 5-12/6/2017    -Restaging CT scans C/A/P-ordered    -Rtc. in 1-2 weeks to reevaluate clinically, repeat labs and for further medical decision making and consider possible further palliative systemic therapy versus continued observation    -Check for PD-L1    -Overall prognosis remains poor    -Return to clinic in approximately 1-2 weeks to reevaluate clinically and for further medical decision-making.    -Closely monitor CBCs and CMP's    -Instructed go the emergency room for any worsening of symptoms and or development of fever    3.)  Dehydration  -Improved  -G-tube feedings per nutritional services and home health-tube feedings going well    4.)  Pain secondary to esophageal cancer/cancer treatment  -Percocet to use as directed  -Percocet refilled today  -Monitor   -Random UDS  -Wean his medication as tolerated    FDG-PET scan 10/19/2017:    IMPRESSION: [Malignant activity at the esophageal mass, middle mediastinal lymph nodes,  supraclavicular lymph nodes compatible with metastases. ]     Activity at the right hilum but without enlarged lymph node, could be early  metastasis.     No malignant activity at subcentimeter pulmonary nodules. Potential for false  negative result exists because of their small size. Advise short-term follow-up  with diagnostic CT.     Activity at dental disease also seen in the mandible and maxilla.     Activity posterior to the right side mandible without definite mass or skeletal  abnormality of uncertain significance. Could be a tiny adjacent lymph node. Attention should be paid to this region on subsequent examinations.       Pathology report 10/12/2017:  FINAL DIAGNOSIS:   ESOPHAGEAL MASS, BIOPSY:   INVASIVE, POORLY DIFFERENTIATED SQUAMOUS CELL CARCINOMA. GROSS DESCRIPTION:     The specimen is received in formalin in a container labeled with the patient's name, Bandar Vega, and biopsy esophageal mass, rule out malignancy and consists of two soft tissue fragments, one pale tan and the other pink to dark red, ranging from 0.3 to 0.5 cm in greatest dimension. The specimen is submitted in toto in cassette A1. (cd)   DMM/clr     I have discussed the diagnosis with the patient and the intended plan as seen in the above orders. I have reviewed the plan of care with the patient, accepted their input and they are in agreement with the treatment goals. Patient has provided input and agrees with goals. History:   Viridiana Sellers is a 75 y.o. male presenting today for:     Distal Esophageal Cancer  -Pathology results reviewed-Squamous cell carcinoma  -No f/c/s/cp or SOB.     -Dysphagia-now improved  -Esophageal pain-well-controlled with pain medications    Subjective:  No f/c/s    Current Outpatient Prescriptions   Medication Sig Dispense Refill    acetaminophen (TYLENOL) 500 mg tablet Take  by mouth every six (6) hours as needed for Pain.  oxyCODONE-acetaminophen (PERCOCET 10)  mg per tablet Take 1 Tab by mouth every six (6) hours as needed for Pain. Max Daily Amount: 4 Tabs. 40 Tab 0    aluminum-magnesium hydroxide 200-200 mg/5 mL susp 5 mL, diphenhydrAMINE 12.5 mg/5 mL liqd 5 mL, lidocaine 2 % soln 5 mL Take 5 mL by mouth three (3) times daily. Swish and swallow 1 or 2 teaspoonsful by mouth 2-3 times every day      polyethylene glycol (MIRALAX) 17 gram packet Take 17 g by mouth daily.  magic mouthwash solution Take 5 mL by mouth three (3) times daily.  nut.tx.impaired dige fxn-fiber (VITAL AF 1.2 MAYKEL) 0.08 gram- 1.2 kcal/mL liqd Start with 1 can over an hour 3 times per day for 24 hours. If tolerated, advance to 2 cans over 1.5 hours 3x/day: 9am, 12pm, 6pm (follow toleration over 48 hours). Flush tube before & after with 200ml water each time (400ml total with each feeding)  If patient can drink water, flushes can be re 90 Can 12    lisinopril (PRINIVIL, ZESTRIL) 20 mg tablet Take 1 Tab by mouth daily. 90 Tab 4    amLODIPine (NORVASC) 5 mg tablet Take 1 Tab by mouth daily. 90 Tab 4       Objective:   VS:      Vitals:    01/15/18 1130   BP: 102/64   Pulse: 99   Resp: 17   Temp: 97.4 °F (36.3 °C)   TempSrc: Oral   SpO2: 100%   Weight: 70.3 kg (155 lb)       Physical Exam:     Constitutional:  no acute distress  alert and oriented x 3    HENT:  atraumatic   Eyes:  EOMI   Neck:  No masses   Cardiovascular:  S1, S2   Pulmonary/Chest Wall:   clear   Abdominal:  Non tender, soft  PEG  soft       Musculoskeletal:  No deformities   Skin:  No rashes     Peripheral Vascular:  Pulses palpable       Review of Systems:   The remainder of 12 point ROS was otherwise negative except for what was reported in the CC/HPI:      Recent Results (from the past 168 hour(s))   EKG, 12 LEAD, INITIAL Collection Time: 01/11/18 11:12 AM   Result Value Ref Range    Ventricular Rate 88 BPM    Atrial Rate 88 BPM    P-R Interval 140 ms    QRS Duration 70 ms    Q-T Interval 368 ms    QTC Calculation (Bezet) 445 ms    Calculated P Axis 66 degrees    Calculated R Axis 31 degrees    Calculated T Axis 44 degrees    Diagnosis       Sinus rhythm with premature atrial complexes  Otherwise normal ECG  When compared with ECG of 18-AUG-2017 11:14,  premature atrial complexes are now present  Confirmed by Catherine Mejia ([id number]) on 1/11/2018 4:01:57 PM     CBC WITH AUTOMATED DIFF    Collection Time: 01/11/18 11:36 AM   Result Value Ref Range    WBC 6.3 4.6 - 13.2 K/uL    RBC 3.66 (L) 4.70 - 5.50 M/uL    HGB 10.3 (L) 13.0 - 16.0 g/dL    HCT 30.4 (L) 36.0 - 48.0 %    MCV 83.1 74.0 - 97.0 FL    MCH 28.1 24.0 - 34.0 PG    MCHC 33.9 31.0 - 37.0 g/dL    RDW 17.4 (H) 11.6 - 14.5 %    PLATELET 254 169 - 804 K/uL    MPV 9.4 9.2 - 11.8 FL    NEUTROPHILS 68 40 - 73 %    LYMPHOCYTES 19 (L) 21 - 52 %    MONOCYTES 9 3 - 10 %    EOSINOPHILS 3 0 - 5 %    BASOPHILS 1 0 - 2 %    ABS. NEUTROPHILS 4.3 1.8 - 8.0 K/UL    ABS. LYMPHOCYTES 1.2 0.9 - 3.6 K/UL    ABS. MONOCYTES 0.6 0.05 - 1.2 K/UL    ABS. EOSINOPHILS 0.2 0.0 - 0.4 K/UL    ABS.  BASOPHILS 0.0 0.0 - 0.06 K/UL    DF AUTOMATED     METABOLIC PANEL, BASIC    Collection Time: 01/11/18 11:36 AM   Result Value Ref Range    Sodium 140 136 - 145 mmol/L    Potassium 3.9 3.5 - 5.5 mmol/L    Chloride 104 100 - 108 mmol/L    CO2 28 21 - 32 mmol/L    Anion gap 8 3.0 - 18 mmol/L    Glucose 71 (L) 74 - 99 mg/dL    BUN 22 (H) 7.0 - 18 MG/DL    Creatinine 0.66 0.6 - 1.3 MG/DL    BUN/Creatinine ratio 33 (H) 12 - 20      GFR est AA >60 >60 ml/min/1.73m2    GFR est non-AA >60 >60 ml/min/1.73m2    Calcium 9.1 8.5 - 10.1 MG/DL       Past Medical History:   Diagnosis Date    Arthritis     Essential hypertension, malignant 8/3/2010    Leg pain 8/3/2010    [facility]roat [facility])        Past Surgical History: Procedure Laterality Date    HX GI      G-tube placement on 11/28/17    [facility] EGD DELIVER THERMAL ENERGY SPHNCTR/CARDIA GERD         Social History     Social History    Marital status:      Spouse name: N/A    Number of children: N/A    Years of education: N/A     Occupational History    Not on file. Social History Main Topics    Smoking status: Former Smoker     Packs/day: 0.25     Types: Cigarettes     Start date: 1/17/2017     Quit date: 11/22/2017    Smokeless tobacco: Never Used      Comment: Restarted in Jan 2017    Alcohol use No      Comment: \"NONE IN AWHILE\"    Drug use: No    Sexual activity: No     Other Topics Concern    Not on file     Social History Narrative       Family History   Problem Relation Age of Onset    Heart Disease Mother     Lung Disease Father        No Known Allergies    Follow-up Disposition: Not on File    Robyn Tena MD, MPH Hematology-Medical Oncology  January 15, 2018 1:16 PM

## 2018-01-15 NOTE — PROGRESS NOTES
Yanci Anderson is a 75 y.o.  male and presents with    Chief Complaint   Patient presents with    Esophageal Cancer    Other     malnutrition    Thyroid Problem    Arthritis    Hypertension           Subjective: Additional Concerns: pt with known esophageal can and malnutriotion. Also mild artharitis  Hx of htn and thyroid dz    Noted low bp recently         Patient Active Problem List    Diagnosis Date Noted    Malnutrition of moderate degree ([facility] [address] 12/13/2017    Metastatic squamous cell carcinoma to esophagus (HCC) 10/17/2017    Arthritis 06/06/2011     Current Outpatient Prescriptions   Medication Sig Dispense Refill    nut.tx.impaired dige fxn-fiber (VITAL AF 1.2 MAYKEL) 0.08 gram- 1.2 kcal/mL liqd Start with 1 can over an hour 3 times per day for 24 hours. If tolerated, advance to 2 cans over 1.5 hours 3x/day: 9am, 12pm, 6pm (follow toleration over 48 hours). Flush tube before & after with 200ml water each time (400ml total with each feeding)  If patient can drink water, flushes can be re 180 Can 12    oxyCODONE-acetaminophen (PERCOCET 10)  mg per tablet Take 1 Tab by mouth every six (6) hours as needed for Pain. Max Daily Amount: 4 Tabs.  36 Tab 0     No Known Allergies  Past Medical History:   Diagnosis Date    Arthritis     Essential hypertension, malignant 8/3/2010    Leg pain 8/3/2010    Throat cancer [facility])      Past Surgical History:   Procedure Laterality Date    HX GI      G-tube placement on 11/28/17    CO EGD DELIVER THERMAL ENERGY SPHNCTR/CARDIA GERD       Family History   Problem Relation Age of Onset    Heart Disease Mother     Lung Disease Father      Social History   Substance Use Topics    Smoking status: Former Smoker     Packs/day: 0.25     Types: Cigarettes     Start date: 1/17/2017     Quit date: 11/22/2017    Smokeless tobacco: Never Used      Comment: Restarted in Jan 2017    Alcohol use No      Comment: \"NONE IN AWHILE\"       ROS All other systems reviewed and are negative. Objective:  Vitals:    01/15/18 1222   BP: 115/78   Pulse: 90   Resp: 18   Temp: 96.2 °F (35.7 °C)   TempSrc: Oral   SpO2: 100%   Weight: 157 lb 9.6 oz (71.5 kg)   Height: 5' 9\" (1.753 m)   PainSc:   0 - No pain                 alert, well appearing, and in no distress and oriented to person, place, and time  Chest - clear to auscultation, no wheezes, rales or rhonchi, symmetric air entry  Heart - normal rate, regular rhythm, normal S1, S2, no murmurs, rubs, clicks or gallops  Abdomen - soft, nontender, nondistended, no masses or organomegaly        LABS   Component      Latest Ref Rng & Units 1/11/2018 1/11/2018          11:36 AM 11:36 AM   WBC      4.6 - 13.2 K/uL  6.3   RBC      4.70 - 5.50 M/uL  3.66 (L)   HGB      13.0 - 16.0 g/dL  10.3 (L)   HCT      36.0 - 48.0 %  30.4 (L)   MCV      74.0 - 97.0 FL  83.1   MCH      24.0 - 34.0 PG  28.1   MCHC      31.0 - 37.0 g/dL  33.9   RDW      11.6 - 14.5 %  17.4 (H)   PLATELET      149 - 741 K/uL  231   MPV      9.2 - 11.8 FL  9.4   NEUTROPHILS      40 - 73 %  68   LYMPHOCYTES      21 - 52 %  19 (L)   MONOCYTES      3 - 10 %  9   EOSINOPHILS      0 - 5 %  3   BASOPHILS      0 - 2 %  1   ABS. NEUTROPHILS      1.8 - 8.0 K/UL  4.3   ABS. LYMPHOCYTES      0.9 - 3.6 K/UL  1.2   ABS. MONOCYTES      0.05 - 1.2 K/UL  0.6   ABS. EOSINOPHILS      0.0 - 0.4 K/UL  0.2   ABS.  BASOPHILS      0.0 - 0.06 K/UL  0.0   DF        AUTOMATED   Sodium      136 - 145 mmol/L 140    Potassium      3.5 - 5.5 mmol/L 3.9    Chloride      100 - 108 mmol/L 104    CO2      21 - 32 mmol/L 28    Anion gap      3.0 - 18 mmol/L 8    Glucose      74 - 99 mg/dL 71 (L)    BUN      7.0 - 18 MG/DL 22 (H)    Creatinine      0.6 - 1.3 MG/DL 0.66    BUN/Creatinine ratio      12 - 20   33 (H)    GFR est AA      >60 ml/min/1.73m2 >60    GFR est non-AA      >60 ml/min/1.73m2 >60    Calcium      8.5 - 10.1 MG/DL 9.1      TESTS      Assessment/Plan:    Esophageal cancer ongoing  Malnutrition improved on feeding tube   Arthritis stable  Hypertension off meds stable  Thyroid stable needs recheck  Will f/u 1 mo  Plan labs at that time    Lab review: labs are reviewed, up to date and normal    Diagnoses and all orders for this visit:    1. Malnutrition of moderate degree ([address]    2. Metastatic squamous cell carcinoma to esophagus (HCC)  -     nut.tx.impaired dige fxn-fiber (VITAL AF 1.2 MAYKEL) 0.08 gram- 1.2 kcal/mL liqd; Start with 1 can over an hour 3 times per day for 24 hours. If tolerated, advance to 2 cans over 1.5 hours 3x/day: 9am, 12pm, 6pm (follow toleration over 48 hours). Flush tube before & after with 200ml water each time (400ml total with each feeding)  If patient can drink water, flushes can be re    3. Arthritis    4. Hyperthyroidism          I have discussed the diagnosis with the patient and the intended plan as seen in the above orders. The patient has received an after-visit summary and questions were answered concerning future plans. I have discussed medication side effects and warnings with the patient as well. I have reviewed the plan of care with the patient, accepted their input and they are in agreement with the treatment goals. Follow-up Disposition:  Return in about 4 weeks (around 2/12/2018) for EOV.

## 2018-01-15 NOTE — MR AVS SNAPSHOT
Visit Information Date & Time Provider Department Dept. Phone Encounter #  
 1/15/2018 11:30 AM [facility] [place] [id number] Oncology [phone] [id number] Your Appointments 1/15/2018 12:30 PM  
ROUTINE CARE with Ann Michele,  [address]) Appt Note: Return in about 4 weeks (around 1/3/2018) for EOV, labs at next visit. [address] [facility] [address]  
  
   
 [address]  
  
    
 1/22/2018  9:30 AM  
Follow Up with Stone Ray MD  
[address] [facility] Oncology [address] [place] [address]) Appt Note: 1 week f-up [address] [facility]  
  
   
 [address] Upcoming Health Maintenance Date Due Pneumococcal 65+ High/Highest Risk (2 of 2 - PPSV23) 1/14/2016 GLAUCOMA SCREENING Q2Y 7/14/2018 MEDICARE YEARLY EXAM 8/16/2018 COLONOSCOPY 6/6/2021 DTaP/Tdap/Td series (2 - Td) 8/15/2027 Allergies as of 1/15/2018  Review Complete On: 1/15/2018 By: Stone Ray MD  
 No Known Allergies Current Immunizations  Reviewed on 1/4/2017 Name Date Influenza High Dose Vaccine PF 1/4/2017, 11/19/2015 Influenza Vaccine 11/18/2014, 2/4/2013 Influenza Vaccine (Quad) PF 10/22/2017 10:21 PM  
 Influenza Vaccine Split 11/17/2011, 11/22/2010 Pneumococcal Conjugate (PCV-13) 11/19/2015 TD Vaccine 8/3/2006 Tdap 8/15/2017 ZZZ-RETIRED (DO NOT USE) Pneumococcal Vaccine (Unspecified Type) 11/22/2010 Not reviewed this visit Vitals BP Pulse Temp Resp Weight(growth percentile) SpO2  
 102/64 (BP 1 Location: Right arm, BP Patient Position: Sitting) 99 97.4 °F (36.3 °C) (Oral) 17 155 lb (70.3 kg) 100% BMI Smoking Status 22.89 kg/m2 Former Smoker BMI and BSA Data  Body Mass Index Body Surface Area  
 22.89 kg/m 2 1.85 m 2  
  
  
 Preferred Pharmacy Pharmacy Name Phone [facility] [address], [address] [phone] Your Updated Medication List  
  
   
This list is accurate as of: 1/15/18 11:46 AM.  Always use your most recent med list.  
  
  
  
  
 acetaminophen 500 mg tablet Commonly known as:  TYLENOL Take  by mouth every six (6) hours as needed for Pain. aluminum-magnesium hydroxide 200-200 mg/5 mL susp 5 mL, diphenhydrAMINE 12.5 mg/5 mL liqd 5 mL, lidocaine 2 % soln 5 mL Take 5 mL by mouth three (3) times daily. Swish and swallow 1 or 2 teaspoonsful by mouth 2-3 times every day  
  
 amLODIPine 5 mg tablet Commonly known as:  Arva Brazen Take 1 Tab by mouth daily. lisinopril 20 mg tablet Commonly known as:  Marlanangy Fidel Take 1 Tab by mouth daily. magic mouthwash solution Take 5 mL by mouth three (3) times daily.  
  
 nut.tx.impaired dige fxn-fiber 0.08 gram- 1.2 kcal/mL Liqd Commonly known as:  VITAL AF 1.2 MAYKEL Start with 1 can over an hour 3 times per day for 24 hours. If tolerated, advance to 2 cans over 1.5 hours 3x/day: 9am, 12pm, 6pm (follow toleration over 48 hours). Flush tube before & after with 200ml water each time (400ml total with each feeding) If patient can drink water, flushes can be re  
  
 oxyCODONE-acetaminophen  mg per tablet Commonly known as:  PERCOCET 10 Take 1 Tab by mouth every six (6) hours as needed for Pain. Max Daily Amount: 4 Tabs. polyethylene glycol 17 gram packet Commonly known as:  Giovany Gigi Take 17 g by mouth daily. To-Do List   
 01/15/2018  1:00 PM  
  Appointment with [address] at [address] [place] [address] ([phone]) Please provide this summary of care documentation to your next provider. Your primary care clinician is listed as [id number] [facility]. If you have any questions after today's visit, please call [phone].

## 2018-01-15 NOTE — PROGRESS NOTES
Wade Tamayo is a 75 y.o. male presented to clinic accompanied by daughter for a routine f/u for throat cancer. 1. Have you been to the ER, urgent care clinic since your last visit? Hospitalized since your last visit? No    2. Have you seen or consulted any other health care providers outside of the [facility] since your last visit? Include any pap smears or colon screening. No     Learning Assessment 10/17/2017   PRIMARY LEARNER Patient   HIGHEST LEVEL OF EDUCATION - PRIMARY LEARNER  GRADUATED HIGH SCHOOL OR GED   BARRIERS PRIMARY LEARNER PHYSICAL   CO-LEARNER CAREGIVER No   PRIMARY LANGUAGE ENGLISH    NEED No   LEARNER PREFERENCE PRIMARY LISTENING     -   LEARNING SPECIAL TOPICS no   ANSWERED BY self   RELATIONSHIP SELF      Patient verbally agrees to permit the Students working in [address] [place] [address] office to observe and participate in medical care during the appointment today, including, where appropriate, providing direct medical care to patient under the physicians direct supervision. Patient agrees that he/she have been given the opportunity to refuse to give such consent and may withdraw consent at any time during appointment. Health Maintenance Due   Topic Date Due    Pneumococcal 65+ High/Highest Risk (2 of 2 - PPSV23) 01/14/2016     Health Maintenance reviewed - Pneumovax needed.

## 2018-01-15 NOTE — MR AVS SNAPSHOT
[facility] 
 
 
 [address] [address] [phone] Patient: Leah Stein Person MRN: [id number] :1950 Visit Information Date & Time Provider Department Dept. Phone Encounter #  
 1/15/2018 12:30 PM Eduardo Henson., [address] [facility] [phone] Follow-up Instructions Return in about 4 weeks (around 2018) for EOV. Follow-up and Disposition History Your Appointments 2018  9:30 AM  
Follow Up with Jacque Anand MD  
[facility] Oncology [facility]) Appt Note: 1 week f-up [address] [facility] [address] [facility]  
  
   
 [address] [facility] [address] Upcoming Health Maintenance Date Due Pneumococcal 65+ High/Highest Risk (2 of 2 - PPSV23) 2016 GLAUCOMA SCREENING Q2Y 2018 MEDICARE YEARLY EXAM 2018 COLONOSCOPY 2021 DTaP/Tdap/Td series (2 - Td) 8/15/2027 Allergies as of 1/15/2018  Review Complete On: 1/15/2018 By: Eduardo Henson., DO No Known Allergies Current Immunizations  Reviewed on 2017 Name Date Influenza High Dose Vaccine PF 2017, 2015 Influenza Vaccine 2014, 2013 Influenza Vaccine (Quad) PF 10/22/2017 10:21 PM  
 Influenza Vaccine Split 2011, 2010 Pneumococcal Conjugate (PCV-13) 2015 TD Vaccine 8/3/2006 Tdap 8/15/2017 ZZZ-RETIRED (DO NOT USE) Pneumococcal Vaccine (Unspecified Type) 2010 Not reviewed this visit You Were Diagnosed With   
  
 Codes Comments Malnutrition of moderate degree (HCC)    -  Primary ICD-10-CM: E44.0 ICD-9-CM: 263.0 Metastatic squamous cell carcinoma to esophagus [facility])     ICD-10-CM: C78.89 ICD-9-CM: 197.8 Arthritis     ICD-10-CM: M19.90 ICD-9-CM: 716.90 Hyperthyroidism     ICD-10-CM: E05.90 ICD-9-CM: 242.90 Vitals BP Pulse Temp Resp Height(growth percentile) Weight(growth percentile) 115/78 (BP 1 Location: Right arm, BP Patient Position: Sitting) 90 96.2 °F (35.7 °C) (Oral) 18 5' 9\" (1.753 m) 157 lb 9.6 oz (71.5 kg) SpO2 BMI Smoking Status 100% 23.27 kg/m2 Former Smoker Vitals History BMI and BSA Data Body Mass Index Body Surface Area  
 23.27 kg/m 2 1.87 m 2 Preferred Pharmacy Pharmacy Name Phone [address], [address] [phone] Your Updated Medication List  
  
   
This list is accurate as of: 1/15/18 12:32 PM.  Always use your most recent med list.  
  
  
  
  
 nut.tx.impaired dige fxn-fiber 0.08 gram- 1.2 kcal/mL Liqd Commonly known as:  VITAL AF 1.2 MAYKEL Start with 1 can over an hour 3 times per day for 24 hours. If tolerated, advance to 2 cans over 1.5 hours 3x/day: 9am, 12pm, 6pm (follow toleration over 48 hours). Flush tube before & after with 200ml water each time (400ml total with each feeding) If patient can drink water, flushes can be re  
  
 oxyCODONE-acetaminophen  mg per tablet Commonly known as:  PERCOCET 10 Take 1 Tab by mouth every six (6) hours as needed for Pain. Max Daily Amount: 4 Tabs. Prescriptions Printed Refills  
 nut.tx.impaired dige fxn-fiber (VITAL AF 1.2 MAYKEL) 0.08 gram- 1.2 kcal/mL liqd 12 Sig: Start with 1 can over an hour 3 times per day for 24 hours. If tolerated, advance to 2 cans over 1.5 hours 3x/day: 9am, 12pm, 6pm (follow toleration over 48 hours). Flush tube before & after with 200ml water each time (400ml total with each feeding) If patient can drink water, flushes can be re Class: Print Follow-up Instructions Return in about 4 weeks (around 2/12/2018) for EOV. To-Do List   
 01/18/2018 2:00 PM  
  Appointment with [facility] RM 1 at [facility] [place] ([phone]) ORAL CONTRAST/PREP   Oral Contrast/Prep from radiology  no later than one day prior to study date/time. DIET RESTRICTIONS  Nothing to eat or drink, 4 hours prior to study  May have water to take meds  May drink oral contrast if directed  GENERAL INSTRUCTIONS  If you were given premedications for IV contrast to take prior to having your study, please arrange to have someone drive you to your appointment. If you have had a creatinine level drawn within the past 30 days, please bring most recent results to your appt. MEDICATIONS  Bring a complete list of all medications you are currently taking to include prescriptions, over-the-counter meds, herbals, vitamins & any dietary supplements. RELATED STUDY INFORMATION  Bring any films, CDs, and reports related with you on the day of your exam.  This only includes studies done outside of [address] [place] [address], [facility], [place], and [place]. QUESTIONS  Notify the CT Department if you have any questions concerning your study. [place] - [phone] [facility] [place] - [phone] Please provide this summary of care documentation to your next provider. Your primary care clinician is listed as [phone] [facility]. If you have any questions after today's visit, please call [phone].

## 2018-01-16 ENCOUNTER — HOSPITAL ENCOUNTER (EMERGENCY)
Age: 68
Discharge: HOME | End: 2018-01-16
Attending: EMERGENCY MEDICINE
Payer: MEDICARE

## 2018-01-16 ENCOUNTER — APPOINTMENT (OUTPATIENT)
Dept: GENERAL RADIOLOGY | Age: 68
End: 2018-01-16
Attending: EMERGENCY MEDICINE
Payer: MEDICARE

## 2018-01-16 VITALS
RESPIRATION RATE: 16 BRPM | SYSTOLIC BLOOD PRESSURE: 138 MMHG | OXYGEN SATURATION: 100 % | HEART RATE: 84 BPM | TEMPERATURE: 98.2 F | DIASTOLIC BLOOD PRESSURE: 71 MMHG

## 2018-01-16 DIAGNOSIS — T85.528A DISLODGED GASTROSTOMY TUBE: Primary | ICD-10-CM

## 2018-01-16 PROCEDURE — C1769 GUIDE WIRE: HCPCS

## 2018-01-16 PROCEDURE — 74011636320 HC RX REV CODE- 636/320: Performed by: EMERGENCY MEDICINE

## 2018-01-16 PROCEDURE — 74011250636 HC RX REV CODE- 250/636: Performed by: EMERGENCY MEDICINE

## 2018-01-16 PROCEDURE — 74011000250 HC RX REV CODE- 250: Performed by: EMERGENCY MEDICINE

## 2018-01-16 PROCEDURE — C1894 INTRO/SHEATH, NON-LASER: HCPCS

## 2018-01-16 PROCEDURE — 74011000258 HC RX REV CODE- 258: Performed by: EMERGENCY MEDICINE

## 2018-01-16 PROCEDURE — 3331090001 HH PPS REVENUE CREDIT

## 2018-01-16 PROCEDURE — 77030011229 HC DIL VESL COON COOK -A

## 2018-01-16 PROCEDURE — 43761 REPOSITION GASTROSTOMY TUBE: CPT

## 2018-01-16 PROCEDURE — 96375 TX/PRO/DX INJ NEW DRUG ADDON: CPT

## 2018-01-16 PROCEDURE — 96365 THER/PROPH/DIAG IV INF INIT: CPT

## 2018-01-16 PROCEDURE — 3331090002 HH PPS REVENUE DEBIT

## 2018-01-16 PROCEDURE — 99284 EMERGENCY DEPT VISIT MOD MDM: CPT

## 2018-01-16 RX ORDER — SODIUM CHLORIDE 9 MG/ML
20 INJECTION INTRAMUSCULAR; INTRAVENOUS; SUBCUTANEOUS
Status: COMPLETED | OUTPATIENT
Start: 2018-01-16 | End: 2018-01-16

## 2018-01-16 RX ORDER — SODIUM CHLORIDE 9 MG/ML
20 INJECTION INTRAMUSCULAR; INTRAVENOUS; SUBCUTANEOUS
Status: DISCONTINUED | OUTPATIENT
Start: 2018-01-16 | End: 2018-01-16

## 2018-01-16 RX ORDER — HYDROMORPHONE HYDROCHLORIDE 1 MG/ML
1 INJECTION, SOLUTION INTRAMUSCULAR; INTRAVENOUS; SUBCUTANEOUS ONCE
Status: COMPLETED | OUTPATIENT
Start: 2018-01-16 | End: 2018-01-16

## 2018-01-16 RX ORDER — ONDANSETRON 2 MG/ML
4 INJECTION INTRAMUSCULAR; INTRAVENOUS
Status: COMPLETED | OUTPATIENT
Start: 2018-01-16 | End: 2018-01-16

## 2018-01-16 RX ADMIN — PIPERACILLIN SODIUM,TAZOBACTAM SODIUM 3.38 G: 3; .375 INJECTION, POWDER, FOR SOLUTION INTRAVENOUS at 19:59

## 2018-01-16 RX ADMIN — ONDANSETRON 4 MG: 2 INJECTION INTRAMUSCULAR; INTRAVENOUS at 19:22

## 2018-01-16 RX ADMIN — HYDROMORPHONE HYDROCHLORIDE 1 MG: 1 INJECTION, SOLUTION INTRAMUSCULAR; INTRAVENOUS; SUBCUTANEOUS at 19:22

## 2018-01-16 RX ADMIN — IOHEXOL 100 ML: 240 INJECTION, SOLUTION INTRATHECAL; INTRAVASCULAR; INTRAVENOUS; ORAL at 18:30

## 2018-01-16 RX ADMIN — SODIUM CHLORIDE 20 ML: 9 INJECTION INTRAMUSCULAR; INTRAVENOUS; SUBCUTANEOUS at 20:16

## 2018-01-16 NOTE — ED TRIAGE NOTES
Patient states his feeding tube was accidentally pulled out when it  got stuck in his belt while trying to tighten the beat.

## 2018-01-16 NOTE — ED PROVIDER NOTES
EMERGENCY DEPARTMENT HISTORY AND PHYSICAL EXAM    3:59 PM      Date: 1/16/2018  Patient Name: Leah Stein Person    History of Presenting Illness     Chief Complaint   Patient presents with    Feeding Tube Problem         History Provided By: Patient    Chief Complaint: Feeding tube problem  Duration: 40 Minutes PTA  Timing:  Acute  Location: Abdomen  Quality: disconnected gastrostomy tube   Severity: N/A  Modifying Factors: N/A  Associated Symptoms: denies any other associated signs or symptoms      Additional History (Context): Jalen Argueta is a 75 y.o. male with throat CA who presents with feeding tube problem occurring 40 minutes ago. Pt was fixing his belt and disconnected the feeding tube from the gastrostomy site. Pt had food 1-2 hrs PTA. He was seen on 1/12 in ED for similar problem. Pt is not on blood thinners. As the patient is without physical symptoms or complaints of pain, there is no severity of pain, quality of pain, duration, modifying factors, or associated signs and symptoms regarding the pt's presenting complaint. PCP: Eduardo Henson., DO    Current Facility-Administered Medications   Medication Dose Route Frequency Provider Last Rate Last Dose    sodium chloride 0.9 % injection 20 mL  20 mL IntraVENous RAD ONCE Rolando Trujillo MD        HYDROmorphone (PF) (DILAUDID) injection 1 mg  1 mg IntraVENous ONCE Rolando Trujillo MD        [facility]ron [facility] injection 4 mg  4 mg IntraVENous NOW Rolando Trujillo MD         Current Outpatient Prescriptions   Medication Sig Dispense Refill    oxyCODONE-acetaminophen (PERCOCET 10)  mg per tablet Take 1 Tab by mouth every six (6) hours as needed for Pain. Max Daily Amount: 4 Tabs. 40 Tab 0    nut.tx.impaired dige fxn-fiber (VITAL AF 1.2 MAYKEL) 0.08 gram- 1.2 kcal/mL liqd Start with 1 can over an hour 3 times per day for 24 hours.    If tolerated, advance to 2 cans over 1.5 hours 3x/day: 9am, 12pm, 6pm (follow toleration over 48 hours). Flush tube before & after with 200ml water each time (400ml total with each feeding)  If patient can drink water, flushes can be re 180 Can 12       Past History     Past Medical History:  Past Medical History:   Diagnosis Date    Arthritis     Essential hypertension, malignant 8/3/2010    Leg pain 8/3/2010    Throat cancer [facility])        Past Surgical History:  Past Surgical History:   Procedure Laterality Date    HX GI      G-tube placement on 11/28/17    OK EGD DELIVER THERMAL ENERGY SPHNCTR/CARDIA GERD         Family History:  Family History   Problem Relation Age of Onset    Heart Disease Mother     Lung Disease Father        Social History:  Social History   Substance Use Topics    Smoking status: Former Smoker     Packs/day: 0.25     Types: Cigarettes     Start date: 1/17/2017     Quit date: 11/22/2017    Smokeless tobacco: Never Used      Comment: Restarted in Jan 2017    Alcohol use No      Comment: \"NONE IN AWHILE\"       Allergies:  No Known Allergies      Review of Systems       Review of Systems   Constitutional: Negative for chills and fever. Eyes: Positive for visual disturbance. Gastrointestinal: Negative for abdominal pain and vomiting. All other systems reviewed and are negative. Physical Exam     Visit Vitals    /83 (BP 1 Location: Left arm, BP Patient Position: At rest;Supine)    Pulse 84    Temp 98.2 °F (36.8 °C)    Resp 18    SpO2 100%       Physical Exam   Constitutional: He is oriented to person, place, and time. He appears well-developed and well-nourished. No distress. HENT:   Head: Normocephalic and atraumatic. Mouth/Throat: Oropharynx is clear and moist.   Eyes: Conjunctivae and EOM are normal. Pupils are equal, round, and reactive to light. No scleral icterus. Neck: Normal range of motion. Neck supple. Cardiovascular: Normal rate, regular rhythm and normal heart sounds. No murmur heard.   Pulmonary/Chest: Effort normal and breath sounds normal. No respiratory distress. Abdominal: Soft. Bowel sounds are normal. He exhibits no distension. There is no tenderness. Musculoskeletal: He exhibits no edema. Lymphadenopathy:     He has no cervical adenopathy. Neurological: He is alert and oriented to person, place, and time. Coordination normal.   Skin: Skin is warm and dry. No rash noted. gastrostomy with no g tube, no erythema, minimal drainage   Psychiatric: He has a normal mood and affect. His behavior is normal.   Nursing note and vitals reviewed. Diagnostic Study Results       XR UPPER GI SERIES W KUB    (Results Pending)     No results found. Medical Decision Making   I am the first provider for this patient. I reviewed the vital signs, available nursing notes, past medical history, past surgical history, family history and social history. Vital Signs-Reviewed the patient's vital signs. Pulse Oximetry Analysis -  100% on room air (Interpretation) normal    Cardiac Monitor:  Rate: 84      Records Reviewed: Nursing Notes (Time of Review: 3:59 PM)    ED Course: Progress Notes, Reevaluation, and Consults:    16:20 Attempted to place strauss in gastrostomy; unable to pass strauss. 16:25 Consult:  Discussed care with Dr. Finn Booker (Surgery). Standard discussion; including history of patients chief complaint, available diagnostic results, and treatment course. He will ask if IR can do it under fluoroscopy and will call back. 19:40 18 Turkish gastrostomy tube placed successfully by IR. Provider Notes (Medical Decision Making):   MDM  Number of Diagnoses or Management Options  Dislodged gastrostomy tube [facility]):   Diagnosis management comments: Dislodged g tube unable to pass strauss pt to IR for g tube placement now awake no complaints       Asked by radiology for dose of antibiotics prior to discharge will give dose of zosyn with skin and gut kayla             Diagnosis     Clinical Impression:   1.  Dislodged gastrostomy tube [facility])        Disposition: Discharge    Follow-up Information     Follow up With Details Comments Contact [facility] EMERGENCY DEPT  As needed, If symptoms worsen [address] [facility]    Tiffany Ruelas MD Schedule an appointment as soon as possible for a visit for ED follow up appointment [phone] [place] [address]  [address] [facility]  [phone]             Patient's Medications   Start Taking    No medications on file   Continue Taking    NUT. TX.IMPAIRED DIGE FXN-FIBER (VITAL AF 1.2 MAYKEL) 0.08 GRAM- 1.2 KCAL/ML LIQD    Start with 1 can over an hour 3 times per day for 24 hours. If tolerated, advance to 2 cans over 1.5 hours 3x/day: 9am, 12pm, 6pm (follow toleration over 48 hours). Flush tube before & after with 200ml water each time (400ml total with each feeding)  If patient can drink water, flushes can be re    OXYCODONE-ACETAMINOPHEN (PERCOCET 10)  MG PER TABLET    Take 1 Tab by mouth every six (6) hours as needed for Pain. Max Daily Amount: 4 Tabs. These Medications have changed    No medications on file   Stop Taking    No medications on file     _______________________________    Attestations:  [address] [place] [address] acting as a scribe for and in the presence of Epi Ramirez MD      January 16, 2018 at 7:48 PM       Provider Attestation:      I personally performed the services described in the documentation, reviewed the documentation, as recorded by the scribe in my presence, and it accurately and completely records my words and actions.  January 16, 2018 at 7:48 PM - Epi Ramirez MD    _______________________________

## 2018-01-17 PROCEDURE — 3331090002 HH PPS REVENUE DEBIT

## 2018-01-17 PROCEDURE — 3331090001 HH PPS REVENUE CREDIT

## 2018-01-17 NOTE — PROGRESS NOTES
INTERVENTIONAL RADIOLOGY PROCEDURE NOTE:    Interventional Radiologists: Shelby Xavier MD and Ashley Mcdaniel MD    Procedure: Fluoroscopic Guided Replacement G-Tube    Pre-operative Diagnosis: Dislodged G-Tube, Head & Neck Cancer    Post-operative Diagnosis: G-Tube replaced, Head & Neck Cancer    Indication: Removed G-tube, unsuccessful bedside attempts    Procedure Details: Initial attempts made to place 20G Envivo G-Tube through existing tract unsuccessful, including with serial dilatation. Attempts to place patient's prior 18G G-tube also unsuccessful. New 18G Kangaroo Gastrostomy Feeding Tube with Y-ports able to be placed with assistance of Halyard Introducer Kit (for 18Fr MIKAELA-KEY tube) using telescoping dilatator system. Contrast injection confirmed intragastric location. Balloon inflated and tube adjusted with balloon pulled up against anterior gastric wall. Outer bumper advanced to secure to skin. 2-0 Ethilon suture tied to catheter next to bumper to help secure. Please see final report for full details. Specimen: None         Complications:  None. EBL: Minimal.               Condition: Stable. Impression:  Successful replacement of G-tube.       Shelby Xavier MD  Diagnostic & Interventional Radiology    [facility]     1/16/2018

## 2018-01-17 NOTE — ED NOTES
Patient's feeding tube is not working properly. Pt consult to surgery to correct the problem. Pt denies any s/s related to problem.

## 2018-01-18 ENCOUNTER — HOSPITAL ENCOUNTER (OUTPATIENT)
Dept: CT IMAGING | Age: 68
Discharge: HOME | End: 2018-01-18
Attending: INTERNAL MEDICINE
Payer: MEDICARE

## 2018-01-18 DIAGNOSIS — R52 PAIN AGGRAVATED BY EATING OR DRINKING: ICD-10-CM

## 2018-01-18 DIAGNOSIS — D64.9 ANEMIA, UNSPECIFIED TYPE: ICD-10-CM

## 2018-01-18 DIAGNOSIS — E44.0 MALNUTRITION OF MODERATE DEGREE (HCC): ICD-10-CM

## 2018-01-18 DIAGNOSIS — C15.9 SQUAMOUS CELL ESOPHAGEAL CANCER (HCC): ICD-10-CM

## 2018-01-18 DIAGNOSIS — C78.89 METASTATIC SQUAMOUS CELL CARCINOMA TO ESOPHAGUS (HCC): ICD-10-CM

## 2018-01-18 PROCEDURE — 74177 CT ABD & PELVIS W/CONTRAST: CPT

## 2018-01-18 PROCEDURE — 3331090002 HH PPS REVENUE DEBIT

## 2018-01-18 PROCEDURE — 74011000255 HC RX REV CODE- 255: Performed by: INTERNAL MEDICINE

## 2018-01-18 PROCEDURE — 3331090001 HH PPS REVENUE CREDIT

## 2018-01-18 PROCEDURE — 74011636320 HC RX REV CODE- 636/320: Performed by: INTERNAL MEDICINE

## 2018-01-18 RX ORDER — BARIUM SULFATE 20 MG/ML
900 SUSPENSION ORAL
Status: COMPLETED | OUTPATIENT
Start: 2018-01-18 | End: 2018-01-18

## 2018-01-18 RX ADMIN — BARIUM SULFATE 900 ML: 20 SUSPENSION ORAL at 14:48

## 2018-01-18 RX ADMIN — IOPAMIDOL 98 ML: 612 INJECTION, SOLUTION INTRAVENOUS at 14:48

## 2018-01-19 ENCOUNTER — PATIENT OUTREACH (OUTPATIENT)
Dept: FAMILY MEDICINE CLINIC | Age: 68
End: 2018-01-19

## 2018-01-19 PROCEDURE — 3331090002 HH PPS REVENUE DEBIT

## 2018-01-19 PROCEDURE — 3331090001 HH PPS REVENUE CREDIT

## 2018-01-19 NOTE — PROGRESS NOTES
Nurse Navigator  ED Follow Up Note  Patient discharged on 18 from [facility] ED : dislodge feeding tube  NN contact on 18          Reached patient on phone and verified identity using name and . Introduced self/role and purpose of call. Pt stated: \"I'm doing pretty good. \" Pt voiced no concerns/complaints at this time. Pt denied any CP, SOB, swelling, abd pain, NVD, fever/chills, drainage, bleeding. Pt reported feeding tube is working well. He is still able to eat and drink some things. Pt ate a pear today. Pt reported he has enough nutritional supplement at this time. Med Reconciliation:   Reviewed home medication and patient verbalized understanding self management of medications. Newly prescribed meds: n/a  Discontinued meds: n/a  Medication dosage changes: n/a      Reviewed red flags for:  Abd pain, swelling bleeding, drainage, pain, NVD, fever/chills, CP, SOB and patient understands when to seek medical attention from PCP/ED. Patients next follow up visit:   -scheduled for 18 at 12:30pm with PCP Dr. Aquino Client  -Pt aware of his appt with Oncology Dr. Rickie Kumar on 18 at 9:30am. Pt stated he will ride the bus to the appt. Patient verbalized understanding of discharge plan and special follow up with PCP/Oncology. Reviewed plan of care. Patient has provided input to plan and agrees with goals. Answered any questions patient had. Provided contact info for any additional questions. This note will not be viewable in [address]

## 2018-01-20 PROCEDURE — 3331090002 HH PPS REVENUE DEBIT

## 2018-01-20 PROCEDURE — 3331090001 HH PPS REVENUE CREDIT

## 2018-01-21 PROCEDURE — 3331090002 HH PPS REVENUE DEBIT

## 2018-01-21 PROCEDURE — 3331090001 HH PPS REVENUE CREDIT

## 2018-01-22 ENCOUNTER — OFFICE VISIT (OUTPATIENT)
Dept: ONCOLOGY | Age: 68
End: 2018-01-22

## 2018-01-22 VITALS
TEMPERATURE: 98 F | DIASTOLIC BLOOD PRESSURE: 68 MMHG | HEIGHT: 69 IN | OXYGEN SATURATION: 100 % | WEIGHT: 157 LBS | SYSTOLIC BLOOD PRESSURE: 101 MMHG | BODY MASS INDEX: 23.25 KG/M2 | RESPIRATION RATE: 17 BRPM | HEART RATE: 82 BPM

## 2018-01-22 DIAGNOSIS — C15.9 SQUAMOUS CELL ESOPHAGEAL CANCER (HCC): ICD-10-CM

## 2018-01-22 DIAGNOSIS — D64.9 ANEMIA, UNSPECIFIED TYPE: ICD-10-CM

## 2018-01-22 DIAGNOSIS — C78.89 METASTATIC SQUAMOUS CELL CARCINOMA TO ESOPHAGUS (HCC): Primary | ICD-10-CM

## 2018-01-22 DIAGNOSIS — E44.0 MALNUTRITION OF MODERATE DEGREE (HCC): ICD-10-CM

## 2018-01-22 DIAGNOSIS — R52 PAIN AGGRAVATED BY EATING OR DRINKING: ICD-10-CM

## 2018-01-22 PROCEDURE — 3331090002 HH PPS REVENUE DEBIT

## 2018-01-22 PROCEDURE — 3331090001 HH PPS REVENUE CREDIT

## 2018-01-22 NOTE — MR AVS SNAPSHOT
[facility] 
 
 
 [address] [facility] [address] [address] 
[phone] Patient: Scott Cooney Person MRN: [id number] :1950 Visit Information Date & Time Provider Department Dept. Phone Encounter #  
 2018  9:30 AM Maggie Finch,  [place] [address] Oncology [phone] [id number] Your Appointments 2018 12:30 PM  
Office [place] [address] [facility] [address]) Appt Note: Return in 4 weeks (18) FOR eov. [address] [facility]  
  
   
 [address] [facility] [address] Upcoming Health Maintenance Date Due Pneumococcal 65+ High/Highest Risk (2 of 2 - PPSV23) 2016 GLAUCOMA SCREENING Q2Y 2018 MEDICARE YEARLY EXAM 2018 COLONOSCOPY 2021 DTaP/Tdap/Td series (2 - Td) 8/15/2027 Allergies as of 2018  Review Complete On: 2018 By: David Leal No Known Allergies Current Immunizations  Reviewed on 2017 Name Date Influenza High Dose Vaccine PF 2017, 2015 Influenza Vaccine 2014, 2013 Influenza Vaccine (Quad) PF 10/22/2017 10:21 PM  
 Influenza Vaccine Split 2011, 2010 Pneumococcal Conjugate (PCV-13) 2015 TD Vaccine 8/3/2006 Tdap 8/15/2017 ZZZ-RETIRED (DO NOT USE) Pneumococcal Vaccine (Unspecified Type) 2010 Not reviewed this visit Vitals BP Pulse Temp Resp Height(growth percentile) Weight(growth percentile) 101/68 (BP 1 Location: Right arm, BP Patient Position: Sitting) 82 98 °F (36.7 °C) (Oral) 17 5' 9\" (1.753 m) 157 lb (71.2 kg) SpO2 BMI Smoking Status 100% 23.18 kg/m2 Former Smoker BMI and BSA Data Body Mass Index Body Surface Area  
 23.18 kg/m 2 1.86 m 2 Preferred Pharmacy Pharmacy Name Phone [address], [address] [phone] Your Updated Medication List  
  
   
This list is accurate as of: 1/22/18 10:30 AM.  Always use your most recent med list.  
  
  
  
  
 nut.tx.impaired dige fxn-fiber 0.08 gram- 1.2 kcal/mL Liqd Commonly known as:  VITAL AF 1.2 MAYKEL Start with 1 can over an hour 3 times per day for 24 hours. If tolerated, advance to 2 cans over 1.5 hours 3x/day: 9am, 12pm, 6pm (follow toleration over 48 hours). Flush tube before & after with 200ml water each time (400ml total with each feeding) If patient can drink water, flushes can be re  
  
 oxyCODONE-acetaminophen  mg per tablet Commonly known as:  PERCOCET 10 Take 1 Tab by mouth every six (6) hours as needed for Pain. Max Daily Amount: 4 Tabs. Please provide this summary of care documentation to your next provider. Your primary care clinician is listed as [id number] [facility]. If you have any questions after today's visit, please call [phone].

## 2018-01-22 NOTE — PROGRESS NOTES
[facility]:     Patient: Andria Sexton Person Age: 75 y.o. Sex: male    YOB: 1950 Admit Date: (Not on file) PCP: Balaji OrdoñezDO Adele   MRN: [id number]  CSN: [id number]       Patient Active Problem List   Diagnosis Code    Arthritis M19.90    Metastatic squamous cell carcinoma to esophagus (HCC) C78.89    Malnutrition of moderate degree (HCC) E44.0     Assessment/ Plan:     1.) Advanced Esophageal Cancer-Squamous Cell Carcinoma    -Pathology results reviewed-Squamous Cell Carcinoma  -No loss of MMR  -No MSI-H  -EGD biopsy per Dr. Agustin Melchor 10/12/2017  -radiation oncology-Dr. Eliza Awan Prestridge  -Complete staging workup with FDG PET CT scan revealed lymph node metastasis (supraclavicular)  -MediPort placement per Dr. Melchor-11/7/2017  -Completed concurrent chemotherapy/radiotherapy 12/2017  -s/p treatment with week #5 of 5-12/6/2017    -Restaging CT scans C/A/P-1/2018 showed a good response to concurrent Tx    -Repeat CT scan 3/2018    -Rtc. in 3 weeks to reevaluate clinically, repeat labs and for further medical decision making     -Check for PD-L1 expression on path. sample    -Overall prognosis remains poor    -Return to clinic in approximately 1-2 weeks to reevaluate clinically and for further medical decision-making.    -Closely monitor CBCs and CMP's    -Instructed go the emergency room for any worsening of symptoms and or development of fever    3.)  Dehydration  -Improved  -G-tube feedings per nutritional services and home health-tube feedings going well    4.)  Pain secondary to esophageal cancer/cancer treatment  -Percocet to use as directed  -Percocet refilled today  -Monitor   -Random UDS  -Wean his medication as tolerated    FDG-PET scan 10/19/2017:    IMPRESSION: [Malignant activity at the esophageal mass, middle mediastinal lymph nodes,  supraclavicular lymph nodes compatible with metastases.  ]     Activity at the right hilum but without enlarged lymph node, could be early  metastasis.     No malignant activity at subcentimeter pulmonary nodules. Potential for false  negative result exists because of their small size. Advise short-term follow-up  with diagnostic CT.     Activity at dental disease also seen in the mandible and maxilla.     Activity posterior to the right side mandible without definite mass or skeletal  abnormality of uncertain significance. Could be a tiny adjacent lymph node. Attention should be paid to this region on subsequent examinations.       Pathology report 10/12/2017:  FINAL DIAGNOSIS:   ESOPHAGEAL MASS, BIOPSY:   INVASIVE, POORLY DIFFERENTIATED SQUAMOUS CELL CARCINOMA. GROSS DESCRIPTION:     The specimen is received in formalin in a container labeled with the patient's name, Ingrid Marie, and biopsy esophageal mass, rule out malignancy and consists of two soft tissue fragments, one pale tan and the other pink to dark red, ranging from 0.3 to 0.5 cm in greatest dimension. The specimen is submitted in toto in cassette A1. (cd)   DMM/clr     I have discussed the diagnosis with the patient and the intended plan as seen in the above orders. I have reviewed the plan of care with the patient, accepted their input and they are in agreement with the treatment goals. Patient has provided input and agrees with goals. History:   Noni Chávez Person is a 75 y.o. male presenting today for:     Distal Esophageal Cancer  -Pathology results reviewed-Squamous cell carcinoma  -No f/c/s/cp or SOB. -Dysphagia-now improved  -Esophageal pain remains well-controlled with pain medications    Subjective:  Swallowing improved  No f/c/s    Current Outpatient Prescriptions   Medication Sig Dispense Refill    oxyCODONE-acetaminophen (PERCOCET 10)  mg per tablet Take 1 Tab by mouth every six (6) hours as needed for Pain. Max Daily Amount: 4 Tabs.  40 Tab 0    nut.tx.impaired dige fxn-fiber (VITAL AF 1.2 MAYKEL) 0.08 gram- 1.2 kcal/mL liqd Start with 1 can over an hour 3 times per day for 24 hours. If tolerated, advance to 2 cans over 1.5 hours 3x/day: 9am, 12pm, 6pm (follow toleration over 48 hours). Flush tube before & after with 200ml water each time (400ml total with each feeding)  If patient can drink water, flushes can be re 180 Can 12       Objective:   VS:      Vitals:    01/22/18 0941   BP: 101/68   Pulse: 82   Resp: 17   Temp: 98 °F (36.7 °C)   TempSrc: Oral   SpO2: 100%   Weight: 71.2 kg (157 lb)   Height: 5' 9\" (1.753 m)       Physical Exam:     Constitutional:  no distress   HENT:  atraumatic   Eyes:  EOMI   Neck:  No mass   Cardiovascular:  S1, S2   Pulmonary/Chest Wall:   clear   Abdominal:  soft  PEG tube  + BS       Musculoskeletal:  No deformity   Skin:  No rash    Peripheral Vascular:  Pulses palpable       Review of Systems: The remainder of 12 point ROS was otherwise negative except for what was reported in the CC/HPI:      No results found for this or any previous visit (from the past 168 hour(s)). Past Medical History:   Diagnosis Date    Arthritis     Essential hypertension, malignant 8/3/2010    Leg pain 8/3/2010    Throat [facility])        Past Surgical History:   Procedure Laterality Date    HX GI      G-tube placement on 11/28/17    AL EGD DELIVER THERMAL ENERGY SPHNCTR/CARDIA GERD         Social History     Social History    Marital status:      Spouse name: N/A    Number of children: N/A    Years of education: N/A     Occupational History    Not on file.      Social History Main Topics    Smoking status: Former Smoker     Packs/day: 0.25     Types: Cigarettes     Start date: 1/17/2017     Quit date: 11/22/2017    Smokeless tobacco: Never Used      Comment: Restarted in Jan 2017    Alcohol use No      Comment: \"NONE IN AWHILE\"    Drug use: No    Sexual activity: No     Other Topics Concern    Not on file     Social History Narrative       Family History   Problem Relation Age of Onset    Heart Disease Mother     Lung Disease Father        No Known Allergies    Follow-up Disposition: Not on File    Mayuri Giang MD, MPH Hematology-Medical Oncology  January 22, 2018 1:16 PM

## 2018-01-23 PROCEDURE — 3331090002 HH PPS REVENUE DEBIT

## 2018-01-23 PROCEDURE — 3331090001 HH PPS REVENUE CREDIT

## 2018-01-23 PROCEDURE — 3331090003 HH PPS REVENUE ADJ

## 2018-02-13 ENCOUNTER — OFFICE VISIT (OUTPATIENT)
Dept: ONCOLOGY | Age: 68
End: 2018-02-13

## 2018-02-13 ENCOUNTER — OFFICE VISIT (OUTPATIENT)
Dept: FAMILY MEDICINE CLINIC | Age: 68
End: 2018-02-13

## 2018-02-13 VITALS
TEMPERATURE: 98 F | RESPIRATION RATE: 18 BRPM | SYSTOLIC BLOOD PRESSURE: 131 MMHG | BODY MASS INDEX: 25.18 KG/M2 | WEIGHT: 170 LBS | OXYGEN SATURATION: 100 % | DIASTOLIC BLOOD PRESSURE: 84 MMHG | HEIGHT: 69 IN | HEART RATE: 101 BPM

## 2018-02-13 VITALS
RESPIRATION RATE: 16 BRPM | HEART RATE: 69 BPM | DIASTOLIC BLOOD PRESSURE: 80 MMHG | BODY MASS INDEX: 24.53 KG/M2 | OXYGEN SATURATION: 98 % | WEIGHT: 165.6 LBS | TEMPERATURE: 97.6 F | HEIGHT: 69 IN | SYSTOLIC BLOOD PRESSURE: 125 MMHG

## 2018-02-13 DIAGNOSIS — R52 PAIN AGGRAVATED BY EATING OR DRINKING: ICD-10-CM

## 2018-02-13 DIAGNOSIS — C78.89 METASTATIC SQUAMOUS CELL CARCINOMA TO ESOPHAGUS (HCC): Primary | ICD-10-CM

## 2018-02-13 DIAGNOSIS — D64.9 ANEMIA, UNSPECIFIED TYPE: ICD-10-CM

## 2018-02-13 DIAGNOSIS — C15.9 SQUAMOUS CELL ESOPHAGEAL CANCER (HCC): ICD-10-CM

## 2018-02-13 DIAGNOSIS — E44.0 MALNUTRITION OF MODERATE DEGREE (HCC): ICD-10-CM

## 2018-02-13 DIAGNOSIS — E44.0 MALNUTRITION OF MODERATE DEGREE (HCC): Primary | ICD-10-CM

## 2018-02-13 DIAGNOSIS — Z93.1 STATUS POST GASTROSTOMY (HCC): ICD-10-CM

## 2018-02-13 DIAGNOSIS — M19.90 ARTHRITIS: ICD-10-CM

## 2018-02-13 DIAGNOSIS — C78.89 METASTATIC SQUAMOUS CELL CARCINOMA TO ESOPHAGUS (HCC): ICD-10-CM

## 2018-02-13 RX ORDER — OXYCODONE AND ACETAMINOPHEN 10; 325 MG/1; MG/1
1 TABLET ORAL
Qty: 40 TAB | Refills: 0 | Status: SHIPPED | OUTPATIENT
Start: 2018-02-13 | End: 2018-04-17 | Stop reason: SDUPTHER

## 2018-02-13 NOTE — PROGRESS NOTES
[facility]:     Patient: Beverly Mcdonnell Person Age: 75 y.o.  Sex: male    YOB: 1950 Admit Date: (Not on file) PCP: Ree LorenzDO henry   MRN: [id number]  CSN: [id number]       Patient Active Problem List   Diagnosis Code    Arthritis M19.90    Metastatic squamous cell carcinoma to esophagus (HCC) C78.89    Malnutrition of moderate degree (HCC) E44.0     Assessment/ Plan:     1.) Advanced Esophageal Cancer-Squamous Cell Carcinoma    -Pathology results reviewed-Squamous Cell Carcinoma  -No loss of MMR  -No MSI-H  -EGD biopsy per Dr. Alonzo Branch 10/12/2017  -radiation oncology-Dr. Immanuel Mock Prestridge  -Complete staging workup with FDG PET CT scan revealed lymph node metastasis (supraclavicular)  -MediPort placement per Dr. Melchor-11/7/2017    -Completed concurrent chemotherapy/radiotherapy 12/2017    -s/p treatment with week #5 of 5-12/6/2017    -Restaging CT scans C/A/P-1/2018 showed a good response to concurrent Tx     -Repeat CT scan 3/2018-early March for restaging purposes and for further medical decision-making    -I will have him follow-up with Dr. German Becerra at the [place] location as I will be transitioning of the practice to move out of state for a new Oncolgy opportunity    -Rtc. in 3 weeks (with Dr. Apollo Adkins) to reevaluate clinically, repeat labs and for further medical decision making     -Check for PD-L1 expression on path. sample    -Overall prognosis remains poor    -Instructed go the emergency room for any worsening of symptoms and or development of fever    3.)  Dehydration  -Resolved  -G-tube feedings per nutritional services and home health-tube feedings going well    4.)  Pain secondary to esophageal cancer/cancer treatment  -Percocet to use as directed  -Percocet refilled today-2/13/18  -Monitor   -Random UDS  -Wean his medication as tolerated    FDG-PET scan 10/19/2017:    IMPRESSION: [Malignant activity at the esophageal mass, middle mediastinal lymph nodes,  supraclavicular lymph nodes compatible with metastases. ]     Activity at the right hilum but without enlarged lymph node, could be early  metastasis.     No malignant activity at subcentimeter pulmonary nodules. Potential for false  negative result exists because of their small size. Advise short-term follow-up  with diagnostic CT.     Activity at dental disease also seen in the mandible and maxilla.     Activity posterior to the right side mandible without definite mass or skeletal  abnormality of uncertain significance. Could be a tiny adjacent lymph node. Attention should be paid to this region on subsequent examinations.       Pathology report 10/12/2017:  FINAL DIAGNOSIS:   ESOPHAGEAL MASS, BIOPSY:   INVASIVE, POORLY DIFFERENTIATED SQUAMOUS CELL CARCINOMA. GROSS DESCRIPTION:     The specimen is received in formalin in a container labeled with the patient's name, Jesus Bearden, and biopsy esophageal mass, rule out malignancy and consists of two soft tissue fragments, one pale tan and the other pink to dark red, ranging from 0.3 to 0.5 cm in greatest dimension. The specimen is submitted in toto in cassette A1. (cd)   DMM/clr     I have discussed the diagnosis with the patient and the intended plan as seen in the above orders. I have reviewed the plan of care with the patient, accepted their input and they are in agreement with the treatment goals. Patient has provided input and agrees with goals. History:   Petar Kapadia Person is a 75 y.o. male presenting today for:     Distal Esophageal Cancer  -Pathology results reviewed-Squamous cell carcinoma  -Dysphagia-now improved    Subjective:    -Esophageal pain remains well-controlled with pain medications  Swallowing improved  No f/c/s    Current Outpatient Prescriptions   Medication Sig Dispense Refill    oxyCODONE-acetaminophen (PERCOCET 10)  mg per tablet Take 1 Tab by mouth every six (6) hours as needed for Pain.  Max Daily Amount: 4 Tabs. 40 Tab 0    nut.tx.impaired dige fxn-fiber (VITAL AF 1.2 MAYKEL) 0.08 gram- 1.2 kcal/mL liqd Start with 1 can over an hour 3 times per day for 24 hours. If tolerated, advance to 2 cans over 1.5 hours 3x/day: 9am, 12pm, 6pm (follow toleration over 48 hours). Flush tube before & after with 200ml water each time (400ml total with each feeding)  If patient can drink water, flushes can be re 180 Can 12       Objective:   VS:      Vitals:    02/13/18 1211   BP: 131/84   Pulse: (!) 101   Resp: 18   Temp: 98 °F (36.7 °C)   TempSrc: Oral   SpO2: 100%   Weight: 77.1 kg (170 lb)   Height: 5' 9\" (1.753 m)     Physical Exam:     Constitutional:  no distress   HENT:  atraumatic   Eyes:  EOMI   Neck:  No mass   Cardiovascular:  S1, S2   Pulmonary/Chest Wall:   clear   Abdominal:  soft  PEG tube  + BS       Musculoskeletal:  No deformity   Skin:  No rash    Peripheral Vascular:  Pulses palpable       Review of Systems: The remainder of 12 point ROS was otherwise negative except for what was reported in the CC/HPI:      No results found for this or any previous visit (from the past 168 hour(s)). Past Medical History:   Diagnosis Date    Arthritis     Essential hypertension, malignant 8/3/2010    Leg pain 8/3/2010    Throat cancer [facility])        Past Surgical History:   Procedure Laterality Date    HX GI      G-tube placement on 11/28/17    MT EGD DELIVER THERMAL ENERGY SPHNCTR/CARDIA GERD         Social History     Social History    Marital status:      Spouse name: N/A    Number of children: N/A    Years of education: N/A     Occupational History    Not on file.      Social History Main Topics    Smoking status: Former Smoker     Packs/day: 0.25     Types: Cigarettes     Start date: 1/17/2017     Quit date: 11/22/2017    Smokeless tobacco: Never Used      Comment: Restarted in Jan 2017    Alcohol use No      Comment: \"NONE IN AWHILE\"    Drug use: No    Sexual activity: No     Other Topics Concern    Not on file     Social History Narrative       Family History   Problem Relation Age of Onset    Heart Disease Mother     Lung Disease Father        No Known Allergies    Follow-up Disposition: Not on File    Uma Mccracken MD, MPH Hematology-Medical Oncology  February 13, 2018 1:16 PM

## 2018-02-13 NOTE — MR AVS SNAPSHOT
[address] [place] [address] [address] [place] [id number] 
[phone] Patient: Tip Salcedo Person MRN: [id number] :1950 Visit Information Date & Time Provider Department Dept. Phone Encounter #  
 2018 12:30 PM Dipti Hooper., [address] [place] [address] [phone] [id number] Follow-up Instructions Return in about 2 months (around 2018) for rov, labs at next visit. Routing History Upcoming Health Maintenance Date Due Pneumococcal 65+ High/Highest Risk (2 of 2 - PPSV23) 2016 GLAUCOMA SCREENING Q2Y 2018 MEDICARE YEARLY EXAM 2018 COLONOSCOPY 2021 DTaP/Tdap/Td series (2 - Td) 8/15/2027 Allergies as of 2018  Review Complete On: 2018 By: Dipti Hooper., DO No Known Allergies Current Immunizations  Reviewed on 2017 Name Date Influenza High Dose Vaccine PF 2017, 2015 Influenza Vaccine 2014, 2013 Influenza Vaccine (Quad) PF 10/22/2017 10:21 PM  
 Influenza Vaccine Split 2011, 2010 Pneumococcal Conjugate (PCV-13) 2015 TD Vaccine 8/3/2006 Tdap 8/15/2017 ZZZ-RETIRED (DO NOT USE) Pneumococcal Vaccine (Unspecified Type) 2010 Not reviewed this visit You Were Diagnosed With   
  
 Codes Comments Malnutrition of moderate degree (HCC)    -  Primary ICD-10-CM: E44.0 ICD-9-CM: 263.0 Metastatic squamous cell carcinoma to esophagus [facility])     ICD-10-CM: C78.89 ICD-9-CM: 197.8 Status post gastrostomy ([facility] [address]     ICD-10-CM: Z93.1 ICD-9-CM: V44.1 Arthritis     ICD-10-CM: M19.90 ICD-9-CM: 716.90 Vitals BP Pulse Temp Resp Height(growth percentile) Weight(growth percentile) 125/80 (BP 1 Location: Left arm, BP Patient Position: Sitting) 69 97.6 °F (36.4 °C) (Oral) 16 5' 9\" (1.753 m) 165 lb 9.6 oz (75.1 kg) SpO2 BMI Smoking Status 98% 24.45 kg/m2 Former Smoker BMI and BSA Data Body Mass Index Body Surface Area  
 24.45 kg/m 2 1.91 m 2 Preferred Pharmacy Pharmacy Name Phone [address], [place] [phone] Your Updated Medication List  
  
   
This list is accurate as of: 2/13/18 12:54 PM.  Always use your most recent med list.  
  
  
  
  
 nut.tx.impaired dige fxn-fiber 0.08 gram- 1.2 kcal/mL Liqd Commonly known as:  VITAL AF 1.2 MAYKEL Start with 1 can over an hour 3 times per day for 24 hours. If tolerated, advance to 2 cans over 1.5 hours 3x/day: 9am, 12pm, 6pm (follow toleration over 48 hours). Flush tube before & after with 200ml water each time (400ml total with each feeding) If patient can drink water, flushes can be re  
  
 oxyCODONE-acetaminophen  mg per tablet Commonly known as:  PERCOCET 10 Take 1 Tab by mouth every six (6) hours as needed for Pain. Max Daily Amount: 4 Tabs. We Performed the Following REFERRAL TO HEMATOLOGY ONCOLOGY [RMA55 Custom] Comments:  
 Please evaluate patient for esophageal cancer -- has been prev under care with Dr Flor Ellington who is leaving. Please set him up with whomever is treating esophageal cancer . Follow-up Instructions Return in about 2 months (around 4/13/2018) for rov, labs at next visit. To-Do List   
 02/13/2018 1:45 PM  
  Appointment with [address] Johnie Ceja Ii [address] [place] [address] ([phone]) Referral Information Referral ID Referred By Referred To  
  
 [id number] [facility] [address]aky Small [address] Phone: [phone] Fax: [phone] Visits Status Start Date End Date 1 New Request 2/13/18 2/13/19 If your referral has a status of pending review or denied, additional information will be sent to support the outcome of this decision. Please provide this summary of care documentation to your next provider. Your primary care clinician is listed as [id number] [facility]. If you have any questions after today's visit, please call [phone].

## 2018-02-13 NOTE — Clinical Note
Aurora Richardson,  With you leaving I am going to refer him to [facility] for ongoing treatment  Thanks Raymundo Dominguez

## 2018-02-13 NOTE — MR AVS SNAPSHOT
[address] [place] [address] [address] [facility] 83 [id number] 
[phone] Patient: Viridiana Gray Person MRN: [id number] :1950 Visit Information Date & Time Provider Department Dept. Phone Encounter #  
 2018 11:45 AM Calixto Su MD [address] [place] [address] [phone] [id number] Follow-up Instructions Return if symptoms worsen or fail to improve, for reeval.. Routing History Follow-up and Disposition History Upcoming Health Maintenance Date Due Pneumococcal 65+ High/Highest Risk (2 of 2 - PPSV23) 2016 GLAUCOMA SCREENING Q2Y 2018 MEDICARE YEARLY EXAM 2018 COLONOSCOPY 2021 DTaP/Tdap/Td series (2 - Td) 8/15/2027 Allergies as of 2018  Review Complete On: 2018 By: Calixto Su MD  
 No Known Allergies Current Immunizations  Reviewed on 2017 Name Date Influenza High Dose Vaccine PF 2017, 2015 Influenza Vaccine 2014, 2013 Influenza Vaccine (Quad) PF 10/22/2017 10:21 PM  
 Influenza Vaccine Split 2011, 2010 Pneumococcal Conjugate (PCV-13) 2015 TD Vaccine 8/3/2006 Tdap 8/15/2017 ZZZ-RETIRED (DO NOT USE) Pneumococcal Vaccine (Unspecified Type) 2010 Not reviewed this visit You Were Diagnosed With   
  
 Codes Comments Metastatic squamous cell carcinoma to esophagus [facility])    -  Primary ICD-10-CM: C78.89 ICD-9-CM: 197.8 Squamous cell esophageal cancer (HCC)     ICD-10-CM: C15.9 ICD-9-CM: 150.9 Anemia, unspecified type     ICD-10-CM: D64.9 ICD-9-CM: 285.9 Malnutrition of moderate degree (HCC)     ICD-10-CM: E44.0 ICD-9-CM: 263.0 Pain aggravated by eating or drinking     ICD-10-CM: R52 ICD-9-CM: 780.96 Vitals BP Pulse Temp Resp Height(growth percentile) Weight(growth percentile)  131/84 (BP 1 Location: Right arm, BP Patient Position: Sitting) (!) 101 98 °F (36.7 °C) (Oral) 18 5' 9\" (1.753 m) 170 lb (77.1 kg) SpO2 BMI Smoking Status 100% 25.1 kg/m2 Former Smoker BMI and BSA Data Body Mass Index Body Surface Area  
 25.1 kg/m 2 1.94 m 2 Preferred Pharmacy Pharmacy Name Phone [address], [place] [phone] Your Updated Medication List  
  
   
This list is accurate as of: 2/13/18 12:30 PM.  Always use your most recent med list.  
  
  
  
  
 nut.tx.impaired dige fxn-fiber 0.08 gram- 1.2 kcal/mL Liqd Commonly known as:  VITAL AF 1.2 MAYKEL Start with 1 can over an hour 3 times per day for 24 hours. If tolerated, advance to 2 cans over 1.5 hours 3x/day: 9am, 12pm, 6pm (follow toleration over 48 hours). Flush tube before & after with 200ml water each time (400ml total with each feeding) If patient can drink water, flushes can be re  
  
 oxyCODONE-acetaminophen  mg per tablet Commonly known as:  PERCOCET 10 Take 1 Tab by mouth every six (6) hours as needed for Pain. Max Daily Amount: 4 Tabs. Prescriptions Printed Refills  
 oxyCODONE-acetaminophen (PERCOCET 10)  mg per tablet 0 Sig: Take 1 Tab by mouth every six (6) hours as needed for Pain. Max Daily Amount: 4 Tabs. Class: Print Route: Oral  
  
Follow-up Instructions Return if symptoms worsen or fail to improve, for reeval.. To-Do List   
 02/13/2018 1:45 PM  
  Appointment with [id number] [facility] [address] [address] [place] [address] ([phone]) Please provide this summary of care documentation to your next provider. Your primary care clinician is listed as [address]. If you have any questions after today's visit, please call [phone].

## 2018-02-13 NOTE — PROGRESS NOTES
Sweta Keyes is a 75 y.o.  male and presents with    Chief Complaint   Patient presents with    Esophageal Cancer    Arthritis    Other     malnutrition           Subjective:    Osteoarthritis and Chronic Pain:  Patient has osteoarthritis, primarily affecting the diffuse. Symptoms onset: problem is longstanding. Rheumatological ROS: no current joint or muscle symptoms, essentially pain-free. Response to treatment plan: stable. malnutrion ongoing on enteral nutriion doing well    Esophageal cancer ongoing    Additional Concerns:          Patient Active Problem List    Diagnosis Date Noted    Status post gastrostomy ([facility] [address] 02/13/2018    Malnutrition of moderate degree ([facility] 12/13/2017    Metastatic squamous cell carcinoma to esophagus (HCC) 10/17/2017    Arthritis 06/06/2011     Current Outpatient Prescriptions   Medication Sig Dispense Refill    oxyCODONE-acetaminophen (PERCOCET 10)  mg per tablet Take 1 Tab by mouth every six (6) hours as needed for Pain. Max Daily Amount: 4 Tabs. 40 Tab 0    nut.tx.impaired dige fxn-fiber (VITAL AF 1.2 MAYKEL) 0.08 gram- 1.2 kcal/mL liqd Start with 1 can over an hour 3 times per day for 24 hours. If tolerated, advance to 2 cans over 1.5 hours 3x/day: 9am, 12pm, 6pm (follow toleration over 48 hours).    Flush tube before & after with 200ml water each time (400ml total with each feeding)  If patient can drink water, flushes can be re 180 Can 12     No Known Allergies  Past Medical History:   Diagnosis Date    Arthritis     Essential hypertension, malignant 8/3/2010    Leg pain 8/3/2010    Throat cancer [facility])      Past Surgical History:   Procedure Laterality Date    HX GI      G-tube placement on 11/28/17    [place] EGD DELIVER THERMAL ENERGY SPHNCTR/CARDIA GERD       Family History   Problem Relation Age of Onset    Heart Disease Mother     Lung Disease Father      Social History   Substance Use Topics    Smoking status: Former Smoker Packs/day: 0.25     Types: Cigarettes     Start date: 1/17/2017     Quit date: 11/22/2017    Smokeless tobacco: Never Used      Comment: Restarted in Jan 2017    Alcohol use No      Comment: \"NONE IN AWHILE\"       ROS       All other systems reviewed and are negative. Objective:  Vitals:    02/13/18 1235   BP: 125/80   Pulse: 69   Resp: 16   Temp: 97.6 °F (36.4 °C)   TempSrc: Oral   SpO2: 98%   Weight: 165 lb 9.6 oz (75.1 kg)   Height: 5' 9\" (1.753 m)   PainSc:   0 - No pain                 alert, well appearing, and in no distress, oriented to person, place, and time and normal appearing weight  Chest - clear to auscultation, no wheezes, rales or rhonchi, symmetric air entry  Heart - normal rate, regular rhythm, normal S1, S2, no murmurs, rubs, clicks or gallops  Abdomen - soft, nontender, nondistended, no masses or organomegaly        LABS   Component      Latest Ref Rng & Units 1/11/2018 1/11/2018          11:36 AM 11:36 AM   WBC      4.6 - 13.2 K/uL  6.3   RBC      4.70 - 5.50 M/uL  3.66 (L)   HGB      13.0 - 16.0 g/dL  10.3 (L)   HCT      36.0 - 48.0 %  30.4 (L)   MCV      74.0 - 97.0 FL  83.1   MCH      24.0 - 34.0 PG  28.1   MCHC      31.0 - 37.0 g/dL  33.9   RDW      11.6 - 14.5 %  17.4 (H)   PLATELET      494 - 718 K/uL  231   MPV      9.2 - 11.8 FL  9.4   NEUTROPHILS      40 - 73 %  68   LYMPHOCYTES      21 - 52 %  19 (L)   MONOCYTES      3 - 10 %  9   EOSINOPHILS      0 - 5 %  3   BASOPHILS      0 - 2 %  1   ABS. NEUTROPHILS      1.8 - 8.0 K/UL  4.3   ABS. LYMPHOCYTES      0.9 - 3.6 K/UL  1.2   ABS. MONOCYTES      0.05 - 1.2 K/UL  0.6   ABS. EOSINOPHILS      0.0 - 0.4 K/UL  0.2   ABS.  BASOPHILS      0.0 - 0.06 K/UL  0.0   DF        AUTOMATED   Sodium      136 - 145 mmol/L 140    Potassium      3.5 - 5.5 mmol/L 3.9    Chloride      100 - 108 mmol/L 104    CO2      21 - 32 mmol/L 28    Anion gap      3.0 - 18 mmol/L 8    Glucose      74 - 99 mg/dL 71 (L)    BUN      7.0 - 18 MG/DL 22 (H) Creatinine      0.6 - 1.3 MG/DL 0.66    BUN/Creatinine ratio      12 - 20   33 (H)    GFR est AA      >60 ml/min/1.73m2 >60    GFR est non-AA      >60 ml/min/1.73m2 >60    Calcium      8.5 - 10.1 MG/DL 9.1      TESTS      Assessment/Plan:    Esophageal cancer ongoing transferreing from [facility]    Malnutrition under tx    arthirist ongoing      Lab review:      Diagnoses and all orders for this visit:    1. Malnutrition of moderate degree ([address]    2. Metastatic squamous cell carcinoma to esophagus (HCC)    3. Status post gastrostomy ([facility]    4. Arthritis        I have discussed the diagnosis with the patient and the intended plan as seen in the above orders. The patient has received an after-visit summary and questions were answered concerning future plans. I have discussed medication side effects and warnings with the patient as well. I have reviewed the plan of care with the patient, accepted their input and they are in agreement with the treatment goals.      Follow-up Disposition: Not on File

## 2018-02-13 NOTE — PROGRESS NOTES
Carlee Elliott is a 75 y.o. male presents today for follow up on his thyroid. Pt is in Room # 4        1. Have you been to the ER, urgent care clinic since your last visit? Hospitalized since your last visit? No    2. Have you seen or consulted any other health care providers outside of the [address] [place] [address] since your last visit? Include any pap smears or colon screening.  No

## 2018-03-14 PROCEDURE — A9552 F18 FDG: HCPCS

## 2018-03-21 ENCOUNTER — HOSPITAL ENCOUNTER (OUTPATIENT)
Dept: PET IMAGING | Age: 68
Discharge: HOME | End: 2018-03-14
Attending: INTERNAL MEDICINE
Payer: MEDICARE

## 2018-03-21 DIAGNOSIS — C15.5 CANCER OF ABDOMINAL ESOPHAGUS (HCC): ICD-10-CM

## 2018-04-17 ENCOUNTER — OFFICE VISIT (OUTPATIENT)
Dept: FAMILY MEDICINE CLINIC | Age: 68
End: 2018-04-17

## 2018-04-17 VITALS
HEART RATE: 88 BPM | TEMPERATURE: 95.4 F | SYSTOLIC BLOOD PRESSURE: 154 MMHG | HEIGHT: 69 IN | OXYGEN SATURATION: 97 % | RESPIRATION RATE: 16 BRPM | DIASTOLIC BLOOD PRESSURE: 92 MMHG | BODY MASS INDEX: 26.57 KG/M2 | WEIGHT: 179.4 LBS

## 2018-04-17 DIAGNOSIS — C78.89 METASTATIC SQUAMOUS CELL CARCINOMA TO ESOPHAGUS (HCC): ICD-10-CM

## 2018-04-17 DIAGNOSIS — D64.9 ANEMIA, UNSPECIFIED TYPE: ICD-10-CM

## 2018-04-17 DIAGNOSIS — E44.0 MALNUTRITION OF MODERATE DEGREE (HCC): ICD-10-CM

## 2018-04-17 RX ORDER — OXYCODONE AND ACETAMINOPHEN 10; 325 MG/1; MG/1
1 TABLET ORAL
Qty: 40 TAB | Refills: 0 | Status: SHIPPED | OUTPATIENT
Start: 2018-04-17 | End: 2018-05-08 | Stop reason: SDUPTHER

## 2018-04-17 NOTE — MR AVS SNAPSHOT
[address] [place] [address] [address] [place] [phone] 
[phone] Patient: Franklyn Rust Person MRN: [id number] :1950 Visit Information Date & Time Provider Department Dept. Phone Encounter #  
 2018 10:30 AM Kristine Abrams, [facility] 6 [phone] [id number] Follow-up Instructions Return in about 2 months (around 2018) for EOV. Upcoming Health Maintenance Date Due Pneumococcal 65+ High/Highest Risk (2 of 2 - PPSV23) 2016 GLAUCOMA SCREENING Q2Y 2018 MEDICARE YEARLY EXAM 2018 COLONOSCOPY 2021 DTaP/Tdap/Td series (2 - Td) 8/15/2027 Allergies as of 2018  Review Complete On: 2018 By: Kristine Abrams, DO No Known Allergies Current Immunizations  Reviewed on 2017 Name Date Influenza High Dose Vaccine PF 2017, 2015 Influenza Vaccine 2014, 2013 Influenza Vaccine (Quad) PF 10/22/2017 10:21 PM  
 Influenza Vaccine Split 2011, 2010 Pneumococcal Conjugate (PCV-13) 2015 TD Vaccine 8/3/2006 Tdap 8/15/2017 ZZZ-RETIRED (DO NOT USE) Pneumococcal Vaccine (Unspecified Type) 2010 Not reviewed this visit You Were Diagnosed With   
  
 Codes Comments Metastatic squamous cell carcinoma to esophagus [facility])     ICD-10-CM: C78.89 ICD-9-CM: 197.8 Anemia, unspecified type     ICD-10-CM: D64.9 ICD-9-CM: 285.9 Malnutrition of moderate degree (HCC)     ICD-10-CM: E44.0 ICD-9-CM: 263.0 Vitals BP Pulse Temp Resp Height(growth percentile) Weight(growth percentile) (!) 154/92 (BP 1 Location: Right arm, BP Patient Position: Sitting) 88 95.4 °F (35.2 °C) (Oral) 16 5' 9\" (1.753 m) 179 lb 6.4 oz (81.4 kg) SpO2 BMI Smoking Status 97% 26.49 kg/m2 Former Smoker BMI and BSA Data  Body Mass Index Body Surface Area  
 26.49 kg/m 2 1.99 m 2  
  
  
 Preferred Pharmacy Pharmacy Name Phone [address], [address] [phone] Your Updated Medication List  
  
   
This list is accurate as of 4/17/18 11:07 AM.  Always use your most recent med list.  
  
  
  
  
 nut.tx.impaired dige fxn-fiber 0.08 gram- 1.2 kcal/mL Liqd Commonly known as:  VITAL AF 1.2 MAYKEL Start with 1 can over an hour 3 times per day for 24 hours. If tolerated, advance to 2 cans over 1.5 hours 3x/day: 9am, 12pm, 6pm (follow toleration over 48 hours). Flush tube before & after with 200ml water each time (400ml total with each feeding) If patient can drink water, flushes can be re  
  
 oxyCODONE-acetaminophen  mg per tablet Commonly known as:  PERCOCET 10 Take 1 Tab by mouth every six (6) hours as needed for Pain. Max Daily Amount: 4 Tabs. Prescriptions Printed Refills  
 oxyCODONE-acetaminophen (PERCOCET 10)  mg per tablet 0 Sig: Take 1 Tab by mouth every six (6) hours as needed for Pain. Max Daily Amount: 4 Tabs. Class: Print Route: Oral  
  
Follow-up Instructions Return in about 2 months (around 6/17/2018) for EOV. Please provide this summary of care documentation to your next provider. Your primary care clinician is listed as [phone] [facility]. If you have any questions after today's visit, please call [phone].

## 2018-04-17 NOTE — PROGRESS NOTES
Milagros Richardson is a 75 y.o.  male and presents with    Chief Complaint   Patient presents with    Esophageal Cancer    Arthritis           Subjective: Additional Concerns: esophageal cancer -- seen by dena bell at [facility]   Also lizarraga and gas  And folowed by jluis  Has run out of pain meds for esophageal ca           Patient Active Problem List    Diagnosis Date Noted    Status post gastrostomy ([facility] 02/13/2018    Malnutrition of moderate degree ([facility] 12/13/2017    Metastatic squamous cell carcinoma to esophagus (HCC) 10/17/2017    Arthritis 06/06/2011     Current Outpatient Prescriptions   Medication Sig Dispense Refill    oxyCODONE-acetaminophen (PERCOCET 10)  mg per tablet Take 1 Tab by mouth every six (6) hours as needed for Pain. Max Daily Amount: 4 Tabs. 40 Tab 0    nut.tx.impaired dige fxn-fiber (VITAL AF 1.2 MAYKEL) 0.08 gram- 1.2 kcal/mL liqd Start with 1 can over an hour 3 times per day for 24 hours. If tolerated, advance to 2 cans over 1.5 hours 3x/day: 9am, 12pm, 6pm (follow toleration over 48 hours).    Flush tube before & after with 200ml water each time (400ml total with each feeding)  If patient can drink water, flushes can be re 180 Can 12     No Known Allergies  Past Medical History:   Diagnosis Date    Arthritis     Essential hypertension, malignant 8/3/2010    Leg pain 8/3/2010    Throat cancer [facility])      Past Surgical History:   Procedure Laterality Date    HX GI      G-tube placement on 11/28/17    [place] EGD DELIVER THERMAL ENERGY SPHNCTR/CARDIA GERD       Family History   Problem Relation Age of Onset    Heart Disease Mother     Lung Disease Father      Social History   Substance Use Topics    Smoking status: Former Smoker     Packs/day: 0.25     Types: Cigarettes     Start date: 1/17/2017     Quit date: 11/22/2017    Smokeless tobacco: Never Used      Comment: Restarted in Jan 2017    Alcohol use No      Comment: \"NONE IN AWHILE\"       ROS All other systems reviewed and are negative. Objective:  Vitals:    04/17/18 1049   BP: (!) 154/92   Pulse: 88   Resp: 16   Temp: 95.4 °F (35.2 °C)   TempSrc: Oral   SpO2: 97%   Weight: 179 lb 6.4 oz (81.4 kg)   Height: 5' 9\" (1.753 m)   PainSc:   0 - No pain                 alert, well appearing, and in no distress and oriented to person, place, and time  Chest - clear to auscultation, no wheezes, rales or rhonchi, symmetric air entry  Heart - normal rate, regular rhythm, normal S1, S2, no murmurs, rubs, clicks or gallops  Abdomen - soft, nontender, nondistended, no masses or organomegaly        LABS     TESTS      Assessment/Plan:    metastatis esophageal cancer -- will refill pain meds  Continue tube feeds  Check labs in 2 mo    arthritsi stable on meds      Lab review: labs are reviewed, up to date and normal    Diagnoses and all orders for this visit:    1. Metastatic squamous cell carcinoma to esophagus (HCC)  -     oxyCODONE-acetaminophen (PERCOCET 10)  mg per tablet; Take 1 Tab by mouth every six (6) hours as needed for Pain. Max Daily Amount: 4 Tabs. 2. Anemia, unspecified type  -     oxyCODONE-acetaminophen (PERCOCET 10)  mg per tablet; Take 1 Tab by mouth every six (6) hours as needed for Pain. Max Daily Amount: 4 Tabs. 3. Malnutrition of moderate degree (HCC)  -     oxyCODONE-acetaminophen (PERCOCET 10)  mg per tablet; Take 1 Tab by mouth every six (6) hours as needed for Pain. Max Daily Amount: 4 Tabs. I have discussed the diagnosis with the patient and the intended plan as seen in the above orders. The patient has received an after-visit summary and questions were answered concerning future plans. I have discussed medication side effects and warnings with the patient as well. I have reviewed the plan of care with the patient, accepted their input and they are in agreement with the treatment goals.      Follow-up Disposition:  Return in about 2 months (around 6/17/2018) for EOV.

## 2018-04-17 NOTE — PROGRESS NOTES
Génesis Salguero is a 75 y.o. male presented to clinic for a routine f/u for throat cancer. Pt denies any pain or concerns at this time. 1. Have you been to the ER, urgent care clinic since your last visit? Hospitalized since your last visit? No    2. Have you seen or consulted any other health care providers outside of the [facility] since your last visit? Include any pap smears or colon screening.  No     Learning Assessment 10/17/2017   PRIMARY LEARNER Patient   HIGHEST LEVEL OF EDUCATION - PRIMARY LEARNER  GRADUATED HIGH SCHOOL OR GED   BARRIERS PRIMARY LEARNER PHYSICAL   CO-LEARNER CAREGIVER No   PRIMARY LANGUAGE ENGLISH    NEED No   LEARNER PREFERENCE PRIMARY LISTENING     -   LEARNING SPECIAL TOPICS no   ANSWERED BY self   RELATIONSHIP SELF     Health Maintenance Due   Topic Date Due    Pneumococcal 65+ High/Highest Risk (2 of 2 - PPSV23) 01/14/2016

## 2018-04-19 ENCOUNTER — ANESTHESIA EVENT (OUTPATIENT)
Dept: ENDOSCOPY | Age: 68
End: 2018-04-19
Payer: MEDICARE

## 2018-04-19 RX ORDER — AMLODIPINE BESYLATE 5 MG/1
5 TABLET ORAL DAILY
COMMUNITY
End: 2018-01-01 | Stop reason: SDUPTHER

## 2018-04-19 RX ORDER — CALC/MAG/B COMPLEX/D3/HERB 61
15 TABLET ORAL AS NEEDED
COMMUNITY
End: 2018-01-01

## 2018-04-19 RX ORDER — ACETAMINOPHEN 325 MG/1
650 TABLET ORAL
COMMUNITY
End: 2018-05-08

## 2018-04-20 ENCOUNTER — HOSPITAL ENCOUNTER (OUTPATIENT)
Dept: ULTRASOUND IMAGING | Age: 68
Discharge: HOME | End: 2018-04-20
Attending: INTERNAL MEDICINE
Payer: MEDICARE

## 2018-04-20 ENCOUNTER — HOSPITAL ENCOUNTER (OUTPATIENT)
Age: 68
Setting detail: OUTPATIENT SURGERY
Discharge: HOME | End: 2018-04-20
Attending: INTERNAL MEDICINE | Admitting: INTERNAL MEDICINE
Payer: MEDICARE

## 2018-04-20 ENCOUNTER — ANESTHESIA (OUTPATIENT)
Dept: ENDOSCOPY | Age: 68
End: 2018-04-20
Payer: MEDICARE

## 2018-04-20 VITALS
BODY MASS INDEX: 26.36 KG/M2 | DIASTOLIC BLOOD PRESSURE: 77 MMHG | WEIGHT: 178 LBS | HEIGHT: 69 IN | OXYGEN SATURATION: 98 % | HEART RATE: 82 BPM | SYSTOLIC BLOOD PRESSURE: 124 MMHG | RESPIRATION RATE: 16 BRPM | TEMPERATURE: 97 F

## 2018-04-20 VITALS
HEART RATE: 84 BPM | DIASTOLIC BLOOD PRESSURE: 76 MMHG | RESPIRATION RATE: 18 BRPM | SYSTOLIC BLOOD PRESSURE: 120 MMHG | OXYGEN SATURATION: 92 %

## 2018-04-20 DIAGNOSIS — R59.9 SWELLING OF LYMPH NODES: ICD-10-CM

## 2018-04-20 PROCEDURE — 76060000031 HC ANESTHESIA FIRST 0.5 HR: Performed by: INTERNAL MEDICINE

## 2018-04-20 PROCEDURE — 88173 CYTOPATH EVAL FNA REPORT: CPT | Performed by: INTERNAL MEDICINE

## 2018-04-20 PROCEDURE — 74011000250 HC RX REV CODE- 250

## 2018-04-20 PROCEDURE — 74011250636 HC RX REV CODE- 250/636

## 2018-04-20 PROCEDURE — 88177 CYTP FNA EVAL EA ADDL: CPT | Performed by: INTERNAL MEDICINE

## 2018-04-20 PROCEDURE — 74011250636 HC RX REV CODE- 250/636: Performed by: NURSE ANESTHETIST, CERTIFIED REGISTERED

## 2018-04-20 PROCEDURE — 74011000250 HC RX REV CODE- 250: Performed by: RADIOLOGY

## 2018-04-20 PROCEDURE — 76942 ECHO GUIDE FOR BIOPSY: CPT

## 2018-04-20 PROCEDURE — 88305 TISSUE EXAM BY PATHOLOGIST: CPT | Performed by: INTERNAL MEDICINE

## 2018-04-20 PROCEDURE — 88172 CYTP DX EVAL FNA 1ST EA SITE: CPT | Performed by: INTERNAL MEDICINE

## 2018-04-20 PROCEDURE — 77030009426 HC FCPS BIOP ENDOSC BSC -B: Performed by: INTERNAL MEDICINE

## 2018-04-20 PROCEDURE — 76040000019: Performed by: INTERNAL MEDICINE

## 2018-04-20 RX ORDER — LIDOCAINE HYDROCHLORIDE 10 MG/ML
10 INJECTION, SOLUTION EPIDURAL; INFILTRATION; INTRACAUDAL; PERINEURAL
Status: COMPLETED | OUTPATIENT
Start: 2018-04-20 | End: 2018-04-20

## 2018-04-20 RX ORDER — LIDOCAINE HYDROCHLORIDE 10 MG/ML
0.1 INJECTION, SOLUTION EPIDURAL; INFILTRATION; INTRACAUDAL; PERINEURAL AS NEEDED
Status: DISCONTINUED | OUTPATIENT
Start: 2018-04-20 | End: 2018-04-20 | Stop reason: HOSPADM

## 2018-04-20 RX ORDER — FAMOTIDINE 20 MG/1
20 TABLET, FILM COATED ORAL ONCE
Status: DISCONTINUED | OUTPATIENT
Start: 2018-04-20 | End: 2018-04-20 | Stop reason: HOSPADM

## 2018-04-20 RX ORDER — SODIUM CHLORIDE, SODIUM LACTATE, POTASSIUM CHLORIDE, CALCIUM CHLORIDE 600; 310; 30; 20 MG/100ML; MG/100ML; MG/100ML; MG/100ML
75 INJECTION, SOLUTION INTRAVENOUS CONTINUOUS
Status: DISCONTINUED | OUTPATIENT
Start: 2018-04-20 | End: 2018-04-20 | Stop reason: HOSPADM

## 2018-04-20 RX ORDER — PROPOFOL 10 MG/ML
INJECTION, EMULSION INTRAVENOUS AS NEEDED
Status: DISCONTINUED | OUTPATIENT
Start: 2018-04-20 | End: 2018-04-20 | Stop reason: HOSPADM

## 2018-04-20 RX ORDER — LIDOCAINE HYDROCHLORIDE 20 MG/ML
INJECTION, SOLUTION EPIDURAL; INFILTRATION; INTRACAUDAL; PERINEURAL AS NEEDED
Status: DISCONTINUED | OUTPATIENT
Start: 2018-04-20 | End: 2018-04-20 | Stop reason: HOSPADM

## 2018-04-20 RX ADMIN — LIDOCAINE HYDROCHLORIDE 10 ML: 10 INJECTION, SOLUTION EPIDURAL; INFILTRATION; INTRACAUDAL; PERINEURAL at 10:08

## 2018-04-20 RX ADMIN — PROPOFOL 30 MG: 10 INJECTION, EMULSION INTRAVENOUS at 11:18

## 2018-04-20 RX ADMIN — LIDOCAINE HYDROCHLORIDE 40 MG: 20 INJECTION, SOLUTION EPIDURAL; INFILTRATION; INTRACAUDAL; PERINEURAL at 11:16

## 2018-04-20 RX ADMIN — SODIUM CHLORIDE, SODIUM LACTATE, POTASSIUM CHLORIDE, AND CALCIUM CHLORIDE 75 ML/HR: 600; 310; 30; 20 INJECTION, SOLUTION INTRAVENOUS at 09:18

## 2018-04-20 RX ADMIN — PROPOFOL 10 MG: 10 INJECTION, EMULSION INTRAVENOUS at 11:20

## 2018-04-20 RX ADMIN — PROPOFOL 90 MG: 10 INJECTION, EMULSION INTRAVENOUS at 11:16

## 2018-04-20 NOTE — ANESTHESIA PREPROCEDURE EVALUATION
Anesthetic History   No history of anesthetic complications            Review of Systems / Medical History  Patient summary reviewed, nursing notes reviewed and pertinent labs reviewed    Pulmonary          Shortness of breath         Neuro/Psych   Within defined limits           Cardiovascular    Hypertension: well controlled              Exercise tolerance: >4 METS     GI/Hepatic/Renal     GERD          Comments: Esophageal cancer Endo/Other      Hypothyroidism: well controlled  Arthritis and cancer     Other Findings   Comments: Documentation of current medication  Current medications obtained, documented and obtained? YES      Risk Factors for Postoperative nausea/vomiting:       History of postoperative nausea/vomiting? NO       Female? NO       Motion sickness? NO       Intended opioid administration for postoperative analgesia? NO      Smoking Abstinence:  Current Smoker? NO  Elective Surgery? YES  Seen preoperatively by anesthesiologist or proxy prior to day of surgery? YES  Pt abstained from smoking 24 hours prior to anesthesia?  N/A    Preventive care/screening for High Blood Pressure:  Aged 18 years and older: YES  Screened for high blood pressure: YES  Patients with high blood pressure referred to primary care provider   for BP management: YES               Physical Exam    Airway  Mallampati: II  TM Distance: 4 - 6 cm  Neck ROM: normal range of motion   Mouth opening: Normal     Cardiovascular  Regular rate and rhythm,  S1 and S2 normal,  no murmur, click, rub, or gallop  Rhythm: regular  Rate: normal         Dental    Dentition: Poor dentition  Comments: Loose teeth throughout   Pulmonary  Breath sounds clear to auscultation               Abdominal  GI exam deferred       Other Findings            Anesthetic Plan    ASA: 3  Anesthesia type: MAC          Induction: Intravenous  Anesthetic plan and risks discussed with: Patient

## 2018-04-20 NOTE — IP AVS SNAPSHOT
[address] [place] [address] [address] 
[phone] Patient: Honey Mantilla Person MRN: [id number] :[id number] About your hospitalization You were admitted on:  2018 You last received care in the:  [facility] PHASE 2 RECOVERY You were discharged on:  2018 Why you were hospitalized Your primary diagnosis was:  Not on File Follow-up Information None Discharge Orders None A check wale indicates which time of day the medication should be taken. My Medications ASK your doctor about these medications Instructions Each Dose to Equal  
 Morning Noon Evening Bedtime  
 amLODIPine 5 mg tablet Commonly known as:  Tani Albright Your last dose was: Your next dose is: Take 5 mg by mouth daily. 5 mg  
    
   
   
   
  
 nut.tx.impaired dige fxn-fiber 0.08 gram- 1.2 kcal/mL Liqd Commonly known as:  VITAL AF 1.2 MAYKEL Your last dose was: Your next dose is:    
   
   
 Start with 1 can over an hour 3 times per day for 24 hours. If tolerated, advance to 2 cans over 1.5 hours 3x/day: 9am, 12pm, 6pm (follow toleration over 48 hours). Flush tube before & after with 200ml water each time (400ml total with each feeding) If patient can drink water, flushes can be re  
     
   
   
   
  
 oxyCODONE-acetaminophen  mg per tablet Commonly known as:  PERCOCET 10 Your last dose was: Your next dose is: Take 1 Tab by mouth every six (6) hours as needed for Pain. Max Daily Amount: 4 Tabs. 1 Tab PREVACID 15 mg capsule Generic drug:  lansoprazole Your last dose was: Your next dose is: Take 15 mg by mouth as needed. 15 mg  
    
   
   
   
  
 TYLENOL 325 mg tablet Generic drug:  acetaminophen Your last dose was: Your next dose is: Take 650 mg by mouth every four (4) hours as needed for Pain. 650 mg Opioid Education Prescription Opioids: What You Need to Know: 
 
 
 
F-face looks uneven A-arms unable to move or move unevenly S-speech slurred or non-existent T-time-call 911 as soon as signs and symptoms begin-DO NOT go Back to bed or wait to see if you get better-TIME IS BRAIN. Warning Signs of HEART ATTACK Call 911 if you have these symptoms: 
? Chest discomfort. Most heart attacks involve discomfort in the center of the chest that lasts more than a few minutes, or that goes away and comes back. It can feel like uncomfortable pressure, squeezing, fullness, or pain. ? Discomfort in other areas of the upper body. Symptoms can include pain or discomfort in one or both arms, the back, neck, jaw, or stomach. ? Shortness of breath with or without chest discomfort. ? Other signs may include breaking out in a cold sweat, nausea, or lightheadedness. Don't wait more than five minutes to call [address]! Fast action can save your life. Calling 911 is almost always the fastest way to get lifesaving treatment. Emergency Medical Services staff can begin treatment when they arrive  up to an hour sooner than if someone gets to the hospital by car. The discharge information has been reviewed with the patient. The patient verbalized understanding. Discharge medications reviewed with the patient and appropriate educational materials and side effects teaching were provided. ___________________________________________________________________________________________________________________________________ Learning About Colonoscopy What is a colonoscopy? A colonoscopy is a test (also called a procedure) that lets a doctor look inside your large intestine. The doctor uses a thin, lighted tube called a colonoscope. The doctor uses it to look for small growths called polyps, colon or rectal cancer (colorectal cancer), or other problems like bleeding. During the procedure, the doctor can take samples of tissue. The samples can then be checked for cancer or other conditions. The doctor can also take out polyps. How is colonoscopy done? This procedure is done in a doctor's office or a clinic or hospital. You will get medicine to help you relax and not feel pain. Some people find that they do not remember having the test because of the medicine. The doctor gently moves the colonoscope, or scope, through the colon. The scope is also a small video camera. It lets the doctor see the colon and take pictures. A colonoscopy usually takes 30 to 45 minutes. It may take longer if the doctor has to remove polyps. How do you prepare for the procedure? You need to clean out your colon before the procedure so the doctor can see all of your colon. You may start the cleaning process a day or two before the test. This depends on which \"colon prep\" your doctor recommends. To clean your colon, you stop eating solid foods and drink only clear liquids. You can have water, tea, coffee, clear juices, clear broths, flavored ice pops, and gelatin (such as Jell-O). Do not drink anything red or purple, such as grape juice or fruit punch. And do not eat red or purple foods, such as grape ice pops or cherry gelatin. The day or night before the procedure, you drink a large amount of a special liquid. This causes loose, frequent stools.  You will go to the bathroom a lot. It is very important to drink all of the colon prep liquid. If you have problems drinking the liquid, call your doctor. For many people, the prep is worse than the test. It may be uncomfortable, and you may feel hungry on the clear liquid diet. Some people do not go to work or do their usual activities on the day of the prep. Arrange to have someone take you home after the test. 
What can you expect after a colonoscopy? The nurses will watch you for 1 to 2 hours until the medicines wear off. Then you can go home. You will need a ride. Your doctor will tell you when you can eat and do your usual activities. Your doctor will talk to you about when you will need your next colonoscopy. The results of your test and your risk for colorectal cancer will help your doctor decide how often you need to be checked. Follow-up care is a key part of your treatment and safety. Be sure to make and go to all appointments, and call your doctor if you are having problems. It's also a good idea to know your test results and keep a list of the medicines you take. Where can you learn more? Go to http://hima-wilton.info/. Enter Y556 in the search box to learn more about \"Learning About Colonoscopy. \" Current as of: May 12, 2017 Content Version: 11.4 © 9364-2088 [employer]. Care instructions adapted under license by [employer] (which disclaims liability or warranty for this information). If you have questions about a medical condition or this instruction, always ask your healthcare professional. [place] [address] any warranty or liability for your use of this information. DISCHARGE SUMMARY from Nurse PATIENT INSTRUCTIONS: 
 
After general anesthesia or intravenous sedation, for 24 hours or while taking prescription Narcotics: · Limit your activities · Do not drive and operate hazardous machinery · Do not make important personal or business decisions · Do  not drink alcoholic beverages · If you have not urinated within 8 hours after discharge, please contact your surgeon on call. Report the following to your surgeon: 
· Excessive pain, swelling, redness or odor of or around the surgical area · Temperature over 100.5 · Nausea and vomiting lasting longer than 4 hours or if unable to take medications · Any signs of decreased circulation or nerve impairment to extremity: change in color, persistent  numbness, tingling, coldness or increase pain · Any questions What to do at Home: *  Please give a list of your current medications to your Primary Care Provider. *  Please update this list whenever your medications are discontinued, doses are 
    changed, or new medications (including over-the-counter products) are added. *  Please carry medication information at all times in case of emergency situations. These are general instructions for a healthy lifestyle: No smoking/ No tobacco products/ Avoid exposure to second hand smoke Surgeon General's Warning:  Quitting smoking now greatly reduces serious risk to your health. Obesity, smoking, and sedentary lifestyle greatly increases your risk for illness A healthy diet, regular physical exercise & weight monitoring are important for maintaining a healthy lifestyle You may be retaining fluid if you have a history of heart failure or if you experience any of the following symptoms:  Weight gain of 3 pounds or more overnight or 5 pounds in a week, increased swelling in our hands or feet or shortness of breath while lying flat in bed. Please call your doctor as soon as you notice any of these symptoms; do not wait until your next office visit. Recognize signs and symptoms of STROKE: 
 
F-face looks uneven A-arms unable to move or move unevenly S-speech slurred or non-existent T-time-call 911 as soon as signs and symptoms begin-DO NOT go Back to bed or wait to see if you get better-TIME IS BRAIN. Warning Signs of HEART ATTACK Call 911 if you have these symptoms: 
? Chest discomfort. Most heart attacks involve discomfort in the center of the chest that lasts more than a few minutes, or that goes away and comes back. It can feel like uncomfortable pressure, squeezing, fullness, or pain. ? Discomfort in other areas of the upper body. Symptoms can include pain or discomfort in one or both arms, the back, neck, jaw, or stomach. ? Shortness of breath with or without chest discomfort. ? Other signs may include breaking out in a cold sweat, nausea, or lightheadedness. Don't wait more than five minutes to call [address]! Fast action can save your life. Calling 911 is almost always the fastest way to get lifesaving treatment. Emergency Medical Services staff can begin treatment when they arrive  up to an hour sooner than if someone gets to the hospital by car. The discharge information has been reviewed with the patient. The patient verbalized understanding. Discharge medications reviewed with the patient and appropriate educational materials and side effects teaching were provided. _____________________Patient armband removed and shredded______________________________________________________________________________________________________________ ACO Transitions of Care Introducing [employer] [address] offers a voluntary care coordination program to provide high quality service and care to [place] fee-for-service beneficiaries. Prashanth Hameed was designed to help you enhance your health and well-being through the following services: ? Transitions of Care  support for individuals who are transitioning from one care setting to another (example: Hospital to home). ? Chronic and Complex Care Coordination  support for individuals and caregivers of those with serious or chronic illnesses or with more than one chronic (ongoing) condition and those who take a number of different medications. If you meet specific medical criteria, a [address] [place] [address] Rd may call you directly to coordinate your care with your primary care physician and your other care providers. For questions about the [facility] programs, please, contact your physicians office. For general questions or additional information about Accountable Care Organizations: 
Please visit www.medicare.gov/acos. html or call 1-800-MEDICARE (0-555.414.1942) TTY users should call 1-165.606.4929. Introducing Tyron Viveros As a schoox patient, I wanted to make you aware of our electronic visit tool called Tyron Viveros. schoox 24/7 allows you to connect within minutes with a medical provider 24 hours a day, seven days a week via a mobile device or tablet or logging into a secure website from your computer. You can access Tyron Viveros from anywhere in the [place]. A virtual visit might be right for you when you have a simple condition and feel like you just dont want to get out of bed, or cant get away from work for an appointment, when your regular schoox provider is not available (evenings, weekends or holidays), or when youre out of town and need minor care. Electronic visits cost only $49 and if the schoox 24/7 provider determines a prescription is needed to treat your condition, one can be electronically transmitted to a nearby pharmacy*. Please take a moment to enroll today if you have not already done so. The enrollment process is free and takes just a few minutes. To enroll, please download the schoox 24/7 zhou to your tablet or phone, or visit [email or web address] to enroll on your computer. And, as an [address] [place] [address] patient with a Hand[employer]oint[employer] account, the results of your visits will be scanned into your electronic medical record and your primary care provider will be able to view the scanned results. We urge you to continue to see your regular Betsey Espinal provider for your ongoing medical care. And while your primary care provider may not be the one available when you seek a Tyron Viveros virtual visit, the peace of mind you get from getting a real diagnosis real time can be priceless. For more information on Tyron Manriquefin, view our Frequently Asked Questions (FAQs) at [email or web address]. Sincerely, 
 
Hawk Gonzales MD 
Chief Medical Officer [employer] *:  certain medications cannot be prescribed via Tyron Hilariafin Providers Seen During Your Hospitalization Provider Specialty Primary office phone Tatiana Aguilar MD Gastroenterology [phone] Your Primary Care Physician (PCP) Primary Care Physician Office Phone Office Fax Isaac Nunn [phone] You are allergic to the following No active allergies Recent Documentation Height Weight BMI Smoking Status 1.753 m 80.7 kg 26.29 kg/m2 Former Smoker Emergency Contacts Name Discharge Info Relation Home Work Mobile Nav Finely CAREGIVER [3] Daughter [21] [phone] Janneth Ariasier [5] [phone] Germancarol Terry  Child [2] [phone] Patient Belongings The following personal items are in your possession at time of discharge: 
  Dental Appliances: None  Visual Aid: None Please provide this summary of care documentation to your next provider. Signatures-by signing, you are acknowledging that this After Visit Summary has been reviewed with you and you have received a copy. Patient Signature:  ____________________________________________________________ Date:  ____________________________________________________________  
  
Larri Hazard Provider Signature:  ____________________________________________________________ Date:  ____________________________________________________________

## 2018-04-20 NOTE — PERIOP NOTES
Spoke to Dr. Konstantin Rand, pt ok to be transferred to endoscopy and may be discharged from there.

## 2018-04-20 NOTE — H&P
Gastroenterology Consult     Referring Physician: Valinda Dubin Date: 4/20/2018     Subjective:     Chief Complaint: Esophageal cancer    History of Present Illness: Dmitriy Mendieta Person is a 75 y.o. male who is seen in consultation for esophageal cancer. Patient was evaluated and examined in the office. Please see scanned note. No interval changes in medical status or examination. Past Medical History:   Diagnosis Date    Arthritis     Essential hypertension, malignant 8/3/2010    Leg pain 8/3/2010    Throat cancer ([address]     Thyroid disease     hypothyroidism     Past Surgical History:   Procedure Laterality Date    HX GI  11/2017    G-tube placement on 11/28/17    HX VASCULAR ACCESS  11/2017    MD EGD DELIVER THERMAL ENERGY SPHNCTR/CARDIA GERD        Family History   Problem Relation Age of Onset    Heart Disease Mother     Lung Disease Father      Social History   Substance Use Topics    Smoking status: Former Smoker     Packs/day: 0.25     Types: Cigarettes     Start date: 1/17/2017     Quit date: 11/22/2017    Smokeless tobacco: Never Used      Comment: smokes occas    Alcohol use No      No Known Allergies  Current Facility-Administered Medications   Medication Dose Route Frequency    lidocaine (PF) (XYLOCAINE) 10 mg/mL (1 %) injection 0.1 mL  0.1 mL SubCUTAneous PRN    lactated Ringers infusion  75 mL/hr IntraVENous CONTINUOUS    famotidine (PEPCID) tablet 20 mg  20 mg Oral ONCE        Review of Systems:  A detailed 10 organ review of systems is obtained with pertinent positives as listed in the History of Present Illness and Past Medical History. All others are negative. Objective:     Physical Exam:  Visit Vitals    /87    Pulse 94    Temp 97 °F (36.1 °C)    Resp 18    Ht 5' 9\" (1.753 m)    Wt 80.7 kg (178 lb)    SpO2 99%    BMI 26.29 kg/m2        Skin:  Extremities and face reveal no rashes. No mesa erythema. No telangiectasias on the chest wall.   HEENT: Sclerae anicteric. Extra-occular muscles are intact. No oral ulcers. No abnormal pigmentation of the lips. The neck is supple. Cardiovascular: Regular rate and rhythm. No murmurs, gallops, or rubs. PMI nondisplaced. Carotids without bruits. Respiratory:  Comfortable breathing with no accessory muscle use. Clear breath sounds with no wheezes, rales, or rhonchi. GI:  Abdomen nondistended, soft, and nontender. Normal active bowel sounds. No enlargement of the liver or spleen. No masses palpable. Rectal:  Deferred  Musculoskeletal:  No pitting edema of the lower legs. Extremities have good range of motion. No costovertebral tenderness. Neurological:  Gross memory appears intact. Patient is alert and oriented. Psychiatric:  Mood appears appropriate with judgement intact. Lymphatic:  No cervical or supraclavicular adenopathy.       Assessment/Plan:     EGD as planned

## 2018-04-20 NOTE — DISCHARGE INSTRUCTIONS
DISCHARGE SUMMARY from Nurse    PATIENT INSTRUCTIONS:    After general anesthesia or intravenous sedation, for 24 hours or while taking prescription Narcotics:  · Limit your activities  · Do not drive and operate hazardous machinery  · Do not make important personal or business decisions  · Do  not drink alcoholic beverages  · If you have not urinated within 8 hours after discharge, please contact your surgeon on call. Report the following to your surgeon:  · Excessive pain, swelling, redness or odor of or around the surgical area  · Temperature over 100.5  · Nausea and vomiting lasting longer than 4 hours or if unable to take medications  · Any signs of decreased circulation or nerve impairment to extremity: change in color, persistent  numbness, tingling, coldness or increase pain  · Any questions    What to do at Home:  Recommended activity: Activity as tolerated and no driving for today. These are general instructions for a healthy lifestyle:    No smoking/ No tobacco products/ Avoid exposure to second hand smoke  Surgeon General's Warning:  Quitting smoking now greatly reduces serious risk to your health. Obesity, smoking, and sedentary lifestyle greatly increases your risk for illness    A healthy diet, regular physical exercise & weight monitoring are important for maintaining a healthy lifestyle    You may be retaining fluid if you have a history of heart failure or if you experience any of the following symptoms:  Weight gain of 3 pounds or more overnight or 5 pounds in a week, increased swelling in our hands or feet or shortness of breath while lying flat in bed. Please call your doctor as soon as you notice any of these symptoms; do not wait until your next office visit.     Recognize signs and symptoms of STROKE:    F-face looks uneven    A-arms unable to move or move unevenly    S-speech slurred or non-existent    T-time-call 911 as soon as signs and symptoms begin-DO NOT go       Back to bed or wait to see if you get better-TIME IS BRAIN. Warning Signs of HEART ATTACK     Call 911 if you have these symptoms:   Chest discomfort. Most heart attacks involve discomfort in the center of the chest that lasts more than a few minutes, or that goes away and comes back. It can feel like uncomfortable pressure, squeezing, fullness, or pain.  Discomfort in other areas of the upper body. Symptoms can include pain or discomfort in one or both arms, the back, neck, jaw, or stomach.  Shortness of breath with or without chest discomfort.  Other signs may include breaking out in a cold sweat, nausea, or lightheadedness. Don't wait more than five minutes to call 911 - MINUTES MATTER! Fast action can save your life. Calling 911 is almost always the fastest way to get lifesaving treatment. Emergency Medical Services staff can begin treatment when they arrive -- up to an hour sooner than if someone gets to the hospital by car. The discharge information has been reviewed with the patient. The patient verbalized understanding. Discharge medications reviewed with the patient and appropriate educational materials and side effects teaching were provided. Patient armband removed and given to patient to take home.   Patient was informed of the privacy risks if armband lost or stolen  ___________________________________________________________________________________________________________________________________

## 2018-04-20 NOTE — IP AVS SNAPSHOT
[address] Patient: Rudy Lawrence Person MRN: [id number] :1950 About your hospitalization You were admitted on:  2018 [facility]  [facility] Why you were hospitalized Your primary diagnosis was:  Not on File Follow-up Information None Discharge Orders None A check wale indicates which time of day the medication should be taken. My Medications Notice This visit is during an admission. Changes to the med list made in this visit will be reflected in the After Visit Summary of the admission. Discharge Instructions DISCHARGE SUMMARY from Nurse PATIENT INSTRUCTIONS: 
 
After general anesthesia or intravenous sedation, for 24 hours or while taking prescription Narcotics: · Limit your activities · Do not drive and operate hazardous machinery · Do not make important personal or business decisions · Do  not drink alcoholic beverages · If you have not urinated within 8 hours after discharge, please contact your surgeon on call. Report the following to your surgeon: 
· Excessive pain, swelling, redness or odor of or around the surgical area · Temperature over 100.5 · Nausea and vomiting lasting longer than 4 hours or if unable to take medications · Any signs of decreased circulation or nerve impairment to extremity: change in color, persistent  numbness, tingling, coldness or increase pain · Any questions What to do at Home: 
Recommended activity: Activity as tolerated and no driving for today. These are general instructions for a healthy lifestyle: No smoking/ No tobacco products/ Avoid exposure to second hand smoke Surgeon General's Warning:  Quitting smoking now greatly reduces serious risk to your health. Obesity, smoking, and sedentary lifestyle greatly increases your risk for illness A healthy diet, regular physical exercise & weight monitoring are important for maintaining a healthy lifestyle You may be retaining fluid if you have a history of heart failure or if you experience any of the following symptoms:  Weight gain of 3 pounds or more overnight or 5 pounds in a week, increased swelling in our hands or feet or shortness of breath while lying flat in bed. Please call your doctor as soon as you notice any of these symptoms; do not wait until your next office visit. Recognize signs and symptoms of STROKE: 
 
F-face looks uneven A-arms unable to move or move unevenly S-speech slurred or non-existent T-time-call 911 as soon as signs and symptoms begin-DO NOT go Back to bed or wait to see if you get better-TIME IS BRAIN. Warning Signs of HEART ATTACK Call 911 if you have these symptoms: 
? Chest discomfort. Most heart attacks involve discomfort in the center of the chest that lasts more than a few minutes, or that goes away and comes back. It can feel like uncomfortable pressure, squeezing, fullness, or pain. ? Discomfort in other areas of the upper body. Symptoms can include pain or discomfort in one or both arms, the back, neck, jaw, or stomach. ? Shortness of breath with or without chest discomfort. ? Other signs may include breaking out in a cold sweat, nausea, or lightheadedness. Don't wait more than five minutes to call [address]! Fast action can save your life. Calling 911 is almost always the fastest way to get lifesaving treatment. Emergency Medical Services staff can begin treatment when they arrive  up to an hour sooner than if someone gets to the hospital by car. The discharge information has been reviewed with the patient. The patient verbalized understanding.  
Discharge medications reviewed with the patient and appropriate educational materials and side effects teaching were provided. Patient armband removed and given to patient to take home. Patient was informed of the privacy risks if armband lost or stolen 
___________________________________________________________________________________________________________________________________ ACO Transitions of Care Introducing [place] [address] [facility] offers a voluntary care coordination program to provide high quality service and care to Bryn Liang fee-for-service beneficiaries. Myles Sarmiento was designed to help you enhance your health and well-being through the following services: ? Transitions of Care  support for individuals who are transitioning from one care setting to another (example: Hospital to home). ? Chronic and Complex Care Coordination  support for individuals and caregivers of those with serious or chronic illnesses or with more than one chronic (ongoing) condition and those who take a number of different medications. If you meet specific medical criteria, a [facility] [address] may call you directly to coordinate your care with your primary care physician and your other care providers. For questions about the [facility] programs, please, contact your physicians office. For general questions or additional information about Accountable Care Organizations: 
Please visit www.medicare.gov/acos. html or call 1-800-MEDICARE (2-890.291.3240) TTY users should call 9-297.452.7724. Introducing Tyron Viveros As a Davemikaela Seths patient, I wanted to make you aware of our electronic visit tool called Tyron Viveros. Tenzin Amezquita 24/7 allows you to connect within minutes with a medical provider 24 hours a day, seven days a week via a mobile device or tablet or logging into a secure website from your computer.   You can access [employer] and [employer] 24/7 from anywhere in the [place]. A virtual visit might be right for you when you have a simple condition and feel like you just dont want to get out of bed, or cant get away from work for an appointment, when your regular [facility] provider is not available (evenings, weekends or holidays), or when youre out of town and need minor care. Electronic visits cost only $49 and if the [employer] 24/7 provider determines a prescription is needed to treat your condition, one can be electronically transmitted to a nearby pharmacy*. Please take a moment to enroll today if you have not already done so. The enrollment process is free and takes just a few minutes. To enroll, please download the [employer] 24/7 zhou to your tablet or phone, or visit [email or web address] to enroll on your computer. And, as an [address] [place] [address] patient with a [employer] account, the results of your visits will be scanned into your electronic medical record and your primary care provider will be able to view the scanned results. We urge you to continue to see your regular [employer] [facility] provider for your ongoing medical care. And while your primary care provider may not be the one available when you seek a [employer] virtual visit, the peace of mind you get from getting a real diagnosis real time can be priceless. For more information on [employer], view our Frequently Asked Questions (FAQs) at [email or web address]. Sincerely, 
 
Mark Rodriguez MD 
Chief Medical Officer [address] [facility] *:  certain medications cannot be prescribed via [employer] Unresulted Labs-Please follow up with your PCP about these lab tests Order Current Status US GUIDE BX LYMPH NOD SUPER In process Providers Seen During Your Hospitalization Provider Specialty Primary office phone Marylene Gallo, MD Oncology [phone] Your Primary Care Physician (PCP) Primary Care Physician Office Phone Office Fax Petty Morris [phone] You are allergic to the following No active allergies Recent Documentation Smoking Status Former Smoker Emergency Contacts Name Discharge Info Relation Home Work Mobile Sirena Ledezma CAREGIVER [3] Daughter [21] [phone] Ciara Cap [5] [phone] Sydnee Crandall  Child [2] [phone] Patient Belongings The following personal items are in your possession at time of discharge: 
  Dental Appliances: None  Visual Aid: None      Home Medications: None   Jewelry: None  Clothing: Pants, Shirt, Footwear, Socks, Jacket/Coat, Undergarments, Yvrose Please provide this summary of care documentation to your next provider. Signatures-by signing, you are acknowledging that this After Visit Summary has been reviewed with you and you have received a copy. Patient Signature:  ____________________________________________________________ Date:  ____________________________________________________________  
  
Nicol Whitaker Provider Signature:  ____________________________________________________________ Date:  ____________________________________________________________

## 2018-04-20 NOTE — DISCHARGE INSTRUCTIONS
DISCHARGE SUMMARY from Nurse    PATIENT INSTRUCTIONS:    After general anesthesia or intravenous sedation, for 24 hours or while taking prescription Narcotics:  · Limit your activities  · Do not drive and operate hazardous machinery  · Do not make important personal or business decisions  · Do  not drink alcoholic beverages  · If you have not urinated within 8 hours after discharge, please contact your surgeon on call. Report the following to your surgeon:  · Excessive pain, swelling, redness or odor of or around the surgical area  · Temperature over 100.5  · Nausea and vomiting lasting longer than 4 hours or if unable to take medications  · Any signs of decreased circulation or nerve impairment to extremity: change in color, persistent  numbness, tingling, coldness or increase pain  · Any questions    What to do at Home:  Recommended activity: Activity as tolerated,         *  Please give a list of your current medications to your Primary Care Provider. *  Please update this list whenever your medications are discontinued, doses are      changed, or new medications (including over-the-counter products) are added. *  Please carry medication information at all times in case of emergency situations. These are general instructions for a healthy lifestyle:    No smoking/ No tobacco products/ Avoid exposure to second hand smoke  Surgeon General's Warning:  Quitting smoking now greatly reduces serious risk to your health. Obesity, smoking, and sedentary lifestyle greatly increases your risk for illness    A healthy diet, regular physical exercise & weight monitoring are important for maintaining a healthy lifestyle    You may be retaining fluid if you have a history of heart failure or if you experience any of the following symptoms:  Weight gain of 3 pounds or more overnight or 5 pounds in a week, increased swelling in our hands or feet or shortness of breath while lying flat in bed.   Please call your doctor as soon as you notice any of these symptoms; do not wait until your next office visit. Recognize signs and symptoms of STROKE:    F-face looks uneven    A-arms unable to move or move unevenly    S-speech slurred or non-existent    T-time-call 911 as soon as signs and symptoms begin-DO NOT go       Back to bed or wait to see if you get better-TIME IS BRAIN. Warning Signs of HEART ATTACK     Call 911 if you have these symptoms:   Chest discomfort. Most heart attacks involve discomfort in the center of the chest that lasts more than a few minutes, or that goes away and comes back. It can feel like uncomfortable pressure, squeezing, fullness, or pain.  Discomfort in other areas of the upper body. Symptoms can include pain or discomfort in one or both arms, the back, neck, jaw, or stomach.  Shortness of breath with or without chest discomfort.  Other signs may include breaking out in a cold sweat, nausea, or lightheadedness. Don't wait more than five minutes to call 911 - MINUTES MATTER! Fast action can save your life. Calling 911 is almost always the fastest way to get lifesaving treatment. Emergency Medical Services staff can begin treatment when they arrive -- up to an hour sooner than if someone gets to the hospital by car. The discharge information has been reviewed with the patient. The patient verbalized understanding. Discharge medications reviewed with the patient and appropriate educational materials and side effects teaching were provided. ___________________________________________________________________________________________________________________________________     Learning About Colonoscopy  What is a colonoscopy? A colonoscopy is a test (also called a procedure) that lets a doctor look inside your large intestine. The doctor uses a thin, lighted tube called a colonoscope.  The doctor uses it to look for small growths called polyps, colon or rectal cancer (colorectal cancer), or other problems like bleeding. During the procedure, the doctor can take samples of tissue. The samples can then be checked for cancer or other conditions. The doctor can also take out polyps. How is colonoscopy done? This procedure is done in a doctor's office or a clinic or hospital. You will get medicine to help you relax and not feel pain. Some people find that they do not remember having the test because of the medicine. The doctor gently moves the colonoscope, or scope, through the colon. The scope is also a small video camera. It lets the doctor see the colon and take pictures. A colonoscopy usually takes 30 to 45 minutes. It may take longer if the doctor has to remove polyps. How do you prepare for the procedure? You need to clean out your colon before the procedure so the doctor can see all of your colon. You may start the cleaning process a day or two before the test. This depends on which \"colon prep\" your doctor recommends. To clean your colon, you stop eating solid foods and drink only clear liquids. You can have water, tea, coffee, clear juices, clear broths, flavored ice pops, and gelatin (such as Jell-O). Do not drink anything red or purple, such as grape juice or fruit punch. And do not eat red or purple foods, such as grape ice pops or cherry gelatin. The day or night before the procedure, you drink a large amount of a special liquid. This causes loose, frequent stools. You will go to the bathroom a lot. It is very important to drink all of the colon prep liquid. If you have problems drinking the liquid, call your doctor. For many people, the prep is worse than the test. It may be uncomfortable, and you may feel hungry on the clear liquid diet. Some people do not go to work or do their usual activities on the day of the prep. Arrange to have someone take you home after the test.  What can you expect after a colonoscopy?   The nurses will watch you for 1 to 2 hours until the medicines wear off. Then you can go home. You will need a ride. Your doctor will tell you when you can eat and do your usual activities. Your doctor will talk to you about when you will need your next colonoscopy. The results of your test and your risk for colorectal cancer will help your doctor decide how often you need to be checked. Follow-up care is a key part of your treatment and safety. Be sure to make and go to all appointments, and call your doctor if you are having problems. It's also a good idea to know your test results and keep a list of the medicines you take. Where can you learn more? Go to http://hima-wilton.info/. Enter T948 in the search box to learn more about \"Learning About Colonoscopy. \"  Current as of: May 12, 2017  Content Version: 11.4  © 4511-0736 Healthwise, [employer]. Care instructions adapted under license by [employer] (which disclaims liability or warranty for this information). If you have questions about a medical condition or this instruction, always ask your healthcare professional. [place] [address] any warranty or liability for your use of this information. DISCHARGE SUMMARY from Nurse    PATIENT INSTRUCTIONS:    After general anesthesia or intravenous sedation, for 24 hours or while taking prescription Narcotics:  · Limit your activities  · Do not drive and operate hazardous machinery  · Do not make important personal or business decisions  · Do  not drink alcoholic beverages  · If you have not urinated within 8 hours after discharge, please contact your surgeon on call.     Report the following to your surgeon:  · Excessive pain, swelling, redness or odor of or around the surgical area  · Temperature over 100.5  · Nausea and vomiting lasting longer than 4 hours or if unable to take medications  · Any signs of decreased circulation or nerve impairment to extremity: change in color, persistent  numbness, tingling, coldness or increase pain  · Any questions    What to do at Home:  *  Please give a list of your current medications to your Primary Care Provider. *  Please update this list whenever your medications are discontinued, doses are      changed, or new medications (including over-the-counter products) are added. *  Please carry medication information at all times in case of emergency situations. These are general instructions for a healthy lifestyle:    No smoking/ No tobacco products/ Avoid exposure to second hand smoke  Surgeon General's Warning:  Quitting smoking now greatly reduces serious risk to your health. Obesity, smoking, and sedentary lifestyle greatly increases your risk for illness    A healthy diet, regular physical exercise & weight monitoring are important for maintaining a healthy lifestyle    You may be retaining fluid if you have a history of heart failure or if you experience any of the following symptoms:  Weight gain of 3 pounds or more overnight or 5 pounds in a week, increased swelling in our hands or feet or shortness of breath while lying flat in bed. Please call your doctor as soon as you notice any of these symptoms; do not wait until your next office visit. Recognize signs and symptoms of STROKE:    F-face looks uneven    A-arms unable to move or move unevenly    S-speech slurred or non-existent    T-time-call 911 as soon as signs and symptoms begin-DO NOT go       Back to bed or wait to see if you get better-TIME IS BRAIN. Warning Signs of HEART ATTACK     Call 911 if you have these symptoms:   Chest discomfort. Most heart attacks involve discomfort in the center of the chest that lasts more than a few minutes, or that goes away and comes back. It can feel like uncomfortable pressure, squeezing, fullness, or pain.  Discomfort in other areas of the upper body. Symptoms can include pain or discomfort in one or both arms, the back, neck, jaw, or stomach.    Shortness of breath with or without chest discomfort.  Other signs may include breaking out in a cold sweat, nausea, or lightheadedness. Don't wait more than five minutes to call 911 - MINUTES MATTER! Fast action can save your life. Calling 911 is almost always the fastest way to get lifesaving treatment. Emergency Medical Services staff can begin treatment when they arrive -- up to an hour sooner than if someone gets to the hospital by car. The discharge information has been reviewed with the patient. The patient verbalized understanding. Discharge medications reviewed with the patient and appropriate educational materials and side effects teaching were provided.   _____________________Patient armband removed and shredded______________________________________________________________________________________________________________

## 2018-05-08 ENCOUNTER — OFFICE VISIT (OUTPATIENT)
Dept: FAMILY MEDICINE CLINIC | Age: 68
End: 2018-05-08

## 2018-05-08 VITALS
WEIGHT: 182.2 LBS | BODY MASS INDEX: 26.98 KG/M2 | HEIGHT: 69 IN | RESPIRATION RATE: 16 BRPM | SYSTOLIC BLOOD PRESSURE: 135 MMHG | OXYGEN SATURATION: 98 % | HEART RATE: 111 BPM | DIASTOLIC BLOOD PRESSURE: 77 MMHG | TEMPERATURE: 98.2 F

## 2018-05-08 DIAGNOSIS — D64.9 ANEMIA, UNSPECIFIED TYPE: ICD-10-CM

## 2018-05-08 DIAGNOSIS — E44.0 MALNUTRITION OF MODERATE DEGREE (HCC): ICD-10-CM

## 2018-05-08 DIAGNOSIS — C78.89 METASTATIC SQUAMOUS CELL CARCINOMA TO ESOPHAGUS (HCC): ICD-10-CM

## 2018-05-08 RX ORDER — OXYCODONE AND ACETAMINOPHEN 10; 325 MG/1; MG/1
1 TABLET ORAL
Qty: 90 TAB | Refills: 0 | Status: SHIPPED | OUTPATIENT
Start: 2018-05-08 | End: 2018-01-01 | Stop reason: SDUPTHER

## 2018-05-08 NOTE — PROGRESS NOTES
Luzmaria Hernandez Person is a 75 y.o. male presents today for follow up after recent EGD biopsy. Pt is in Room # 4        1. Have you been to the ER, urgent care clinic since your last visit? Hospitalized since your last visit? No    2. Have you seen or consulted any other health care providers outside of the [address] [place] [address] since your last visit? Include any pap smears or colon screening. No     Health Maintenance reviewed - .

## 2018-05-08 NOTE — PROGRESS NOTES
Génesis Salguero is a 75 y.o.  male and presents with    Chief Complaint   Patient presents with    Other     throat cancer    Other     malnutrition    Hypertension           Subjective: Additional Concerns: 1. Throat cancer being followed by GI, med onc and rad onc  Here for pain meds . Taking percocet 3 times a day     2. malnutrion on g tube doing well    3. htn on meds doing well           Patient Active Problem List    Diagnosis Date Noted    Status post gastrostomy ([facility] 02/13/2018    Malnutrition of moderate degree ([facility] 12/13/2017    Metastatic squamous cell carcinoma to esophagus (HCC) 10/17/2017    Arthritis 06/06/2011     Current Outpatient Prescriptions   Medication Sig Dispense Refill    oxyCODONE-acetaminophen (PERCOCET 10)  mg per tablet Take 1 Tab by mouth every eight (8) hours as needed for Pain. Max Daily Amount: 3 Tabs. 90 Tab 0    lansoprazole (PREVACID) 15 mg capsule Take 15 mg by mouth as needed.  amLODIPine (NORVASC) 5 mg tablet Take 5 mg by mouth daily.  nut.tx.impaired dige fxn-fiber (VITAL AF 1.2 MAYKEL) 0.08 gram- 1.2 kcal/mL liqd Start with 1 can over an hour 3 times per day for 24 hours. If tolerated, advance to 2 cans over 1.5 hours 3x/day: 9am, 12pm, 6pm (follow toleration over 48 hours).    Flush tube before & after with 200ml water each time (400ml total with each feeding)  If patient can drink water, flushes can be re 180 Can 12     No Known Allergies  Past Medical History:   Diagnosis Date    Arthritis     Essential hypertension, malignant 8/3/2010    Leg pain 8/3/2010    Throat cancer ([facility]     Thyroid disease     hypothyroidism     Past Surgical History:   Procedure Laterality Date    HX GI  11/2017    G-tube placement on 11/28/17    HX VASCULAR ACCESS  11/2017    SC EGD DELIVER THERMAL ENERGY SPHNCTR/CARDIA GERD       Family History   Problem Relation Age of Onset    Heart Disease Mother     Lung Disease Father Social History   Substance Use Topics    Smoking status: Former Smoker     Packs/day: 0.25     Types: Cigarettes     Start date: 1/17/2017     Quit date: 11/22/2017    Smokeless tobacco: Never Used      Comment: smokes occas    Alcohol use No       ROS       All other systems reviewed and are negative. Objective:  Vitals:    05/08/18 1234   BP: 135/77   Pulse: (!) 111   Resp: 16   Temp: 98.2 °F (36.8 °C)   TempSrc: Oral   SpO2: 98%   Weight: 182 lb 3.2 oz (82.6 kg)   Height: 5' 9\" (1.753 m)   PainSc:   0 - No pain                 alert, well appearing, and in no distress and oriented to person, place, and time  Chest - clear to auscultation, no wheezes, rales or rhonchi, symmetric air entry  Heart - normal rate, regular rhythm, normal S1, S2, no murmurs, rubs, clicks or gallops  Abdomen - soft, nontender, nondistended, no masses or organomegaly        LABS     TESTS      Assessment/Plan:    Hypertension - stable  Malnutrition improved  Chronic pain seconadry to terminal throat cancer will refill percocet q 8 hour and f/u   4 wk      Lab review: labs are reviewed, up to date and normal    Diagnoses and all orders for this visit:    1. Metastatic squamous cell carcinoma to esophagus (HCC)  -     oxyCODONE-acetaminophen (PERCOCET 10)  mg per tablet; Take 1 Tab by mouth every eight (8) hours as needed for Pain. Max Daily Amount: 3 Tabs. 2. Anemia, unspecified type  -     oxyCODONE-acetaminophen (PERCOCET 10)  mg per tablet; Take 1 Tab by mouth every eight (8) hours as needed for Pain. Max Daily Amount: 3 Tabs. 3. Malnutrition of moderate degree (HCC)  -     oxyCODONE-acetaminophen (PERCOCET 10)  mg per tablet; Take 1 Tab by mouth every eight (8) hours as needed for Pain. Max Daily Amount: 3 Tabs. I have discussed the diagnosis with the patient and the intended plan as seen in the above orders.   The patient has received an after-visit summary and questions were answered concerning future plans. I have discussed medication side effects and warnings with the patient as well. I have reviewed the plan of care with the patient, accepted their input and they are in agreement with the treatment goals. Follow-up Disposition:  Return in about 4 weeks (around 6/5/2018) for EOV,  and drug screen.

## 2018-05-08 NOTE — MR AVS SNAPSHOT
[facility] 
 
 
 [address] [facility] [address] [facility] [id number] [phone] Patient: Omayra Charles Person MRN: [id number] :1950 Visit Information Date & Time Provider Department Dept. Phone Encounter #  
 2018 12:30 PM Courtney Feng [phone] [id number] Follow-up Instructions Return in about 4 weeks (around 2018) for EOV,  and drug screen. Follow-up and Disposition History Your Appointments 2018 10:30 AM  
ROUTINE CARE with Hiram Stephenson.DO [address] [facility] [address] [place] [address]) Appt Note: Return in about 2 months (around 2018) for EOV. [address] [facility] [address] [facility] 83 222 [facility]  
  
   
 [address] [facility] [address] Upcoming Health Maintenance Date Due Pneumococcal 65+ High/Highest Risk (2 of 2 - PPSV23) 2016 GLAUCOMA SCREENING Q2Y 2018 Influenza Age 5 to Adult 2018 MEDICARE YEARLY EXAM 2018 COLONOSCOPY 2021 DTaP/Tdap/Td series (2 - Td) 8/15/2027 Allergies as of 2018  Review Complete On: 2018 By: Hiram Estrada DO No Known Allergies Current Immunizations  Reviewed on 2017 Name Date Influenza High Dose Vaccine PF 2017, 2015 Influenza Vaccine 2014, 2013 Influenza Vaccine (Quad) PF 10/22/2017 10:21 PM  
 Influenza Vaccine Split 2011, 2010 Pneumococcal Conjugate (PCV-13) 2015 TD Vaccine 8/3/2006 Tdap 8/15/2017 ZZZ-RETIRED (DO NOT USE) Pneumococcal Vaccine (Unspecified Type) 2010 Not reviewed this visit You Were Diagnosed With   
  
 Codes Comments Metastatic squamous cell carcinoma to esophagus [facility])     ICD-10-CM: C78.89 ICD-9-CM: 197.8 Anemia, unspecified type     ICD-10-CM: D64.9 ICD-9-CM: 285.9 Malnutrition of moderate degree (HCC)     ICD-10-CM: E44.0 ICD-9-CM: 263.0 Vitals BP Pulse Temp Resp Height(growth percentile) Weight(growth percentile) 135/77 (BP 1 Location: Right arm, BP Patient Position: Sitting) (!) 111 98.2 °F (36.8 °C) (Oral) 16 5' 9\" (1.753 m) 182 lb 3.2 oz (82.6 kg) SpO2 BMI Smoking Status 98% 26.91 kg/m2 Former Smoker BMI and BSA Data Body Mass Index Body Surface Area  
 26.91 kg/m 2 2.01 m 2 Preferred Pharmacy Pharmacy Name Phone [facility] [address], [place] [phone] Your Updated Medication List  
  
   
This list is accurate as of 5/8/18 12:48 PM.  Always use your most recent med list. amLODIPine 5 mg tablet Commonly known as:  Lujan Inks Take 5 mg by mouth daily.  
  
 nut.tx.impaired dige fxn-fiber 0.08 gram- 1.2 kcal/mL Liqd Commonly known as:  VITAL AF 1.2 MAYKEL Start with 1 can over an hour 3 times per day for 24 hours. If tolerated, advance to 2 cans over 1.5 hours 3x/day: 9am, 12pm, 6pm (follow toleration over 48 hours). Flush tube before & after with 200ml water each time (400ml total with each feeding) If patient can drink water, flushes can be re  
  
 oxyCODONE-acetaminophen  mg per tablet Commonly known as:  PERCOCET 10 Take 1 Tab by mouth every eight (8) hours as needed for Pain. Max Daily Amount: 3 Tabs. PREVACID 15 mg capsule Generic drug:  lansoprazole Take 15 mg by mouth as needed. Prescriptions Printed Refills  
 oxyCODONE-acetaminophen (PERCOCET 10)  mg per tablet 0 Sig: Take 1 Tab by mouth every eight (8) hours as needed for Pain. Max Daily Amount: 3 Tabs. Class: Print Route: Oral  
  
Follow-up Instructions Return in about 4 weeks (around 6/5/2018) for EOV,  and drug screen. Please provide this summary of care documentation to your next provider. Your primary care clinician is listed as [address] [place] [address].  If you have any questions after today's visit, please call [phone].

## 2018-06-05 NOTE — PROGRESS NOTES
Dakota Donis is a 75 y.o.  male and presents with    Chief Complaint   Patient presents with    Other     esophageal can    Other     malnutrition           Subjective:  Esophageal cancer. Had appt with dena meneses. Never kept f/u . Needs to get back in    Malnutrition has g tube -- getting [id number] nutrition          Additional Concerns:          Patient Active Problem List    Diagnosis Date Noted    Status post gastrostomy ([facility] [address] 02/13/2018    Malnutrition of moderate degree ([facility] [address] 12/13/2017    Metastatic squamous cell carcinoma to esophagus (HCC) 10/17/2017    Arthritis 06/06/2011     Current Outpatient Prescriptions   Medication Sig Dispense Refill    oxyCODONE-acetaminophen (PERCOCET 10)  mg per tablet Take 1 Tab by mouth every eight (8) hours as needed for Pain. Max Daily Amount: 3 Tabs. 90 Tab 0    lansoprazole (PREVACID) 15 mg capsule Take 15 mg by mouth as needed.  amLODIPine (NORVASC) 5 mg tablet Take 5 mg by mouth daily.  nut.tx.impaired dige fxn-fiber (VITAL AF 1.2 MAYKEL) 0.08 gram- 1.2 kcal/mL liqd Start with 1 can over an hour 3 times per day for 24 hours. If tolerated, advance to 2 cans over 1.5 hours 3x/day: 9am, 12pm, 6pm (follow toleration over 48 hours).    Flush tube before & after with 200ml water each time (400ml total with each feeding)  If patient can drink water, flushes can be re 180 Can 12     No Known Allergies  Past Medical History:   Diagnosis Date    Arthritis     Essential hypertension, malignant 8/3/2010    Leg pain 8/3/2010    Throat cancer ([facility] [address]     Thyroid disease     hypothyroidism     Past Surgical History:   Procedure Laterality Date    HX GI  11/2017    G-tube placement on 11/28/17    HX VASCULAR ACCESS  11/2017    CA EGD DELIVER THERMAL ENERGY SPHNCTR/CARDIA GERD       Family History   Problem Relation Age of Onset    Heart Disease Mother     Lung Disease Father      Social History   Substance Use Topics    Smoking status: Former Smoker     Packs/day: 0.25     Types: Cigarettes     Start date: 1/17/2017     Quit date: 11/22/2017    Smokeless tobacco: Never Used      Comment: smokes occas    Alcohol use No       ROS       All other systems reviewed and are negative. Objective:  Vitals:    06/05/18 1239   BP: 119/88   Pulse: (!) 106   Resp: 16   Temp: 98.3 °F (36.8 °C)   TempSrc: Oral   SpO2: 96%   Weight: 182 lb 6.4 oz (82.7 kg)   Height: 5' 9\" (1.753 m)   PainSc:   0 - No pain                 alert, well appearing, and in no distress and oriented to person, place, and time  Chest - clear to auscultation, no wheezes, rales or rhonchi, symmetric air entry  Heart - normal rate, regular rhythm, normal S1, S2, no murmurs, rubs, clicks or gallops        LABS     TESTS      Assessment/Plan:    Esophageal ca will get back into [facility] for f/u  Chronic pain I am managing -- this is cancer pain   Malnutrition ongiong -- using enteral g tube nutrition    Lab review:     Diagnoses and all orders for this visit:    1. Metastatic squamous cell carcinoma to esophagus (HCC)  -     CBC WITH AUTOMATED DIFF; Future  -     METABOLIC PANEL, COMPREHENSIVE; Future  -     URINALYSIS W/ RFLX MICROSCOPIC; Future  -     REFERRAL TO HEMATOLOGY ONCOLOGY  -     oxyCODONE-acetaminophen (PERCOCET 10)  mg per tablet; Take 1 Tab by mouth every eight (8) hours as needed for Pain. Max Daily Amount: 3 Tabs. 2. Malnutrition of moderate degree (HCC)  -     CBC WITH AUTOMATED DIFF; Future  -     METABOLIC PANEL, COMPREHENSIVE; Future  -     URINALYSIS W/ RFLX MICROSCOPIC; Future  -     REFERRAL TO HEMATOLOGY ONCOLOGY    3. Status post gastrostomy ([facility] [address]  -     CBC WITH AUTOMATED DIFF; Future  -     METABOLIC PANEL, COMPREHENSIVE; Future  -     URINALYSIS W/ RFLX MICROSCOPIC; Future  -     REFERRAL TO HEMATOLOGY ONCOLOGY    4. Arthritis  -     CBC WITH AUTOMATED DIFF; Future  -     METABOLIC PANEL, COMPREHENSIVE;  Future  -     URINALYSIS W/ RFLX MICROSCOPIC; Future  -     REFERRAL TO HEMATOLOGY ONCOLOGY    5. Anemia, unspecified type          I have discussed the diagnosis with the patient and the intended plan as seen in the above orders. The patient has received an after-visit summary and questions were answered concerning future plans. I have discussed medication side effects and warnings with the patient as well. I have reviewed the plan of care with the patient, accepted their input and they are in agreement with the treatment goals. Follow-up Disposition:  Return in about 4 weeks (around 7/3/2018) for EOV.

## 2018-06-05 NOTE — PROGRESS NOTES
Leward Goodpasture is a 75 y.o. male presents today for follow up on his throat cancer and malnutrition. He would also like to discuss recent xrays to determine any change in his cancer. Patient also reports of some SOB when walking. Patient also reports of swelling in his hands and legs yesterday but swelling has gone down today. Patient reports while bathing this morning he noticed the peg tube was out further than usual although he has not noticed any drainage. .           Pt is in Room # 5        1. Have you been to the ER, urgent care clinic since your last visit? Hospitalized since your last visit? No    2. Have you seen or consulted any other health care providers outside of the [address] [place] [address] since your last visit? Include any pap smears or colon screening. No     Health Maintenance reviewed - .

## 2018-06-05 NOTE — MR AVS SNAPSHOT
[address] [facility] 
 
 
 [address] [facility] [address] [facility] 83 [address] 
[phone] Patient: Mansoor Myers Person MRN: [id number] :1950 Visit Information Date & Time Provider Department Dept. Phone Encounter #  
 2018 12:30 PM Courtney Feng [phone] [id number] Follow-up Instructions Return in about 4 weeks (around 7/3/2018) for EOV. Follow-up and Disposition History Your Appointments 2018 10:30 AM  
ROUTINE CARE with Yvonne Motta,  [address] [address]) Appt Note: Return in about 2 months (around 2018) for EOV. [address] [facility] [address] [facility] 83 222 [facility]  
  
   
 [address] [facility] [address] [facility] Upcoming Health Maintenance Date Due Pneumococcal 65+ High/Highest Risk (2 of 2 - PPSV23) 2016 GLAUCOMA SCREENING Q2Y 2018 Influenza Age 5 to Adult 2018 MEDICARE YEARLY EXAM 2018 COLONOSCOPY 2021 DTaP/Tdap/Td series (2 - Td) 8/15/2027 Allergies as of 2018  Review Complete On: 2018 By: Yvonne Motta DO No Known Allergies Current Immunizations  Reviewed on 2017 Name Date Influenza High Dose Vaccine PF 2017, 2015 Influenza Vaccine 2014, 2013 Influenza Vaccine (Quad) PF 10/22/2017 10:21 PM  
 Influenza Vaccine Split 2011, 2010 Pneumococcal Conjugate (PCV-13) 2015 TD Vaccine 8/3/2006 Tdap 8/15/2017 ZZZ-RETIRED (DO NOT USE) Pneumococcal Vaccine (Unspecified Type) 2010 Not reviewed this visit You Were Diagnosed With   
  
 Codes Comments Metastatic squamous cell carcinoma to esophagus [facility])    -  Primary ICD-10-CM: C78.89 ICD-9-CM: 197.8 Malnutrition of moderate degree (HCC)     ICD-10-CM: E44.0 ICD-9-CM: 263.0 Status post gastrostomy ([address]     ICD-10-CM: Z93.1 ICD-9-CM: V44.1 Arthritis     ICD-10-CM: M19.90 ICD-9-CM: 716.90 Anemia, unspecified type     ICD-10-CM: D64.9 ICD-9-CM: 577. 9 Vitals BP Pulse Temp Resp Height(growth percentile) Weight(growth percentile) 119/88 (BP 1 Location: Right arm, BP Patient Position: Sitting) (!) 106 98.3 °F (36.8 °C) (Oral) 16 5' 9\" (1.753 m) 182 lb 6.4 oz (82.7 kg) SpO2 BMI Smoking Status 96% 26.94 kg/m2 Former Smoker BMI and BSA Data Body Mass Index Body Surface Area  
 26.94 kg/m 2 2.01 m 2 Preferred Pharmacy Pharmacy Name Phone [address], [address] [place], [place] [address]e [phone] Your Updated Medication List  
  
   
This list is accurate as of 6/5/18 12:59 PM.  Always use your most recent med list. amLODIPine 5 mg tablet Commonly known as:  Kaleigh Alanis Take 5 mg by mouth daily.  
  
 nut.tx.impaired dige fxn-fiber 0.08 gram- 1.2 kcal/mL Liqd Commonly known as:  VITAL AF 1.2 MAYKEL Start with 1 can over an hour 3 times per day for 24 hours. If tolerated, advance to 2 cans over 1.5 hours 3x/day: 9am, 12pm, 6pm (follow toleration over 48 hours). Flush tube before & after with 200ml water each time (400ml total with each feeding) If patient can drink water, flushes can be re  
  
 oxyCODONE-acetaminophen  mg per tablet Commonly known as:  PERCOCET 10 Take 1 Tab by mouth every eight (8) hours as needed for Pain. Max Daily Amount: 3 Tabs. PREVACID 15 mg capsule Generic drug:  lansoprazole Take 15 mg by mouth as needed. Prescriptions Printed Refills  
 oxyCODONE-acetaminophen (PERCOCET 10)  mg per tablet 0 Sig: Take 1 Tab by mouth every eight (8) hours as needed for Pain. Max Daily Amount: 3 Tabs. Class: Print Route: Oral  
  
We Performed the Following REFERRAL TO HEMATOLOGY ONCOLOGY [GSX14 Custom] Comments:  
 Please evaluate patient for f/u with dr Kyara Mcginnis. Follow-up Instructions Return in about 4 weeks (around 7/3/2018) for EOV. To-Do List   
 06/05/2018 Lab:  CBC WITH AUTOMATED DIFF   
  
 06/05/2018 Lab:  METABOLIC PANEL, COMPREHENSIVE   
  
 06/05/2018 Lab:  URINALYSIS W/ RFLX MICROSCOPIC Referral Information Referral ID Referred By Referred To  
  
 [id number] [facility] [address] Phone: [phone] Fax: [phone] Visits Status Start Date End Date 1 New Request 6/5/18 6/5/19 If your referral has a status of pending review or denied, additional information will be sent to support the outcome of this decision. Please provide this summary of care documentation to your next provider. Your primary care clinician is listed as [id number] [facility]. If you have any questions after today's visit, please call [phone].

## 2018-06-08 NOTE — TELEPHONE ENCOUNTER
Specialist information documented in referral. Awaiting notes to follow scheduled appointment. No further action required.

## 2018-06-08 NOTE — TELEPHONE ENCOUNTER
[address] [facility] called in regards to referral recently sent. Patient is already an existing patient with Dr. Darby Zurita as of March 1, 2018. Patient's next appt is scheduled for June 14, 2018.

## 2018-07-02 NOTE — TELEPHONE ENCOUNTER
Haily Kramer with [facility] Surgical Specialist requesting a call back regarding referral for patient.  States she needs to clarify information prior to scheduling,  Haily Kramer can be reached at [phone]

## 2018-07-02 NOTE — MR AVS SNAPSHOT
[address] [place] [id number] 
[phone] Patient: Carlos Markham Person MRN: [id number] :1950 Visit Information Date & Time Provider Department Dept. Phone Encounter #  
 2018  9:00  [place] [facility] [id number] Follow-up Instructions Return in about 2 months (around 2018) for MWE, physical, labs at next visit, EKG next visit. Follow-up and Disposition History Upcoming Health Maintenance Date Due Pneumococcal 65+ High/Highest Risk (2 of 2 - PPSV23) 2016 GLAUCOMA SCREENING Q2Y 2018 Influenza Age 5 to Adult 2018 MEDICARE YEARLY EXAM 2018 COLONOSCOPY 2021 DTaP/Tdap/Td series (2 - Td) 8/15/2027 Allergies as of 2018  Review Complete On: 2018 By: Sita Bee., DO No Known Allergies Current Immunizations  Reviewed on 2017 Name Date Influenza High Dose Vaccine PF 2017, 2015 Influenza Vaccine 2014, 2013 Influenza Vaccine (Quad) PF 10/22/2017 10:21 PM  
 Influenza Vaccine Split 2011, 2010 Pneumococcal Conjugate (PCV-13) 2015 TD Vaccine 8/3/2006 Tdap 8/15/2017 ZZZ-RETIRED (DO NOT USE) Pneumococcal Vaccine (Unspecified Type) 2010 Not reviewed this visit You Were Diagnosed With   
  
 Codes Comments [facility]    -  Primary ICD-10-CM: Z93.1 ICD-9-CM: V44.1 Malnutrition of moderate degree (HCC)     ICD-10-CM: E44.0 ICD-9-CM: 263.0 Metastatic squamous cell carcinoma to esophagus [facility])     ICD-10-CM: C78.89 ICD-9-CM: 197.8 Arthritis     ICD-10-CM: M19.90 ICD-9-CM: 716.90 Vitals BP Pulse Temp Resp Height(growth percentile) Weight(growth percentile) 127/85 100 97.5 °F (36.4 °C) (Oral) 17 5' 9\" (1.753 m) 180 lb 6.4 oz (81.8 kg) SpO2 BMI Smoking Status 97% 26.64 kg/m2 Former Smoker BMI and BSA Data Body Mass Index Body Surface Area  
 26.64 kg/m 2 2 m 2 Preferred Pharmacy Pharmacy Name Phone [address], [place] [phone] Your Updated Medication List  
  
   
This list is accurate as of 7/2/18  9:58 AM.  Always use your most recent med list. amLODIPine 5 mg tablet Commonly known as:  Carolyn Newton Take 1 Tab by mouth daily.  
  
 nut.tx.impaired dige fxn-fiber 0.08 gram- 1.2 kcal/mL Liqd Commonly known as:  VITAL AF 1.2 MAYKEL Start with 1 can over an hour 3 times per day for 24 hours. If tolerated, advance to 2 cans over 1.5 hours 3x/day: 9am, 12pm, 6pm (follow toleration over 48 hours). Flush tube before & after with 200ml water each time (400ml total with each feeding) If patient can drink water, flushes can be re  
  
 oxyCODONE-acetaminophen  mg per tablet Commonly known as:  PERCOCET 10 Take 1 Tab by mouth every eight (8) hours as needed for Pain. Max Daily Amount: 3 Tabs. Prescriptions Printed Refills  
 oxyCODONE-acetaminophen (PERCOCET 10)  mg per tablet 0 Sig: Take 1 Tab by mouth every eight (8) hours as needed for Pain. Max Daily Amount: 3 Tabs. Class: Print Route: Oral  
 amLODIPine (NORVASC) 5 mg tablet 4 Sig: Take 1 Tab by mouth daily. Class: Print Route: Oral  
  
We Performed the Following REFERRAL TO SURGERY [HJH823 Custom] Comments:  
 Pt is taking po nutrition. Need to consider removing feeding tube when OK with heme/onc   Hannah Follow-up Instructions Return in about 2 months (around 9/2/2018) for MWE, physical, labs at next visit, EKG next visit. To-Do List   
 08/08/2018 8:00 AM  
  Appointment with [facility] PET CT RM 1 at [facility] RAD PET ([phone]) OUTSIDE FILMS  - Any outside films related to the study being scheduled should be brought with you on the day of the exam.  If this cannot be done there may be a delay in the reading of the study. MEDICATIONS  - Patient must bring a complete list of all medications currently taking to include prescriptions, over-the-counter meds, herbals, vitamins & any dietary supplements  GENERAL INSTRUCTIONS  1. Entire visit to the hospital will last about 2 Hours. 2. Eat a low carb/high protein diet the evening before your procedure. Stay away from food and drink that contains sugars the night before and ESPECIALLY the day of the test. 3. Do NOT eat food/chew gum/eat candy 6 hours prior to your procedure. The patient may drink all the plain water they want, up until the time of their appointment. 24-48 oz. of plain water is recommended within 6 hours prior to the procedure. 4. Do NOT take insulin or any other diabetic medication at least 6 hours before your procedure. 5. Take meds with water (except diabetic medications). 6. Avoid strenuous exercise 24 hours prior to your procedure. 7. Avoid chewing gum, playing video games or any activity that contains constant, repetitive movements the day of exam. 8. Do not take STEROIDS or DIURETICS (fluid pills) 12 hours prior to exam. 9. You should bring medications for pain, anxiety, or claustrophobia if you need them. If you take medications for anxiety or claustrophobia, a ride needs to come with you. 10. Materials for this procedure are ordered in advance and are time-sensitive. If you need to cancel or reschedule, you must call [phone] at least 48 hours prior to your appointment time. Staff will contact the facility to cancel the DOSE!! 11. Bring recent CT scan results from other facilities with you. 12. Wear comfortable clothing without metal (buttons/zippers, etc) and do not wear jewelry or body piercing's. 15. No children or pregnant women should accompany/escort the patient.  14. Patient MUST wait one week (7 days) after having any procedure that uses ORAL Contrast.  (No restrictions for IV Contrast) 15. If patient has undergone chemotherapy, it is preferable but NOT MANDATORY to wait 3-4 weeks post chemotherapy to allow for any metabolic changes to subside in order to obtain the most accurate results. 16. If patient has undergone radiation therapy, it is preferable but NOT MANDATORY to wait 3-4 weeks post radiation therapy to allow for any metabolic changes in the tissue in order to obtain the most accurate results. You must call 48 hours prior to your appointment time in order to cancel or reschedule your appointment. Please call Central Scheduling at [phone]. Referral Information Referral ID Referred By Referred To  
  
 [id number] Freddy Quiroz, [address] [facility], [place]   
   [address] [place] [address] Phone: [phone] Fax: [phone] Visits Status Start Date End Date 1 New Request 7/2/18 7/2/19 If your referral has a status of pending review or denied, additional information will be sent to support the outcome of this decision. Please provide this summary of care documentation to your next provider. Your primary care clinician is listed as [id number] [facility]. If you have any questions after today's visit, please call [phone].

## 2018-07-02 NOTE — PROGRESS NOTES
Ariela Garcia is a 75 y.o.  male and presents with    Chief Complaint   Patient presents with    Other     cancer of esophagus    Arthritis           Subjective:  1. cqancer of esophagus. See zia's note. Appears in remission at this time  2. Malnutrition. Appears to be resolving. Weight stable. avble to eat fine. 3. arthritsi ongoing stable        Additional Concerns:          Patient Active Problem List    Diagnosis Date Noted    Status post gastrostomy ([facility] 02/13/2018    Malnutrition of moderate degree ([facility] 12/13/2017    Metastatic squamous cell carcinoma to esophagus (HCC) 10/17/2017    Arthritis 06/06/2011     Current Outpatient Prescriptions   Medication Sig Dispense Refill    oxyCODONE-acetaminophen (PERCOCET 10)  mg per tablet Take 1 Tab by mouth every eight (8) hours as needed for Pain. Max Daily Amount: 3 Tabs. 90 Tab 0    lansoprazole (PREVACID) 15 mg capsule Take 15 mg by mouth as needed.  amLODIPine (NORVASC) 5 mg tablet Take 5 mg by mouth daily.  nut.tx.impaired dige fxn-fiber (VITAL AF 1.2 MAYKEL) 0.08 gram- 1.2 kcal/mL liqd Start with 1 can over an hour 3 times per day for 24 hours. If tolerated, advance to 2 cans over 1.5 hours 3x/day: 9am, 12pm, 6pm (follow toleration over 48 hours).    Flush tube before & after with 200ml water each time (400ml total with each feeding)  If patient can drink water, flushes can be re 180 Can 12     No Known Allergies  Past Medical History:   Diagnosis Date    Arthritis     Essential hypertension, malignant 8/3/2010    Leg pain 8/3/2010    Throat cancer ([facility]     Thyroid disease     hypothyroidism     Past Surgical History:   Procedure Laterality Date    HX GI  11/2017    G-tube placement on 11/28/17    HX VASCULAR ACCESS  11/2017    DE EGD DELIVER THERMAL ENERGY SPHNCTR/CARDIA GERD       Family History   Problem Relation Age of Onset    Heart Disease Mother     Lung Disease Father      Social History Substance Use Topics    Smoking status: Former Smoker     Packs/day: 0.25     Types: Cigarettes     Start date: 1/17/2017     Quit date: 11/22/2017    Smokeless tobacco: Never Used      Comment: smokes occas    Alcohol use No       ROS       All other systems reviewed and are negative. Objective:  Vitals:    07/02/18 0920   BP: 127/85   Pulse: 100   Resp: 17   Temp: 97.5 °F (36.4 °C)   TempSrc: Oral   SpO2: 97%   Weight: 180 lb 6.4 oz (81.8 kg)   Height: 5' 9\" (1.753 m)   PainSc:   0 - No pain                 alert, well appearing, and in no distress, oriented to person, place, and time and normal appearing weight  Chest - clear to auscultation, no wheezes, rales or rhonchi, symmetric air entry  Heart - normal rate, regular rhythm, normal S1, S2, no murmurs, rubs, clicks or gallops  Abdomen - soft, nontender, nondistended, no masses or organomegaly        LABS     TESTS      Assessment/Plan:    Esophageal cancer stable in remission. arthritsi stable  Chronic pain secoandry to cancer stable   Will consider with dena meneses's imput removing feeding tube. Will ask Ruiz to see in next month or two . Lab review: no lab studies available for review at time of visit    Diagnoses and all orders for this visit:    1. Status post gastrostomy ([facility]  -     [facility]    2. Malnutrition of moderate degree ([facility]  -     [facility]    3. Metastatic squamous cell carcinoma to esophagus (HCC)  -     [facility]  -     oxyCODONE-acetaminophen (PERCOCET 10)  mg per tablet; Take 1 Tab by mouth every eight (8) hours as needed for Pain. Max Daily Amount: 3 Tabs. 4. Arthritis          I have discussed the diagnosis with the patient and the intended plan as seen in the above orders. The patient has received an after-visit summary and questions were answered concerning future plans. I have discussed medication side effects and warnings with the patient as well.  I have reviewed the plan of care with the patient, accepted their input and they are in agreement with the treatment goals. Follow-up Disposition:  Return in about 2 months (around 9/2/2018) for MWE, physical, labs at next visit, EKG next visit.

## 2018-07-02 NOTE — PROGRESS NOTES
Tai Aguilar Person is a 75 y.o. male presents in office for a follow up visit throat cancer and malnutrition. Health Maintenance Due   Topic Date Due    Pneumococcal 65+ High/Highest Risk (2 of 2 - PPSV23) 01/14/2016    GLAUCOMA SCREENING Q2Y  07/14/2018       1. Have you been to the ER, urgent care clinic since your last visit? Hospitalized since your last visit?no    2. Have you seen or consulted any other health care providers outside of the [facility] since your last visit? Include any pap smears or colon screening.  no

## 2018-07-09 NOTE — IP AVS SNAPSHOT
[address] [place] [address] Patient: Faina Ling Person MRN: [id number] :[id number] About your hospitalization You were admitted on:  2018 You last received care in the:  [facility] You were discharged on:  2018 Why you were hospitalized Your primary diagnosis was:  Not on File Follow-up Information Follow up With Details Comments Contact Info Patricia Mercado.,    [address] [facility] [address] [facility] [address] [id number] 
[phone] Your Scheduled Appointments 2018  8:00 AM EDT  
PET TMR IMG SKULL THIGH SUB with [facility] PET CT RM 1 [facility] RAD  [facility]) [address] [place] [address] 317 [facility] OUTSIDE FILMS  - Any outside films related to the study being scheduled should be brought with you on the day of the exam.  If this cannot be done there may be a delay in the reading of the study. MEDICATIONS  - Patient must bring a complete list of all medications currently taking to include prescriptions, over-the-counter meds, herbals, vitamins & any dietary supplements  GENERAL INSTRUCTIONS  1. Entire visit to the hospital will last about 2 Hours. 2. Eat a low carb/high protein diet the evening before your procedure. Stay away from food and drink that contains sugars the night before and ESPECIALLY the day of the test. 3. Do NOT eat food/chew gum/eat candy 6 hours prior to your procedure. The patient may drink all the plain water they want, up until the time of their appointment. 24-48 oz. of plain water is recommended within 6 hours prior to the procedure. 4. Do NOT take insulin or any other diabetic medication at least 6 hours before your procedure. 5. Take meds with water (except diabetic medications).  6. Avoid strenuous exercise 24 hours prior to your procedure. 7. Avoid chewing gum, playing video games or any activity that contains constant, repetitive movements the day of exam. 8. Do not take STEROIDS or DIURETICS (fluid pills) 12 hours prior to exam. 9. You should bring medications for pain, anxiety, or claustrophobia if you need them. If you take medications for anxiety or claustrophobia, a ride needs to come with you. 10. Materials for this procedure are ordered in advance and are time-sensitive. If you need to cancel or reschedule, you must call [phone] at least 48 hours prior to your appointment time. Staff will contact the facility to cancel the DOSE!! 11. Bring recent CT scan results from other facilities with you. 12. Wear comfortable clothing without metal (buttons/zippers, etc) and do not wear jewelry or body piercing's. 15. No children or pregnant women should accompany/escort the patient. 14. Patient MUST wait one week (7 days) after having any procedure that uses ORAL Contrast.  (No restrictions for IV Contrast) 15. If patient has undergone chemotherapy, it is preferable but NOT MANDATORY to wait 3-4 weeks post chemotherapy to allow for any metabolic changes to subside in order to obtain the most accurate results. 16. If patient has undergone radiation therapy, it is preferable but NOT MANDATORY to wait 3-4 weeks post radiation therapy to allow for any metabolic changes in the tissue in order to obtain the most accurate results. You must call 48 hours prior to your appointment time in order to cancel or reschedule your appointment. Please call Central Scheduling at [phone]. CHECK IN INSTRUCTIONS  Check in at Radiation Therapy 30 minutes prior to your appointment. [facility] [address] Kavita Del Rosario [facility] Discharge Orders None A check wale indicates which time of day the medication should be taken. My Medications CONTINUE taking these medications Instructions Each Dose to Equal  
 Morning Noon Evening Bedtime  
 amLODIPine 5 mg tablet Commonly known as:  Imelda Aurelio Your last dose was: Your next dose is: Take 1 Tab by mouth daily. 5 mg  
    
   
   
   
  
 nut.tx.impaired dige fxn-fiber 0.08 gram- 1.2 kcal/mL Liqd Commonly known as:  VITAL AF 1.2 MAYKEL Your last dose was: Your next dose is:    
   
   
 Start with 1 can over an hour 3 times per day for 24 hours. If tolerated, advance to 2 cans over 1.5 hours 3x/day: 9am, 12pm, 6pm (follow toleration over 48 hours). Flush tube before & after with 200ml water each time (400ml total with each feeding) If patient can drink water, flushes can be re  
     
   
   
   
  
 oxyCODONE-acetaminophen  mg per tablet Commonly known as:  PERCOCET 10 Your last dose was: Your next dose is: Take 1 Tab by mouth every eight (8) hours as needed for Pain. Max Daily Amount: 3 Tabs. 1 Tab Opioid Education Prescription Opioids: What You Need to Know: 
 
Prescription opioids can be used to help relieve moderate-to-severe pain and are often prescribed following a surgery or injury, or for certain health conditions. These medications can be an important part of treatment but also come with serious risks. Opioids are strong pain medicines. Examples include hydrocodone, oxycodone, fentanyl, and morphine. Heroin is an example of an illegal opioid. It is important to work with your health care provider to make sure you are getting the safest, most effective care. WHAT ARE THE RISKS AND SIDE EFFECTS OF OPIOID USE? Prescription opioids carry serious risks of addiction and overdose, especially with prolonged use. An opioid overdose, often marked by slow breathing, can cause sudden death. The use of prescription opioids can have a number of side effects as well, even when taken as directed. · Tolerance-meaning you might need to take more of a medication for the same pain relief · Physical dependence-meaning you have symptoms of withdrawal when the medication is stopped. Withdrawal symptoms can include nausea, sweating, chills, diarrhea, stomach cramps, and muscle aches. Withdrawal can last up to several weeks, depending on which drug you took and how long you took it. · Increased sensitivity to pain · Constipation · Nausea, vomiting, and dry mouth · Sleepiness and dizziness · Confusion · Depression · Low levels of testosterone that can result in lower sex drive, energy, and strength · Itching and sweating RISKS ARE GREATER WITH:      
· History of drug misuse, substance use disorder, or overdose · Mental health conditions (such as depression or anxiety) · Sleep apnea · Older age (72 years or older) · Pregnancy Avoid alcohol while taking prescription opioids. Also, unless specifically advised by your health care provider, medications to avoid include: · Benzodiazepines (such as Xanax or Valium) · Muscle relaxants (such as Soma or Flexeril) · Hypnotics (such as Ambien or Lunesta) · Other prescription opioids KNOW YOUR OPTIONS Talk to your health care provider about ways to manage your pain that don't involve prescription opioids. Some of these options may actually work better and have fewer risks and side effects. Options may include: 
· Pain relievers such as acetaminophen, ibuprofen, and naproxen · Some medications that are also used for depression or seizures · Physical therapy and exercise · Counseling to help patients learn how to cope better with triggers of pain and stress. · Application of heat or cold compress · Massage therapy · Relaxation techniques Be Informed Make sure you know the name of your medication, how much and how often to take it, and its potential risks & side effects.  
 
IF YOU ARE PRESCRIBED OPIOIDS FOR PAIN: 
 · Never take opioids in greater amounts or more often than prescribed. Remember the goal is not to be pain-free but to manage your pain at a tolerable level. · Follow up with your primary care provider to: · Work together to create a plan on how to manage your pain. · Talk about ways to help manage your pain that don't involve prescription opioids. · Talk about any and all concerns and side effects. · Help prevent misuse and abuse. · Never sell or share prescription opioids · Help prevent misuse and abuse. · Store prescription opioids in a secure place and out of reach of others (this may include visitors, children, friends, and family). · Safely dispose of unused/unwanted prescription opioids: Find your community drug take-back program or your pharmacy mail-back program, or flush them down the toilet, following guidance from the Food and Drug Administration (www.fda.gov/Drugs/ResourcesForYou). · Visit www.cdc.gov/drugoverdose to learn about the risks of opioid abuse and overdose. · If you believe you may be struggling with addiction, tell your health care provider and ask for guidance or call [address] [place], [place] [address] at [phone]. Discharge Instructions DISCHARGE SUMMARY from Nurse PATIENT INSTRUCTIONS: 
 
After general anesthesia or intravenous sedation, for 24 hours or while taking prescription Narcotics: · Limit your activities · Do not drive and operate hazardous machinery · Do not make important personal or business decisions · Do  not drink alcoholic beverages · If you have not urinated within 8 hours after discharge, please contact your surgeon on call. Report the following to your surgeon: 
· Excessive pain, swelling, redness or odor of or around the surgical area · Temperature over 100.5 · Nausea and vomiting lasting longer than 4 hours or if unable to take medications · Any signs of decreased circulation or nerve impairment to extremity: change in color, persistent  numbness, tingling, coldness or increase pain · Any questions What to do at Home: 
Recommended activity: Activity as tolerated and no driving for today, These are general instructions for a healthy lifestyle: No smoking/ No tobacco products/ Avoid exposure to second hand smoke Surgeon General's Warning:  Quitting smoking now greatly reduces serious risk to your health. Obesity, smoking, and sedentary lifestyle greatly increases your risk for illness A healthy diet, regular physical exercise & weight monitoring are important for maintaining a healthy lifestyle You may be retaining fluid if you have a history of heart failure or if you experience any of the following symptoms:  Weight gain of 3 pounds or more overnight or 5 pounds in a week, increased swelling in our hands or feet or shortness of breath while lying flat in bed. Please call your doctor as soon as you notice any of these symptoms; do not wait until your next office visit. Recognize signs and symptoms of STROKE: 
 
F-face looks uneven A-arms unable to move or move unevenly S-speech slurred or non-existent T-time-call 911 as soon as signs and symptoms begin-DO NOT go Back to bed or wait to see if you get better-TIME IS BRAIN. Warning Signs of HEART ATTACK Call 911 if you have these symptoms: 
? Chest discomfort. Most heart attacks involve discomfort in the center of the chest that lasts more than a few minutes, or that goes away and comes back. It can feel like uncomfortable pressure, squeezing, fullness, or pain. ? Discomfort in other areas of the upper body. Symptoms can include pain or discomfort in one or both arms, the back, neck, jaw, or stomach. ? Shortness of breath with or without chest discomfort. ? Other signs may include breaking out in a cold sweat, nausea, or lightheadedness. Don't wait more than five minutes to call [address]! Fast action can save your life. Calling 911 is almost always the fastest way to get lifesaving treatment. Emergency Medical Services staff can begin treatment when they arrive  up to an hour sooner than if someone gets to the hospital by car. The discharge information has been reviewed with the patient. The patient verbalized understanding. Discharge medications reviewed with the patient and appropriate educational materials and side effects teaching were provided. Patient armband removed and given to patient to take home. Patient was informed of the privacy risks if armband lost or stolen 
 
___________________________________________________________________________________________________________________________________ ACO Transitions of Care Introducing [facility] offers a voluntary care coordination program to provide high quality service and care to [facility] fee-for-service beneficiaries. Barb Sara was designed to help you enhance your health and well-being through the following services: ? Transitions of Care  support for individuals who are transitioning from one care setting to another (example: Hospital to home). ? Chronic and Complex Care Coordination  support for individuals and caregivers of those with serious or chronic illnesses or with more than one chronic (ongoing) condition and those who take a number of different medications. If you meet specific medical criteria, a [address] [place] [address] Rd may call you directly to coordinate your care with your primary care physician and your other care providers. For questions about the [facility] programs, please, contact your physicians office. For general questions or additional information about Accountable Care Organizations: Please visit www.medicare.gov/acos. html or call 1-800-MEDICARE (7-828.712.9938) TTY users should call 9-336.410.3473. Introducing Tyron Viveros As a [facility]autam patient, I wanted to make you aware of our electronic visit tool called Tyron Viveros. [facility] 24/7 allows you to connect within minutes with a medical provider 24 hours a day, seven days a week via a mobile device or tablet or logging into a secure website from your computer. You can access Tyron Viveros from anywhere in the [place]. A virtual visit might be right for you when you have a simple condition and feel like you just dont want to get out of bed, or cant get away from work for an appointment, when your regular Jessica Gautam provider is not available (evenings, weekends or holidays), or when youre out of town and need minor care. Electronic visits cost only $49 and if the [facility] 24/7 provider determines a prescription is needed to treat your condition, one can be electronically transmitted to a nearby pharmacy*. Please take a moment to enroll today if you have not already done so. The enrollment process is free and takes just a few minutes. To enroll, please download the [employer] 24/7 zhou to your tablet or phone, or visit [email or web address] to enroll on your computer. And, as an [address] [place] [address] patient with a [employer] account, the results of your visits will be scanned into your electronic medical record and your primary care provider will be able to view the scanned results. We urge you to continue to see your regular Jessica Gautam provider for your ongoing medical care. And while your primary care provider may not be the one available when you seek a Tyron Viveros virtual visit, the peace of mind you get from getting a real diagnosis real time can be priceless.    
 
For more information on Tyron Viveros, view our Frequently Asked Questions (FAQs) at [email or web address]. Sincerely, 
 
Vivian Loera MD 
Chief Medical Officer [address] *:  certain medications cannot be prescribed via Tyron Viveros Providers Seen During Your Hospitalization Provider Specialty Primary office phone Vadim Hines MD Radiology [phone] Your Primary Care Physician (PCP) Primary Care Physician Office Phone Office Fax Augusto Crooks [phone] You are allergic to the following No active allergies Recent Documentation Height Weight BMI Smoking Status 1.753 m 81.7 kg 26.59 kg/m2 Former Smoker Emergency Contacts Name Discharge Info Relation Home Work Mobile Barry Zhao CAREGIVER [3] Daughter [21] [phone] [address] CAREGIVER [3] Friend [5] [phone] Patient Belongings The following personal items are in your possession at time of discharge: 
  Dental Appliances: None         Home Medications: None   Jewelry: None  Clothing: Pants, Shirt, Footwear, Hat, Undergarments    Other Valuables: Wallet, At bedside Please provide this summary of care documentation to your next provider. Signatures-by signing, you are acknowledging that this After Visit Summary has been reviewed with you and you have received a copy. Patient Signature:  ____________________________________________________________ Date:  ____________________________________________________________  
  
Paula Dennis Provider Signature:  ____________________________________________________________ Date:  ____________________________________________________________

## 2018-07-09 NOTE — PERIOP NOTES
Ms. Viviana Alonso, friend, is designated point of contact for medical info and confirmed ride [place]- [phone]

## 2018-07-09 NOTE — DISCHARGE INSTRUCTIONS
DISCHARGE SUMMARY from Nurse    PATIENT INSTRUCTIONS:    After general anesthesia or intravenous sedation, for 24 hours or while taking prescription Narcotics:  · Limit your activities  · Do not drive and operate hazardous machinery  · Do not make important personal or business decisions  · Do  not drink alcoholic beverages  · If you have not urinated within 8 hours after discharge, please contact your surgeon on call. Report the following to your surgeon:  · Excessive pain, swelling, redness or odor of or around the surgical area  · Temperature over 100.5  · Nausea and vomiting lasting longer than 4 hours or if unable to take medications  · Any signs of decreased circulation or nerve impairment to extremity: change in color, persistent  numbness, tingling, coldness or increase pain  · Any questions    What to do at Home:  Recommended activity: Activity as tolerated and no driving for today,       These are general instructions for a healthy lifestyle:    No smoking/ No tobacco products/ Avoid exposure to second hand smoke  Surgeon General's Warning:  Quitting smoking now greatly reduces serious risk to your health. Obesity, smoking, and sedentary lifestyle greatly increases your risk for illness    A healthy diet, regular physical exercise & weight monitoring are important for maintaining a healthy lifestyle    You may be retaining fluid if you have a history of heart failure or if you experience any of the following symptoms:  Weight gain of 3 pounds or more overnight or 5 pounds in a week, increased swelling in our hands or feet or shortness of breath while lying flat in bed. Please call your doctor as soon as you notice any of these symptoms; do not wait until your next office visit.     Recognize signs and symptoms of STROKE:    F-face looks uneven    A-arms unable to move or move unevenly    S-speech slurred or non-existent    T-time-call 911 as soon as signs and symptoms begin-DO NOT go       Back to bed or wait to see if you get better-TIME IS BRAIN. Warning Signs of HEART ATTACK     Call 911 if you have these symptoms:   Chest discomfort. Most heart attacks involve discomfort in the center of the chest that lasts more than a few minutes, or that goes away and comes back. It can feel like uncomfortable pressure, squeezing, fullness, or pain.  Discomfort in other areas of the upper body. Symptoms can include pain or discomfort in one or both arms, the back, neck, jaw, or stomach.  Shortness of breath with or without chest discomfort.  Other signs may include breaking out in a cold sweat, nausea, or lightheadedness. Don't wait more than five minutes to call 911 - MINUTES MATTER! Fast action can save your life. Calling 911 is almost always the fastest way to get lifesaving treatment. Emergency Medical Services staff can begin treatment when they arrive -- up to an hour sooner than if someone gets to the hospital by car. The discharge information has been reviewed with the patient. The patient verbalized understanding. Discharge medications reviewed with the patient and appropriate educational materials and side effects teaching were provided. Patient armband removed and given to patient to take home.   Patient was informed of the privacy risks if armband lost or stolen    ___________________________________________________________________________________________________________________________________

## 2018-07-09 NOTE — PROGRESS NOTES
TRANSFER - OUT REPORT:    Verbal report given to Dee Leiva on Millie Taylor Person being transferred to [facility] for routine progression of care       Report consisted of patient's Situation, Background, Assessment and   Recommendations(SBAR). Information from the following report(s) SBAR was reviewed with the receiving nurse. Opportunity for questions and clarification was provided.       Patient transported with:   [employer]

## 2018-07-09 NOTE — PROGRESS NOTES
VASCULAR & INTERVENTIONAL RADIOLOGY PROGRESS NOTE    History and Physical reviewed; I have examined the patient and there are no pertinent changes. Pt is an appropriate candidate to undergo port removal under moderate sedation.     Updated Physical Exam: 7/2/18 Dr. Matthew Salvador  Lungs: CTA  Heart: RRR, without murmur    Murleen Simmonds, MD  Vascular & Interventional Radiology  [facility]  7/9/2018

## 2018-08-07 NOTE — PROGRESS NOTES
Ron Coyle is a 75 y.o.  male and presents with    Chief Complaint   Patient presents with    Esophageal Cancer    Pain (Chronic)           Subjective:  Here for f/u of chronic pain secoanry to esophageal cancer  Awaiting surgical eval to remove feeding tube       Additional Concerns:          Patient Active Problem List    Diagnosis Date Noted    Status post gastrostomy ([facility] 02/13/2018    Malnutrition of moderate degree ([facility] 12/13/2017    Metastatic squamous cell carcinoma to esophagus (HCC) 10/17/2017    Arthritis 06/06/2011     Current Outpatient Prescriptions   Medication Sig Dispense Refill    oxyCODONE-acetaminophen (PERCOCET 10)  mg per tablet Take 1 Tab by mouth every eight (8) hours as needed for Pain. Max Daily Amount: 3 Tabs. 90 Tab 0    amLODIPine (NORVASC) 5 mg tablet Take 1 Tab by mouth daily. 90 Tab 4    nut.tx.impaired dige fxn-fiber (VITAL AF 1.2 MAYKEL) 0.08 gram- 1.2 kcal/mL liqd Start with 1 can over an hour 3 times per day for 24 hours. If tolerated, advance to 2 cans over 1.5 hours 3x/day: 9am, 12pm, 6pm (follow toleration over 48 hours).    Flush tube before & after with 200ml water each time (400ml total with each feeding)  If patient can drink water, flushes can be re 180 Can 12     No Known Allergies  Past Medical History:   Diagnosis Date    Arthritis     Essential hypertension, malignant 8/3/2010    Leg pain 8/3/2010    Throat cancer ([facility]     Thyroid disease     hypothyroidism     Past Surgical History:   Procedure Laterality Date    HX GI  11/2017    G-tube placement on 11/28/17    HX VASCULAR ACCESS  11/2017    [place] EGD DELIVER THERMAL ENERGY SPHNCTR/CARDIA GERD       Family History   Problem Relation Age of Onset    Heart Disease Mother     Lung Disease Father      Social History   Substance Use Topics    Smoking status: Former Smoker     Packs/day: 0.25     Types: Cigarettes     Start date: 1/17/2017     Quit date: 11/22/2017    Smokeless tobacco: Never Used      Comment: smokes occas    Alcohol use No       ROS       All other systems reviewed and are negative. Objective:  Vitals:    08/07/18 1442   BP: 133/72   Pulse: (!) 123   Resp: 19   Temp: 97.1 °F (36.2 °C)   TempSrc: Oral   SpO2: 100%   Weight: 180 lb 6.4 oz (81.8 kg)   Height: 5' 9\" (1.753 m)   PainSc:   0 - No pain                 alert, well appearing, and in no distress, oriented to person, place, and time and normal appearing weight  Chest - clear to auscultation, no wheezes, rales or rhonchi, symmetric air entry  Heart - normal rate, regular rhythm, normal S1, S2, no murmurs, rubs, clicks or gallops  Abdomen - soft, nontender, nondistended, no masses or organomegaly        LABS     TESTS      Assessment/Plan:    Chronic pain secoanry to esophageal cancer  This is a chronic problem that is stable. Per review of available records and patients , there are not sign of overuse, misuse, diversion, or concerning side effects. Today we reviewed: the risk of overdose, addiction, and dependency  The following changes were made to the patients current treatment plan: nothing, medications refilled. Lab review: labs are reviewed, up to date and normal    Diagnoses and all orders for this visit:    1. Metastatic squamous cell carcinoma to esophagus (HCC)  -     oxyCODONE-acetaminophen (PERCOCET 10)  mg per tablet; Take 1 Tab by mouth every eight (8) hours as needed for Pain. Max Daily Amount: 3 Tabs. 2. Malnutrition of moderate degree (HCC)    3. Arthritis          I have discussed the diagnosis with the patient and the intended plan as seen in the above orders. The patient has received an after-visit summary and questions were answered concerning future plans. I have discussed medication side effects and warnings with the patient as well.  I have reviewed the plan of care with the patient, accepted their input and they are in agreement with the treatment goals.     Follow-up Disposition:  Return in about 4 weeks (around 9/4/2018) for EOV,  and drug screen.

## 2018-08-07 NOTE — MR AVS SNAPSHOT
[facility] 
 
 
 [address] [place] [id number] 
[phone] Patient: Veena Carr Person MRN: [id number] Visit Information Date & Time Provider Department Dept. Phone Encounter #  
 2018  2:00 PM Homero Barrios, [address] [place] [address] [phone] [id number] Follow-up Instructions Return in about 4 weeks (around 2018) for EOV,  and drug screen. Follow-up and Disposition History Your Appointments 2018  1:30 PM  
[place] [address] [address] [facility]) Appt Note: med refill [address] [facility] 83 [address] [facility] [address]  
  
   
 [address]  
  
    
 2018 12:30 PM  
Medicare [place] [address] [address] [facility]) Appt Note: Return in about 2 months (around 2018) for MWE, physical, labs at next visit, EKG next visit. [address] [facility] 83 [address] [facility] [address]  
  
   
 [address] Upcoming Health Maintenance Date Due Pneumococcal 65+ High/Highest Risk (2 of 2 - PPSV23) 2016 GLAUCOMA SCREENING Q2Y 2018 Influenza Age 5 to Adult 2018 MEDICARE YEARLY EXAM 2018 COLONOSCOPY 2021 DTaP/Tdap/Td series (2 - Td) 8/15/2027 Allergies as of 2018  Review Complete On: 2018 By: Pete Murphy LPN No Known Allergies Current Immunizations  Reviewed on 2017 Name Date Influenza High Dose Vaccine PF 2017, 2015 Influenza Vaccine 2014, 2013 Influenza Vaccine (Quad) PF 10/22/2017 10:21 PM  
 Influenza Vaccine Split 2011, 2010 Pneumococcal Conjugate (PCV-13) 2015 TD Vaccine 8/3/2006 Tdap 8/15/2017  ZZZ-RETIRED (DO NOT USE) Pneumococcal Vaccine (Unspecified Type) 11/22/2010 Not reviewed this visit You Were Diagnosed With   
  
 Codes Comments Metastatic squamous cell carcinoma to esophagus [facility])    -  Primary ICD-10-CM: C78.89 ICD-9-CM: 197.8 Malnutrition of moderate degree (HCC)     ICD-10-CM: E44.0 ICD-9-CM: 263.0 Arthritis     ICD-10-CM: M19.90 ICD-9-CM: 716.90 Vitals BP Pulse Temp Resp Height(growth percentile) Weight(growth percentile) 133/72 (BP 1 Location: Right arm, BP Patient Position: Sitting) (!) 123 97.1 °F (36.2 °C) (Oral) 19 5' 9\" (1.753 m) 180 lb 6.4 oz (81.8 kg) SpO2 BMI Smoking Status 100% 26.64 kg/m2 Former Smoker BMI and BSA Data Body Mass Index Body Surface Area  
 26.64 kg/m 2 2 m 2 Preferred Pharmacy Pharmacy Name Phone [facility] [address], [address] [place] [address] [phone] Your Updated Medication List  
  
   
This list is accurate as of 8/7/18  2:54 PM.  Always use your most recent med list. amLODIPine 5 mg tablet Commonly known as:  Kathy Kuo Take 1 Tab by mouth daily.  
  
 nut.tx.impaired dige fxn-fiber 0.08 gram- 1.2 kcal/mL Liqd Commonly known as:  VITAL AF 1.2 MAYKEL Start with 1 can over an hour 3 times per day for 24 hours. If tolerated, advance to 2 cans over 1.5 hours 3x/day: 9am, 12pm, 6pm (follow toleration over 48 hours). Flush tube before & after with 200ml water each time (400ml total with each feeding) If patient can drink water, flushes can be re  
  
 oxyCODONE-acetaminophen  mg per tablet Commonly known as:  PERCOCET 10 Take 1 Tab by mouth every eight (8) hours as needed for Pain. Max Daily Amount: 3 Tabs. Prescriptions Printed Refills  
 oxyCODONE-acetaminophen (PERCOCET 10)  mg per tablet 0 Sig: Take 1 Tab by mouth every eight (8) hours as needed for Pain. Max Daily Amount: 3 Tabs. Class: Print Route: Oral  
  
Follow-up Instructions Return in about 4 weeks (around 9/4/2018) for EOV,  and drug screen. To-Do List   
 08/08/2018 8:00 AM  
  Appointment with [facility] PET CT RM 1 at [facility] RAD PET ([phone]) OUTSIDE FILMS  - Any outside films related to the study being scheduled should be brought with you on the day of the exam.  If this cannot be done there may be a delay in the reading of the study. MEDICATIONS  - Patient must bring a complete list of all medications currently taking to include prescriptions, over-the-counter meds, herbals, vitamins & any dietary supplements  GENERAL INSTRUCTIONS  1. Entire visit to the hospital will last about 2 Hours. 2. Eat a low carb/high protein diet the evening before your procedure. Stay away from food and drink that contains sugars the night before and ESPECIALLY the day of the test. 3. Do NOT eat food/chew gum/eat candy 6 hours prior to your procedure. The patient may drink all the plain water they want, up until the time of their appointment. 24-48 oz. of plain water is recommended within 6 hours prior to the procedure. 4. Do NOT take insulin or any other diabetic medication at least 6 hours before your procedure. 5. Take meds with water (except diabetic medications). 6. Avoid strenuous exercise 24 hours prior to your procedure. 7. Avoid chewing gum, playing video games or any activity that contains constant, repetitive movements the day of exam. 8. Do not take STEROIDS or DIURETICS (fluid pills) 12 hours prior to exam. 9. You should bring medications for pain, anxiety, or claustrophobia if you need them. If you take medications for anxiety or claustrophobia, a ride needs to come with you. 10. Materials for this procedure are ordered in advance and are time-sensitive. If you need to cancel or reschedule, you must call [phone] at least 48 hours prior to your appointment time.  Staff will contact the facility to cancel the DOSE!! 11. Bring recent CT scan results from other facilities with you. 12. Wear comfortable clothing without metal (buttons/zippers, etc) and do not wear jewelry or body piercing's. 15. No children or pregnant women should accompany/escort the patient. 14. Patient MUST wait one week (7 days) after having any procedure that uses ORAL Contrast.  (No restrictions for IV Contrast) 15. If patient has undergone chemotherapy, it is preferable but NOT MANDATORY to wait 3-4 weeks post chemotherapy to allow for any metabolic changes to subside in order to obtain the most accurate results. 16. If patient has undergone radiation therapy, it is preferable but NOT MANDATORY to wait 3-4 weeks post radiation therapy to allow for any metabolic changes in the tissue in order to obtain the most accurate results. You must call 48 hours prior to your appointment time in order to cancel or reschedule your appointment. Please call Central Scheduling at [phone]. Please provide this summary of care documentation to your next provider. Your primary care clinician is listed as [id number] [place] [address]. If you have any questions after today's visit, please call [phone].

## 2018-08-07 NOTE — PROGRESS NOTES
Room #      SUBJECTIVE:    Aravind Sellers is a 75 y.o. male who presents today for medication refill    1. Have you been to the ER, urgent care clinic since your last visit? Hospitalized since your last visit? NO    2. Have you seen or consulted any other health care providers outside of the [facility] since your last visit? Include any pap smears or colon screening. NO  When :  Reason:    Health Maintenance reviewed  Yes    Health Maintenance Due   Topic Date Due    Pneumococcal 65+ High/Highest Risk (2 of 2 - PPSV23) 01/14/2016    GLAUCOMA SCREENING Q2Y  07/14/2018    Influenza Age 5 to Adult  08/01/2018

## 2018-09-04 NOTE — PROGRESS NOTES
Daisha Larson is a 75 y.o.  male and presents with Chief Complaint Patient presents with  
 Other  
  esophageal cancer  Arthritis Subjective: Pt with esophageal cancer -- currently in remission. Using some pain meds . Still has feeding tube. Able to take po fine. Osteoarthritis and Chronic Pain: 
Patient has osteoarthritis, primarily affecting the diffuse. Symptoms onset: problem is longstanding. Rheumatological ROS: no current joint or muscle symptoms, essentially pain-free. Response to treatment plan: stable. Additional Concerns:   
 
 
 
Patient Active Problem List  
 Diagnosis Date Noted  Status post gastrostomy ([facility] [facility] 02/13/2018  Malnutrition of moderate degree ([facility] 12/13/2017  Metastatic squamous cell carcinoma to esophagus ([facility] 10/17/2017  Arthritis 06/06/2011 Current Outpatient Prescriptions Medication Sig Dispense Refill  oxyCODONE-acetaminophen (PERCOCET 10)  mg per tablet Take 1 Tab by mouth every eight (8) hours as needed for Pain. Max Daily Amount: 3 Tabs. 90 Tab 0  
 amLODIPine (NORVASC) 5 mg tablet Take 1 Tab by mouth daily. 90 Tab 4  
 nut.tx.impaired dige fxn-fiber (VITAL AF 1.2 MAYKEL) 0.08 gram- 1.2 kcal/mL liqd Start with 1 can over an hour 3 times per day for 24 hours. If tolerated, advance to 2 cans over 1.5 hours 3x/day: 9am, 12pm, 6pm (follow toleration over 48 hours). Flush tube before & after with 200ml water each time (400ml total with each feeding) If patient can drink water, flushes can be re 180 Can 12 No Known Allergies Past Medical History:  
Diagnosis Date  Arthritis  Essential hypertension, malignant 8/3/2010  Leg pain 8/3/2010  Throat cancer ([facility] [address]  Thyroid disease   
 hypothyroidism Past Surgical History:  
Procedure Laterality Date  HX GI  11/2017 G-tube placement on 11/28/17  HX VASCULAR ACCESS  11/2017  [place] EGD DELIVER THERMAL ENERGY SPHNCTR/CARDIA GERD Family History Problem Relation Age of Onset  Heart Disease Mother  Lung Disease Father Social History Substance Use Topics  Smoking status: Former Smoker Packs/day: 0.25 Types: Cigarettes Start date: 1/17/2017 Quit date: 11/22/2017  Smokeless tobacco: Never Used Comment: smokes occas  Alcohol use No  
 
 
ROS All other systems reviewed and are negative. Objective: 
Vitals:  
 09/04/18 1259 BP: 125/78 Pulse: (!) 107 Resp: 20 Temp: 97.9 °F (36.6 °C) TempSrc: Oral  
SpO2: 99% Weight: 183 lb (83 kg) Height: 5' 9\" (1.753 m) PainSc:   0 - No pain  
 
 
 
 
 
 
 
alert, well appearing, and in no distress and oriented to person, place, and time Chest - clear to auscultation, no wheezes, rales or rhonchi, symmetric air entry Heart - normal rate, regular rhythm, normal S1, S2, no murmurs, rubs, clicks or gallops Abdomen - soft, nontender, nondistended, no masses or organomegaly LABS TESTS Assessment/Plan:   
Esophageal cancer Able to po well. Will refer to youseff to see if tube can be removed 
 
artyhritsi stable doing well Lab review: labs are reviewed, up to date and normal 
 
Diagnoses and all orders for this visit: 
 
1. Hypertension, unspecified type -     AMB POC EKG ROUTINE W/ 12 LEADS, INTER & REP 
-     oxyCODONE-acetaminophen (PERCOCET 10)  mg per tablet; Take 1 Tab by mouth every eight (8) hours as needed for Pain. Max Daily Amount: 3 Tabs. -     [facility] 2. Metastatic squamous cell carcinoma to esophagus (HCC) -     AMB POC EKG ROUTINE W/ 12 LEADS, INTER & REP 
-     oxyCODONE-acetaminophen (PERCOCET 10)  mg per tablet; Take 1 Tab by mouth every eight (8) hours as needed for Pain. Max Daily Amount: 3 Tabs. -     [facility] I have discussed the diagnosis with the patient and the intended plan as seen in the above orders. The patient has received an after-visit summary and questions were answered concerning future plans. I have discussed medication side effects and warnings with the patient as well. I have reviewed the plan of care with the patient, accepted their input and they are in agreement with the treatment goals. Follow-up Disposition: 
Return in about 4 weeks (around 10/2/2018) for EOV,  and drug screen.

## 2018-09-04 NOTE — MR AVS SNAPSHOT
Akil Ego 
 
 
 [address] [facility] [id number] 
[phone] Patient: Ryan Salvador Person MRN: [id number] :1950 Visit Information Date & Time Provider Department Dept. Phone Encounter #  
 2018 12:30  [facility] [id number] [id number] Follow-up Instructions Return in about 4 weeks (around 10/2/2018) for EOV,  and drug screen. Follow-up and Disposition History Your Appointments 2018  2:00 PM  
ROUTINE [place]arkar DO [address] [address] [facility]) Appt Note: med refill; Return in about 4 weeks (around 2018) for EOV,  and drug screen [address] [facility] [address]  
  
   
 [address] Upcoming Health Maintenance Date Due Pneumococcal 65+ High/Highest Risk (2 of 2 - PPSV23) 2016 GLAUCOMA SCREENING Q2Y 2018 Influenza Age 5 to Adult 2018 MEDICARE YEARLY EXAM 2018 COLONOSCOPY 2021 DTaP/Tdap/Td series (2 - Td) 8/15/2027 Allergies as of 2018  Review Complete On: 2018 By: Natali Sarkar DO No Known Allergies Current Immunizations  Reviewed on 2017 Name Date Influenza High Dose Vaccine PF 2017, 2015 Influenza Vaccine 2014, 2013 Influenza Vaccine (Quad) PF 10/22/2017 10:21 PM  
 Influenza Vaccine Split 2011, 2010 Pneumococcal Conjugate (PCV-13) 2015 TD Vaccine 8/3/2006 Tdap 8/15/2017 ZZZ-RETIRED (DO NOT USE) Pneumococcal Vaccine (Unspecified Type) 2010 Not reviewed this visit You Were Diagnosed With   
  
 Codes Comments Hypertension, unspecified type    -  Primary ICD-10-CM: I10 
ICD-9-CM: 401.9 Metastatic squamous cell carcinoma to esophagus [facility])     ICD-10-CM: C78.89 ICD-9-CM: 197.8 Vitals BP Pulse Temp Resp Height(growth percentile) Weight(growth percentile) 125/78 (BP 1 Location: Left arm, BP Patient Position: Sitting) (!) 107 97.9 °F (36.6 °C) (Oral) 20 5' 9\" (1.753 m) 183 lb (83 kg) SpO2 BMI Smoking Status 99% 27.02 kg/m2 Former Smoker BMI and BSA Data Body Mass Index Body Surface Area  
 27.02 kg/m 2 2.01 m 2 Preferred Pharmacy Pharmacy Name Phone [facility] [address], [place] [phone] Your Updated Medication List  
  
   
This list is accurate as of 9/4/18  1:10 PM.  Always use your most recent med list. amLODIPine 5 mg tablet Commonly known as:  Carolyn Newton Take 1 Tab by mouth daily.  
  
 nut.tx.impaired dige fxn-fiber 0.08 gram- 1.2 kcal/mL Liqd Commonly known as:  VITAL AF 1.2 MAYKEL Start with 1 can over an hour 3 times per day for 24 hours. If tolerated, advance to 2 cans over 1.5 hours 3x/day: 9am, 12pm, 6pm (follow toleration over 48 hours). Flush tube before & after with 200ml water each time (400ml total with each feeding) If patient can drink water, flushes can be re  
  
 oxyCODONE-acetaminophen  mg per tablet Commonly known as:  PERCOCET 10 Take 1 Tab by mouth every eight (8) hours as needed for Pain. Max Daily Amount: 3 Tabs. Prescriptions Printed Refills  
 oxyCODONE-acetaminophen (PERCOCET 10)  mg per tablet 0 Sig: Take 1 Tab by mouth every eight (8) hours as needed for Pain. Max Daily Amount: 3 Tabs. Class: Print Route: Oral  
  
We Performed the Following AMB POC EKG ROUTINE W/ 12 LEADS, INTER & REP [69239 CPT(R)] REFERRAL TO SURGERY [[id number] Custom] Comments:  
 Please consider removing feeding tube Follow-up Instructions Return in about 4 weeks (around 10/2/2018) for EOV,  and drug screen. Referral Information Referral ID Referred By Referred To [id number] Zaria Mariee MD   
   [id number] [place] [address] [address] Phone: [phone] Fax: [phone] Visits Status Start Date End Date 1 New Request 9/4/18 9/4/19 If your referral has a status of pending review or denied, additional information will be sent to support the outcome of this decision. Please provide this summary of care documentation to your next provider. Your primary care clinician is listed as [id number] [facility]. If you have any questions after today's visit, please call [phone].

## 2018-10-02 NOTE — PROGRESS NOTES
July Li is a 75 y.o. male presents today for transition of care from [facility] on 9/26-9/28/18 for GI hemorrhage with melena. Patient reports of epigastric pain. Patient reports his pain as 4/10. Pt is in Room # 4        1. Have you been to the ER, urgent care clinic since your last visit? Hospitalized since your last visit? Yes When: 9/26-9/28/18 Where: [facility] Reason for visit: gastrointestinal hemorrhage with melena    2. Have you seen or consulted any other health care providers outside of the [address] [place] [address] since your last visit? Include any pap smears or colon screening. Yes When: 9/26/18 Where: Oncology Reason for visit: follow up     Health Maintenance reviewed - .

## 2018-10-02 NOTE — PROGRESS NOTES
Nurse Navigator Hospital Follow Up Note 
-18 Admitted at [facility] for GI bleed, stomach CA 
-18 Discharged to home  
-10/02/18 NN contact [facility] Patient's cell: [phone] Daughter Geo Deck: [phone] Ms. Aparicio (friend): [phone] Patient has given me verbal permission to speak to his daughter or Ms. Aparicio if unable to reach him Hospital Discharge Follow-Up Date/Time:  10/2/2018 1:59 PM 
 
Patient was admitted to [facility] on 18 and discharged on 18 for GI bleed, stomach CA. The physician discharge summary was available at the time of outreach. Patient was contacted within 2 business days of discharge. Top Challenges reviewed with the provider -GI bleed 
-Stomach CA, f/u with surgeon Method of communication with provider :face to face Inpatient RRAT score: n/a Was this a readmission? no  
Patient stated reason for the readmission: n/a Nurse Navigator (NN) contacted the patient during PCP office visit today to perform post hospital discharge assessment. Verified name and  with patient as identifiers. Provided introduction to self, and explanation of the Nurse Navigator role. Reviewed discharge instructions and red flags with patient who verbalized understanding. Patient given an opportunity to ask questions and does not have any further questions or concerns at this time. The patient agrees to contact the PCP office for questions related to their healthcare. NN provided contact information for future reference. Disease Specific:   N/A Summary of patient's top problems: 1. Stomach CA 
2. GI bleed 3. Home Health orders at discharge: none [address]: n/a Date of initial visit: n/a Durable Medical Equipment ordered/company: n/a Durable Medical Equipment received: n/a Barriers to care? none identitified at this time. Pt has support from his daughter and a friend Advance Care Planning: Does patient have an Advance Directive:  not reviewed with the patient Medication(s):  
New Medications at Discharge: none Changed Medications at Discharge: none Discontinued Medications at Discharge: none Medication reconciliation was performed with patient by PCP. Patient verbalizes understanding of administration of home medications. There were no barriers to obtaining medications identified at this time. Referral to Pharm D needed: no  
 
Current Outpatient Prescriptions Medication Sig  pantoprazole (PROTONIX) 40 mg tablet Take 40 mg by mouth daily.  oxyCODONE-acetaminophen (PERCOCET 10)  mg per tablet Take 1 Tab by mouth every eight (8) hours as needed for Pain. Max Daily Amount: 3 Tabs.  nut.tx.impaired dige fxn-fiber (VITAL AF 1.2 MAYKEL) 0.08 gram- 1.2 kcal/mL liqd Start with 1 can over an hour 3 times per day for 24 hours. If tolerated, advance to 2 cans over 1.5 hours 3x/day: 9am, 12pm, 6pm (follow toleration over 48 hours). Flush tube before & after with 200ml water each time (400ml total with each feeding) If patient can drink water, flushes can be re No current facility-administered medications for this visit. There are no discontinued medications. [facility] follow up appointment(s): Future Appointments Date Time Provider Damari Zuniga 10/10/2018 9:00 AM Raimundo Flanagan.DO Mike [facility] follow up appointment(s): Noted scheduled surgery with on 10/18/18. Will contact Dr. Kevin Katz office to follow up Goals  Attends follow-up appointments as directed.  Establish PCP relationships and regularly scheduled appointments.  Prevent complications post hospitalization.

## 2018-10-02 NOTE — PROGRESS NOTES
Kristin Mao is a 75 y.o.  male and presents with    Chief Complaint   Patient presents with    Other    Stomach Cancer    Esophageal Cancer    Other     malnutrition    Arthritis    Hypertension     Pt admitted to [facility] on 9/26 with GI bleed  D/c on 9/28  Plan was to admit for surgery to gastric mass ( probably cancer ) on monday 10/1  Now it is 10/2 and nothing has been done. I called Dr Christina Reyez -- finally got thru to his office -- he will call back later          Subjective:    Esophageal cancer -- on going    Malnutrition now doing oral feeds also has feedilng tube in place -- uses 1 can 2 maykel -- 4 times a day     htn  Note norvasc was d/c in hosp      Additional Concerns:          Patient Active Problem List    Diagnosis Date Noted    Status post [facility]stomy ([facility] 02/13/2018    Malnutrition of moderate degree ([facility] 12/13/2017    Metastatic squamous cell carcinoma to esophagus (HCC) 10/17/2017    Arthritis 06/06/2011     Current Outpatient Prescriptions   Medication Sig Dispense Refill    pantoprazole (PROTONIX) 40 mg tablet Take 40 mg by mouth daily.  oxyCODONE-acetaminophen (PERCOCET 10)  mg per tablet Take 1 Tab by mouth every eight (8) hours as needed for Pain. Max Daily Amount: 3 Tabs. 90 Tab 0    nut.tx.impaired dige fxn-fiber (VITAL AF 1.2 MAYKEL) 0.08 gram- 1.2 kcal/mL liqd Start with 1 can over an hour 3 times per day for 24 hours. If tolerated, advance to 2 cans over 1.5 hours 3x/day: 9am, 12pm, 6pm (follow toleration over 48 hours).    Flush tube before & after with 200ml water each time (400ml total with each feeding)  If patient can drink water, flushes can be re 180 Can 12     No Known Allergies  Past Medical History:   Diagnosis Date    Arthritis     Essential hypertension, malignant 8/3/2010    Leg pain 8/3/2010    Throat cancer ([facility] [facility]     Thyroid disease     hypothyroidism     Past Surgical History:   Procedure Laterality Date    HX GI 11/2017    G-tube placement on 11/28/17    HX VASCULAR ACCESS  11/2017    AR EGD DELIVER THERMAL ENERGY SPHNCTR/CARDIA GERD       Family History   Problem Relation Age of Onset    Heart Disease Mother     Lung Disease Father      Social History   Substance Use Topics    Smoking status: Former Smoker     Packs/day: 0.25     Types: Cigarettes     Start date: 1/17/2017     Quit date: 11/22/2017    Smokeless tobacco: Never Used      Comment: smokes occas    Alcohol use No       ROS       All other systems reviewed and are negative. Objective:  Vitals:    10/02/18 1248   BP: 125/75   Pulse: 93   Resp: 16   Temp: 98.4 °F (36.9 °C)   TempSrc: Oral   SpO2: 98%   Weight: 176 lb 3.2 oz (79.9 kg)   Height: 5' 9\" (1.753 m)   PainSc:   4   PainLoc: Abdomen                 alert, well appearing, and in no distress and oriented to person, place, and time  Chest - clear to auscultation, no wheezes, rales or rhonchi, symmetric air entry  Heart - normal rate, regular rhythm, normal S1, S2, no murmurs, rubs, clicks or gallops  Abdomen - soft, nontender, nondistended, no masses or organomegaly        LABS     TESTS      Assessment/Plan:    htn off meds ok at present    esopphageal cancer     Possible new gastric cancer -- waiting for dr Sharda Cortez to call back    malnutriion improved    arthitis ongoing        Lab review: labs are reviewed, up to date and normal    Diagnoses and all orders for this visit:    1. Gastrointestinal hemorrhage, unspecified gastrointestinal hemorrhage type    2. Status post gastrostomy ([address]    3. Arthritis    4. Malnutrition of moderate degree (HCC)    5. Metastatic squamous cell carcinoma to esophagus (HCC)    6. Gastric mass          I have discussed the diagnosis with the patient and the intended plan as seen in the above orders. The patient has received an after-visit summary and questions were answered concerning future plans.   I have discussed medication side effects and warnings with the patient as well. I have reviewed the plan of care with the patient, accepted their input and they are in agreement with the treatment goals. Follow-up Disposition:  Return in about 1 week (around 10/9/2018) for EOV. Dr Mikey Kruse called back this morning  (10/3)  Had some problems with scheduling so now patient is scheduled for surgery 10/18. Same day admission  As far as I could determine pt had no knowledge of this. I will see patient back next week and discuss with him  Will also ask nurse navigator to call and confirm dates with him  Dr Mikey Kruse will also have his office call and confirm. Hopefully pt will show , be admitted and have surgery. Will also copy medical onc with this new development as they may need to be involved.

## 2018-10-02 NOTE — MR AVS SNAPSHOT
[address] [place] [phone] 
[phone] Patient: Violet Godwin Person MRN: [id number] :1950 Visit Information Date & Time Provider Department Dept. Phone Encounter #  
 10/2/2018 12:30 PM Courtney Feng [phone] [id number] Follow-up Instructions Return in about 1 week (around 10/9/2018) for EOV. Follow-up and Disposition History Upcoming Health Maintenance Date Due Shingrix Vaccine Age 50> (1 of 2) 10/28/1999 Pneumococcal 65+ High/Highest Risk (2 of 2 - PPSV23) 2016 GLAUCOMA SCREENING Q2Y 2018 MEDICARE YEARLY EXAM 2018 COLONOSCOPY 2021 DTaP/Tdap/Td series (2 - Td) 8/15/2027 Allergies as of 10/2/2018  Review Complete On: 10/2/2018 By: Earline Justin, DO No Known Allergies Current Immunizations  Reviewed on 2017 Name Date Influenza High Dose Vaccine PF 2017, 2015 Influenza Vaccine 2014, 2013 Influenza Vaccine (Quad) PF 10/22/2017 10:21 PM  
 Influenza Vaccine Split 2011, 2010 Pneumococcal Conjugate (PCV-13) 2015 TD Vaccine 8/3/2006 Tdap 8/15/2017 ZZZ-RETIRED (DO NOT USE) Pneumococcal Vaccine (Unspecified Type) 2010 Not reviewed this visit You Were Diagnosed With   
  
 Codes Comments Gastrointestinal hemorrhage, unspecified gastrointestinal hemorrhage type    -  Primary ICD-10-CM: K92.2 ICD-9-CM: 578.9 Status post gastrostomy ([facility] [facility]     ICD-10-CM: Z93.1 ICD-9-CM: V44.1 Arthritis     ICD-10-CM: M19.90 ICD-9-CM: 716.90 Malnutrition of moderate degree (HCC)     ICD-10-CM: E44.0 ICD-9-CM: 263.0 Metastatic squamous cell carcinoma to esophagus [facility])     ICD-10-CM: C78.89 ICD-9-CM: 197.8 Gastric mass     ICD-10-CM: K31.9 ICD-9-CM: 537.9 Vitals BP Pulse Temp Resp Height(growth percentile) Weight(growth percentile) 125/75 (BP 1 Location: Right arm, BP Patient Position: Sitting) 93 98.4 °F (36.9 °C) (Oral) 16 5' 9\" (1.753 m) 176 lb 3.2 oz (79.9 kg) SpO2 BMI Smoking Status 98% 26.02 kg/m2 Former Smoker BMI and BSA Data Body Mass Index Body Surface Area 26.02 kg/m 2 1.97 m 2 Preferred Pharmacy Pharmacy Name Phone [address], [address] [facility] [phone] Your Updated Medication List  
  
   
This list is accurate as of 10/2/18  1:20 PM.  Always use your most recent med list.  
  
  
  
  
 nut.tx.impaired dige fxn-fiber 0.08 gram- 1.2 kcal/mL Liqd Commonly known as:  VITAL AF 1.2 MAYKEL Start with 1 can over an hour 3 times per day for 24 hours. If tolerated, advance to 2 cans over 1.5 hours 3x/day: 9am, 12pm, 6pm (follow toleration over 48 hours). Flush tube before & after with 200ml water each time (400ml total with each feeding) If patient can drink water, flushes can be re  
  
 oxyCODONE-acetaminophen  mg per tablet Commonly known as:  PERCOCET 10 Take 1 Tab by mouth every eight (8) hours as needed for Pain. Max Daily Amount: 3 Tabs. PROTONIX 40 mg tablet Generic drug:  pantoprazole Take 40 mg by mouth daily. Follow-up Instructions Return in about 1 week (around 10/9/2018) for EOV. Please provide this summary of care documentation to your next provider. Your primary care clinician is listed as [id number] [facility]. If you have any questions after today's visit, please call [phone].

## 2018-10-03 NOTE — PROGRESS NOTES
Follow up Patient's surgery with Dr. Ho Danielle scheduled on 10/18/18. Called and spoke to patient. Pt's identity verified x2. Patient informed of his Surgery date. Also provided him with Dr. Prince Oconnor office number ([phone]). Pt reminded of his appt with PCP Dr. Danie Rose on 10/1018 at [facility]. Discussed the importance of attending. Pt also reported he has an appt with his A provider Dr. Desire Castro on 10/08/18 but he is unable to recall the time. Pt requesting NN to find out the time of his appt. NN contacted [facility] ([phone]). Pt's identity verified x2 with the staff. Pt is scheduled on 10/08/18 at 11:45am 
 
Called back patient and informed him of his appt with A. Pt verbalized understanding. Patient read back his upcoming appts to NN. Advised patient to contact NN/ PCP office for any questions/concerns.

## 2018-10-10 NOTE — PROGRESS NOTES
Libbie Essex is a 75 y.o.  male and presents with Chief Complaint Patient presents with  
 Other  Esophageal Cancer  Other  
  malnutritino Pt in hosp with gi bleed last wk Found to have gastric cancer Probably met from esophageal cancer [facility] states [facility] is [facility] I spoke with purnima last wk Plan was to admit pt next thursday for surgery. Subjective: Additional Concerns:   
 
 
 
Patient Active Problem List  
 Diagnosis Date Noted  Status post gastrostomy ([address] 02/13/2018  Malnutrition of moderate degree ([address] 12/13/2017  Metastatic squamous cell carcinoma to esophagus ([facility] 10/17/2017  Arthritis 06/06/2011 Current Outpatient Prescriptions Medication Sig Dispense Refill  nut.tx.impaired dige fxn-fiber (VITAL AF 1.2 MAYKEL) 0.08 gram- 1.2 kcal/mL liqd 2 cans via feeding tube 3 times a day. Flush tube before & after with 200ml water each time (400ml total with each feeding) 180 Can 12  
 pantoprazole (PROTONIX) 40 mg tablet Take 40 mg by mouth daily.  oxyCODONE-acetaminophen (PERCOCET 10)  mg per tablet Take 1 Tab by mouth every eight (8) hours as needed for Pain. Max Daily Amount: 3 Tabs. 90 Tab 0 No Known Allergies Past Medical History:  
Diagnosis Date  Arthritis  Essential hypertension, malignant 8/3/2010  Leg pain 8/3/2010  Throat cancer ([facility] [address]  Thyroid disease   
 hypothyroidism Past Surgical History:  
Procedure Laterality Date  HX GI  11/2017 G-tube placement on 11/28/17  HX VASCULAR ACCESS  11/2017  MD EGD DELIVER THERMAL ENERGY SPHNCTR/CARDIA GERD Family History Problem Relation Age of Onset  Heart Disease Mother  Lung Disease Father Social History Substance Use Topics  Smoking status: Former Smoker Packs/day: 0.25 Types: Cigarettes Start date: 1/17/2017 Quit date: 11/22/2017  Smokeless tobacco: Never Used Comment: smokes occas  Alcohol use No  
 
 
ROS All other systems reviewed and are negative. Objective: 
Vitals:  
 10/10/18 0856 BP: 114/63 Pulse: 76 Resp: 20 Temp: 96.9 °F (36.1 °C) TempSrc: Oral  
SpO2: 97% Weight: 174 lb 12.8 oz (79.3 kg) Height: 5' 9\" (1.753 m) PainSc:   0 - No pain  
 
 
 
 
 
 
 
alert, well appearing, and in no distress, oriented to person, place, and time and normal appearing weight Chest - clear to auscultation, no wheezes, rales or rhonchi, symmetric air entry Heart - normal rate, regular rhythm, normal S1, S2, no murmurs, rubs, clicks or gallops Abdomen - soft, nontender, nondistended, no masses or organomegaly LABS TESTS Assessment/Plan:   
GI bleed Called and spoke with russell today -- pt had hg of 5. Received transfusions in hosp. Seen by russell monday 10/8 -- ordered 2 more U prbc for hg of 7. Gastric cancer -- pt has f/u with vanesa today to confirm surgery for gastric cancer I will see asap post op. Lab review: labs are reviewed, up to date and normal 
 
Diagnoses and all orders for this visit: 1. Status post gastrostomy ([address] 2. Metastatic squamous cell carcinoma to esophagus (HCC) 
-     nut.tx.impaired dige fxn-fiber (VITAL AF 1.2 MAYKEL) 0.08 gram- 1.2 kcal/mL liqd; 2 cans via feeding tube 3 times a day. Flush tube before & after with 200ml water each time (400ml total with each feeding) 3. Arthritis 4. Malnutrition of moderate degree ([address] 5. Gastrointestinal hemorrhage with hematemesis I have discussed the diagnosis with the patient and the intended plan as seen in the above orders. The patient has received an after-visit summary and questions were answered concerning future plans. I have discussed medication side effects and warnings with the patient as well.  I have reviewed the plan of care with the patient, accepted their input and they are in agreement with the treatment goals. Follow-up Disposition: Not on File More than 1/2 of this 45 minute visit was spent in counselling and coordination of care, as described above.

## 2018-10-10 NOTE — PROGRESS NOTES
Room # SUBJECTIVE: 
 
Micah Sellers is a 75 y.o. male who presents today for follow up for GI hemorrhage and malnutrition. He reports be tired 1. Have you been to the ER, urgent care clinic since your last visit? Hospitalized since your last visit? NO 
 
2. Have you seen or consulted any other health care providers outside of the [facility] since your last visit? Include any pap smears or colon screening. NO When : 
Reason: 
 
Health Maintenance reviewed Yes Health Maintenance Due Topic Date Due  Shingrix Vaccine Age 50> (1 of 2) 10/28/1999  Pneumococcal 65+ High/Highest Risk (2 of 2 - PPSV23) 01/14/2016  GLAUCOMA SCREENING Q2Y  07/14/2018  MEDICARE YEARLY EXAM  08/16/2018

## 2018-10-10 NOTE — PATIENT INSTRUCTIONS
1. Go see Dr Ramandeep Sandoval today 2. Planning on a blood transfusion 3. Planning on surgery next week for stomach cancer with Dr Ramandeep Sandoval

## 2018-10-10 NOTE — MR AVS SNAPSHOT
[address] [address] 
[phone] Patient: Emi Danielson Person MRN: [id number] :1950 Visit Information Date & Time Provider Department Dept. Phone Encounter #  
 10/10/2018  9:00 AM Courtney Feng [phone] [id number] Follow-up Instructions Return in about 2 weeks (around 10/24/2018). Follow-up and Disposition History Upcoming Health Maintenance Date Due Shingrix Vaccine Age 50> (1 of 2) 10/28/1999 Pneumococcal 65+ High/Highest Risk (2 of 2 - PPSV23) 2016 GLAUCOMA SCREENING Q2Y 2018 MEDICARE YEARLY EXAM 2018 COLONOSCOPY 2021 DTaP/Tdap/Td series (2 - Td) 8/15/2027 Allergies as of 10/10/2018  Review Complete On: 10/10/2018 By: Areli Arizmendi, DO No Known Allergies Current Immunizations  Reviewed on 2017 Name Date Influenza High Dose Vaccine PF 2017, 2015 Influenza Vaccine 2018 12:00 AM, 2014, 2013 Influenza Vaccine (Quad) PF 10/22/2017 10:21 PM  
 Influenza Vaccine Split 2011, 2010 Pneumococcal Conjugate (PCV-13) 2015 TD Vaccine 8/3/2006 Tdap 8/15/2017 ZZZ-RETIRED (DO NOT USE) Pneumococcal Vaccine (Unspecified Type) 2010 Not reviewed this visit You Were Diagnosed With   
  
 Codes Comments [facility]    -  Primary ICD-10-CM: Z93.1 ICD-9-CM: V44.1 Metastatic squamous cell carcinoma to esophagus [facility])     ICD-10-CM: C78.89 ICD-9-CM: 197.8 Arthritis     ICD-10-CM: M19.90 ICD-9-CM: 716.90 Malnutrition of moderate degree (HCC)     ICD-10-CM: E44.0 ICD-9-CM: 263.0 Gastrointestinal hemorrhage with hematemesis     ICD-10-CM: K92.0 ICD-9-CM: 578.0 Vitals BP Pulse Temp Resp Height(growth percentile) Weight(growth percentile) 114/63 (BP 1 Location: Right arm, BP Patient Position: Sitting) 76 96.9 °F (36.1 °C) (Oral) 20 5' 9\" (1.753 m) 174 lb 12.8 oz (79.3 kg) SpO2 BMI Smoking Status 97% 25.81 kg/m2 Former Smoker BMI and BSA Data Body Mass Index Body Surface Area  
 25.81 kg/m 2 1.96 m 2 Preferred Pharmacy Pharmacy Name Phone [address], [address] [phone] Your Updated Medication List  
  
   
This list is accurate as of 10/10/18  9:25 AM.  Always use your most recent med list.  
  
  
  
  
 nut.tx.impaired dige fxn-fiber 0.08 gram- 1.2 kcal/mL Liqd Commonly known as:  VITAL AF 1.2 MAYKEL  
2 cans via feeding tube 3 times a day. Flush tube before & after with 200ml water each time (400ml total with each feeding)  
  
 oxyCODONE-acetaminophen  mg per tablet Commonly known as:  PERCOCET 10 Take 1 Tab by mouth every eight (8) hours as needed for Pain. Max Daily Amount: 3 Tabs. PROTONIX 40 mg tablet Generic drug:  pantoprazole Take 40 mg by mouth daily. Prescriptions Printed Refills  
 nut.tx.impaired dige fxn-fiber (VITAL AF 1.2 MAYKEL) 0.08 gram- 1.2 kcal/mL liqd 12 Si cans via feeding tube 3 times a day. Flush tube before & after with 200ml water each time (400ml total with each feeding) Class: Print Follow-up Instructions Return in about 2 weeks (around 10/24/2018). Patient Instructions 1. Go see Dr Kana Ram today 2. Planning on a blood transfusion 3. Planning on surgery next week for stomach cancer with Dr Kana Ram Patient Instructions History Please provide this summary of care documentation to your next provider. Your primary care clinician is listed as [id number] [facility]. If you have any questions after today's visit, please call [phone].

## 2018-10-31 NOTE — ED PROVIDER NOTES
Sean Brandon is a 70 y.o. Male with h/o gastric ca who was recently admitted at [facility] where he has j-tube place for feedings and states the cap broke off his pump and now is unable to connect it back. Denies any removal of the j-tube, abd pain, nvd, fever. Was unable to get reconnected. The history is provided by the patient and medical records. Past Medical History:  
Diagnosis Date  Arthritis  Essential hypertension, malignant 8/3/2010  Leg pain 8/3/2010  Throat cancer ([address]  Thyroid disease   
 hypothyroidism Past Surgical History:  
Procedure Laterality Date  HX GI  2017 G-tube placement on 17  HX VASCULAR ACCESS  2017  FL EGD DELIVER THERMAL ENERGY SPHNCTR/CARDIA GERD Family History:  
Problem Relation Age of Onset  Heart Disease Mother  Lung Disease Father Social History Socioeconomic History  Marital status:  Spouse name: Not on file  Number of children: Not on file  Years of education: Not on file  Highest education level: Not on file Social Needs  Financial resource strain: Not on file  Food insecurity - worry: Not on file  Food insecurity - inability: Not on file  Transportation needs - medical: Not on file  Transportation needs - non-medical: Not on file Occupational History  Not on file Tobacco Use  Smoking status: Former Smoker Packs/day: 0.25 Types: Cigarettes Start date: 2017 Last attempt to quit: 2017 Years since quittin.9  Smokeless tobacco: Never Used  Tobacco comment: smokes occas Substance and Sexual Activity  Alcohol use: No  
  Alcohol/week: 4.8 oz Types: 8 Standard drinks or equivalent per week  Drug use: No  
 Sexual activity: No  
Other Topics Concern  Not on file Social History Narrative  Not on file ALLERGIES: Patient has no known allergies. Review of Systems Constitutional: Negative for fever. HENT: Negative for sore throat. Respiratory: Negative for shortness of breath. Cardiovascular: Negative for chest pain. Gastrointestinal: Negative for abdominal pain. Genitourinary: Negative for difficulty urinating. Musculoskeletal: Negative for gait problem. Allergic/Immunologic: Negative for food allergies. Neurological: Negative for syncope. Psychiatric/Behavioral: Positive for sleep disturbance. Vitals:  
 10/31/18 0049 BP: 112/70 Pulse: 90 Resp: 12 Temp: 97.7 °F (36.5 °C) SpO2: 99% Weight: 77.1 kg (170 lb) Height: 5' 9\" (1.753 m) Physical Exam  
Constitutional: He appears well-developed and well-nourished. No distress. HENT:  
Head: Normocephalic and atraumatic. Eyes: EOM are normal. Pupils are equal, round, and reactive to light. Neck: Neck supple. Cardiovascular: Normal rate, regular rhythm and normal heart sounds. Pulmonary/Chest: Effort normal.  
Abdominal: Soft. There is no tenderness. Skin: He is not diaphoretic. Nursing note and vitals reviewed. MDM Procedures Vitals: 
Patient Vitals for the past 12 hrs: 
 Temp Pulse Resp BP SpO2  
10/31/18 0049 97.7 °F (36.5 °C) 90 12 112/70 99 % Medications ordered:  
Medications - No data to display Lab findings: 
No results found for this or any previous visit (from the past 12 hour(s)). EKG interpretation by ED Physician: X-Ray, CT or other radiology findings or impressions: No orders to display Progress notes, Consult notes or additional Procedure notes: We do not have the particular j-tube set up here. There is no indication for admission, transfer or emergent consult. D/w pt this and need to call surgeon's office in the morning to arrange fix of j-tube I have discussed with patient and/or family/sig other the results, interpretation of any imaging if performed, suspected diagnosis and treatment plan to include instructions regarding the diagnoses listed to which understanding was expressed with all questions answered Reevaluation of patient:  
stable Disposition: 
Diagnosis: 1. Malfunction of jejunostomy tube ([address] Disposition: home Follow-up Information Follow up With Specialties Details Why Contact Lizzette Fairbanks MD Surgery Call office this morning to inform of issues with j-tube connector [address] [facility] [address] 
[phone] Medication List  
  
ASK your doctor about these medications JEVITY 1.5 MAYKEL PO 
  
nut.tx.impaired dige fxn-fiber 0.08 gram- 1.2 kcal/mL Liqd Commonly known as:  VITAL AF 1.2 MAYKEL 
2 cans via feeding tube 3 times a day. Flush tube before & after with 200ml water each time (400ml total with each feeding) 
  
oxyCODONE-acetaminophen  mg per tablet Commonly known as:  PERCOCET 10 Take 1 Tab by mouth every eight (8) hours as needed for Pain. Max Daily Amount: 3 Tabs. PROTONIX 40 mg tablet Generic drug:  pantoprazole

## 2018-10-31 NOTE — ED TRIAGE NOTES
Pt arrived by EMS. Per pt, feeding tube placed on 18th Oct and part of feeding tube that connect to feeding pump is broken.

## 2018-10-31 NOTE — PROGRESS NOTES
Nurse Navigator Hospital Follow Up Note 
-10/18/18 scheduled admission at [facility] for stomach cancer, s/p gastrectomy 
-10/28/18 Discharged to home with home health Called and spoke to patient. Patient's identity verified x2. Per patient, he is doing \"alright. \" Noted ED visit this morning regarding disconnected j-tube. Inquired if patient went to Dr. Sima Rodarte office, pt reports \"no,\" states home health nurse is currently with him assisting with his j-tube. Pt requests to be called at later time. I verbalized understanding. Offered a PCP appt for tomorrow, pt declilned. Pt preferred next week. PCP appt rescheduled to 11/05/18 at 12:30pm.  
 
 
Will call patient again at later time

## 2018-10-31 NOTE — ED NOTES
I have reviewed discharge instructions with the patient. The patient verbalized understanding. Patient armband removed and given to patient to take home. Patient was informed of the privacy risks if armband lost or stolen. Pt ambulated out of the ED.

## 2018-11-01 NOTE — PROGRESS NOTES
Nurse Navigator Hospital Follow Up Note 
-18 Admitted at [facility] for GI bleed, stomach CA 
-18 Discharged to home  
-10/02/18 PCP f/u, NN contact 
-10/18/18 scheduled admission at [facility] for stomach cancer, s/p gastrectomy 
-10/28/18 Discharged to home with home health 
-10/31/18 NN contact Hospital Discharge Follow-Up Date/Time:  2018 3:39 PM 
 
Patient was admitted to [facility] on 10/18/18 and discharged on 10/28/18 for scheduled admission for stomach CA, s/p gastrectomy. The physician discharge summary was available at the time of outreach. Patient was contacted 10/31/18. Top Challenges reviewed with the provider  
-stomach CA, s/p gastrectomy, f/u with Surgeon Dr. Edwin Garza 18 
-peg tube, continue nocturnal feedings as ordered 
-pain management Method of communication with provider :chart routing Inpatient RRAT score: n/a Was this a readmission? yes Patient stated reason for the readmission: scheduled admission for gastrectomy Nurse Navigator (NN) contacted the patient by telephone to perform post hospital discharge assessment. Verified name and  with patient as identifiers. Provided introduction to self, and explanation of the Nurse Navigator role. Pt reports he is doing \"fine. \" Pt denies any CP, SOB, bleeding, swelling, HA/dizziness. Pt reports that the [facility] nurse assisted him with the peg tube. Pt reports he was able to use the peg tube last night without any problems. Reviewed discharge instructions and red flags with patient who verbalized understanding. Patient given an opportunity to ask questions and does not have any further questions or concerns at this time. The patient agrees to contact the PCP office for questions related to their healthcare. NN provided contact information for future reference. Disease Specific:   N/A Summary of patient's top problems: 1. Stomach CA 
2. S/p gastrectomy 3. Esophageal CA Home Health orders at discharge: PT, OT, SN [address]: [facility] Date of initial visit: 10/29/18 Durable Medical Equipment ordered/company: n/a Durable Medical Equipment received: n/a Barriers to care? transportation , pt relies on others to bring him to his appts. Advance Care Planning:  
Does patient have an Advance Directive:  not reviewed with the patient Medication(s):  
New Medications at Discharge: roxicodone Changed Medications at Discharge: n/a Discontinued Medications at Discharge: percocet, protonix, TF Vital 
 
Medication reconciliation was performed with patient, who verbalizes understanding of administration of home medications. There were no barriers to obtaining medications identified at this time. Referral to Pharm D needed: no  
 
Current Outpatient Medications Medication Sig  
 metoprolol tartrate (LOPRESSOR) 100 mg IR tablet Take 100 mg by mouth two (2) times a day.  multivitamin,tx-iron-ca-min (THERA-M W/ IRON) 27-0.4 mg tab Take 1 Tab by mouth daily.  lactose-reduced food/fiber (JEVITY 1.5 MAYKEL PO) 80 mL by PEG Tube route nightly. 80 ml/hr from 4326-8356. provides 1680 kcal, 72 g protein, 851 ml free water.  nut.tx.impaired dige fxn-fiber (VITAL AF 1.2 MAYKEL) 0.08 gram- 1.2 kcal/mL liqd 2 cans via feeding tube 3 times a day. Flush tube before & after with 200ml water each time (400ml total with each feeding)  pantoprazole (PROTONIX) 40 mg tablet Take 40 mg by mouth daily.  oxyCODONE-acetaminophen (PERCOCET 10)  mg per tablet Take 1 Tab by mouth every eight (8) hours as needed for Pain. Max Daily Amount: 3 Tabs. No current facility-administered medications for this visit. There are no discontinued medications. [facility] follow up appointment(s):  
Future Appointments Date Time Provider Damari Zuniga 11/2/2018 To Be Determined Caleb Chaves RN [address] [facility]  
 11/5/2018 To Be Determined Leticia Kowalski, RN [address]  
11/5/2018 12:00 PM Yung Bergerid, PTA [address] [facility]  
11/5/2018 12:30 PM Katrina Naik., DO [facility] SARAH SCHED  
11/6/2018 To Be Determined Kiera Battles, CNA [address] [facility]  
11/7/2018 To Be Determined Leticia Kowalski, RN [address]  
11/8/2018 To Be Determined Kiera Battles, CNA [address] [facility] [address]  
11/8/2018 To Be Determined Yung Bergerid, PTA [address] [facility]  
11/9/2018 To Be Determined Leticia Kowalski, RN [address] [facility] [address]  
11/12/2018 To Be Determined Leticia Kowalski, RN [address] [place] [address] Street  
11/13/2018 To Be Determined Kiera Batharrys, CNA [address] [place] [address] Street  
11/13/2018 To Be Determined Kaitlynna James, PTA [address] [facility]  
11/13/2018 10:30 AM Katrina Rudolpha., DO [facility] SARAH SCHED  
11/15/2018 To Be Determined Kiera Battles, CNA [address] [facility]  
11/15/2018 To Be Determined Antwon Lowe [address] [place] [address]  
11/16/2018 To Be Determined Leticia Kowalski, RN [address] [place] [address] Street  
11/19/2018 To Be Determined Leticia Kowalski, RN [address] [place] [address] Street  
11/20/2018 To Be Determined Kiera Battles, CNA [address] [facility] [address]  
11/22/2018 To Be Determined Kierawanda Worley, CNA [address] [facility] Pt aware of his PCP appt and he will arrange transportation. Non-[facility] follow up appointment(s): Surgeon Dr. Candace Farmer 11/07/18 at 1:45pm. Pt informed and he is aware of his appt. Pt was provided with their office number [facility]:  n/a  
 
Goals  Attends follow-up appointments as directed.  Establish PCP relationships and regularly scheduled appointments.  Prevent complications post hospitalization. -pt will follow up with PCP as scheduled 
-pt will follow up with Dr. Kandi Verde office as schedule 
-pt will take all prescribed meds as directed 
-pt will be able to do nocturnal feeding without any problems  Understands red flags post discharge. -pt will recognize red flags (CP, SOB, bleeding, fever/chills, NVD, swelling, increase in pain, numbness/tingling, confusion) and report to PCP/surgeons' office or seek emergency assistance

## 2018-11-01 NOTE — Clinical Note
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 Went to Dr. Altman Done office to reconnect. Home health nurse also visited him to assist patient at home. Pt reports he has had no further problems with the peg tube-PCP f/u scheduled on 11/05-Dr. Stephanie Duncan f/u appt scheduled on 11/07-Also, there were a few changes in his medication listThank you!

## 2018-11-05 NOTE — PROGRESS NOTES
Room #      SUBJECTIVE:    Mandi Garcia Person is a 70 y.o. male who presents today for ED follow up for malfunction of J-tube and surgery    1. Have you been to the ER, urgent care clinic since your last visit? Hospitalized since your last visit? YES    2. Have you seen or consulted any other health care providers outside of the [address] [place] [address] since your last visit? Include any pap smears or colon screening. YES  When :  Reason:    Health Maintenance reviewed Yes    Health Maintenance Due   Topic Date Due    Shingrix Vaccine Age 49> (1 of 2) 10/28/1999    Pneumococcal 65+ High/Highest Risk (2 of 2 - PPSV23) 01/14/2016    GLAUCOMA SCREENING Q2Y  07/14/2018    MEDICARE YEARLY EXAM  08/16/2018

## 2018-11-05 NOTE — PROGRESS NOTES
Follow up Received message from PCP today: Pt was discharged 1 wk ago from [facility].  Doesn't know how to do tube feeds.  Note he is illiterate. Apparently a neighbor is coming over and hooking him up at night. He isn't happy with this becasue there is noone to unhook it or restart the pump if it stops. Please investigate who is in charge of nutrition I will have him f/u here in 1 wk - sooner if a problem  
that patient unsure about his tube feedings and how to operate the pump. Noted patient has [facility], last visit was 11/03/18. Contacted [employer] and informed her regarding patient and his tube feedings. Requested if Fred Roger can re-assess/re-eval, re-educate or provide additional education for patient's tube feeding. Per Joel Cleaning she will reach out to the staff and get back with me  
 
 
 
-attempted to reach patient's friend, Ms. Aparicio (previously given verbal permission to contact). Unable to reach. Left message with NN contact info 
 
-attempted to reach patient's daughter, Elgin Fisher (listed on PHI). Unable to reach. Left message on voicemail with NN contact into 
 
-attempted to reach patient. Unable to reach. Left message on voicemail with NN contact info

## 2018-11-05 NOTE — PROGRESS NOTES
Follow up 
 
 
 
-Called Dr. Prince Hill office and spoke to Pato santamaria. Pt's identity verified x2. Introduced self. Per Pato santamaria, Dr. Prince Hill nurse was currently in another office and will let her know regarding the tube feedings/peg tube orders. NN contact info provided 
 
 
-received call from Dr. Arsalan Garvey. She informed me that Dr. Sagrario Michel has previously signed off on tube feedings for the patient before. She also stated  they have previously received home health order and should not be a problem for Dr. Sagrario Michel to sign off it. I have advised her to contact NN/PCP office for any concerns/questions or any changes. She verbalized understanding. Contact info provided PCP notified regarding outreach to Dr. Prince Hill office and enteral feedings/HH orders

## 2018-11-05 NOTE — PROGRESS NOTES
Marco Espinoza is a 70 y.o.  male and presents with    Chief Complaint   Patient presents with    Esophageal Cancer    Hypertension    Other     malnutrition           Subjective:  Admit to hosp with gastric mass 10/18  D/c 10/28  Readmitted overnight because he was having difficulty with feeds  NN  10/31    Here with Leandro Soto friend     1. Esophageal cancer followed by Nella Holliday   2. chroic pain   3. htn on meds     Additional Concerns:          Patient Active Problem List    Diagnosis Date Noted    Status post [facility]stomy [facility] 02/13/2018    Malnutrition of moderate degree ([facility] 12/13/2017    Metastatic squamous cell carcinoma to esophagus (HCC) 10/17/2017    Arthritis 06/06/2011     Current Outpatient Medications   Medication Sig Dispense Refill    oxyCODONE-acetaminophen (PERCOCET 10)  mg per tablet Take 1 Tab by mouth every eight (8) hours as needed for Pain. Max Daily Amount: 3 Tabs. 90 Tab 0    metoprolol tartrate (LOPRESSOR) 100 mg IR tablet Take 100 mg by mouth two (2) times a day.  multivitamin,tx-iron-ca-min (THERA-M W/ IRON) 27-0.4 mg tab Take 1 Tab by mouth daily.  lactose-reduced food/fiber (JEVITY 1.5 MAYKEL PO) 80 mL by PEG Tube route nightly. 80 ml/hr from 2636-7358. provides 1680 kcal, 72 g protein, 851 ml free water.  pantoprazole (PROTONIX) 40 mg tablet Take 40 mg by mouth daily.        No Known Allergies  Past Medical History:   Diagnosis Date    Arthritis     Essential hypertension, malignant 8/3/2010    Leg pain 8/3/2010    Throat [facility] ([facility]     Thyroid disease     hypothyroidism     Past Surgical History:   Procedure Laterality Date    HX GI  11/2017    G-tube placement on 11/28/17    HX VASCULAR ACCESS  11/2017    AR EGD DELIVER THERMAL ENERGY SPHNCTR/CARDIA GERD       Family History   Problem Relation Age of Onset    Heart Disease Mother     Lung Disease Father      Social History     Tobacco Use    Smoking status: Former Smoker Packs/day: 0.25     Types: Cigarettes     Start date: 2017     Last attempt to quit: 2017     Years since quittin.9    Smokeless tobacco: Never Used    Tobacco comment: smokes occas   Substance Use Topics    Alcohol use: No     Alcohol/week: 4.8 oz     Types: 8 Standard drinks or equivalent per week       ROS       All other systems reviewed and are negative. Objective:  Vitals:    18 1245   BP: 116/75   Pulse: 89   Resp: 19   Temp: 97.9 °F (36.6 °C)   TempSrc: Oral   SpO2: 98%   Weight: 167 lb 6.4 oz (75.9 kg)   Height: 5' 9\" (1.753 m)   PainSc:   4   PainLoc: Throat                 alert, well appearing, and in no distress, oriented to person, place, and time and normal appearing weight  Chest - clear to auscultation, no wheezes, rales or rhonchi, symmetric air entry  Heart - normal rate, regular rhythm, normal S1, S2, no murmurs, rubs, clicks or gallops  Abdomen - soft, nontender, nondistended, no masses or organomegaly        LABS     TESTS      Assessment/Plan:    He has esophageal cancer with mets to stomach -- s/p surgical exicison    He is having a lot of trouble with the new feeding tube. Apparently supposed to hook up to pump from 6 PM to 8 AM-- lot of trouble he doesn't know how to use pump and hook himself up     arthritsi ongoing    htn stable      Lab review: labs are reviewed, up to date and normal    Diagnoses and all orders for this visit:    1. Metastatic squamous cell carcinoma to esophagus (HCC)  -     oxyCODONE-acetaminophen (PERCOCET 10)  mg per tablet; Take 1 Tab by mouth every eight (8) hours as needed for Pain. Max Daily Amount: 3 Tabs. 2. Hypertension, unspecified type  -     oxyCODONE-acetaminophen (PERCOCET 10)  mg per tablet; Take 1 Tab by mouth every eight (8) hours as needed for Pain. Max Daily Amount: 3 Tabs. I have discussed the diagnosis with the patient and the intended plan as seen in the above orders.   The patient has received an after-visit summary and questions were answered concerning future plans. I have discussed medication side effects and warnings with the patient as well. I have reviewed the plan of care with the patient, accepted their input and they are in agreement with the treatment goals. Follow-up Disposition:  Return in about 1 week (around 11/12/2018) for EOV. More than 1/2 of this 45 minute visit was spent in counselling and coordination of care, as described above.

## 2018-11-06 NOTE — TELEPHONE ENCOUNTER
Spoke with Home Health Nurse with [facility] who reports that patient needs nutritionist or dietician tube feedings. Patient currently has all the tube feedings set up with dme supplier- [address]. Caller reports that [address] can send a nutritionist and or dietician to visit patient in the evening to teach patient how to operate the equipment. Referral order sent to MD for nutritionist or dietician tube feeding education. Will fax signed order to [address] at [phone].

## 2018-11-12 NOTE — PROGRESS NOTES
Room #      SUBJECTIVE:    Greta Dickey Person is a 70 y.o. male who presents today for for follow up for feeding. Patient was referred for teaching with [address] and scheduled for appointment with Dr Danny Croft on 11/14/2018 patient scheduled to change feeds to Bolus feeding    1. Have you been to the ER, urgent care clinic since your last visit? Hospitalized since your last visit? NO    2. Have you seen or consulted any other health care providers outside of the [address] [place] [address] since your last visit? Include any pap smears or colon screening. NO  When :  Reason:    Health Maintenance reviewed Yes    Health Maintenance Due   Topic Date Due    Shingrix Vaccine Age 49> (1 of 2) 10/28/1999    Pneumococcal 65+ High/Highest Risk (2 of 2 - PPSV23) 01/14/2016    GLAUCOMA SCREENING Q2Y  07/14/2018    MEDICARE YEARLY EXAM  08/16/2018

## 2018-11-12 NOTE — PROGRESS NOTES
Gris Morrissey is a 70 y.o.  male and presents with    Chief Complaint   Patient presents with    Other     malnutrition    Other     esophageal cancer    Arthritis           Subjective: Additional Concerns: pt is post gastric surgeon for metastatic esophageal cancer  Here for f/u  Having a lot of trouble with night time pump  I had referred estrellita out to help him use the pump at night. Still having trouble  He states dr Makenzie Chavez will be switching him back to bolus feeds during the day. abd appears to be healing well  Tolerating new feeds with jevity              Patient Active Problem List    Diagnosis Date Noted    Status post gastrostomy ([facility] 02/13/2018    Malnutrition of moderate degree ([facility] 12/13/2017    Metastatic squamous cell carcinoma to esophagus (HCC) 10/17/2017    Arthritis 06/06/2011     Current Outpatient Medications   Medication Sig Dispense Refill    oxyCODONE-acetaminophen (PERCOCET 10)  mg per tablet Take 1 Tab by mouth every eight (8) hours as needed for Pain. Max Daily Amount: 3 Tabs. 90 Tab 0    metoprolol tartrate (LOPRESSOR) 100 mg IR tablet Take 100 mg by mouth two (2) times a day.  multivitamin,tx-iron-ca-min (THERA-M W/ IRON) 27-0.4 mg tab Take 1 Tab by mouth daily.  lactose-reduced food/fiber (JEVITY 1.5 MAYKEL PO) 80 mL by PEG Tube route nightly. 80 ml/hr from 0255-4102. provides 1680 kcal, 72 g protein, 851 ml free water.  pantoprazole (PROTONIX) 40 mg tablet Take 40 mg by mouth daily.        No Known Allergies  Past Medical History:   Diagnosis Date    Arthritis     Essential hypertension, malignant 8/3/2010    Leg pain 8/3/2010    Throat cancer ([facility]     Thyroid disease     hypothyroidism     Past Surgical History:   Procedure Laterality Date    HX GI  11/2017    G-tube placement on 11/28/17    HX VASCULAR ACCESS  11/2017    IN EGD DELIVER THERMAL ENERGY SPHNCTR/CARDIA GERD       Family History   Problem Relation Age of Onset    Heart Disease Mother     Lung Disease Father      Social History     Tobacco Use    Smoking status: Former Smoker     Packs/day: 0.25     Types: Cigarettes     Start date: 2017     Last attempt to quit: 2017     Years since quittin.9    Smokeless tobacco: Never Used    Tobacco comment: smokes occas   Substance Use Topics    Alcohol use: No     Alcohol/week: 4.8 oz     Types: 8 Standard drinks or equivalent per week       ROS       All other systems reviewed and are negative. Objective:  Vitals:    18 1506   BP: 138/76   Pulse: 74   Resp: 20   Temp: 98 °F (36.7 °C)   TempSrc: Oral   SpO2: 100%   Weight: 168 lb 3.2 oz (76.3 kg)   Height: 5' 9\" (1.753 m)   PainSc:   0 - No pain                 alert, well appearing, and in no distress and oriented to person, place, and time  Chest - clear to auscultation, no wheezes, rales or rhonchi, symmetric air entry  Heart - normal rate, regular rhythm, normal S1, S2, no murmurs, rubs, clicks or gallops  Abdomen - soft, nontender, nondistended, no masses or organomegaly        LABS     TESTS      Assessment/Plan:    Esophageal cancer with mets to stomach  Having ongoing trouble with tube feeds  Apparently going to convert to bolus  Chronic pain secoanry to cancer under tx  bp stable at this time. Lab review: orders written for new lab studies as appropriate; see orders    Diagnoses and all orders for this visit:    1. Metastatic squamous cell carcinoma to esophagus (HCC)  -     CBC WITH AUTOMATED DIFF; Future  -     METABOLIC PANEL, COMPREHENSIVE; Future  -     LIPID PANEL; Future  -     URINALYSIS W/ RFLX MICROSCOPIC; Future  -     TSH 3RD GENERATION; Future  -     GGT; Future  -     VITAMIN B12 & FOLATE; Future  -     VITAMIN D, 1, 25 DIHYDROXY; Future  -     FERRITIN; Future    2. Malnutrition of moderate degree (HCC)  -     CBC WITH AUTOMATED DIFF; Future  -     METABOLIC PANEL, COMPREHENSIVE;  Future  -     LIPID PANEL; Future  -     URINALYSIS W/ RFLX MICROSCOPIC; Future  -     TSH 3RD GENERATION; Future  -     GGT; Future  -     VITAMIN B12 & FOLATE; Future  -     VITAMIN D, 1, 25 DIHYDROXY; Future  -     FERRITIN; Future    3. Status post gastrostomy ([address]  -     CBC WITH AUTOMATED DIFF; Future  -     METABOLIC PANEL, COMPREHENSIVE; Future  -     LIPID PANEL; Future  -     URINALYSIS W/ RFLX MICROSCOPIC; Future  -     TSH 3RD GENERATION; Future  -     GGT; Future  -     VITAMIN B12 & FOLATE; Future  -     VITAMIN D, 1, 25 DIHYDROXY; Future  -     FERRITIN; Future    4. Arthritis  -     CBC WITH AUTOMATED DIFF; Future  -     METABOLIC PANEL, COMPREHENSIVE; Future  -     LIPID PANEL; Future  -     URINALYSIS W/ RFLX MICROSCOPIC; Future  -     TSH 3RD GENERATION; Future  -     GGT; Future  -     VITAMIN B12 & FOLATE; Future  -     VITAMIN D, 1, 25 DIHYDROXY; Future  -     FERRITIN; Future    5. Abnormal levels of other serum enzymes   -     GGT; Future    6. Disorder of bone density and structure, unspecified   -     VITAMIN D, 1, 25 DIHYDROXY; Future          I have discussed the diagnosis with the patient and the intended plan as seen in the above orders. The patient has received an after-visit summary and questions were answered concerning future plans. I have discussed medication side effects and warnings with the patient as well. I have reviewed the plan of care with the patient, accepted their input and they are in agreement with the treatment goals. Follow-up Disposition:  Return in about 2 weeks (around 11/26/2018) for EOV, labs prior.

## 2018-11-12 NOTE — PROGRESS NOTES
Follow up Reviewed notes available, including home health encounters 
 
 -Called Dr. Jonathan Rivera office ([phone]). Pt's identity verified x2. Spoke to nurse Eloy Sanches. Informed her regarding request order from dietician for bolus feeding. She stated order can be faxed to their office and will let Dr. Dejon Morales aware. Fax number [phone]. Pt attended his appt on 11/08/18 and is scheduled to return to their office this Wed 11/14/18 at 1pm. I have requested for the most recent office notes to be faxed to PCP office. PCP fax number provided 
 
 
-Contacted PCP office and info passed along to Dr. Calvin Ibarra -Info passed along to Home health liaison Iraj Mishra via secure email

## 2018-11-28 NOTE — TELEPHONE ENCOUNTER
Patient called in for a refill on oxyCODONE-acetaminophen (PERCOCET 10)  mg per tablet.   Please assist.

## 2018-11-29 NOTE — TELEPHONE ENCOUNTER
Called patient to let him know that he will need to schedule an appointment to be seen for medication PERCOCET to be refill as this is a controlled medication and cannot be sent to the pharmacy directly. Left a message on vm for him to contact office at his earliest convenience.

## 2018-12-06 NOTE — PROGRESS NOTES
Room #      SUBJECTIVE:    Deborah Sellers is a 70 y.o. male who presents today for hospital follow up    1. Have you been to the ER, urgent care clinic since your last visit? Hospitalized since your last visit? Yes    2. Have you seen or consulted any other health care providers outside of the [address] [place] [address] since your last visit? Include any pap smears or colon screening. YES  When :  Reason:    Health Maintenance reviewed Yes    Health Maintenance Due   Topic Date Due    Shingrix Vaccine Age 49> (1 of 2) 10/28/1999    Pneumococcal 65+ High/Highest Risk (2 of 2 - PPSV23) 01/14/2016    GLAUCOMA SCREENING Q2Y  07/14/2018    MEDICARE YEARLY EXAM  08/16/2018

## 2018-12-06 NOTE — PROGRESS NOTES
Estelle Tena is a 70 y.o.  male and presents with    Chief Complaint   Patient presents with    Other     throat ca    Other     malnutrition    Hypertension    Arthritis           Subjective:    Metastatic esophageal ca with mets to stomach    Malnutrition    arthritsi     Ongoing htn      Additional Concerns: ouat of meds -- told to come here for refills         Patient Active Problem List    Diagnosis Date Noted    Status post gastrostomy ([facility] 02/13/2018    Malnutrition of moderate degree ([facility] [facility] 12/13/2017    Metastatic squamous cell carcinoma to esophagus ([facility] [address] 10/17/2017    Arthritis 06/06/2011     Current Outpatient Medications   Medication Sig Dispense Refill    oxyCODONE-acetaminophen (PERCOCET 10)  mg per tablet Take 1 Tab by mouth every eight (8) hours as needed for Pain. Max Daily Amount: 3 Tabs. 90 Tab 0    metoprolol tartrate (LOPRESSOR) 100 mg IR tablet Take 1 Tab by mouth two (2) times a day. 180 Tab 4    Lactose-Free Food with Fiber (JEVITY 1.5 MAYKEL) 0.06 gram-1.5 kcal/mL liqd 6 Cans by PEG Tube route nightly. 80 ml/hr from 9868-5501. provides 1680 kcal, 72 g protein, 851 ml free water. 180 Can 12    multivitamin,tx-iron-ca-min (THERA-M W/ IRON) 27-0.4 mg tab Take 1 Tab by mouth daily.        No Known Allergies  Past Medical History:   Diagnosis Date    Arthritis     Essential hypertension, malignant 8/3/2010    Leg pain 8/3/2010    Throat cancer ([facility] [facility]     Thyroid disease     hypothyroidism     Past Surgical History:   Procedure Laterality Date    HX GI  11/2017    G-tube placement on 11/28/17    HX VASCULAR ACCESS  11/2017    ID EGD DELIVER THERMAL ENERGY SPHNCTR/CARDIA GERD       Family History   Problem Relation Age of Onset    Heart Disease Mother     Lung Disease Father      Social History     Tobacco Use    Smoking status: Former Smoker     Packs/day: 0.25     Types: Cigarettes     Start date: 1/17/2017     Last attempt to quit: 11/22/2017 Years since quittin.0    Smokeless tobacco: Never Used    Tobacco comment: smokes occas   Substance Use Topics    Alcohol use: No     Alcohol/week: 4.8 oz     Types: 8 Standard drinks or equivalent per week       ROS       All other systems reviewed and are negative. Objective:  Vitals:    18 1312   BP: 151/85   Pulse: 77   Resp: 16   Temp: 97.5 °F (36.4 °C)   SpO2: 98%   PainSc:   2                 alert, well appearing, and in no distress and oriented to person, place, and time  Chest - clear to auscultation, no wheezes, rales or rhonchi, symmetric air entry  Heart - normal rate, regular rhythm, normal S1, S2, no murmurs, rubs, clicks or gallops        LABS   Component      Latest Ref Rng & Units 2018           2:49 PM  2:48 PM  2:48 PM  2:48 PM   WBC      4.6 - 13.2 K/uL       RBC      4.70 - 5.50 M/uL       HGB      13.0 - 16.0 g/dL       HCT      36.0 - 48.0 %       MCV      74.0 - 97.0 FL       MCH      24.0 - 34.0 PG       MCHC      31.0 - 37.0 g/dL       RDW      11.6 - 14.5 %       PLATELET      653 - 075 K/uL       MPV      9.2 - 11.8 FL       NEUTROPHILS      40 - 73 %       LYMPHOCYTES      21 - 52 %       MONOCYTES      3 - 10 %       EOSINOPHILS      0 - 5 %       BASOPHILS      0 - 2 %       ABS. NEUTROPHILS      1.8 - 8.0 K/UL       ABS. LYMPHOCYTES      0.9 - 3.6 K/UL       ABS. MONOCYTES      0.05 - 1.2 K/UL       ABS. EOSINOPHILS      0.0 - 0.4 K/UL       ABS.  BASOPHILS      0.0 - 0.1 K/UL       DF             Sodium      136 - 145 mmol/L       Potassium      3.5 - 5.5 mmol/L       Chloride      100 - 108 mmol/L       CO2      21 - 32 mmol/L       Anion gap      3.0 - 18 mmol/L       Glucose      74 - 99 mg/dL       BUN      7.0 - 18 MG/DL       Creatinine      0.6 - 1.3 MG/DL       BUN/Creatinine ratio      12 - 20         GFR est AA      >60 ml/min/1.73m2       GFR est non-AA      >60 ml/min/1.73m2       Calcium      8.5 - 10.1 MG/DL Bilirubin, total      0.2 - 1.0 MG/DL       ALT (SGPT)      16 - 61 U/L       AST      15 - 37 U/L       Alk. phosphatase      45 - 117 U/L       Protein, total      6.4 - 8.2 g/dL       Albumin      3.4 - 5.0 g/dL       Globulin      2.0 - 4.0 g/dL       A-G Ratio      0.8 - 1.7         Color       YELLOW      Appearance       CLEAR      Specific gravity      1.005 - 1.030   1.016      pH (UA)      5.0 - 8.0   8.0      Protein      NEG mg/dL TRACE (A)      Glucose      NEG mg/dL NEGATIVE      Ketone      NEG mg/dL NEGATIVE      Bilirubin      NEG   NEGATIVE      Blood      NEG   NEGATIVE      Urobilinogen      0.2 - 1.0 EU/dL 1.0      Nitrites      NEG   NEGATIVE      Leukocyte Esterase      NEG   TRACE (A)      Cholesterol, total      <200 MG/DL       Triglyceride      <150 MG/DL       HDL Cholesterol      40 - 60 MG/DL       LDL, calculated      0 - 100 MG/DL       VLDL, calculated      MG/DL       CHOL/HDL Ratio      0 - 5.0         Vitamin B12      211 - 911 pg/mL       Folate      3.10 - 17.50 ng/mL       TSH      0.36 - 3.74 uIU/mL       GGT      0 - 65 IU/L   69 (H)    Calcitriol (Vit D 1, 25 di-OH)      19.9 - 79.3 pg/mL  49.1     Ferritin      8 - 388 NG/ML    165     Component      Latest Ref Rng & Units 11/26/2018 11/26/2018 11/26/2018 11/26/2018           2:48 PM  2:48 PM  2:48 PM  2:48 PM   WBC      4.6 - 13.2 K/uL       RBC      4.70 - 5.50 M/uL       HGB      13.0 - 16.0 g/dL       HCT      36.0 - 48.0 %       MCV      74.0 - 97.0 FL       MCH      24.0 - 34.0 PG       MCHC      31.0 - 37.0 g/dL       RDW      11.6 - 14.5 %       PLATELET      324 - 987 K/uL       MPV      9.2 - 11.8 FL       NEUTROPHILS      40 - 73 %       LYMPHOCYTES      21 - 52 %       MONOCYTES      3 - 10 %       EOSINOPHILS      0 - 5 %       BASOPHILS      0 - 2 %       ABS. NEUTROPHILS      1.8 - 8.0 K/UL       ABS. LYMPHOCYTES      0.9 - 3.6 K/UL       ABS. MONOCYTES      0.05 - 1.2 K/UL       ABS.  EOSINOPHILS      0.0 - 0.4 K/UL       ABS. BASOPHILS      0.0 - 0.1 K/UL       DF             Sodium      136 - 145 mmol/L    139   Potassium      3.5 - 5.5 mmol/L    4.1   Chloride      100 - 108 mmol/L    104   CO2      21 - 32 mmol/L    29   Anion gap      3.0 - 18 mmol/L    6   Glucose      74 - 99 mg/dL    78   BUN      7.0 - 18 MG/DL    20 (H)   Creatinine      0.6 - 1.3 MG/DL    0.86   BUN/Creatinine ratio      12 - 20      23 (H)   GFR est AA      >60 ml/min/1.73m2    >60   GFR est non-AA      >60 ml/min/1.73m2    >60   Calcium      8.5 - 10.1 MG/DL    9.3   Bilirubin, total      0.2 - 1.0 MG/DL    0.3   ALT (SGPT)      16 - 61 U/L    113 (H)   AST      15 - 37 U/L    48 (H)   Alk.  phosphatase      45 - 117 U/L    67   Protein, total      6.4 - 8.2 g/dL    8.7 (H)   Albumin      3.4 - 5.0 g/dL    3.7   Globulin      2.0 - 4.0 g/dL    5.0 (H)   A-G Ratio      0.8 - 1.7      0.7 (L)   Color             Appearance             Specific gravity      1.005 - 1.030         pH (UA)      5.0 - 8.0         Protein      NEG mg/dL       Glucose      NEG mg/dL       Ketone      NEG mg/dL       Bilirubin      NEG         Blood      NEG         Urobilinogen      0.2 - 1.0 EU/dL       Nitrites      NEG         Leukocyte Esterase      NEG         Cholesterol, total      <200 MG/DL   147    Triglyceride      <150 MG/DL   96    HDL Cholesterol      40 - 60 MG/DL   52    LDL, calculated      0 - 100 MG/DL   75.8    VLDL, calculated      MG/DL   19.2    CHOL/HDL Ratio      0 - 5.0     2.8    Vitamin B12      211 - 911 pg/mL 671      Folate      3.10 - 17.50 ng/mL >20.0 (H)      TSH      0.36 - 3.74 uIU/mL  2.40     GGT      0 - 65 IU/L       Calcitriol (Vit D 1, 25 di-OH)      19.9 - 79.3 pg/mL       Ferritin      8 - 388 NG/ML         Component      Latest Ref Rng & Units 11/26/2018           2:48 PM   WBC      4.6 - 13.2 K/uL 5.6   RBC      4.70 - 5.50 M/uL 4.23 (L)   HGB      13.0 - 16.0 g/dL 10.7 (L)   HCT      36.0 - 48.0 % 34.4 (L)   MCV 74.0 - 97.0 FL 81.3   MCH      24.0 - 34.0 PG 25.3   MCHC      31.0 - 37.0 g/dL 31.1   RDW      11.6 - 14.5 % 20.2 (H)   PLATELET      598 - 691 K/uL 332   MPV      9.2 - 11.8 FL 9.8   NEUTROPHILS      40 - 73 % 64   LYMPHOCYTES      21 - 52 % 26   MONOCYTES      3 - 10 % 5   EOSINOPHILS      0 - 5 % 5   BASOPHILS      0 - 2 % 0   ABS. NEUTROPHILS      1.8 - 8.0 K/UL 3.6   ABS. LYMPHOCYTES      0.9 - 3.6 K/UL 1.4   ABS. MONOCYTES      0.05 - 1.2 K/UL 0.3   ABS. EOSINOPHILS      0.0 - 0.4 K/UL 0.3   ABS. BASOPHILS      0.0 - 0.1 K/UL 0.0   DF       AUTOMATED   Sodium      136 - 145 mmol/L    Potassium      3.5 - 5.5 mmol/L    Chloride      100 - 108 mmol/L    CO2      21 - 32 mmol/L    Anion gap      3.0 - 18 mmol/L    Glucose      74 - 99 mg/dL    BUN      7.0 - 18 MG/DL    Creatinine      0.6 - 1.3 MG/DL    BUN/Creatinine ratio      12 - 20      GFR est AA      >60 ml/min/1.73m2    GFR est non-AA      >60 ml/min/1.73m2    Calcium      8.5 - 10.1 MG/DL    Bilirubin, total      0.2 - 1.0 MG/DL    ALT (SGPT)      16 - 61 U/L    AST      15 - 37 U/L    Alk.  phosphatase      45 - 117 U/L    Protein, total      6.4 - 8.2 g/dL    Albumin      3.4 - 5.0 g/dL    Globulin      2.0 - 4.0 g/dL    A-G Ratio      0.8 - 1.7      Color          Appearance          Specific gravity      1.005 - 1.030      pH (UA)      5.0 - 8.0      Protein      NEG mg/dL    Glucose      NEG mg/dL    Ketone      NEG mg/dL    Bilirubin      NEG      Blood      NEG      Urobilinogen      0.2 - 1.0 EU/dL    Nitrites      NEG      Leukocyte Esterase      NEG      Cholesterol, total      <200 MG/DL    Triglyceride      <150 MG/DL    HDL Cholesterol      40 - 60 MG/DL    LDL, calculated      0 - 100 MG/DL    VLDL, calculated      MG/DL    CHOL/HDL Ratio      0 - 5.0      Vitamin B12      211 - 911 pg/mL    Folate      3.10 - 17.50 ng/mL    TSH      0.36 - 3.74 uIU/mL    GGT      0 - 65 IU/L    Calcitriol (Vit D 1, 25 di-OH)      19.9 - 79.3 pg/mL Ferritin      8 - 388 NG/ML      TESTS      Assessment/Plan:    Hypertension - well controlled, stable  Metastatic esophageal ca  Ongoing  Malnutrition nasir best      Lab review:     Diagnoses and all orders for this visit:    1. Metastatic squamous cell carcinoma to esophagus (HCC)  -     oxyCODONE-acetaminophen (PERCOCET 10)  mg per tablet; Take 1 Tab by mouth every eight (8) hours as needed for Pain. Max Daily Amount: 3 Tabs. 2. Hypertension, unspecified type  -     oxyCODONE-acetaminophen (PERCOCET 10)  mg per tablet; Take 1 Tab by mouth every eight (8) hours as needed for Pain. Max Daily Amount: 3 Tabs. Other orders  -     metoprolol tartrate (LOPRESSOR) 100 mg IR tablet; Take 1 Tab by mouth two (2) times a day. -     Lactose-Free Food with Fiber (JEVITY 1.5 MAYKEL) 0.06 gram-1.5 kcal/mL liqd; 6 Cans by PEG Tube route nightly. 80 ml/hr from 2275-1168. provides 1680 kcal, 72 g protein, 851 ml free water. I have discussed the diagnosis with the patient and the intended plan as seen in the above orders. The patient has received an after-visit summary and questions were answered concerning future plans. I have discussed medication side effects and warnings with the patient as well. I have reviewed the plan of care with the patient, accepted their input and they are in agreement with the treatment goals.      Follow-up Disposition: Not on File

## 2019-01-01 ENCOUNTER — HOME CARE VISIT (OUTPATIENT)
Dept: HOSPICE | Facility: HOSPICE | Age: 69
End: 2019-01-01
Payer: MEDICARE

## 2019-01-01 ENCOUNTER — HOSPICE ADMISSION (OUTPATIENT)
Dept: HOSPICE | Facility: HOSPICE | Age: 69
End: 2019-01-01
Payer: MEDICARE

## 2019-01-01 ENCOUNTER — APPOINTMENT (OUTPATIENT)
Dept: GENERAL RADIOLOGY | Age: 69
DRG: 374 | End: 2019-01-01
Attending: HOSPITALIST
Payer: MEDICARE

## 2019-01-01 ENCOUNTER — ANESTHESIA EVENT (OUTPATIENT)
Dept: ENDOSCOPY | Age: 69
DRG: 374 | End: 2019-01-01
Payer: MEDICARE

## 2019-01-01 ENCOUNTER — ANESTHESIA (OUTPATIENT)
Dept: ENDOSCOPY | Age: 69
DRG: 374 | End: 2019-01-01
Payer: MEDICARE

## 2019-01-01 ENCOUNTER — HOSPITAL ENCOUNTER (INPATIENT)
Age: 69
LOS: 1 days | Discharge: ANOTHER HOSPITAL | DRG: 374 | End: 2019-03-11
Attending: EMERGENCY MEDICINE | Admitting: HOSPITALIST
Payer: MEDICARE

## 2019-01-01 ENCOUNTER — OFFICE VISIT (OUTPATIENT)
Dept: FAMILY MEDICINE CLINIC | Age: 69
End: 2019-01-01

## 2019-01-01 ENCOUNTER — PATIENT OUTREACH (OUTPATIENT)
Dept: FAMILY MEDICINE CLINIC | Age: 69
End: 2019-01-01

## 2019-01-01 ENCOUNTER — HOSPITAL ENCOUNTER (OUTPATIENT)
Dept: LAB | Age: 69
Discharge: HOME | DRG: 374 | End: 2019-03-07
Payer: MEDICARE

## 2019-01-01 ENCOUNTER — APPOINTMENT (OUTPATIENT)
Dept: GENERAL RADIOLOGY | Age: 69
DRG: 374 | End: 2019-01-01
Attending: EMERGENCY MEDICINE
Payer: MEDICARE

## 2019-01-01 ENCOUNTER — TELEPHONE (OUTPATIENT)
Dept: FAMILY MEDICINE CLINIC | Age: 69
End: 2019-01-01

## 2019-01-01 ENCOUNTER — HOME CARE VISIT (OUTPATIENT)
Dept: SCHEDULING | Facility: HOME HEALTH | Age: 69
End: 2019-01-01
Payer: MEDICARE

## 2019-01-01 VITALS
HEART RATE: 66 BPM | RESPIRATION RATE: 17 BRPM | DIASTOLIC BLOOD PRESSURE: 76 MMHG | HEIGHT: 69 IN | TEMPERATURE: 98.1 F | BODY MASS INDEX: 22.63 KG/M2 | OXYGEN SATURATION: 100 % | SYSTOLIC BLOOD PRESSURE: 109 MMHG | WEIGHT: 152.8 LBS

## 2019-01-01 VITALS
BODY MASS INDEX: 24.59 KG/M2 | HEART RATE: 108 BPM | DIASTOLIC BLOOD PRESSURE: 66 MMHG | SYSTOLIC BLOOD PRESSURE: 107 MMHG | HEIGHT: 69 IN | TEMPERATURE: 98.7 F | WEIGHT: 166 LBS | RESPIRATION RATE: 18 BRPM | OXYGEN SATURATION: 95 %

## 2019-01-01 VITALS
WEIGHT: 165 LBS | RESPIRATION RATE: 19 BRPM | TEMPERATURE: 97.2 F | SYSTOLIC BLOOD PRESSURE: 121 MMHG | HEART RATE: 64 BPM | OXYGEN SATURATION: 100 % | RESPIRATION RATE: 18 BRPM | WEIGHT: 170.8 LBS | SYSTOLIC BLOOD PRESSURE: 123 MMHG | HEIGHT: 69 IN | BODY MASS INDEX: 25.3 KG/M2 | OXYGEN SATURATION: 100 % | DIASTOLIC BLOOD PRESSURE: 81 MMHG | BODY MASS INDEX: 24.44 KG/M2 | HEIGHT: 69 IN | TEMPERATURE: 97.4 F | DIASTOLIC BLOOD PRESSURE: 78 MMHG | HEART RATE: 82 BPM

## 2019-01-01 VITALS
OXYGEN SATURATION: 99 % | DIASTOLIC BLOOD PRESSURE: 64 MMHG | SYSTOLIC BLOOD PRESSURE: 132 MMHG | RESPIRATION RATE: 16 BRPM | HEART RATE: 79 BPM

## 2019-01-01 VITALS
SYSTOLIC BLOOD PRESSURE: 122 MMHG | DIASTOLIC BLOOD PRESSURE: 70 MMHG | WEIGHT: 148 LBS | HEART RATE: 97 BPM | RESPIRATION RATE: 20 BRPM | TEMPERATURE: 97.2 F | BODY MASS INDEX: 21.86 KG/M2

## 2019-01-01 VITALS
OXYGEN SATURATION: 99 % | HEART RATE: 87 BPM | RESPIRATION RATE: 16 BRPM | TEMPERATURE: 98.1 F | BODY MASS INDEX: 23.91 KG/M2 | DIASTOLIC BLOOD PRESSURE: 83 MMHG | WEIGHT: 161.4 LBS | HEIGHT: 69 IN | SYSTOLIC BLOOD PRESSURE: 128 MMHG

## 2019-01-01 VITALS
HEART RATE: 67 BPM | DIASTOLIC BLOOD PRESSURE: 71 MMHG | TEMPERATURE: 98.2 F | RESPIRATION RATE: 19 BRPM | HEIGHT: 69 IN | SYSTOLIC BLOOD PRESSURE: 140 MMHG | BODY MASS INDEX: 24.62 KG/M2 | WEIGHT: 166.2 LBS | OXYGEN SATURATION: 97 %

## 2019-01-01 VITALS
HEIGHT: 69 IN | HEART RATE: 74 BPM | DIASTOLIC BLOOD PRESSURE: 63 MMHG | RESPIRATION RATE: 19 BRPM | WEIGHT: 163.6 LBS | SYSTOLIC BLOOD PRESSURE: 99 MMHG | BODY MASS INDEX: 24.23 KG/M2 | TEMPERATURE: 98 F | OXYGEN SATURATION: 100 %

## 2019-01-01 DIAGNOSIS — C78.89 METASTATIC SQUAMOUS CELL CARCINOMA TO ESOPHAGUS (HCC): ICD-10-CM

## 2019-01-01 DIAGNOSIS — I10 HYPERTENSION, UNSPECIFIED TYPE: ICD-10-CM

## 2019-01-01 DIAGNOSIS — R74.8 ABNORMAL LIVER ENZYMES: ICD-10-CM

## 2019-01-01 DIAGNOSIS — R13.19 ESOPHAGEAL DYSPHAGIA: ICD-10-CM

## 2019-01-01 DIAGNOSIS — D64.9 ANEMIA, UNSPECIFIED TYPE: ICD-10-CM

## 2019-01-01 DIAGNOSIS — E44.0 MALNUTRITION OF MODERATE DEGREE (HCC): Primary | ICD-10-CM

## 2019-01-01 DIAGNOSIS — M19.90 ARTHRITIS: ICD-10-CM

## 2019-01-01 DIAGNOSIS — E44.0 MALNUTRITION OF MODERATE DEGREE (HCC): ICD-10-CM

## 2019-01-01 DIAGNOSIS — E46 MALNUTRITION, UNSPECIFIED TYPE (HCC): ICD-10-CM

## 2019-01-01 DIAGNOSIS — E05.90 HYPERTHYROIDISM: ICD-10-CM

## 2019-01-01 DIAGNOSIS — I10 ESSENTIAL HYPERTENSION, MALIGNANT: ICD-10-CM

## 2019-01-01 DIAGNOSIS — K92.2 UPPER GI BLEED: Primary | ICD-10-CM

## 2019-01-01 DIAGNOSIS — C78.89 METASTATIC SQUAMOUS CELL CARCINOMA TO ESOPHAGUS (HCC): Primary | ICD-10-CM

## 2019-01-01 LAB
1,25(OH)2D3 SERPL-MCNC: 42.5 PG/ML (ref 19.9–79.3)
ABO + RH BLD: NORMAL
ALBUMIN SERPL-MCNC: 2.8 G/DL (ref 3.4–5)
ALBUMIN SERPL-MCNC: 3.1 G/DL (ref 3.4–5)
ALBUMIN/GLOB SERPL: 0.5 {RATIO} (ref 0.8–1.7)
ALBUMIN/GLOB SERPL: 0.6 {RATIO} (ref 0.8–1.7)
ALP SERPL-CCNC: 91 U/L (ref 45–117)
ALP SERPL-CCNC: 98 U/L (ref 45–117)
ALT SERPL-CCNC: 38 U/L (ref 16–61)
ALT SERPL-CCNC: 44 U/L (ref 16–61)
ANION GAP SERPL CALC-SCNC: 7 MMOL/L (ref 3–18)
ANION GAP SERPL CALC-SCNC: 8 MMOL/L (ref 3–18)
ANION GAP SERPL CALC-SCNC: 9 MMOL/L (ref 3–18)
APPEARANCE UR: CLEAR
APTT PPP: 31 SEC (ref 23–36.4)
AST SERPL-CCNC: 22 U/L (ref 15–37)
AST SERPL-CCNC: 48 U/L (ref 15–37)
ATRIAL RATE: 80 BPM
BACTERIA URNS QL MICRO: NEGATIVE /HPF
BASOPHILS # BLD: 0 K/UL (ref 0–0.1)
BASOPHILS NFR BLD: 0 % (ref 0–2)
BILIRUB SERPL-MCNC: 0.3 MG/DL (ref 0.2–1)
BILIRUB SERPL-MCNC: 0.5 MG/DL (ref 0.2–1)
BILIRUB UR QL: NEGATIVE
BLD PROD TYP BPU: NORMAL
BLOOD GROUP ANTIBODIES SERPL: NORMAL
BPU ID: NORMAL
BUN SERPL-MCNC: 18 MG/DL (ref 7–18)
BUN SERPL-MCNC: 22 MG/DL (ref 7–18)
BUN SERPL-MCNC: 24 MG/DL (ref 7–18)
BUN/CREAT SERPL: 22 (ref 12–20)
BUN/CREAT SERPL: 26 (ref 12–20)
BUN/CREAT SERPL: 33 (ref 12–20)
CALCIUM SERPL-MCNC: 8.4 MG/DL (ref 8.5–10.1)
CALCIUM SERPL-MCNC: 9.2 MG/DL (ref 8.5–10.1)
CALCIUM SERPL-MCNC: 9.3 MG/DL (ref 8.5–10.1)
CALCULATED P AXIS, ECG09: 55 DEGREES
CALCULATED R AXIS, ECG10: 3 DEGREES
CALCULATED T AXIS, ECG11: -10 DEGREES
CALLED TO:,BCALL1: NORMAL
CHLORIDE SERPL-SCNC: 103 MMOL/L (ref 100–108)
CHLORIDE SERPL-SCNC: 106 MMOL/L (ref 100–108)
CHLORIDE SERPL-SCNC: 107 MMOL/L (ref 100–108)
CHOLEST SERPL-MCNC: 110 MG/DL
CO2 SERPL-SCNC: 23 MMOL/L (ref 21–32)
CO2 SERPL-SCNC: 25 MMOL/L (ref 21–32)
CO2 SERPL-SCNC: 27 MMOL/L (ref 21–32)
COLOR UR: YELLOW
CREAT SERPL-MCNC: 0.67 MG/DL (ref 0.6–1.3)
CREAT SERPL-MCNC: 0.81 MG/DL (ref 0.6–1.3)
CREAT SERPL-MCNC: 0.93 MG/DL (ref 0.6–1.3)
CROSSMATCH RESULT,%XM: NORMAL
DIAGNOSIS, 93000: NORMAL
DIFFERENTIAL METHOD BLD: ABNORMAL
EOSINOPHIL # BLD: 0.1 K/UL (ref 0–0.4)
EOSINOPHIL NFR BLD: 1 % (ref 0–5)
EPITH CASTS URNS QL MICRO: NORMAL /LPF (ref 0–5)
ERYTHROCYTE [DISTWIDTH] IN BLOOD BY AUTOMATED COUNT: 15.8 % (ref 11.6–14.5)
ERYTHROCYTE [DISTWIDTH] IN BLOOD BY AUTOMATED COUNT: 16 % (ref 11.6–14.5)
ERYTHROCYTE [DISTWIDTH] IN BLOOD BY AUTOMATED COUNT: 16.2 % (ref 11.6–14.5)
FERRITIN SERPL-MCNC: 240 NG/ML (ref 8–388)
FOLATE SERPL-MCNC: >20 NG/ML (ref 3.1–17.5)
GGT SERPL-CCNC: 98 IU/L (ref 0–65)
GLOBULIN SER CALC-MCNC: 4.8 G/DL (ref 2–4)
GLOBULIN SER CALC-MCNC: 5.7 G/DL (ref 2–4)
GLUCOSE SERPL-MCNC: 113 MG/DL (ref 74–99)
GLUCOSE SERPL-MCNC: 121 MG/DL (ref 74–99)
GLUCOSE SERPL-MCNC: 135 MG/DL (ref 74–99)
GLUCOSE UR STRIP.AUTO-MCNC: NEGATIVE MG/DL
HCT VFR BLD AUTO: 20.9 % (ref 36–48)
HCT VFR BLD AUTO: 27.3 % (ref 36–48)
HCT VFR BLD AUTO: 29.7 % (ref 36–48)
HCT VFR BLD AUTO: 30.1 % (ref 36–48)
HCT VFR BLD AUTO: 33.6 % (ref 36–48)
HDLC SERPL-MCNC: 39 MG/DL (ref 40–60)
HDLC SERPL: 2.8 {RATIO} (ref 0–5)
HGB BLD-MCNC: 10.6 G/DL (ref 13–16)
HGB BLD-MCNC: 6.6 G/DL (ref 13–16)
HGB BLD-MCNC: 8.9 G/DL (ref 13–16)
HGB BLD-MCNC: 9 G/DL (ref 13–16)
HGB BLD-MCNC: 9.4 G/DL (ref 13–16)
HGB UR QL STRIP: NEGATIVE
INR PPP: 1.1 (ref 0.8–1.2)
KETONES UR QL STRIP.AUTO: NEGATIVE MG/DL
LDLC SERPL CALC-MCNC: 56.6 MG/DL (ref 0–100)
LEUKOCYTE ESTERASE UR QL STRIP.AUTO: NEGATIVE
LIPID PROFILE,FLP: ABNORMAL
LYMPHOCYTES # BLD: 0.9 K/UL (ref 0.9–3.6)
LYMPHOCYTES # BLD: 1 K/UL (ref 0.9–3.6)
LYMPHOCYTES # BLD: 1.4 K/UL (ref 0.9–3.6)
LYMPHOCYTES NFR BLD: 10 % (ref 21–52)
LYMPHOCYTES NFR BLD: 11 % (ref 21–52)
LYMPHOCYTES NFR BLD: 16 % (ref 21–52)
MAGNESIUM SERPL-MCNC: 2.1 MG/DL (ref 1.6–2.6)
MCH RBC QN AUTO: 23.7 PG (ref 24–34)
MCH RBC QN AUTO: 24 PG (ref 24–34)
MCH RBC QN AUTO: 24.3 PG (ref 24–34)
MCHC RBC AUTO-ENTMCNC: 31.2 G/DL (ref 31–37)
MCHC RBC AUTO-ENTMCNC: 31.6 G/DL (ref 31–37)
MCHC RBC AUTO-ENTMCNC: 32.6 G/DL (ref 31–37)
MCV RBC AUTO: 74.6 FL (ref 74–97)
MCV RBC AUTO: 75.2 FL (ref 74–97)
MCV RBC AUTO: 76.8 FL (ref 74–97)
MONOCYTES # BLD: 0.5 K/UL (ref 0.05–1.2)
MONOCYTES # BLD: 0.7 K/UL (ref 0.05–1.2)
MONOCYTES # BLD: 0.8 K/UL (ref 0.05–1.2)
MONOCYTES NFR BLD: 5 % (ref 3–10)
MONOCYTES NFR BLD: 7 % (ref 3–10)
MONOCYTES NFR BLD: 9 % (ref 3–10)
NEUTS SEG # BLD: 6.6 K/UL (ref 1.8–8)
NEUTS SEG # BLD: 7 K/UL (ref 1.8–8)
NEUTS SEG # BLD: 8 K/UL (ref 1.8–8)
NEUTS SEG NFR BLD: 78 % (ref 40–73)
NEUTS SEG NFR BLD: 79 % (ref 40–73)
NEUTS SEG NFR BLD: 82 % (ref 40–73)
NITRITE UR QL STRIP.AUTO: NEGATIVE
P-R INTERVAL, ECG05: 130 MS
PH UR STRIP: 5.5 [PH] (ref 5–8)
PLATELET # BLD AUTO: 288 K/UL (ref 135–420)
PLATELET # BLD AUTO: 359 K/UL (ref 135–420)
PLATELET # BLD AUTO: 367 K/UL (ref 135–420)
PMV BLD AUTO: 10.1 FL (ref 9.2–11.8)
PMV BLD AUTO: 9.4 FL (ref 9.2–11.8)
PMV BLD AUTO: 9.6 FL (ref 9.2–11.8)
POTASSIUM SERPL-SCNC: 4.4 MMOL/L (ref 3.5–5.5)
POTASSIUM SERPL-SCNC: 4.6 MMOL/L (ref 3.5–5.5)
POTASSIUM SERPL-SCNC: 4.9 MMOL/L (ref 3.5–5.5)
PROT SERPL-MCNC: 7.9 G/DL (ref 6.4–8.2)
PROT SERPL-MCNC: 8.5 G/DL (ref 6.4–8.2)
PROT UR STRIP-MCNC: ABNORMAL MG/DL
PROTHROMBIN TIME: 13.5 SEC (ref 11.5–15.2)
Q-T INTERVAL, ECG07: 386 MS
QRS DURATION, ECG06: 72 MS
QTC CALCULATION (BEZET), ECG08: 445 MS
RBC # BLD AUTO: 2.78 M/UL (ref 4.7–5.5)
RBC # BLD AUTO: 3.66 M/UL (ref 4.7–5.5)
RBC # BLD AUTO: 3.92 M/UL (ref 4.7–5.5)
RBC #/AREA URNS HPF: 0 /HPF (ref 0–5)
SODIUM SERPL-SCNC: 135 MMOL/L (ref 136–145)
SODIUM SERPL-SCNC: 139 MMOL/L (ref 136–145)
SODIUM SERPL-SCNC: 141 MMOL/L (ref 136–145)
SP GR UR REFRACTOMETRY: 1.02 (ref 1–1.03)
SPECIMEN EXP DATE BLD: NORMAL
STATUS OF UNIT,%ST: NORMAL
TRIGL SERPL-MCNC: 72 MG/DL (ref ?–150)
TSH SERPL DL<=0.05 MIU/L-ACNC: 0.64 UIU/ML (ref 0.36–3.74)
UNIT DIVISION, %UDIV: 0
UROBILINOGEN UR QL STRIP.AUTO: 0.2 EU/DL (ref 0.2–1)
VENTRICULAR RATE, ECG03: 80 BPM
VIT B12 SERPL-MCNC: 299 PG/ML (ref 211–911)
VLDLC SERPL CALC-MCNC: 14.4 MG/DL
WBC # BLD AUTO: 8.3 K/UL (ref 4.6–13.2)
WBC # BLD AUTO: 9 K/UL (ref 4.6–13.2)
WBC # BLD AUTO: 9.8 K/UL (ref 4.6–13.2)
WBC URNS QL MICRO: NORMAL /HPF (ref 0–4)

## 2019-01-01 PROCEDURE — 65270000029 HC RM PRIVATE

## 2019-01-01 PROCEDURE — T4522 ADULT SIZE BRIEF/DIAPER MED: HCPCS

## 2019-01-01 PROCEDURE — 76040000007: Performed by: INTERNAL MEDICINE

## 2019-01-01 PROCEDURE — 80061 LIPID PANEL: CPT

## 2019-01-01 PROCEDURE — 93005 ELECTROCARDIOGRAM TRACING: CPT

## 2019-01-01 PROCEDURE — 74018 RADEX ABDOMEN 1 VIEW: CPT

## 2019-01-01 PROCEDURE — 77030022875 HC PRB AR PLSM COAG ERBE -C: Performed by: INTERNAL MEDICINE

## 2019-01-01 PROCEDURE — 74011250636 HC RX REV CODE- 250/636

## 2019-01-01 PROCEDURE — 36415 COLL VENOUS BLD VENIPUNCTURE: CPT

## 2019-01-01 PROCEDURE — 82977 ASSAY OF GGT: CPT

## 2019-01-01 PROCEDURE — 0651 HSPC ROUTINE HOME CARE

## 2019-01-01 PROCEDURE — G0299 HHS/HOSPICE OF RN EA 15 MIN: HCPCS

## 2019-01-01 PROCEDURE — A9270 NON-COVERED ITEM OR SERVICE: HCPCS

## 2019-01-01 PROCEDURE — T4526 ADULT SIZE PULL-ON MED: HCPCS

## 2019-01-01 PROCEDURE — 85025 COMPLETE CBC W/AUTO DIFF WBC: CPT

## 2019-01-01 PROCEDURE — 3331090004 HSPC SERVICE INTENSITY ADD-ON

## 2019-01-01 PROCEDURE — 76060000032 HC ANESTHESIA 0.5 TO 1 HR: Performed by: INTERNAL MEDICINE

## 2019-01-01 PROCEDURE — 82652 VIT D 1 25-DIHYDROXY: CPT

## 2019-01-01 PROCEDURE — 74011250636 HC RX REV CODE- 250/636: Performed by: EMERGENCY MEDICINE

## 2019-01-01 PROCEDURE — 84443 ASSAY THYROID STIM HORMONE: CPT

## 2019-01-01 PROCEDURE — G0155 HHCP-SVS OF CSW,EA 15 MIN: HCPCS

## 2019-01-01 PROCEDURE — 30233N1 TRANSFUSION OF NONAUTOLOGOUS RED BLOOD CELLS INTO PERIPHERAL VEIN, PERCUTANEOUS APPROACH: ICD-10-PCS | Performed by: HOSPITALIST

## 2019-01-01 PROCEDURE — HOSPICE MEDICATION HC HH HOSPICE MEDICATION

## 2019-01-01 PROCEDURE — 77030008771 HC TU NG SALEM SUMP -A

## 2019-01-01 PROCEDURE — C9113 INJ PANTOPRAZOLE SODIUM, VIA: HCPCS | Performed by: EMERGENCY MEDICINE

## 2019-01-01 PROCEDURE — 74011000258 HC RX REV CODE- 258: Performed by: EMERGENCY MEDICINE

## 2019-01-01 PROCEDURE — 74011250636 HC RX REV CODE- 250/636: Performed by: HOSPITALIST

## 2019-01-01 PROCEDURE — 80048 BASIC METABOLIC PNL TOTAL CA: CPT

## 2019-01-01 PROCEDURE — 86923 COMPATIBILITY TEST ELECTRIC: CPT

## 2019-01-01 PROCEDURE — 77030021352 HC CBL LD SYS DISP COVD -B

## 2019-01-01 PROCEDURE — 81001 URINALYSIS AUTO W/SCOPE: CPT

## 2019-01-01 PROCEDURE — 36430 TRANSFUSION BLD/BLD COMPNT: CPT

## 2019-01-01 PROCEDURE — 85730 THROMBOPLASTIN TIME PARTIAL: CPT

## 2019-01-01 PROCEDURE — HHS10554 SHAMPOO/BODY WASH 8 OZ ALOE VESTA

## 2019-01-01 PROCEDURE — 85610 PROTHROMBIN TIME: CPT

## 2019-01-01 PROCEDURE — 0W3P8ZZ CONTROL BLEEDING IN GASTROINTESTINAL TRACT, VIA NATURAL OR ARTIFICIAL OPENING ENDOSCOPIC: ICD-10-PCS | Performed by: INTERNAL MEDICINE

## 2019-01-01 PROCEDURE — 82607 VITAMIN B-12: CPT

## 2019-01-01 PROCEDURE — 80053 COMPREHEN METABOLIC PANEL: CPT

## 2019-01-01 PROCEDURE — 85018 HEMOGLOBIN: CPT

## 2019-01-01 PROCEDURE — 99285 EMERGENCY DEPT VISIT HI MDM: CPT

## 2019-01-01 PROCEDURE — 82728 ASSAY OF FERRITIN: CPT

## 2019-01-01 PROCEDURE — 83735 ASSAY OF MAGNESIUM: CPT

## 2019-01-01 PROCEDURE — T4541 LARGE DISPOSABLE UNDERPAD: HCPCS

## 2019-01-01 PROCEDURE — 86900 BLOOD TYPING SEROLOGIC ABO: CPT

## 2019-01-01 PROCEDURE — 96365 THER/PROPH/DIAG IV INF INIT: CPT

## 2019-01-01 PROCEDURE — 77030020268 HC MISC GENERAL SUPPLY: Performed by: INTERNAL MEDICINE

## 2019-01-01 PROCEDURE — P9016 RBC LEUKOCYTES REDUCED: HCPCS

## 2019-01-01 PROCEDURE — A4927 NON-STERILE GLOVES: HCPCS

## 2019-01-01 PROCEDURE — 3336500001 HSPC ELECTION

## 2019-01-01 PROCEDURE — A6250 SKIN SEAL PROTECT MOISTURIZR: HCPCS

## 2019-01-01 PROCEDURE — 71045 X-RAY EXAM CHEST 1 VIEW: CPT

## 2019-01-01 PROCEDURE — 96366 THER/PROPH/DIAG IV INF ADDON: CPT

## 2019-01-01 PROCEDURE — 77030032490 HC SLV COMPR SCD KNE COVD -B

## 2019-01-01 RX ORDER — OXYCODONE AND ACETAMINOPHEN 10; 325 MG/1; MG/1
1 TABLET ORAL
Qty: 90 TAB | Refills: 0 | Status: SHIPPED | OUTPATIENT
Start: 2019-01-01 | End: 2019-01-01 | Stop reason: SDUPTHER

## 2019-01-01 RX ORDER — PANTOPRAZOLE SODIUM 40 MG/10ML
80 INJECTION, POWDER, LYOPHILIZED, FOR SOLUTION INTRAVENOUS
Status: COMPLETED | OUTPATIENT
Start: 2019-01-01 | End: 2019-01-01

## 2019-01-01 RX ORDER — METOPROLOL TARTRATE 100 MG/1
100 TABLET ORAL 2 TIMES DAILY
Qty: 180 TAB | Refills: 4 | Status: SHIPPED | OUTPATIENT
Start: 2019-01-01

## 2019-01-01 RX ORDER — PROPOFOL 10 MG/ML
INJECTION, EMULSION INTRAVENOUS AS NEEDED
Status: DISCONTINUED | OUTPATIENT
Start: 2019-01-01 | End: 2019-01-01 | Stop reason: HOSPADM

## 2019-01-01 RX ORDER — SODIUM CHLORIDE, SODIUM LACTATE, POTASSIUM CHLORIDE, CALCIUM CHLORIDE 600; 310; 30; 20 MG/100ML; MG/100ML; MG/100ML; MG/100ML
INJECTION, SOLUTION INTRAVENOUS
Status: DISCONTINUED | OUTPATIENT
Start: 2019-01-01 | End: 2019-01-01 | Stop reason: HOSPADM

## 2019-01-01 RX ORDER — METOPROLOL TARTRATE 50 MG/1
100 TABLET ORAL 2 TIMES DAILY
Status: DISCONTINUED | OUTPATIENT
Start: 2019-01-01 | End: 2019-01-01 | Stop reason: HOSPADM

## 2019-01-01 RX ORDER — SODIUM CHLORIDE 9 MG/ML
150 INJECTION, SOLUTION INTRAVENOUS ONCE
Status: COMPLETED | OUTPATIENT
Start: 2019-01-01 | End: 2019-01-01

## 2019-01-01 RX ORDER — MORPHINE SULFATE 2 MG/ML
1-2 INJECTION, SOLUTION INTRAMUSCULAR; INTRAVENOUS
Status: DISCONTINUED | OUTPATIENT
Start: 2019-01-01 | End: 2019-01-01 | Stop reason: HOSPADM

## 2019-01-01 RX ORDER — MORPHINE SULFATE 4 MG/ML
1 INJECTION INTRAVENOUS ONCE
Status: COMPLETED | OUTPATIENT
Start: 2019-01-01 | End: 2019-01-01

## 2019-01-01 RX ORDER — DEXTROSE, SODIUM CHLORIDE, AND POTASSIUM CHLORIDE 5; .45; .15 G/100ML; G/100ML; G/100ML
125 INJECTION INTRAVENOUS CONTINUOUS
Status: DISCONTINUED | OUTPATIENT
Start: 2019-01-01 | End: 2019-01-01 | Stop reason: HOSPADM

## 2019-01-01 RX ORDER — SODIUM CHLORIDE 9 MG/ML
250 INJECTION, SOLUTION INTRAVENOUS AS NEEDED
Status: DISCONTINUED | OUTPATIENT
Start: 2019-01-01 | End: 2019-01-01 | Stop reason: HOSPADM

## 2019-01-01 RX ORDER — OXYCODONE AND ACETAMINOPHEN 10; 325 MG/1; MG/1
1 TABLET ORAL
Qty: 90 TAB | Refills: 0 | Status: SHIPPED | OUTPATIENT
Start: 2019-01-01 | End: 2019-01-01

## 2019-01-01 RX ORDER — OXYCODONE AND ACETAMINOPHEN 10; 325 MG/1; MG/1
1 TABLET ORAL
Qty: 90 TAB | Refills: 0 | Status: SHIPPED | OUTPATIENT
Start: 2019-01-01 | End: 2019-05-23

## 2019-01-01 RX ADMIN — MORPHINE SULFATE 1 MG: 4 INJECTION INTRAVENOUS at 00:11

## 2019-01-01 RX ADMIN — PANTOPRAZOLE SODIUM 80 MG: 40 INJECTION, POWDER, FOR SOLUTION INTRAVENOUS at 17:21

## 2019-01-01 RX ADMIN — PROPOFOL 20 MG: 10 INJECTION, EMULSION INTRAVENOUS at 14:19

## 2019-01-01 RX ADMIN — SODIUM CHLORIDE 8 MG/HR: 900 INJECTION INTRAVENOUS at 00:11

## 2019-01-01 RX ADMIN — SODIUM CHLORIDE 1000 ML: 900 INJECTION, SOLUTION INTRAVENOUS at 17:14

## 2019-01-01 RX ADMIN — SODIUM CHLORIDE 8 MG/HR: 900 INJECTION INTRAVENOUS at 17:30

## 2019-01-01 RX ADMIN — SODIUM CHLORIDE 150 ML/HR: 900 INJECTION, SOLUTION INTRAVENOUS at 18:52

## 2019-01-01 RX ADMIN — MORPHINE SULFATE 2 MG: 2 INJECTION, SOLUTION INTRAMUSCULAR; INTRAVENOUS at 13:00

## 2019-01-01 RX ADMIN — DEXTROSE MONOHYDRATE, SODIUM CHLORIDE, AND POTASSIUM CHLORIDE 125 ML/HR: 50; 4.5; 1.49 INJECTION, SOLUTION INTRAVENOUS at 00:11

## 2019-01-01 RX ADMIN — DEXTROSE MONOHYDRATE, SODIUM CHLORIDE, AND POTASSIUM CHLORIDE 125 ML/HR: 50; 4.5; 1.49 INJECTION, SOLUTION INTRAVENOUS at 05:58

## 2019-01-01 RX ADMIN — MORPHINE SULFATE 2 MG: 2 INJECTION, SOLUTION INTRAMUSCULAR; INTRAVENOUS at 18:25

## 2019-01-01 RX ADMIN — PROPOFOL 20 MG: 10 INJECTION, EMULSION INTRAVENOUS at 14:15

## 2019-01-01 RX ADMIN — PROPOFOL 20 MG: 10 INJECTION, EMULSION INTRAVENOUS at 14:21

## 2019-01-01 RX ADMIN — SODIUM CHLORIDE, SODIUM LACTATE, POTASSIUM CHLORIDE, CALCIUM CHLORIDE: 600; 310; 30; 20 INJECTION, SOLUTION INTRAVENOUS at 13:00

## 2019-01-01 RX ADMIN — PROPOFOL 20 MG: 10 INJECTION, EMULSION INTRAVENOUS at 14:25

## 2019-01-01 RX ADMIN — PROPOFOL 20 MG: 10 INJECTION, EMULSION INTRAVENOUS at 14:23

## 2019-01-01 RX ADMIN — PROPOFOL 80 MG: 10 INJECTION, EMULSION INTRAVENOUS at 14:14

## 2019-01-01 RX ADMIN — SODIUM CHLORIDE 8 MG/HR: 900 INJECTION INTRAVENOUS at 07:18

## 2019-01-01 RX ADMIN — SODIUM CHLORIDE 8 MG/HR: 900 INJECTION INTRAVENOUS at 01:53

## 2019-01-01 RX ADMIN — PROPOFOL 20 MG: 10 INJECTION, EMULSION INTRAVENOUS at 14:17

## 2019-01-10 PROBLEM — I10 ESSENTIAL HYPERTENSION: Status: ACTIVE | Noted: 2019-01-01

## 2019-01-10 NOTE — PROGRESS NOTES
Henriette Habermann is a 70 y.o.  male and presents with    Chief Complaint   Patient presents with    Esophageal Cancer    Arthritis    Hypertension           Subjective:    Cardiovascular Review:  The patient has hypertension. Diet and Lifestyle: not attempting to follow a low fat, low cholesterol diet, not attempting to follow a low sodium diet  Home BP Monitoring: is not measured at home. Pertinent ROS: taking medications as instructed, no medication side effects noted, no TIA's, no chest pain on exertion, no dyspnea on exertion, no swelling of ankles. Osteoarthritis and Chronic Pain:  Patient has osteoarthritis, primarily affecting the diffuse. Symptoms onset: problem is longstanding. Rheumatological ROS: no current joint or muscle symptoms, essentially pain-free. Response to treatment plan: stable. Esophageal cancer ondgiong metastatic      Additional Concerns:          Patient Active Problem List    Diagnosis Date Noted    Essential hypertension 01/10/2019    Status post gastrostomy ([facility] [address] 02/13/2018    Malnutrition of moderate degree ([facility] [address] 12/13/2017    Metastatic squamous cell carcinoma to esophagus (HCC) 10/17/2017    Arthritis 06/06/2011     Current Outpatient Medications   Medication Sig Dispense Refill    oxyCODONE-acetaminophen (PERCOCET 10)  mg per tablet Take 1 Tab by mouth every eight (8) hours as needed for Pain. Max Daily Amount: 3 Tabs. 90 Tab 0    metoprolol tartrate (LOPRESSOR) 100 mg IR tablet Take 1 Tab by mouth two (2) times a day. 180 Tab 4    Lactose-Free Food with Fiber (JEVITY 1.5 MAYKEL) 0.06 gram-1.5 kcal/mL liqd 6 Cans by PEG Tube route nightly. 80 ml/hr from 9063-1163. provides 1680 kcal, 72 g protein, 851 ml free water. 180 Can 12    multivitamin,tx-iron-ca-min (THERA-M W/ IRON) 27-0.4 mg tab Take 1 Tab by mouth daily.        No Known Allergies  Past Medical History:   Diagnosis Date    Arthritis     Essential hypertension 1/10/2019    Essential hypertension, malignant 8/3/2010    Leg pain 8/3/2010    [facility]roat cancer ([facility] [address]     Thyroid disease     hypothyroidism     Past Surgical History:   Procedure Laterality Date    HX GI  2017    G-tube placement on 17    HX VASCULAR ACCESS  2017    [place] EGD DELIVER THERMAL ENERGY SPHNCTR/CARDIA GERD       Family History   Problem Relation Age of Onset    Heart Disease Mother     Lung Disease Father      Social History     Tobacco Use    Smoking status: Former Smoker     Packs/day: 0.25     Types: Cigarettes     Start date: 2017     Last attempt to quit: 2017     Years since quittin.1    Smokeless tobacco: Never Used    Tobacco comment: smokes occas   Substance Use Topics    Alcohol use: No     Alcohol/week: 4.8 oz     Types: 8 Standard drinks or equivalent per week       ROS       All other systems reviewed and are negative.       Objective:  Vitals:    01/10/19 1409   BP: 121/81   Pulse: 82   Resp: 19   Temp: 97.2 °F (36.2 °C)   TempSrc: Oral   SpO2: 100%   Weight: 170 lb 12.8 oz (77.5 kg)   Height: 5' 9\" (1.753 m)   PainSc:   9   PainLoc: Abdomen                 alert, well appearing, and in no distress and oriented to person, place, and time  Chest - clear to auscultation, no wheezes, rales or rhonchi, symmetric air entry  Heart - normal rate, regular rhythm, normal S1, S2, no murmurs, rubs, clicks or gallops  Abdomen - soft, nontender, nondistended, no masses or organomegaly        LABS   Component      Latest Ref Rng & Units 2018           2:49 PM  2:49 PM  2:48 PM  2:48 PM   WBC      4.6 - 13.2 K/uL       RBC      4.70 - 5.50 M/uL       HGB      13.0 - 16.0 g/dL       HCT      36.0 - 48.0 %       MCV      74.0 - 97.0 FL       MCH      24.0 - 34.0 PG       MCHC      31.0 - 37.0 g/dL       RDW      11.6 - 14.5 %       PLATELET      382 - 048 K/uL       MPV      9.2 - 11.8 FL       NEUTROPHILS      40 - 73 %       LYMPHOCYTES      21 - 52 %       MONOCYTES      3 - 10 %       EOSINOPHILS      0 - 5 %       BASOPHILS      0 - 2 %       ABS. NEUTROPHILS      1.8 - 8.0 K/UL       ABS. LYMPHOCYTES      0.9 - 3.6 K/UL       ABS. MONOCYTES      0.05 - 1.2 K/UL       ABS. EOSINOPHILS      0.0 - 0.4 K/UL       ABS. BASOPHILS      0.0 - 0.1 K/UL       DF             Sodium      136 - 145 mmol/L       Potassium      3.5 - 5.5 mmol/L       Chloride      100 - 108 mmol/L       CO2      21 - 32 mmol/L       Anion gap      3.0 - 18 mmol/L       Glucose      74 - 99 mg/dL       BUN      7.0 - 18 MG/DL       Creatinine      0.6 - 1.3 MG/DL       BUN/Creatinine ratio      12 - 20         GFR est AA      >60 ml/min/1.73m2       GFR est non-AA      >60 ml/min/1.73m2       Calcium      8.5 - 10.1 MG/DL       Bilirubin, total      0.2 - 1.0 MG/DL       ALT (SGPT)      16 - 61 U/L       AST      15 - 37 U/L       Alk.  phosphatase      45 - 117 U/L       Protein, total      6.4 - 8.2 g/dL       Albumin      3.4 - 5.0 g/dL       Globulin      2.0 - 4.0 g/dL       A-G Ratio      0.8 - 1.7         Color        YELLOW     Appearance        CLEAR     Specific gravity      1.005 - 1.030    1.016     pH (UA)      5.0 - 8.0    8.0     Protein      NEG mg/dL  TRACE (A)     Glucose      NEG mg/dL  NEGATIVE     Ketone      NEG mg/dL  NEGATIVE     Bilirubin      NEG    NEGATIVE     Blood      NEG    NEGATIVE     Urobilinogen      0.2 - 1.0 EU/dL  1.0     Nitrites      NEG    NEGATIVE     Leukocyte Esterase      NEG    TRACE (A)     Cholesterol, total      <200 MG/DL       Triglyceride      <150 MG/DL       HDL Cholesterol      40 - 60 MG/DL       LDL, calculated      0 - 100 MG/DL       VLDL, calculated      MG/DL       CHOL/HDL Ratio      0 - 5.0         WBC      0 - 4 /hpf 2 to 4      RBC      0 - 5 /hpf 0      Epithelial cells      0 - 5 /lpf FEW      Bacteria      NEG /hpf NEGATIVE      Vitamin B12      211 - 911 pg/mL       Folate      3.10 - 17.50 ng/mL       TSH      0.36 - 3. 74 uIU/mL       GGT      0 - 65 IU/L    69 (H)   Calcitriol (Vit D 1, 25 di-OH)      19.9 - 79.3 pg/mL   49.1    Ferritin      8 - 388 NG/ML         Component      Latest Ref Rng & Units 11/26/2018 11/26/2018 11/26/2018 11/26/2018           2:48 PM  2:48 PM  2:48 PM  2:48 PM   WBC      4.6 - 13.2 K/uL       RBC      4.70 - 5.50 M/uL       HGB      13.0 - 16.0 g/dL       HCT      36.0 - 48.0 %       MCV      74.0 - 97.0 FL       MCH      24.0 - 34.0 PG       MCHC      31.0 - 37.0 g/dL       RDW      11.6 - 14.5 %       PLATELET      043 - 994 K/uL       MPV      9.2 - 11.8 FL       NEUTROPHILS      40 - 73 %       LYMPHOCYTES      21 - 52 %       MONOCYTES      3 - 10 %       EOSINOPHILS      0 - 5 %       BASOPHILS      0 - 2 %       ABS. NEUTROPHILS      1.8 - 8.0 K/UL       ABS. LYMPHOCYTES      0.9 - 3.6 K/UL       ABS. MONOCYTES      0.05 - 1.2 K/UL       ABS. EOSINOPHILS      0.0 - 0.4 K/UL       ABS. BASOPHILS      0.0 - 0.1 K/UL       DF             Sodium      136 - 145 mmol/L       Potassium      3.5 - 5.5 mmol/L       Chloride      100 - 108 mmol/L       CO2      21 - 32 mmol/L       Anion gap      3.0 - 18 mmol/L       Glucose      74 - 99 mg/dL       BUN      7.0 - 18 MG/DL       Creatinine      0.6 - 1.3 MG/DL       BUN/Creatinine ratio      12 - 20         GFR est AA      >60 ml/min/1.73m2       GFR est non-AA      >60 ml/min/1.73m2       Calcium      8.5 - 10.1 MG/DL       Bilirubin, total      0.2 - 1.0 MG/DL       ALT (SGPT)      16 - 61 U/L       AST      15 - 37 U/L       Alk.  phosphatase      45 - 117 U/L       Protein, total      6.4 - 8.2 g/dL       Albumin      3.4 - 5.0 g/dL       Globulin      2.0 - 4.0 g/dL       A-G Ratio      0.8 - 1.7         Color             Appearance             Specific gravity      1.005 - 1.030         pH (UA)      5.0 - 8.0         Protein      NEG mg/dL       Glucose      NEG mg/dL       Ketone      NEG mg/dL       Bilirubin      NEG         Blood      NEG Urobilinogen      0.2 - 1.0 EU/dL       Nitrites      NEG         Leukocyte Esterase      NEG         Cholesterol, total      <200 MG/DL    147   Triglyceride      <150 MG/DL    96   HDL Cholesterol      40 - 60 MG/DL    52   LDL, calculated      0 - 100 MG/DL    75.8   VLDL, calculated      MG/DL    19.2   CHOL/HDL Ratio      0 - 5.0      2.8   WBC      0 - 4 /hpf       RBC      0 - 5 /hpf       Epithelial cells      0 - 5 /lpf       Bacteria      NEG /hpf       Vitamin B12      211 - 911 pg/mL  671     Folate      3.10 - 17.50 ng/mL  >20.0 (H)     TSH      0.36 - 3.74 uIU/mL   2.40    GGT      0 - 65 IU/L       Calcitriol (Vit D 1, 25 di-OH)      19.9 - 79.3 pg/mL       Ferritin      8 - 388 NG/        Component      Latest Ref Rng & Units 11/26/2018 11/26/2018           2:48 PM  2:48 PM   WBC      4.6 - 13.2 K/uL  5.6   RBC      4.70 - 5.50 M/uL  4.23 (L)   HGB      13.0 - 16.0 g/dL  10.7 (L)   HCT      36.0 - 48.0 %  34.4 (L)   MCV      74.0 - 97.0 FL  81.3   MCH      24.0 - 34.0 PG  25.3   MCHC      31.0 - 37.0 g/dL  31.1   RDW      11.6 - 14.5 %  20.2 (H)   PLATELET      889 - 097 K/uL  332   MPV      9.2 - 11.8 FL  9.8   NEUTROPHILS      40 - 73 %  64   LYMPHOCYTES      21 - 52 %  26   MONOCYTES      3 - 10 %  5   EOSINOPHILS      0 - 5 %  5   BASOPHILS      0 - 2 %  0   ABS. NEUTROPHILS      1.8 - 8.0 K/UL  3.6   ABS. LYMPHOCYTES      0.9 - 3.6 K/UL  1.4   ABS. MONOCYTES      0.05 - 1.2 K/UL  0.3   ABS. EOSINOPHILS      0.0 - 0.4 K/UL  0.3   ABS.  BASOPHILS      0.0 - 0.1 K/UL  0.0   DF        AUTOMATED   Sodium      136 - 145 mmol/L 139    Potassium      3.5 - 5.5 mmol/L 4.1    Chloride      100 - 108 mmol/L 104    CO2      21 - 32 mmol/L 29    Anion gap      3.0 - 18 mmol/L 6    Glucose      74 - 99 mg/dL 78    BUN      7.0 - 18 MG/DL 20 (H)    Creatinine      0.6 - 1.3 MG/DL 0.86    BUN/Creatinine ratio      12 - 20   23 (H)    GFR est AA      >60 ml/min/1.73m2 >60    GFR est non-AA      >60 ml/min/1.73m2 >60    Calcium      8.5 - 10.1 MG/DL 9.3    Bilirubin, total      0.2 - 1.0 MG/DL 0.3    ALT (SGPT)      16 - 61 U/L 113 (H)    AST      15 - 37 U/L 48 (H)    Alk. phosphatase      45 - 117 U/L 67    Protein, total      6.4 - 8.2 g/dL 8.7 (H)    Albumin      3.4 - 5.0 g/dL 3.7    Globulin      2.0 - 4.0 g/dL 5.0 (H)    A-G Ratio      0.8 - 1.7   0.7 (L)    Color           Appearance           Specific gravity      1.005 - 1.030       pH (UA)      5.0 - 8.0       Protein      NEG mg/dL     Glucose      NEG mg/dL     Ketone      NEG mg/dL     Bilirubin      NEG       Blood      NEG       Urobilinogen      0.2 - 1.0 EU/dL     Nitrites      NEG       Leukocyte Esterase      NEG       Cholesterol, total      <200 MG/DL     Triglyceride      <150 MG/DL     HDL Cholesterol      40 - 60 MG/DL     LDL, calculated      0 - 100 MG/DL     VLDL, calculated      MG/DL     CHOL/HDL Ratio      0 - 5.0       WBC      0 - 4 /hpf     RBC      0 - 5 /hpf     Epithelial cells      0 - 5 /lpf     Bacteria      NEG /hpf     Vitamin B12      211 - 911 pg/mL     Folate      3.10 - 17.50 ng/mL     TSH      0.36 - 3.74 uIU/mL     GGT      0 - 65 IU/L     Calcitriol (Vit D 1, 25 di-OH)      19.9 - 79.3 pg/mL     Ferritin      8 - 388 NG/ML       TESTS      Assessment/Plan:    Hypertension - stable  arthriis stable  Esophageal cancer metastatic ongiong worsening      Lab review: labs are reviewed, up to date and normal    Diagnoses and all orders for this visit:    1. Metastatic squamous cell carcinoma to esophagus (HCC)  -     oxyCODONE-acetaminophen (PERCOCET 10)  mg per tablet; Take 1 Tab by mouth every eight (8) hours as needed for Pain. Max Daily Amount: 3 Tabs. 2. Hypertension, unspecified type  -     oxyCODONE-acetaminophen (PERCOCET 10)  mg per tablet; Take 1 Tab by mouth every eight (8) hours as needed for Pain. Max Daily Amount: 3 Tabs.           I have discussed the diagnosis with the patient and the intended plan as seen in the above orders. The patient has received an after-visit summary and questions were answered concerning future plans. I have discussed medication side effects and warnings with the patient as well. I have reviewed the plan of care with the patient, accepted their input and they are in agreement with the treatment goals. Follow-up Disposition:  Return in about 4 weeks (around 2/7/2019) for EOV.

## 2019-01-10 NOTE — PROGRESS NOTES
Room #      SUBJECTIVE:    Omayra Sellers is a 70 y.o. male who presents today pain at level    1. Have you been to the ER, urgent care clinic since your last visit? Hospitalized since your last visit? NO    2. Have you seen or consulted any other health care providers outside of the [address] [place] [address] since your last visit? Include any pap smears or colon screening. NO  When :  Reason:    Health Maintenance reviewed Yes    Health Maintenance Due   Topic Date Due    Shingrix Vaccine Age 49> (1 of 2) 10/28/1999    Pneumococcal 65+ High/Highest Risk (2 of 2 - PPSV23) 01/14/2016    GLAUCOMA SCREENING Q2Y  07/14/2018    MEDICARE YEARLY EXAM  08/16/2018

## 2019-02-06 NOTE — PROGRESS NOTES
.ifRoom #      SUBJECTIVE:    Deronda Seip Person is a 70 y.o. male who presents today for follow up for cancer      1. Have you been to the ER, urgent care clinic since your last visit? Hospitalized since your last visit? NO    2. Have you seen or consulted any other health care providers outside of the [address] [place] [address] since your last visit? Include any pap smears or colon screening. NO  When :  Reason:    Health Maintenance reviewed Yes    Health Maintenance Due   Topic Date Due    Shingrix Vaccine Age 49> (1 of 2) 10/28/1999    Pneumococcal 65+ High/Highest Risk (2 of 2 - PPSV23) 01/14/2016    GLAUCOMA SCREENING Q2Y  07/14/2018    MEDICARE YEARLY EXAM  08/16/2018

## 2019-02-06 NOTE — PROGRESS NOTES
Florentin Garcia is a 70 y.o.  male and presents with    Chief Complaint   Patient presents with    Cholesterol Problem    Hypertension    Other     malnuitrition    Esophageal Cancer           Subjective:  Esophageal cancer now with mets to throat ongoing    Chronic pain  Fairly stable on percocet    htn  Stable on meds          Additional Concerns:          Patient Active Problem List    Diagnosis Date Noted    Essential hypertension 01/10/2019    Status post gastrostomy ([facility] [address] 02/13/2018    Malnutrition of moderate degree ([facility] [address] 12/13/2017    Metastatic squamous cell carcinoma to esophagus ([facility] [address] 10/17/2017    Arthritis 06/06/2011     Current Outpatient Medications   Medication Sig Dispense Refill    oxyCODONE-acetaminophen (PERCOCET 10)  mg per tablet Take 1 Tab by mouth every eight (8) hours as needed for Pain. Max Daily Amount: 3 Tabs. 90 Tab 0    metoprolol tartrate (LOPRESSOR) 100 mg IR tablet Take 1 Tab by mouth two (2) times a day. 180 Tab 4    Lactose-Free Food with Fiber (JEVITY 1.5 MAYKEL) 0.06 gram-1.5 kcal/mL liqd 6 Cans by PEG Tube route nightly. 80 ml/hr from 8630-4757. provides 1680 kcal, 72 g protein, 851 ml free water. 180 Can 12    multivitamin,tx-iron-ca-min (THERA-M W/ IRON) 27-0.4 mg tab Take 1 Tab by mouth daily.        No Known Allergies  Past Medical History:   Diagnosis Date    Arthritis     Essential hypertension 1/10/2019    Essential hypertension, malignant 8/3/2010    Leg pain 8/3/2010    Throat cancer ([facility] [address]     Thyroid disease     hypothyroidism     Past Surgical History:   Procedure Laterality Date    HX GI  11/2017    G-tube placement on 11/28/17    HX VASCULAR ACCESS  11/2017    [place] EGD DELIVER THERMAL ENERGY SPHNCTR/CARDIA GERD       Family History   Problem Relation Age of Onset    Heart Disease Mother     Lung Disease Father      Social History     Tobacco Use    Smoking status: Former Smoker     Packs/day: 0.25     Types: Cigarettes Start date: 2017     Last attempt to quit: 2017     Years since quittin.2    Smokeless tobacco: Never Used    Tobacco comment: smokes occas   Substance Use Topics    Alcohol use: No     Alcohol/week: 4.8 oz     Types: 8 Standard drinks or equivalent per week       ROS       All other systems reviewed and are negative. Objective:  Vitals:    19 1250   BP: 140/71   Pulse: 67   Resp: 19   Temp: 98.2 °F (36.8 °C)   TempSrc: Oral   SpO2: 97%   Weight: 166 lb 3.2 oz (75.4 kg)   Height: 5' 9\" (1.753 m)   PainSc:   3   PainLoc: Abdomen                 alert, well appearing, and in no distress, oriented to person, place, and time and normal appearing weight  Chest - clear to auscultation, no wheezes, rales or rhonchi, symmetric air entry  Heart - normal rate, regular rhythm, normal S1, S2, no murmurs, rubs, clicks or gallops  Abdomen - soft, nontender, nondistended, no masses or organomegaly        LABS     TESTS      Assessment/Plan:    Hypertension - stable  Hyperlipidemia - stable  Chronic pain  Stable  On percocet  Esophageal cancer with mets to throat stable      Lab review: labs are reviewed, up to date and normal    Diagnoses and all orders for this visit:    1. Metastatic squamous cell carcinoma to esophagus (HCC)  -     oxyCODONE-acetaminophen (PERCOCET 10)  mg per tablet; Take 1 Tab by mouth every eight (8) hours as needed for Pain. Max Daily Amount: 3 Tabs. 2. Hypertension, unspecified type  -     oxyCODONE-acetaminophen (PERCOCET 10)  mg per tablet; Take 1 Tab by mouth every eight (8) hours as needed for Pain. Max Daily Amount: 3 Tabs. I have discussed the diagnosis with the patient and the intended plan as seen in the above orders. The patient has received an after-visit summary and questions were answered concerning future plans. I have discussed medication side effects and warnings with the patient as well.  I have reviewed the plan of care with the patient, accepted their input and they are in agreement with the treatment goals.      Follow-up Disposition: Not on File

## 2019-03-07 NOTE — PROGRESS NOTES
Room #      SUBJECTIVE:    Omayra Sellers is a 70 y.o. male who presents today for follow up for cancer    1. Have you been to the ER, urgent care clinic since your last visit? Hospitalized since your last visit? NO    2. Have you seen or consulted any other health care providers outside of the [address] [place] [address] since your last visit? Include any pap smears or colon screening. NO  When :  Reason:    Health Maintenance reviewed Yes    Health Maintenance Due   Topic Date Due    Shingrix Vaccine Age 49> (1 of 2) 10/28/1999    Pneumococcal 65+ High/Highest Risk (2 of 2 - PPSV23) 01/14/2016    GLAUCOMA SCREENING Q2Y  07/14/2018    MEDICARE YEARLY EXAM  08/16/2018

## 2019-03-07 NOTE — PROGRESS NOTES
Daniela Yanez is a 70 y.o.  male and presents with    Chief Complaint   Patient presents with    Other     esophageal cancer    Hypertension           Subjective:    1. Metastatic esophageal cancer ongong  S/p surg a couple of momnths ago. 2. htn  Note somewhat low today    3. States he feels weak. Wants a rollator walker      Additional Concerns:          Patient Active Problem List    Diagnosis Date Noted    Essential hypertension 01/10/2019    Status post gastrostomy ([facility] [address] 02/13/2018    Malnutrition of moderate degree ([facility] [address] 12/13/2017    Metastatic squamous cell carcinoma to esophagus ([facility] [address] 10/17/2017    Arthritis 06/06/2011     Current Outpatient Medications   Medication Sig Dispense Refill    oxyCODONE-acetaminophen (PERCOCET 10)  mg per tablet Take 1 Tab by mouth every eight (8) hours as needed for Pain for up to 30 days. Max Daily Amount: 3 Tabs. 90 Tab 0    metoprolol tartrate (LOPRESSOR) 100 mg IR tablet Take 1 Tab by mouth two (2) times a day. 180 Tab 4    Lactose-Free Food with Fiber (JEVITY 1.5 MAYKEL) 0.06 gram-1.5 kcal/mL liqd 6 Cans by PEG Tube route nightly. 80 ml/hr from 8162-5920. provides 1680 kcal, 72 g protein, 851 ml free water. 180 Can 12    multivitamin,tx-iron-ca-min (THERA-M W/ IRON) 27-0.4 mg tab Take 1 Tab by mouth daily.        No Known Allergies  Past Medical History:   Diagnosis Date    Arthritis     Essential hypertension 1/10/2019    Essential hypertension, malignant 8/3/2010    Leg pain 8/3/2010    Throat cancer ([facility] [address]     Thyroid disease     hypothyroidism     Past Surgical History:   Procedure Laterality Date    HX GI  11/2017    G-tube placement on 11/28/17    HX VASCULAR ACCESS  11/2017    [place] EGD DELIVER THERMAL ENERGY SPHNCTR/CARDIA GERD       Family History   Problem Relation Age of Onset    Heart Disease Mother     Lung Disease Father      Social History     Tobacco Use    Smoking status: Former Smoker     Packs/day: 0.25 Types: Cigarettes     Start date: 2017     Last attempt to quit: 2017     Years since quittin.2    Smokeless tobacco: Never Used    Tobacco comment: smokes occas   Substance Use Topics    Alcohol use: No     Alcohol/week: 4.8 oz     Types: 8 Standard drinks or equivalent per week       ROS       All other systems reviewed and are negative. Objective:  Vitals:    19 1244   BP: 99/63   Pulse: 74   Resp: 19   Temp: 98 °F (36.7 °C)   TempSrc: Oral   SpO2: 100%   Weight: 163 lb 9.6 oz (74.2 kg)   Height: 5' 9\" (1.753 m)   PainSc:  10 - Worst pain ever   PainLoc: Abdomen                 alert, well appearing, and in no distress and oriented to person, place, and time  Chest - clear to auscultation, no wheezes, rales or rhonchi, symmetric air entry  Heart - normal rate, regular rhythm, normal S1, S2, no murmurs, rubs, clicks or gallops  Abdomen - soft, nontender, nondistended, no masses or organomegaly        LABS     TESTS      Assessment/Plan:    Esophageal ca  Ongoing metastatic    [facility] labs tday  rx for rollator f/u if labzs are abnormal    htn  A bit low will reasses in 1 mo     Lab review: labs are reviewed, up to date and normal    Diagnoses and all orders for this visit:    1. Metastatic squamous cell carcinoma to esophagus (HCC)  -     oxyCODONE-acetaminophen (PERCOCET 10)  mg per tablet; Take 1 Tab by mouth every eight (8) hours as needed for Pain for up to 30 days. Max Daily Amount: 3 Tabs. -     AMB SUPPLY ORDER  -     LIPID PANEL; Future  -     CBC WITH AUTOMATED DIFF; Future  -     METABOLIC PANEL, COMPREHENSIVE; Future  -     URINALYSIS W/ RFLX MICROSCOPIC; Future  -     TSH 3RD GENERATION; Future  -     FERRITIN; Future  -     VITAMIN B12 & FOLATE; Future  -     VITAMIN D, 1, 25 DIHYDROXY; Future  -     GGT; Future    2. Hypertension, unspecified type  -     oxyCODONE-acetaminophen (PERCOCET 10)  mg per tablet;  Take 1 Tab by mouth every eight (8) hours as needed for Pain for up to 30 days. Max Daily Amount: 3 Tabs. -     LIPID PANEL; Future  -     CBC WITH AUTOMATED DIFF; Future  -     METABOLIC PANEL, COMPREHENSIVE; Future  -     URINALYSIS W/ RFLX MICROSCOPIC; Future  -     TSH 3RD GENERATION; Future  -     FERRITIN; Future  -     VITAMIN B12 & FOLATE; Future  -     VITAMIN D, 1, 25 DIHYDROXY; Future  -     GGT; Future          I have discussed the diagnosis with the patient and the intended plan as seen in the above orders. The patient has received an after-visit summary and questions were answered concerning future plans. I have discussed medication side effects and warnings with the patient as well. I have reviewed the plan of care with the patient, accepted their input and they are in agreement with the treatment goals.      Follow-up Disposition: Not on File

## 2019-03-08 NOTE — TELEPHONE ENCOUNTER
Received a call from Amirah Wayne from the [facility] ER stating that the dx code that was input for the GGT (lab 2143)([id number]) was not covered by Medicare. A new order signed by the provider with a Dx ICD code -10  is needed that can cover this order. .Signed order ca be faxed to [phone]

## 2019-03-10 PROBLEM — C15.9 ESOPHAGEAL CANCER (HCC): Status: ACTIVE | Noted: 2019-01-01

## 2019-03-10 PROBLEM — K92.2 GI BLEED: Status: ACTIVE | Noted: 2019-01-01

## 2019-03-10 PROBLEM — I10 HTN (HYPERTENSION): Status: ACTIVE | Noted: 2019-01-01

## 2019-03-10 NOTE — ED NOTES
6:11 PM :Pt care assumed from Dr. Kendall Tinajero , ED provider. Pt complaint(s), current treatment plan, progression and available diagnostic results have been discussed thoroughly. Rounding occurred: yes  Intended Disposition: TBD   Pending diagnostic reports and/or labs (please list): GI and Hospitalist Consult    19:58 Consult:  Discussed care with Dr. Kimmy Gonzales (Hospitalist). Standard discussion; including history of patients chief complaint, available diagnostic results, and treatment course. Agreed to see for admission. Scribe Attestation     Lacy De Leon acting as a scribe for and in the presence of Dr. Flo Ren    March 10, 2019 at 6:11 PM       Provider Attestation:      I personally performed the services described in the documentation, reviewed the documentation, as recorded by the scribe in my presence, and it accurately and completely records my words and actions.  March 10, 2019 at 6:11 PM -   Dr. Flo Ren

## 2019-03-10 NOTE — ED PROVIDER NOTES
EMERGENCY DEPARTMENT HISTORY AND PHYSICAL EXAM    5:33 PM      Date: 3/10/2019  Patient Name: Dmitriy Mendieta Person    History of Presenting Illness     Chief Complaint   Patient presents with    Vomiting         History Provided By: Patient      Additional History (Context): Jnay Klein is a 70 y.o. male with a PMHx of HTN, esophageal cancer, and hypothyroidism who presents with acute, severe hematemesis onset PTA. Patient vomited dark red blood into an emesis bag in the ER. He has a G-tube in from his previous esophageal cancer, which is he currently not getting treatment for. Patient also has been experiencing diarrhea. He denies abd pain. Patient takes HTN medicine but denies taking blood thinners. He does not use EtOH. His GI doctor is at [facility] and his PCP is Dr. Mehul Dickson. No modifying or aggravating factors were reported. No other symptoms or concerns were expressed. PCP: Ashley Garcia DO    Chief Complaint: Hematemesis  Duration:  PTA  Timing:  Acute  Location: GE  Quality: Dark red  Severity: Severe  Modifying Factors: No modifying or aggravating factors were reported. Associated Symptoms: Diarrhea      Current Facility-Administered Medications   Medication Dose Route Frequency Provider Last Rate Last Dose    pantoprazole (PROTONIX) 40 mg in 0.9% sodium chloride (MBP/ADV) 50 mL MBP  8 mg/hr IntraVENous CONTINUOUS Ann Busch MD 10 mL/hr at 03/10/19 1730 8 mg/hr at 03/10/19 1730    0.9% sodium chloride infusion  150 mL/hr IntraVENous ONCE Ann Busch MD         Current Outpatient Medications   Medication Sig Dispense Refill    oxyCODONE-acetaminophen (PERCOCET 10)  mg per tablet Take 1 Tab by mouth every eight (8) hours as needed for Pain for up to 30 days. Max Daily Amount: 3 Tabs. 90 Tab 0    metoprolol tartrate (LOPRESSOR) 100 mg IR tablet Take 1 Tab by mouth two (2) times a day.  180 Tab 4    Lactose-Free Food with Fiber (JEVITY 1.5 MAYKEL) 0.06 gram-1.5 kcal/mL liqd 6 Cans by PEG Tube route nightly. 80 ml/hr from 9491-4062. provides 1680 kcal, 72 g protein, 851 ml free water. 180 Can 12    multivitamin,tx-iron-ca-min (THERA-M W/ IRON) 27-0.4 mg tab Take 1 Tab by mouth daily. Past History     Past Medical History:  Past Medical History:   Diagnosis Date    Arthritis     Essential hypertension 1/10/2019    Essential hypertension, malignant 8/3/2010    Leg pain 8/3/2010    Throat cancer ([address]     Thyroid disease     hypothyroidism       Past Surgical History:  Past Surgical History:   Procedure Laterality Date    HX GI  2017    G-tube placement on 17    HX VASCULAR ACCESS  2017    SD EGD DELIVER THERMAL ENERGY SPHNCTR/CARDIA GERD         Family History:  Family History   Problem Relation Age of Onset    Heart Disease Mother     Lung Disease Father        Social History:  Social History     Tobacco Use    Smoking status: Former Smoker     Packs/day: 0.25     Types: Cigarettes     Start date: 2017     Last attempt to quit: 2017     Years since quittin.2    Smokeless tobacco: Never Used    Tobacco comment: smokes occas   Substance Use Topics    Alcohol use: No     Alcohol/week: 4.8 oz     Types: 8 Standard drinks or equivalent per week    Drug use: No       Allergies:  No Known Allergies      Review of Systems       Review of Systems   Constitutional: Negative for fever. Gastrointestinal: Positive for diarrhea and vomiting (bloody). Negative for abdominal pain. Psychiatric/Behavioral: Negative for agitation. All other systems reviewed and are negative. Physical Exam     Visit Vitals  /75 (BP Patient Position: At rest)   Pulse (!) 105   Temp 98 °F (36.7 °C)   Resp 16   Ht 5' 9\" (1.753 m)   Wt 75.3 kg (166 lb)   SpO2 98%   BMI 24.51 kg/m²       Physical Exam   Constitutional: He is oriented to person, place, and time. He appears well-developed and well-nourished. No distress.    Actively vomiting bright red blood. HENT:   Head: Normocephalic and atraumatic. Mouth/Throat: Oropharynx is clear and moist.   No bleeding from mouth. Eyes: Conjunctivae and EOM are normal. Pupils are equal, round, and reactive to light. No scleral icterus. Neck: Normal range of motion. Neck supple. Cardiovascular: Regular rhythm and normal heart sounds. Tachycardia present. No murmur heard. Pulmonary/Chest: Effort normal and breath sounds normal. No respiratory distress. Abdominal: Soft. Bowel sounds are normal. He exhibits distension (mild). There is no tenderness. G-tube in place. Musculoskeletal: He exhibits no edema. Lymphadenopathy:     He has no cervical adenopathy. Neurological: He is alert and oriented to person, place, and time. Coordination normal.   Skin: Skin is warm and dry. No rash noted. Psychiatric: He has a normal mood and affect. His behavior is normal.   Nursing note and vitals reviewed. Diagnostic Study Results     Labs -  Recent Results (from the past 12 hour(s))   CBC WITH AUTOMATED DIFF    Collection Time: 03/10/19  5:00 PM   Result Value Ref Range    WBC 9.0 4.6 - 13.2 K/uL    RBC 3.66 (L) 4.70 - 5.50 M/uL    HGB 8.9 (L) 13.0 - 16.0 g/dL    HCT 27.3 (L) 36.0 - 48.0 %    MCV 74.6 74.0 - 97.0 FL    MCH 24.3 24.0 - 34.0 PG    MCHC 32.6 31.0 - 37.0 g/dL    RDW 16.0 (H) 11.6 - 14.5 %    PLATELET 046 157 - 034 K/uL    MPV 10.1 9.2 - 11.8 FL    NEUTROPHILS 78 (H) 40 - 73 %    LYMPHOCYTES 16 (L) 21 - 52 %    MONOCYTES 5 3 - 10 %    EOSINOPHILS 1 0 - 5 %    BASOPHILS 0 0 - 2 %    ABS. NEUTROPHILS 7.0 1.8 - 8.0 K/UL    ABS. LYMPHOCYTES 1.4 0.9 - 3.6 K/UL    ABS. MONOCYTES 0.5 0.05 - 1.2 K/UL    ABS. EOSINOPHILS 0.1 0.0 - 0.4 K/UL    ABS.  BASOPHILS 0.0 0.0 - 0.1 K/UL    DF AUTOMATED     TYPE & SCREEN    Collection Time: 03/10/19  5:00 PM   Result Value Ref Range    Crossmatch Expiration 03/13/2019     ABO/Rh(D) A POSITIVE     Antibody screen NEG    METABOLIC PANEL, COMPREHENSIVE    Collection Time: 03/10/19  5:00 PM   Result Value Ref Range    Sodium 135 (L) 136 - 145 mmol/L    Potassium 4.9 3.5 - 5.5 mmol/L    Chloride 103 100 - 108 mmol/L    CO2 23 21 - 32 mmol/L    Anion gap 9 3.0 - 18 mmol/L    Glucose 121 (H) 74 - 99 mg/dL    BUN 24 (H) 7.0 - 18 MG/DL    Creatinine 0.93 0.6 - 1.3 MG/DL    BUN/Creatinine ratio 26 (H) 12 - 20      GFR est AA >60 >60 ml/min/1.73m2    GFR est non-AA >60 >60 ml/min/1.73m2    Calcium 9.2 8.5 - 10.1 MG/DL    Bilirubin, total 0.5 0.2 - 1.0 MG/DL    ALT (SGPT) 38 16 - 61 U/L    AST (SGOT) 48 (H) 15 - 37 U/L    Alk. phosphatase 98 45 - 117 U/L    Protein, total 8.5 (H) 6.4 - 8.2 g/dL    Albumin 2.8 (L) 3.4 - 5.0 g/dL    Globulin 5.7 (H) 2.0 - 4.0 g/dL    A-G Ratio 0.5 (L) 0.8 - 1.7     PROTHROMBIN TIME + INR    Collection Time: 03/10/19  5:00 PM   Result Value Ref Range    Prothrombin time 13.5 11.5 - 15.2 sec    INR 1.1 0.8 - 1.2     MAGNESIUM    Collection Time: 03/10/19  5:00 PM   Result Value Ref Range    Magnesium 2.1 1.6 - 2.6 mg/dL   PTT    Collection Time: 03/10/19  5:00 PM   Result Value Ref Range    aPTT 31.0 23.0 - 36.4 SEC   EKG, 12 LEAD, INITIAL    Collection Time: 03/10/19  6:20 PM   Result Value Ref Range    Ventricular Rate 80 BPM    Atrial Rate 80 BPM    P-R Interval 130 ms    QRS Duration 72 ms    Q-T Interval 386 ms    QTC Calculation (Bezet) 445 ms    Calculated P Axis 55 degrees    Calculated R Axis 3 degrees    Calculated T Axis -10 degrees    Diagnosis       Normal sinus rhythm  Normal ECG  When compared with ECG of 11-JAN-2018 11:12,  premature atrial complexes are no longer present  T wave inversion now evident in Inferior leads         Radiologic Studies -   XR CHEST PORT    (Results Pending)       Medical Decision Making   I am the first provider for this patient. I reviewed the vital signs, available nursing notes, past medical history, past surgical history, family history and social history.     Vital Signs-Reviewed the patient's vital signs.    Pulse Oximetry Analysis -  98% on room air, WNL    Cardiac Monitor:  Rate: 105 bpm    Records Reviewed: Nursing Notes (Time of Review: 5:33 PM)    ED Course: Progress Notes, Reevaluation, and Consults:    Consult:  Discussed care with Dr. Sushila Bermudez (GI). Standard discussion; including history of patients chief complaint, available diagnostic results, and treatment course. Will consult patient. 6:14 PM, 3/10/2019     Provider Notes (Medical Decision Making):   MDM  Number of Diagnoses or Management Options  Upper GI bleed:   Diagnosis management comments: H/o esophageal cancer with gastrectomy and J tube currently not on cancer treatments with acute onset hematemesis in ED min tachycardia on arrival . IV X 2 placed fluids and protonix started discussed with DR Sushila Bermudez awaiting admission       Ekg; nsr rate 80 nonspecific st changes        Amount and/or Complexity of Data Reviewed  Clinical lab tests: ordered and reviewed    Risk of Complications, Morbidity, and/or Mortality  Presenting problems: high  Diagnostic procedures: moderate  Management options: moderate        Procedures:  Ultrasound Guided IV  Date/Time: 3/10/2019 5:50 PM  Performed by: Maxwell Boucher MD  Authorized by: Maxwell Boucher MD     Consent:     Consent obtained:  Verbal    Consent given by:  Patient    Risks discussed:  Bleeding and pain  Universal protocol:     Procedure explained and questions answered to patient or proxy's satisfaction: yes      Site/side marked: yes      Immediately prior to procedure, a time out was called: yes      Patient identity confirmed:  Verbally with patient and arm band  Anesthesia (see MAR for exact dosages): Anesthesia method:  None  Post-procedure details:     Patient tolerance of procedure: Tolerated well, no immediate complications  Comments:      18 gauge. Good blood flow, good return. Diagnosis     Clinical Impression:   1.  Upper GI bleed        6:12 PM: Pt care transferred to  Dinh Florez, ED provider. History of patient complaint(s), available diagnostic reports and current treatment plan has been discussed thoroughly. Bedside rounding on patient occurred: Yes  Intended disposition of patient: TBD  Pending diagnostics reports and/or labs (please list): EKG, GI and hospitalist consult    Disposition: Signed out to Dr. Tata Sánchez your doctor about these medications    Lactose-Free Food with Fiber 0.06 gram-1.5 kcal/mL Liqd  Commonly known as:  JEVITY 1.5 MAYKEL  6 Cans by PEG Tube route nightly. 80 ml/hr from 5256-4057. provides 1680 kcal, 72 g protein, 851 ml free water. metoprolol tartrate 100 mg IR tablet  Commonly known as:  LOPRESSOR  Take 1 Tab by mouth two (2) times a day. multivitamin,tx-iron-ca-min 27-0.4 mg Tab  Commonly known as:  THERA-M w/ IRON     oxyCODONE-acetaminophen  mg per tablet  Commonly known as:  PERCOCET 10  Take 1 Tab by mouth every eight (8) hours as needed for Pain for up to 30 days. Max Daily Amount: 3 Tabs.          _______________________________    Attestations:  Toni [address] acting as a scribe for and in the presence of Andra Ibarra MD  March 10, 2019 at 5:33 PM       Provider Attestation:      I personally performed the services described in the documentation, reviewed the documentation, as recorded by the scribe in my presence, and it accurately and completely records my words and actions.  March 10, 2019 at 5:33 PM - Andra Ibarra MD    _______________________________

## 2019-03-10 NOTE — ED TRIAGE NOTES
Pt arrived by EMS with chief c/o vomiting blood X20 minutes. Patient states it was a large amount of blood and has vomited 4X in 20 minutes. Ambulatory form EMS stretcher to ER stretcher, alert and oriented x4.

## 2019-03-11 PROBLEM — D62 ACUTE BLOOD LOSS ANEMIA: Status: ACTIVE | Noted: 2019-01-01

## 2019-03-11 NOTE — PROCEDURES
Esophagogastroduodenoscopy (EGD) Report    Patient: Maryann Sellers MRN: [id number]  SSN: [id number]    YOB: 1950  Age: 70 y.o. Sex: male      Date/Time:  3/11/2019 2:35 PM    Procedure: Esophagogastroduodenoscopy with control of bleeding    FINDINGS:  1. Esophagus -  Large clot at the esophagojejunal anastomosis at about 38 cm from the incisors. This was washed off exposing a large, necrotic and ulcerated appearing region with diffuse bleeding. I was able to intubate both efferent and afferent limbs of the RNY anastomosis with limb appearing intact without ulcers or areas of bleeding. The scope was withdrawn back to the anastomosis and hemostatic efforts using APC at automated ERBE settings was performed with incomplete control of bleeding. Therefore additional hemostatic attempt were performed using Hemospray by [facility].  The ulcerated and bleeding area was aggressively coated with Hemospray achieving control of bleeding. No immediate complications. 2.  Efferent and afferent jejunal limbs - Normal.    IMPRESSION:   Hx of total gastrectomy with RNY gastrojejunal anastomosis last 10/2018 for esophageal cancer metastatic to the stomach with large, ulcerated and bleeding area at the gastrojejunal anastomosis either due to ischemia or recurrent cancer. Bleeding controlled by combination APC ablation and Hemospray application. RECOMMENDATIONS:  1. Strict NPO. 2.   Transfuse if Hgb < 7 gm/dL. 3.   No role for PPI in the absence of the stomach. 4.   Discuss with Dr. Subha Sapp and recommended patient be transferred to his care at [facility] for surgical management. He agreed to accept patient as a transfer. Indication:   Acute UGI hemorrhage. Suspect recurrent bleeding cancer at the E-J anastomosis. :  Rashi Asher MD  Referring Provider:   Mammie Gilford.,   History: The history and physical exam were reviewed and updated.    Endoscope: [id number]  Extent of Exam: proximal jejunum  ASA: ASA 4 - Patient with severe systemic disease that is a constant threat to life  Anethesia/Sedation:  MAC anesthesia    Description of the procedure: The procedure was discussed with the patient including risks, benefits, alternatives including risks of iv sedation, bleeding, perforation and aspiration. A safety timeout was performed. The patient was placed in the left lateral decubitus position. A bite block was placed. Sedation/anesthesia was administered. The patients vital signs were monitored at all times including heart rate/rhythm, blood pressure and oxygen saturation. The endoscope was then passed under direct visualization to the esophagojejunal anastomosis and then to the efferent and afferent limbs of the RNY anastomosis. Please refer to the Findings section for additional procedure details. The endoscope was then slowly withdrawn while visualizing the mucosa. The endoscope was then slowly withdrawn. The patient was then transferred to recovery in stable condition. Therapies:  APC and Hemospray to control bleeding. Specimens:  None           Complications:   None; patient tolerated the procedure well. EBL:Minimal      Elgin Rossi MD, [address]  [facility]  March 11, 2019  2:35 PM

## 2019-03-11 NOTE — PROGRESS NOTES
Problem: Falls - Risk of  Goal: *Absence of Falls  Document Zelda Fall Risk and appropriate interventions in the flowsheet.   Outcome: Progressing Towards Goal  Fall Risk Interventions:            Medication Interventions: Teach patient to arise slowly

## 2019-03-11 NOTE — PERIOP NOTES
Phase 2 Recovery Summary  Patient arrived to Phase 2 at 1438  Report received from Rhoda Lozano:    03/11/19 1038 03/11/19 1108 03/11/19 1117 03/11/19 1438   BP: 129/77 121/82 128/79 94/56   Pulse: 84 81 77 85   Resp: 16 16 16 16   Temp: 98.7 °F (37.1 °C) 98.1 °F (36.7 °C) 98.9 °F (37.2 °C)    SpO2: 100% 100% 100% 97%   Weight:       Height:           oriented to time, place, person and situation    Lines and Drains  Peripheral Intravenous Line:   Peripheral IV 03/10/19 Right Antecubital (Active)   Site Assessment Clean, dry, & intact 3/11/2019  2:45 PM   Phlebitis Assessment 0 3/11/2019  2:45 PM   Infiltration Assessment 0 3/11/2019  2:45 PM   Dressing Status Clean, dry, & intact 3/11/2019  2:45 PM   Dressing Type Tape;Transparent 3/11/2019  2:45 PM   Hub Color/Line Status Pink; Infusing 3/11/2019  2:45 PM   Alcohol Cap Used Yes 3/10/2019 10:30 PM       Peripheral IV 03/10/19 Left Antecubital (Active)   Site Assessment Clean, dry, & intact 3/11/2019  2:45 PM   Phlebitis Assessment 0 3/11/2019  2:45 PM   Infiltration Assessment 0 3/11/2019  2:45 PM   Dressing Status Clean, dry, & intact 3/11/2019  2:45 PM   Dressing Type Tape;Transparent 3/11/2019  2:45 PM   Hub Color/Line Status Green;Capped 3/11/2019  2:45 PM   Alcohol Cap Used Yes 3/10/2019 10:30 PM       Wound  [REMOVED] Wound Abdomen (Removed)   Number of days: 45       [REMOVED] Wound Chest Right;Upper (Removed)   Number of days: 356       [REMOVED] Wound Chest Right;Upper (Removed)   Number of days: 356       [REMOVED] Wound Abdomen Medial;Mid (Removed)   Number of days: 335       [REMOVED] Wound Abdomen (Removed)   Number of days: 331       [REMOVED] Wound Abdomen (Removed)   Number of days: 331       [REMOVED] Wound Abdomen Mid (Removed)   Number of days: 18      Patient arrived to phase 2 from [facility]. Alert and oriented x4. No complaints on pain, nausea or dizziness. Vital signs taken. BP was a little low, opened fluids up. Oxygen level and HR regular. Transport put in for patient to return to room. Dr. Tiffanie Aleman came and spoke with patient. Patient resting comfortably in bed. Awaiting transport. Marina Oquendo came and took patient back to room at [phone].          Allsion Waters

## 2019-03-11 NOTE — CDMP QUERY
The medical record reflects the following:    Risk:  upper gi bleed, hx of esophageal ca,     Clinical Indicators:  h/h on  admission 9/4 and 30.1,   down to  6.6  and  20.9    Treatment:  type/ cross and transfuse blood , cbc q 8 hours, gi consult    Please clarify if this patient is being treated/managed for:    =>Acute Blood loss anemia sec to upper gi bleed   =>Other Explanation of clinical findings  =>Unable to Determine (no explanation of clinical findings)    Please clarify and document your clinical opinion in the progress notes and discharge summary including the definitive and/or presumptive diagnosis, (suspected or probable), related to the above clinical findings. Please include clinical findings supporting your diagnosis. If you DECLINE this query or would like to communicate with [employer], please utilize the \"[employer] message box\" at the TOP of the Progress Note on the right.       Thank you,  Mark Baugh RN/ANUEL  [phone]

## 2019-03-11 NOTE — ROUTINE PROCESS
TRANSFER - IN REPORT:    Verbal report received from [address] RN(name) on Mirlande Langston Person  being received from 2100(unit) for ordered procedure      Report consisted of patients Situation, Background, Assessment and   Recommendations(SBAR). Information from the following report(s) Pre Procedure Checklist was reviewed with the receiving nurse. Opportunity for questions and clarification was provided. Assessment completed upon patients arrival to unit and care assumed.

## 2019-03-11 NOTE — PROGRESS NOTES
Pt assessed, a/o x4, skin warm and dry, denies CP or SOB, has vomited this shift, very dark brown in color, no BM. Spoke with Dr. Ivet Earl via phone regarding pt's H/H downward trend (see flow sheet) and description of vomitus. Orders received for blood tx, blood bank notified. Will continue to monitor.

## 2019-03-11 NOTE — PROGRESS NOTES
Reason for Admission:   GI bleed               RRAT Score:   23               Resources/supports as identified by patient/family: INS, PCP, family support                  Top Challenges facing patient (as identified by patient/family and CM): Finances/Medication cost?   MCR/HAN ins                 Transportation?  family              Support system or lack thereof?  family                     Living arrangements? brother           Self-care/ADLs/Cognition? Fairly independent          Current Advanced Directive/Advance Care Plan: On file, veronica Real                          Plan for utilizing home health:  Not indicated @ this time                       Likelihood of readmission:  High/red                 Transition of Care Plan:    Home with family support & out-pt follow up when medically stable. Chart reviewed. Met with pt., verified all demographics. States has MCR/HAN ins. [facility] Dr. Albertina Hook is his PCP, last seen 3/7/19. NOK/MPOA: Fanny Oseguera, daughter: [phone]. States lives with his brother. Has walker. States able to his own personal care. States his daughter will pick him up @ discharge. Will cont to follow for needs. Leticia Domingo RN,ext [phone]. Patient has designated his daughter to participate in his/her discharge plan and to receive any needed information. Name: Fanny Oseguera  Address:  Phone number: [phone]      Care Management Interventions  PCP Verified by CM: Yes(Dr. Albertina Hook)  Last Visit to PCP: 03/07/19  Palliative Care Criteria Met (RRAT>21 & CHF Dx)?: No  Mode of Transport at Discharge:  Other (see comment)(family)  Transition of Care Consult (CM Consult): Discharge Planning  Discharge Durable Medical Equipment: No  Physical Therapy Consult: No  Occupational Therapy Consult: No  Speech Therapy Consult: No  Current Support Network: Relative's Home(brother)  Confirm Follow Up Transport: Family  Plan discussed with Pt/Family/Caregiver: Yes  Discharge Location  Discharge Placement: Home

## 2019-03-11 NOTE — PERIOP NOTES
Pt c/o pain generalized and to abdomen, 5-6 on scale 1-10. Has prn Morphine, pulled Morphine 4 mg/ml vial from OR pyxis as we did not have it in Endo pyxis. Wasted in pyxis with fellow RN. Will continue to monitor pt.

## 2019-03-11 NOTE — ROUTINE PROCESS
Patient taken to recovery by this RN and CRNA, bedside report given to [facility]. Patient stable and on monitor. SBAR completed.

## 2019-03-11 NOTE — PROGRESS NOTES
03/11/2019 Patient is to transfer to [facility] under Attending Dr Filiberto Begmu. I called [address] [phone]. They do not have a bed yet. She would like me to provide WILL CALL for [facility] transport. She will call [phone] when bed obtained. I called [facility] [phone] and pt placed on will call. Pt will need Emtala Form and [facility] will need to know if pt will have IV for  Transport. I have faxed demo sheet and pcs form to [facility]. [place].  [address] Management  [phone], Pager [phone]

## 2019-03-11 NOTE — PROGRESS NOTES
Bedside and verbal report given to NIR Victoria, with use of kardex. Rounded on pt Q1 hour throughout shift, no s/s distress nor discomfort noted. Dr. Flaca Urbnia at pt's bedside. Call bell in reach.

## 2019-03-11 NOTE — DISCHARGE SUMMARY
Discharge Summary    Patient: Cristhian Hobbs Person  YOB: 1950  Age:  70 y.o.      Admit Date: 3/10/2019  Discharge Date: 3/11/2019  LOS:  LOS: 1 day   Discharge To: [facility]  Consults: Gastroenterology    Admission Diagnoses: GI bleed [K92.2]  Esophageal cancer ([facility] [C15.9]  HTN (hypertension) [I10]    Discharge Diagnoses:    Problem List as of 3/11/2019 Date Reviewed: 3/11/2019          Codes Class Noted - Resolved    * (Principal) GI bleed ICD-10-CM: K92.2  ICD-9-CM: 578.9  3/10/2019 - Present        Acute blood loss anemia ICD-10-CM: D62  ICD-9-CM: 285.1  3/11/2019 - Present        Esophageal cancer ([facility] ICD-10-CM: C15.9  ICD-9-CM: 150.9  3/10/2019 - Present        HTN (hypertension) ICD-10-CM: I10  ICD-9-CM: 401.9  3/10/2019 - Present        Essential hypertension ICD-10-CM: I10  ICD-9-CM: 401.9  1/10/2019 - Present        Status post gastrostomy ([facility] ICD-10-CM: Z93.1  ICD-9-CM: V44.1  2/13/2018 - Present        Malnutrition of moderate degree ([facility] ICD-10-CM: E44.0  ICD-9-CM: 263.0  12/13/2017 - Present        Metastatic squamous cell carcinoma to esophagus ([facility] ICD-10-CM: C78.89  ICD-9-CM: 197.8  10/17/2017 - Present        Arthritis ICD-10-CM: M19.90  ICD-9-CM: 716.90  6/6/2011 - Present        RESOLVED: Anemia ICD-10-CM: D64.9  ICD-9-CM: 285.9  12/28/2017 - 1/15/2018        RESOLVED: Squamous cell esophageal cancer ([facility] ICD-10-CM: C15.9  ICD-9-CM: 150.9  12/13/2017 - 1/15/2018        RESOLVED: Pain aggravated by eating or drinking ICD-10-CM: R52  ICD-9-CM: 780.96  12/13/2017 - 1/15/2018        RESOLVED: Esophageal mass ICD-10-CM: K22.9  ICD-9-CM: 530.9  12/13/2017 - 1/15/2018        RESOLVED: Malignant neoplasm of esophagus ([facility] ICD-10-CM: C15.9  ICD-9-CM: 150.9  12/13/2017 - 1/15/2018        RESOLVED: Malnutrition of moderate degree ([facility] ICD-10-CM: E44.0  ICD-9-CM: 263.0  12/6/2017 - 12/6/2017        RESOLVED: Pain aggravated by eating or drinking ICD-10-CM: R52  ICD-9-CM: 780.96 12/6/2017 - 12/6/2017        RESOLVED: Esophageal mass ICD-10-CM: K22.9  ICD-9-CM: 530.9  12/6/2017 - 12/6/2017        RESOLVED: Malnutrition ([facility] ICD-10-CM: E46  ICD-9-CM: 263.9  12/4/2017 - 1/15/2018        RESOLVED: PEG tube malfunction ([facility] ICD-10-CM: B17.73  ICD-9-CM: 536.42  12/2/2017 - 12/6/2017        RESOLVED: HTN (hypertension) ICD-10-CM: I10  ICD-9-CM: 401.9  12/2/2017 - 12/6/2017        RESOLVED: Pain aggravated by eating or drinking ICD-10-CM: R52  ICD-9-CM: 780.96  11/29/2017 - 11/30/2017        RESOLVED: Esophageal mass ICD-10-CM: K22.9  ICD-9-CM: 530.9  10/30/2017 - 11/30/2017        RESOLVED: Squamous cell esophageal cancer ([facility] ICD-10-CM: C15.9  ICD-9-CM: 150.9  10/30/2017 - 12/6/2017        RESOLVED: Acute gastric perforation ([facility] ICD-10-CM: K25.1  ICD-9-CM: 531.10  10/20/2017 - 11/30/2017        RESOLVED: Gastric perforation, acute ([facility] ICD-10-CM: K25.1  ICD-9-CM: 531.10  10/20/2017 - 12/6/2017        RESOLVED: Proteinuria ICD-10-CM: R80.9  ICD-9-CM: 791.0  5/30/2014 - 12/6/2017        RESOLVED: Hyperthyroidism ICD-10-CM: E05.90  ICD-9-CM: 242.90  5/30/2014 - 1/15/2018        RESOLVED: Leg pain ICD-10-CM: M79.606  ICD-9-CM: 729.5  8/3/2010 - 2/4/2013        RESOLVED: Essential hypertension, malignant ICD-10-CM: I10  ICD-9-CM: 401.0  8/3/2010 - 1/15/2018            Discharge Condition:  Improved    Procedures: EGD         HPI:   Edwige Anderson is a 70 y.o. male who presented to the ER after vomiting up blood at home. He reports several episodes of hematemesis at home. He does not have diarrhea. He has had partial gastrectomy in the past because of esophageal cancer. He is s/p PEG placement. He does not have chest pain or SOB. He will be admitted for ongoing management. Hospital Course: Following admission patient had multiple episodes of hematemesis. Hemoglobin dropped from 9.0 to 6.6. 1 unit blood transfusion was given, repeat hemoglobin 10.6.  GI was consulted and patient underwent EGD by Dr. Virginie Larkin. EGD impression: \"Hx of total gastrectomy with RNY gastrojejunal anastomosis last 10/2018 for esophageal cancer metastatic to the stomach with large, ulcerated and bleeding area at the gastrojejunal anastomosis either due to ischemia or recurrent cancer. Bleeding controlled by combination APC ablation and Hemospray application. \"  Please see full report of EGD below. Dr. Virginie Larkin spoke with Dr. Eagle Jarrell, Surgical Oncology at [facility] who recommended patient be transferred to his care at [facility] for surgical management, and agreed to accept patient as transfer. Bed requested through transfer center at [facility]. The rest of the patient's chronic conditions were managed appropriately during their admission. They were medically stable at the time of discharge. EGD REPORT 3/11/19  FINDINGS:  1. Esophagus -  Large clot at the esophagojejunal anastomosis at about 38 cm from the incisors. This was washed off exposing a large, necrotic and ulcerated appearing region with diffuse bleeding. I was able to intubate both efferent and afferent limbs of the RNY anastomosis with limb appearing intact without ulcers or areas of bleeding. The scope was withdrawn back to the anastomosis and hemostatic efforts using APC at automated ERBE settings was performed with incomplete control of bleeding. Therefore additional hemostatic attempt were performed using Hemospray by [facility].  The ulcerated and bleeding area was aggressively coated with Hemospray achieving control of bleeding. No immediate complications. 2.  Efferent and afferent jejunal limbs - Normal.   IMPRESSION:   Hx of total gastrectomy with RNY gastrojejunal anastomosis last 10/2018 for esophageal cancer metastatic to the stomach with large, ulcerated and bleeding area at the gastrojejunal anastomosis either due to ischemia or recurrent cancer.   Bleeding controlled by combination APC ablation and Hemospray application.   RECOMMENDATIONS:  1. Strict NPO. 2.   Transfuse if Hgb < 7 gm/dL. 3.   No role for PPI in the absence of the stomach. 4.   Discuss with Dr. Arminda Lopez and recommended patient be transferred to his care at [facility] for surgical management. He agreed to accept patient as a transfer. Visit Vitals  BP 94/56 (BP 1 Location: Right arm, BP Patient Position: At rest)   Pulse 85   Temp 98.9 °F (37.2 °C)   Resp 16   Ht 5' 9\" (1.753 m)   Wt 75.3 kg (166 lb)   SpO2 97%   BMI 24.51 kg/m²       Physical Exam at Discharge:  General Appearance: NAD, conversant  HENT: normocephalic/atraumatic, moist mucus membranes. Lungs: CTA with normal respiratory effort  CV: RRR, no m/r/g  Abdomen: soft, non-tender, normal bowel sounds  Extremities: no cyanosis, no peripheral edema  Neuro: moves all extremities, no focal deficits  Psych: appropriate affect, alert and oriented to person, place and time    Labs Prior to Discharge:  Labs: Results:       Chemistry Recent Labs     03/11/19  0400 03/10/19  1700   * 121*    135*   K 4.4 4.9    103   CO2 25 23   BUN 22* 24*   CREA 0.67 0.93   CA 8.4* 9.2   AGAP 7 9   BUCR 33* 26*   AP  --  98   TP  --  8.5*   ALB  --  2.8*   GLOB  --  5.7*   AGRAT  --  0.5*      CBC w/Diff Recent Labs     03/11/19  1620 03/11/19  0400 03/10/19  2300 03/10/19  1700   WBC  --  8.3  --  9.0   RBC  --  2.78*  --  3.66*   HGB 10.6* 6.6* 9.0* 8.9*   HCT 33.6* 20.9* 29.7* 27.3*   PLT  --  288  --  359   GRANS  --  79*  --  78*   LYMPH  --  11*  --  16*   EOS  --  1  --  1      Cardiac Enzymes No results for input(s): CPK, CKND1, SRIDHAR in the last 72 hours.     No lab exists for component: CKRMB, TROIP   Coagulation Recent Labs     03/10/19  1700   PTP 13.5   INR 1.1   APTT 31.0       Lipid Panel Lab Results   Component Value Date/Time    Cholesterol, total 110 03/07/2019 01:23 PM    HDL Cholesterol 39 (L) 03/07/2019 01:23 PM    LDL, calculated 56.6 03/07/2019 01:23 PM    VLDL, calculated 14.4 03/07/2019 01:23 PM    Triglyceride 72 03/07/2019 01:23 PM    CHOL/HDL Ratio 2.8 03/07/2019 01:23 PM      BNP No results for input(s): BNPP in the last 72 hours. Liver Enzymes Recent Labs     03/10/19  1700   TP 8.5*   ALB 2.8*   AP 98   SGOT 48*      Thyroid Studies Lab Results   Component Value Date/Time    TSH 0.64 03/07/2019 01:23 PM            Significant Imaging:  Xr Abd (kub)    Result Date: 3/11/2019  EXAM: XR ABD (KUB) CLINICAL INDICATION/HISTORY:  For NG tube   > Additional: None COMPARISON: 8/18/2017 TECHNIQUE: Supine abdomen _______________ FINDINGS: There is a pigtail catheter projected over the lower abdomen. Sump-type NG/OG tube present, tip in the stomach but proximal sidehole in the esophagus. This should be advanced 10 cm. There are extensive surgical clips noted in the upper abdomen. Visualized bowel gas pattern unremarkable. _______________     IMPRESSION: The tip of the sump tube is in the stomach but the proximal sidehole is in the esophagus. This should be advanced 10 cm. Xr Chest Port    Result Date: 3/11/2019  AP portable chest, 3/10/2019 at 1737 hours: INDICATION: Gastrointestinal hemorrhage. In the interval, upper abdominal surgery apparently has been performed with clips in the left upper quadrant and bowel sutures. Left hemidiaphragm is elevated in the interval, not appearing to be subpulmonic fluid. Otherwise the lungs are clear. The heart is not enlarged. Some tortuosity of the descending aorta and thoracic aortic arch. IMPRESSION: Interval elevation left hemidiaphragm. Evidence of left upper quadrant abdominal surgery. No other change.     Xr Abd Port  1 V    Result Date: 3/11/2019  EXAM: XR ABD PORT  1 V CLINICAL INDICATION/HISTORY:  ngt placement   > Additional: None COMPARISON: Earlier in the morning, 0805 hours TECHNIQUE: Supine view of the lower chest and upper abdomen _______________ FINDINGS: No change in the position of the NG tube, the tip of which is in the region of the stomach and the proximal sidehole in the distal esophagus. There are extensive surgical clips noted in the upper abdomen. Question as to whether or not the stomach or most of the stomach may have been removed. _______________     IMPRESSION: No change in the position of the nasogastric tube compared with earlier in the morning. Correlation with upper abdominal surgery is needed in order to determine how much, if any, stomach remains. Discharge Medications:     Current Discharge Medication List      CONTINUE these medications which have NOT CHANGED    Details   oxyCODONE-acetaminophen (PERCOCET 10)  mg per tablet Take 1 Tab by mouth every eight (8) hours as needed for Pain for up to 30 days. Max Daily Amount: 3 Tabs. Qty: 90 Tab, Refills: 0    Associated Diagnoses: Metastatic squamous cell carcinoma to esophagus ([address] Hypertension, unspecified type; Malnutrition of moderate degree ([address] Arthritis; Hyperthyroidism; Malnutrition, unspecified type ([address] Esophageal dysphagia; Abnormal liver enzymes      metoprolol tartrate (LOPRESSOR) 100 mg IR tablet Take 1 Tab by mouth two (2) times a day. Qty: 180 Tab, Refills: 4      Lactose-Free Food with Fiber (JEVITY 1.5 MAYKEL) 0.06 gram-1.5 kcal/mL liqd 6 Cans by PEG Tube route nightly. 80 ml/hr from 4454-5289. provides 1680 kcal, 72 g protein, 851 ml free water. Qty: 180 Can, Refills: 12      multivitamin,tx-iron-ca-min (THERA-M W/ IRON) 27-0.4 mg tab Take 1 Tab by mouth daily.              Activity: Activity as tolerated    Diet: NPO         Total time spent including time spent on final examination and discharge discussion, discharge documentation and records reviewed and medication reconciliation: > 30 minutes    Ady Veliz MD  3/11/2019  2:57 PM  [facility]. Hospitalist

## 2019-03-11 NOTE — PROGRESS NOTES
Assumed care, pt arrving via stretcher from ED, on RA, nontele, PIV with NS and protonix gtt infusing. No s/s distress nor discomfort noted, call bell in reach, will monitor.

## 2019-03-11 NOTE — CONSULTS
Gastroenterology Consult    Patient: Honey Sellers MRN: [id number]  SSN: [id number]    YOB: 1950  Age: 70 y.o. Sex: male        Assessment:   1) Hematemesis; suspected to be secondary to recurrent malignancy at the esophago-jejunostomy based on recent CT report; ulceration/AVM/MWT are considerations as well. 2) Anemia secondary to GI bleeding  3) Metastatic esophageal squamous cell carcinoma    The risks of EGD were discussed in detail with the patient to include the risks of bleeding or inability to control bleeding, infection, and perforation. Plan:   1) Continue protonix gtt  2) Agree with PRBC transfusion  3) Plan for EGD for further evaluation and possible treatment this morning. Subjective:      Honey Sellers is a 70 y.o. male who is being seen for  Hematemesis. He has a history most notable for esophageal squamous cell carcinoma found in 2017 and treated with radiation and chemotherapy. He had a PEG tube at that time. He developed a metastatic gastric fundal mass last fall which caused bleeding and was treated endoscopically by Dr Qasim Solano with cryospray but ultimately he underwent exploratory laparotomy, ISACC, total gastrectomy with Lashawn-en-Y esophagojejunostomy and feeding jejunostomy placement in Oct2018. He recently had imaging Feb2019 suggesting recurrence with an enhancing soft tissue mass near the anastomosis as well as an enlarging gastroehpatic lymph node and an enlarging liver lesion with additional new satellite live lesions. He reports yesterday he had nausea and several episodes of bright red hematemesis and came to the ER. Initial HR was in the low 100's with normal blood pressure. Vitals normalized and have remained normal overnight. He had additional episodes of hematemesis early this morning but none for the past few hours. Dark material was aspirated from the j-tube in the ER.    Hb was 8.9/9 overnight (from 9.4 recently) but dropped to 6.6 this morning and he is receiving 1 unit PRBC's. He is on protonix gtt. He has no abdominal pain. He states he receives most nutrition through the j-tube although he does drink liquids without much difficulty. He has had no abdominal pain, melena, or hematochezia.        Past Medical History  Past Medical History:   Diagnosis Date    Arthritis     Essential hypertension 1/10/2019    Essential hypertension, malignant 8/3/2010    Leg pain 8/3/2010    Throat cancer ([address]     Thyroid disease     hypothyroidism       Past Surgical History  Past Surgical History:   Procedure Laterality Date    HX GI  2017    G-tube placement on 17    HX VASCULAR ACCESS  2017    CT EGD DELIVER THERMAL ENERGY SPHNCTR/CARDIA GERD         Family History  Family History   Problem Relation Age of Onset    Heart Disease Mother     Lung Disease Father          Social History  Social History     Socioeconomic History    Marital status:      Spouse name: Not on file    Number of children: Not on file    Years of education: Not on file    Highest education level: Not on file   Social Needs    Financial resource strain: Not on file    Food insecurity - worry: Not on file    Food insecurity - inability: Not on file   [place] needs - medical: Not on file   [employer] needs - non-medical: Not on file   Occupational History    Not on file   Tobacco Use    Smoking status: Former Smoker     Packs/day: 0.25     Types: Cigarettes     Start date: 2017     Last attempt to quit: 2017     Years since quittin.2    Smokeless tobacco: Never Used    Tobacco comment: smokes occas   Substance and Sexual Activity    Alcohol use: No     Alcohol/week: 4.8 oz     Types: 8 Standard drinks or equivalent per week    Drug use: No    Sexual activity: No   Other Topics Concern    Not on file   Social History Narrative    Not on file         Medications  Current Facility-Administered Medications Medication Dose Route Frequency    0.9% sodium chloride infusion 250 mL  250 mL IntraVENous PRN    pantoprazole (PROTONIX) 40 mg in 0.9% sodium chloride (MBP/ADV) 50 mL MBP  8 mg/hr IntraVENous CONTINUOUS    metoprolol tartrate (LOPRESSOR) tablet 100 mg  100 mg Oral BID    dextrose 5% - 0.45% NaCl with KCl 20 mEq/L infusion  125 mL/hr IntraVENous CONTINUOUS    morphine injection 1-2 mg  1-2 mg IntraVENous Q4H PRN        Hospital Problems  Date Reviewed: 3/7/2019          Codes Class Noted POA    Esophageal cancer ([facility] ICD-10-CM: C15.9  ICD-9-CM: 150.9  3/10/2019 Unknown        HTN (hypertension) ICD-10-CM: I10  ICD-9-CM: 401.9  3/10/2019 Unknown        * (Principal) GI bleed ICD-10-CM: K92.2  ICD-9-CM: 578.9  3/10/2019 Unknown            No Known Allergies    Review of Systems:  Pertinent items are noted in the History of Present Illness. Objective:     Physical Exam:  Visit Vitals  /82   Pulse 81   Temp 98.1 °F (36.7 °C)   Resp 16   Ht 5' 9\" (1.753 m)   Wt 75.3 kg (166 lb)   SpO2 100%   BMI 24.51 kg/m²     General appearance: alert, cooperative, no distress, appears stated age  Head: Normocephalic, without obvious abnormality, atraumatic  Eyes: negative for pallor/icterus  Neck: supple, symmetrical, trachea midline, no adenopathy,  Lungs: Some rales in the right mid-lung field; otherwise clear to auscultation bilaterally  Heart: regular rate and rhythm, S1, S2 normal, no murmur, click, rub or gallop  Abdomen: soft, non-tender; multiple well healed surgical scars; jejunal feeding tube in the left mid-abdomen with no sign of infection around the tube. Extremities: Right hand contracture and hand atrophy noted.    Skin: Skin color, texture, turgor normal. No rashes or lesions  Neurologic: Awake and alert, appropriate    Recent Results (from the past 24 hour(s))   CBC WITH AUTOMATED DIFF    Collection Time: 03/10/19  5:00 PM   Result Value Ref Range    WBC 9.0 4.6 - 13.2 K/uL    RBC 3.66 (L) 4.70 - 5.50 M/uL    HGB 8.9 (L) 13.0 - 16.0 g/dL    HCT 27.3 (L) 36.0 - 48.0 %    MCV 74.6 74.0 - 97.0 FL    MCH 24.3 24.0 - 34.0 PG    MCHC 32.6 31.0 - 37.0 g/dL    RDW 16.0 (H) 11.6 - 14.5 %    PLATELET 023 082 - 294 K/uL    MPV 10.1 9.2 - 11.8 FL    NEUTROPHILS 78 (H) 40 - 73 %    LYMPHOCYTES 16 (L) 21 - 52 %    MONOCYTES 5 3 - 10 %    EOSINOPHILS 1 0 - 5 %    BASOPHILS 0 0 - 2 %    ABS. NEUTROPHILS 7.0 1.8 - 8.0 K/UL    ABS. LYMPHOCYTES 1.4 0.9 - 3.6 K/UL    ABS. MONOCYTES 0.5 0.05 - 1.2 K/UL    ABS. EOSINOPHILS 0.1 0.0 - 0.4 K/UL    ABS. BASOPHILS 0.0 0.0 - 0.1 K/UL    DF AUTOMATED     TYPE & SCREEN    Collection Time: 03/10/19  5:00 PM   Result Value Ref Range    Crossmatch Expiration 03/13/2019     ABO/Rh(D) A POSITIVE     Antibody screen NEG     CALLED TO:  Tim Mckeon, 2100, AT 0746 ON 03/11/2019 BY [facility]    METABOLIC PANEL, COMPREHENSIVE    Collection Time: 03/10/19  5:00 PM   Result Value Ref Range    Sodium 135 (L) 136 - 145 mmol/L    Potassium 4.9 3.5 - 5.5 mmol/L    Chloride 103 100 - 108 mmol/L    CO2 23 21 - 32 mmol/L    Anion gap 9 3.0 - 18 mmol/L    Glucose 121 (H) 74 - 99 mg/dL    BUN 24 (H) 7.0 - 18 MG/DL    Creatinine 0.93 0.6 - 1.3 MG/DL    BUN/Creatinine ratio 26 (H) 12 - 20      GFR est AA >60 >60 ml/min/1.73m2    GFR est non-AA >60 >60 ml/min/1.73m2    Calcium 9.2 8.5 - 10.1 MG/DL    Bilirubin, total 0.5 0.2 - 1.0 MG/DL    ALT (SGPT) 38 16 - 61 U/L    AST (SGOT) 48 (H) 15 - 37 U/L    Alk.  phosphatase 98 45 - 117 U/L    Protein, total 8.5 (H) 6.4 - 8.2 g/dL    Albumin 2.8 (L) 3.4 - 5.0 g/dL    Globulin 5.7 (H) 2.0 - 4.0 g/dL    A-G Ratio 0.5 (L) 0.8 - 1.7     PROTHROMBIN TIME + INR    Collection Time: 03/10/19  5:00 PM   Result Value Ref Range    Prothrombin time 13.5 11.5 - 15.2 sec    INR 1.1 0.8 - 1.2     MAGNESIUM    Collection Time: 03/10/19  5:00 PM   Result Value Ref Range    Magnesium 2.1 1.6 - 2.6 mg/dL   PTT    Collection Time: 03/10/19  5:00 PM   Result Value Ref Range    aPTT 31.0 23.0 - 36.4 SEC   EKG, 12 LEAD, INITIAL    Collection Time: 03/10/19  6:20 PM   Result Value Ref Range    Ventricular Rate 80 BPM    Atrial Rate 80 BPM    P-R Interval 130 ms    QRS Duration 72 ms    Q-T Interval 386 ms    QTC Calculation (Bezet) 445 ms    Calculated P Axis 55 degrees    Calculated R Axis 3 degrees    Calculated T Axis -10 degrees    Diagnosis       Normal sinus rhythm  Normal ECG  When compared with ECG of 11-JAN-2018 11:12,  premature atrial complexes are no longer present  T wave inversion now evident in Inferior leads     HGB & HCT    Collection Time: 03/10/19 11:00 PM   Result Value Ref Range    HGB 9.0 (L) 13.0 - 16.0 g/dL    HCT 29.7 (L) 36.0 - 48.0 %   CBC WITH AUTOMATED DIFF    Collection Time: 03/11/19  4:00 AM   Result Value Ref Range    WBC 8.3 4.6 - 13.2 K/uL    RBC 2.78 (L) 4.70 - 5.50 M/uL    HGB 6.6 (L) 13.0 - 16.0 g/dL    HCT 20.9 (L) 36.0 - 48.0 %    MCV 75.2 74.0 - 97.0 FL    MCH 23.7 (L) 24.0 - 34.0 PG    MCHC 31.6 31.0 - 37.0 g/dL    RDW 15.8 (H) 11.6 - 14.5 %    PLATELET 487 107 - 110 K/uL    MPV 9.4 9.2 - 11.8 FL    NEUTROPHILS 79 (H) 40 - 73 %    LYMPHOCYTES 11 (L) 21 - 52 %    MONOCYTES 9 3 - 10 %    EOSINOPHILS 1 0 - 5 %    BASOPHILS 0 0 - 2 %    ABS. NEUTROPHILS 6.6 1.8 - 8.0 K/UL    ABS. LYMPHOCYTES 0.9 0.9 - 3.6 K/UL    ABS. MONOCYTES 0.8 0.05 - 1.2 K/UL    ABS. EOSINOPHILS 0.1 0.0 - 0.4 K/UL    ABS.  BASOPHILS 0.0 0.0 - 0.1 K/UL    DF AUTOMATED     METABOLIC PANEL, BASIC    Collection Time: 03/11/19  4:00 AM   Result Value Ref Range    Sodium 139 136 - 145 mmol/L    Potassium 4.4 3.5 - 5.5 mmol/L    Chloride 107 100 - 108 mmol/L    CO2 25 21 - 32 mmol/L    Anion gap 7 3.0 - 18 mmol/L    Glucose 135 (H) 74 - 99 mg/dL    BUN 22 (H) 7.0 - 18 MG/DL    Creatinine 0.67 0.6 - 1.3 MG/DL    BUN/Creatinine ratio 33 (H) 12 - 20      GFR est AA >60 >60 ml/min/1.73m2    GFR est non-AA >60 >60 ml/min/1.73m2    Calcium 8.4 (L) 8.5 - 10.1 MG/DL         Signed By: Estelita Kim MD March 11, 2019

## 2019-03-11 NOTE — ROUTINE PROCESS
TRANSFER - OUT REPORT:    Verbal report given to Liseth(name) on Flakito Love Person  being transferred to [facility](unit) for routine post - op       Report consisted of patients Situation, Background, Assessment and   Recommendations(SBAR). Information from the following report(s) Procedure Summary was reviewed with the receiving nurse. Lines:   Peripheral IV 03/10/19 Right Antecubital (Active)   Site Assessment Clean, dry, & intact 3/11/2019  2:45 PM   Phlebitis Assessment 0 3/11/2019  2:45 PM   Infiltration Assessment 0 3/11/2019  2:45 PM   Dressing Status Clean, dry, & intact 3/11/2019  2:45 PM   Dressing Type Tape;Transparent 3/11/2019  2:45 PM   Hub Color/Line Status Pink; Infusing 3/11/2019  2:45 PM   Alcohol Cap Used Yes 3/10/2019 10:30 PM       Peripheral IV 03/10/19 Left Antecubital (Active)   Site Assessment Clean, dry, & intact 3/11/2019  2:45 PM   Phlebitis Assessment 0 3/11/2019  2:45 PM   Infiltration Assessment 0 3/11/2019  2:45 PM   Dressing Status Clean, dry, & intact 3/11/2019  2:45 PM   Dressing Type Tape;Transparent 3/11/2019  2:45 PM   Hub Color/Line Status Green;Capped 3/11/2019  2:45 PM   Alcohol Cap Used Yes 3/10/2019 10:30 PM        Opportunity for questions and clarification was provided.       Patient transported with:   [employer]

## 2019-03-11 NOTE — H&P
History and Physical    Patient: Deronda Seip Person               Sex: male          DOA: 3/10/2019       YOB: 1950      Age:  70 y.o.        LOS:  LOS: 0 days        HPI:     Jim William is a 70 y.o. male who presented to the ER after vomiting up blood at home. He reports several episodes of hematemesis at home. He does not have diarrhea. He has had partial gastrectomy in the past because of esophageal cancer. He is s/p PEG placement. He does not have chest pain or SOB. He will be admitted for ongoing management. Past Medical History:   Diagnosis Date    Arthritis     Essential hypertension 1/10/2019    Essential hypertension, malignant 8/3/2010    Leg pain 8/3/2010    Throat cancer ([address]     Thyroid disease     hypothyroidism       Social History:   Tobacco use:  Patient does not smoke   Alcohol use:  Patient does not use alcohol   Patient lives with his brother    Family History: Mother had kidney disease   Father had lung cancer    Review of Systems    Constitutional:  No fever or weight loss  HEENT:  No headache or visual changes  Cardiovascular:  No chest pain or diaphoresis  Respiratory:  No coughing, wheezing, or shortness of breath. GI:  Hematemesis as above. No diarrhea  :  No hematuria or dysuria  Skin:  No rashes or moles  Neuro:  No seizures or syncope  Hematological:  No bruising or bleeding  Endocrine:  No diabetes or thyroid disease    Physical Exam:      Visit Vitals  /63   Pulse 81   Temp 98.5 °F (36.9 °C)   Resp 14   Ht 5' 9\" (1.753 m)   Wt 75.3 kg (166 lb)   SpO2 98%   BMI 24.51 kg/m²       Physical Exam:    Gen:  No distress, alert  HEENT:  Normal cephalic atraumatic, extra-occular movements are intact.   Neck:  Supple, No JVD  Lungs:  Clear bilaterally, no wheeze, no rales, normal effort  Heart:  Regular Rate and Rhythm, normal S1 and S2, no edema  Abdomen:  Soft, non tender, PEG in place  Extremities:  Well perfused, no cyanosis or edema  Neurological:  Awake and alert, CN's are intact, normal strength throughout extremities  Skin:  No rashes or moles  Mental Status:  Normal thought process, does not appear anxious    Laboratory Studies:    BMP:   Lab Results   Component Value Date/Time     (L) 03/10/2019 05:00 PM    K 4.9 03/10/2019 05:00 PM     03/10/2019 05:00 PM    CO2 23 03/10/2019 05:00 PM    AGAP 9 03/10/2019 05:00 PM     (H) 03/10/2019 05:00 PM    BUN 24 (H) 03/10/2019 05:00 PM    CREA 0.93 03/10/2019 05:00 PM    GFRAA >60 03/10/2019 05:00 PM    GFRNA >60 03/10/2019 05:00 PM     CBC:   Lab Results   Component Value Date/Time    WBC 9.0 03/10/2019 05:00 PM    HGB 9.0 (L) 03/10/2019 11:00 PM    HCT 29.7 (L) 03/10/2019 11:00 PM     03/10/2019 05:00 PM       Assessment/Plan     Principal Problem:    GI bleed (3/10/2019)    Active Problems:    Esophageal cancer ([address] (3/10/2019)      HTN (hypertension) (3/10/2019)        PLAN:    IV protonix  Follow H/H closely  GI consulted in ER  BP control  Nutrition consult for PEG feeding recommendations  DVT prophylaxis.

## 2019-03-11 NOTE — PHYSICIAN ADVISORY
Letter of Determination: Inpatient Status Appropriate    This patient was originally hospitalized as Inpatient Status on 3/10/2019 for acute gastrointestinal bleed. This patient is appropriate for Inpatient Admission in accordance with CMS regulation Section 43 .3. Specifically, patient's stay is expected to be more than Two Midnights and was medically necessary. The patient's stay was medically necessary based on hemoglobin to 6.6 mg/dl, history of rou-en-Y gastrojejunal anastomosis, with large necrotic lesion at anastomosis site, with medical plan for transfer to an outside facility for surgical intervention. Consistent with CMS guidelines, patient meets for inpatient status. While the patient has not completed two midnights, he remains appropriate for inpatient care based on plan for continued care at another facility. It is our recommendation that this patient's hospitalization status should be INPATIENT status.      The final decision regarding the patient's hospitalization status depends on the attending physician's judgement.     Viri Hill MD, OSMIN,   Physician Valeriy Peterson.

## 2019-03-11 NOTE — ANESTHESIA PREPROCEDURE EVALUATION
Anesthetic History   No history of anesthetic complications            Review of Systems / Medical History  Patient summary reviewed and pertinent labs reviewed    Pulmonary  Within defined limits                 Neuro/Psych   Within defined limits           Cardiovascular    Hypertension: well controlled              Exercise tolerance: >4 METS     GI/Hepatic/Renal  Within defined limits              Endo/Other      Hypothyroidism: well controlled  Cancer (esophageal CA.  surgery chemo radiation)     Other Findings   Comments:                  Physical Exam    Airway  Mallampati: II  TM Distance: 4 - 6 cm  Neck ROM: normal range of motion   Mouth opening: Normal     Cardiovascular  Regular rate and rhythm,  S1 and S2 normal,  no murmur, click, rub, or gallop  Rhythm: regular  Rate: normal         Dental  No notable dental hx       Pulmonary  Breath sounds clear to auscultation               Abdominal  GI exam deferred       Other Findings            Anesthetic Plan    ASA: 3  Anesthesia type: MAC          Induction: Intravenous  Anesthetic plan and risks discussed with: Patient

## 2019-03-11 NOTE — PROGRESS NOTES
0730 received pt, resting in bed, oriented x4, no acute distress, peg tube left abdomen (pta), IVF infusing, pt NPO. Dr Michael Malik in room, wants NGT dropped to low int suction. Blood ordered for pt last night, will infuse when ready. 0800 spoke w dr Kiera Lockhart on phone, he asked  How pt was feeling, told dr about pt PEG and order for NGT, he wants to be notified if bright red blood is in suction canister once it is hooked up. 0002 asked dr Michael Malik for tele order since pt is gi bleed. Also hold PO lopressor for now. 0845 NGT dropped by marybel, charge RN, needs to be advanced 10cm per KUB, now at 65, blood ready, will infuse asap. Pt is on box 50, NSR.     0935 kub, placement of ngt is correct, dr Carlos Enrique Love in room. 6812 blood started, dr Carlos Enrique Love wants blood in before pt goes to endo at 1100. Will run at 76 for first 15 min, then increase to 200, pt aware of signs and symptoms that need to be reported during blood transfusion. 1020 pt doing well, no signs of transfusion reaction, VSS. 1045 call to endo, spoke w estrellita, about latest kub, looks to be not in stomach, should we have it on suction? She said to take it off suction and they will reposition when he comes down. 1057 call from estrellita in endo, dr Carlos Enrique Love wants NGT removed. Done. 1117 blood finished, no signs of transfusion reaction noted. 1135 call from estrellita in endo, pt in for transport. 1145 pt to endo w transport and fluids running. [phone] pt back on floor, will be transferred to [facility] for surgery, accepting physician on emtala form, awaiting call from transfer center. Tele reapplied, ivf infusing, protonix gtt stopped. 1600 call placed to dr Michael Malik for discharge order. 1800 pt had BM in bathroom, urinated on self, pt cleaned, gown changed, socks and SCDs changed, repositioned in bed.call bell in reach.     1915 Bedside and Verbal shift change report given to Cam (oncoming nurse) by Bhavana Clayton RN (offgoing nurse). Report included the following information Kardex, MAR and Recent Results.

## 2019-03-11 NOTE — PROGRESS NOTES
Progress Note      Patient: Twin Sleeper Person               Sex: male          DOA: 3/10/2019       YOB: 1950      Age:  70 y.o.        LOS:  LOS: 1 day             CHIEF COMPLAINT:  Vomiting      Subjective:     Pt had multiple episodes of hematemesis last night with drop in Hgb. Pt denies c/o, not having activng vomiting currently. NG tube placed, GI contacted, plan for EGD. Objective:      Visit Vitals  /67   Pulse 82   Temp 98.7 °F (37.1 °C)   Resp 16   Ht 5' 9\" (1.753 m)   Wt 75.3 kg (166 lb)   SpO2 100%   BMI 24.51 kg/m²       Physical Exam:  Physical Exam   Constitutional: He is oriented to person, place, and time and well-developed, well-nourished, and in no distress. HENT:   Head: Normocephalic and atraumatic. Eyes: Conjunctivae and EOM are normal. Pupils are equal, round, and reactive to light. Neck: Normal range of motion. Cardiovascular: Normal rate, regular rhythm and normal heart sounds. Pulmonary/Chest: Effort normal and breath sounds normal.   Abdominal: Soft. Bowel sounds are normal.   Musculoskeletal: Normal range of motion. Neurological: He is alert and oriented to person, place, and time. Skin: Skin is warm and dry. Nursing note and vitals reviewed.       Lab/Data Reviewed:  BMP:   Lab Results   Component Value Date/Time     03/11/2019 04:00 AM    K 4.4 03/11/2019 04:00 AM     03/11/2019 04:00 AM    CO2 25 03/11/2019 04:00 AM    AGAP 7 03/11/2019 04:00 AM     (H) 03/11/2019 04:00 AM    BUN 22 (H) 03/11/2019 04:00 AM    CREA 0.67 03/11/2019 04:00 AM    GFRAA >60 03/11/2019 04:00 AM    GFRNA >60 03/11/2019 04:00 AM     CBC:   Lab Results   Component Value Date/Time    WBC 8.3 03/11/2019 04:00 AM    HGB 6.6 (L) 03/11/2019 04:00 AM    HCT 20.9 (L) 03/11/2019 04:00 AM     03/11/2019 04:00 AM       Imaging Reviewed:  Xr Abd (kub)    Result Date: 3/11/2019  EXAM: XR ABD (KUB) CLINICAL INDICATION/HISTORY:  For NG tube   > Additional: None COMPARISON: 8/18/2017 TECHNIQUE: Supine abdomen _______________ FINDINGS: There is a pigtail catheter projected over the lower abdomen. Sump-type NG/OG tube present, tip in the stomach but proximal sidehole in the esophagus. This should be advanced 10 cm. There are extensive surgical clips noted in the upper abdomen. Visualized bowel gas pattern unremarkable. _______________     IMPRESSION: The tip of the sump tube is in the stomach but the proximal sidehole is in the esophagus. This should be advanced 10 cm. Xr Chest Port    Result Date: 3/11/2019  AP portable chest, 3/10/2019 at 1737 hours: INDICATION: Gastrointestinal hemorrhage. In the interval, upper abdominal surgery apparently has been performed with clips in the left upper quadrant and bowel sutures. Left hemidiaphragm is elevated in the interval, not appearing to be subpulmonic fluid. Otherwise the lungs are clear. The heart is not enlarged. Some tortuosity of the descending aorta and thoracic aortic arch. IMPRESSION: Interval elevation left hemidiaphragm. Evidence of left upper quadrant abdominal surgery. No other change. Assessment:     Principal Problem:    GI bleed (3/10/2019)    Active Problems:    Acute blood loss anemia (3/11/2019)      Esophageal cancer (HCC) (3/10/2019)      HTN (hypertension) (3/10/2019)        PLAN:  · Cont IV protonix gtt  · NG tube placed  · Plan for EGD per GI  · Transfuse blood for goal Hgb > 7  · Nutrition consult for PEG feeding recs  · Cont acceptable home medications for chronic conditions  · DVT protocol    I have personally reviewed all pertinent labs and films that have officially resulted over the last 24 hours. I have personally checked for all pending labs that are awaiting final results.       Signed By: Amaris Kearney MD     March 11, 2019

## 2019-03-11 NOTE — ROUTINE PROCESS
TRANSFER - IN REPORT:    Verbal report received from [place], [place] (name) on Kate Tripp Person  being received from 2100(unit) for ordered procedure      Report consisted of patients Situation, Background, Assessment and   Recommendations(SBAR). Information from the following report(s) SBAR was reviewed with the receiving nurse. Opportunity for questions and clarification was provided. Assessment completed upon patients arrival to unit and care assumed.

## 2019-03-11 NOTE — DISCHARGE INSTRUCTIONS
DISCHARGE SUMMARY from Nurse    PATIENT INSTRUCTIONS:    After general anesthesia or intravenous sedation, for 24 hours or while taking prescription Narcotics:  · Limit your activities  · Do not drive and operate hazardous machinery  · Do not make important personal or business decisions  · Do  not drink alcoholic beverages  · If you have not urinated within 8 hours after discharge, please contact your surgeon on call. Report the following to your surgeon:  · Excessive pain, swelling, redness or odor of or around the surgical area  · Temperature over 100.5  · Nausea and vomiting lasting longer than 4 hours or if unable to take medications  · Any signs of decreased circulation or nerve impairment to extremity: change in color, persistent  numbness, tingling, coldness or increase pain  · Any questions    What to do at Home:  Recommended activity: Activity as tolerated. If you experience any of the following symptoms shortness of breath, fever, chills, vomiting, please follow up with your primary care provider or local eD. *  Please give a list of your current medications to your Primary Care Provider. *  Please update this list whenever your medications are discontinued, doses are      changed, or new medications (including over-the-counter products) are added. *  Please carry medication information at all times in case of emergency situations. These are general instructions for a healthy lifestyle:    No smoking/ No tobacco products/ Avoid exposure to second hand smoke  Surgeon General's Warning:  Quitting smoking now greatly reduces serious risk to your health.     Obesity, smoking, and sedentary lifestyle greatly increases your risk for illness    A healthy diet, regular physical exercise & weight monitoring are important for maintaining a healthy lifestyle    You may be retaining fluid if you have a history of heart failure or if you experience any of the following symptoms:  Weight gain of 3 pounds or more overnight or 5 pounds in a week, increased swelling in our hands or feet or shortness of breath while lying flat in bed. Please call your doctor as soon as you notice any of these symptoms; do not wait until your next office visit. Recognize signs and symptoms of STROKE:    F-face looks uneven    A-arms unable to move or move unevenly    S-speech slurred or non-existent    T-time-call 911 as soon as signs and symptoms begin-DO NOT go       Back to bed or wait to see if you get better-TIME IS BRAIN. Warning Signs of HEART ATTACK     Call 911 if you have these symptoms:   Chest discomfort. Most heart attacks involve discomfort in the center of the chest that lasts more than a few minutes, or that goes away and comes back. It can feel like uncomfortable pressure, squeezing, fullness, or pain.  Discomfort in other areas of the upper body. Symptoms can include pain or discomfort in one or both arms, the back, neck, jaw, or stomach.  Shortness of breath with or without chest discomfort.  Other signs may include breaking out in a cold sweat, nausea, or lightheadedness. Don't wait more than five minutes to call 911 - MINUTES MATTER! Fast action can save your life. Calling 911 is almost always the fastest way to get lifesaving treatment. Emergency Medical Services staff can begin treatment when they arrive -- up to an hour sooner than if someone gets to the hospital by car. The discharge information has been reviewed with the patient. The patient verbalized understanding. Discharge medications reviewed with the patient and appropriate educational materials and side effects teaching were provided.   ___________________________________________________________________________________________________________________________________

## 2019-03-11 NOTE — ED NOTES
TRANSFER - ED to INPATIENT REPORT:    SBAR report made available to receiving floor on this patient being transferred to [facility] (2100)  for routine progression of care       Admitting diagnosis GI bleed [K92.2]  Esophageal cancer ([facility] [C15.9]  HTN (hypertension) [I10]    Information from the following report(s) SBAR was made available to receiving floor. Lines:   Peripheral IV 03/10/19 Right Antecubital (Active)   Site Assessment Clean, dry, & intact 3/10/2019  5:00 PM   Phlebitis Assessment 0 3/10/2019  5:00 PM   Infiltration Assessment 0 3/10/2019  5:00 PM   Dressing Status Clean, dry, & intact 3/10/2019  5:00 PM   Dressing Type Transparent 3/10/2019  5:00 PM   Hub Color/Line Status Pink 3/10/2019  5:00 PM       Peripheral IV 03/10/19 Left Antecubital (Active)   Site Assessment Clean, dry, & intact 3/10/2019  5:45 PM   Phlebitis Assessment 0 3/10/2019  5:45 PM   Infiltration Assessment 0 3/10/2019  5:45 PM   Dressing Status Clean, dry, & intact 3/10/2019  5:45 PM   Dressing Type Transparent 3/10/2019  5:45 PM   Hub Color/Line Status Flushed;Patent 3/10/2019  5:45 PM        Medication list confirmed with patient    Opportunity for questions and clarification was provided.       Patient is oriented to time, place, person and situation   Patient is  continent and ambulatory with assist     Valuables transported with patient     Patient transported with:   Tech    MAP (Monitor): 72 =Monitored (most recent)  Vitals w/ MEWS Score (last day)     Date/Time MEWS Score Pulse Resp Temp BP Level of Consciousness SpO2    03/10/19 2145  0  81  14  98.5 °F (36.9 °C)  101/63  Alert  98 %    03/10/19 2100    78      111/66  Alert  100 %    03/10/19 1900    88      120/70    98 %    03/10/19 1845    81      110/69    98 %    03/10/19 1830    80      111/72    98 %    03/10/19 1815    79      107/65    97 %    03/10/19 1800    81      104/54        03/10/19 1700    102  (Abnormal)       118/81    97 % 03/10/19 1657  2  105  (Abnormal)   16  98 °F (36.7 °C)  133/75  Alert  98 %

## 2019-03-11 NOTE — PROGRESS NOTES
Nutrition initial assessment/Plan of care      RECOMMENDATIONS:   1. NPO   2. Monitor labs, weight and PO intake  3. RD to follow     GOALS:   1. PO intake meets >75% of protein/calorie needs by 3/14  2. Weight Maintenance (+/- 1-2 lb) by 3/18       ASSESSMENT:   Wt status is classified as normal per BMI of 24.5. Currently NPO. Labs noted. Pt w/ hypocalcemia, hypoalbuminemia, H/H (6.6/20.9) and elevated BUN. Nutrition recommendations listed. RD to follow. Nutrition Diagnoses: Altered Gi function related to GI bleed as evidenced by multiple episodes of hematemesis        Nutrition Risk:  [x] High  [] Moderate []  Low    SUBJECTIVE/OBJECTIVE:    Pt admitted for GI bleed, esophageal cancer and HTN. Noted Pt s/p feeding jejunostomy placement in Oct 2018 and receives most of his nutrition through his J-tube but he does drink liquids without much difficulty. GI consulted: plan is for EGD. Attempted to see Pt x 2 but off the floor for procedure. Will follow up. Information Obtained from:    [x] Chart Review   [x] Patient   [] Family/Caregiver   [] Nurse/Physician   [] Interdisciplinary Meeting/Rounds      Diet: NPO  Medications: [x] Reviewed  IV: D5 1/2 NS @125 mL/hr (510 kcal/day)   Allergies: [x] Reviewed   Encounter Diagnoses     ICD-10-CM ICD-9-CM   1.  Upper GI bleed K92.2 578.9     Past Medical History:   Diagnosis Date    Arthritis     Essential hypertension 1/10/2019    Essential hypertension, malignant 8/3/2010    Leg pain 8/3/2010    Th[facility] ([facility] [address] [address]     Thyroid disease     hypothyroidism      Labs:    Lab Results   Component Value Date/Time    Sodium 139 03/11/2019 04:00 AM    Potassium 4.4 03/11/2019 04:00 AM    Chloride 107 03/11/2019 04:00 AM    CO2 25 03/11/2019 04:00 AM    Anion gap 7 03/11/2019 04:00 AM    Glucose 135 (H) 03/11/2019 04:00 AM    BUN 22 (H) 03/11/2019 04:00 AM    Creatinine 0.67 03/11/2019 04:00 AM    Calcium 8.4 (L) 03/11/2019 04:00 AM    Magnesium 2.1 03/10/2019 05:00 PM    Albumin 2.8 (L) 03/10/2019 05:00 PM     Anthropometrics: BMI (calculated): 24.5  Last 3 Recorded Weights in this Encounter    03/10/19 1657   Weight: 75.3 kg (166 lb)      Ht Readings from Last 1 Encounters:   03/10/19 5' 9\" (1.753 m)     Weight Metrics 3/10/2019 3/7/2019 2/6/2019 1/10/2019 11/27/2018 11/12/2018 11/6/2018   Weight 166 lb 163 lb 9.6 oz 166 lb 3.2 oz 170 lb 12.8 oz 162 lb 168 lb 3.2 oz 167 lb   BMI 24.51 kg/m2 24.16 kg/m2 24.54 kg/m2 25.22 kg/m2 23.92 kg/m2 24.84 kg/m2 24.66 kg/m2       No data found. IBW: 160 lb %IBW: 104% UBW: 162-170 lb   [] Weight Loss [] Weight Gain [x] Weight Stable    Estimated Nutrition Needs: [x] MSJ  [] Other:  Calories: 8386-7818 kcal Based on:   [x] Actual BW    Protein:   76-91 g Based on:   [x] Actual BW    Fluid:       5343-1325 ml Based on:   [x] Actual BW      [x] No Cultural, Yazidi or ethnic dietary need identified.     [] Cultural, Yazidi and ethnic food preferences identified and addressed     Wt Status:  [x] Normal (18.6 - 24.9) [] Underweight (<18.5) [] Overweight (25 - 29.9) [] Mild Obesity (30 - 34.9)  [] Moderate Obesity (35 - 39.9) [] Morbid Obesity (40+)       Nutrition Problems Identified:   [x] Suboptimal PO intake v/s NPO  [] Food Allergies  [] Difficulty chewing/swallowing/poor dentition  [] Constipation/Diarrhea   [] Nausea/Vomiting   [] None  [] Other:     Plan:   [] Therapeutic Diet  []  Obtained/adjusted food preferences/tolerances and/or snacks options   []  Supplements added   [] Occupational therapy following for feeding techniques  []  HS snack added   []  Modify diet texture   []  Modify diet for food allergies   []  Educate patient   []  Assist with menu selection   []  Monitor PO intake on meal rounds   [x]  Continue inpatient monitoring and intervention   []  Participated in discharge planning/Interdisciplinary rounds/Team meetings   []  Other:     Education Needs:   [] Not appropriate for teaching at this time due to:   [x] Identified and addressed    Nutrition Monitoring and Evaluation:  [x] Continue ongoing monitoring and intervention  [] Other    Neema Petties

## 2019-03-12 NOTE — PROGRESS NOTES
Care Management Interventions  PCP Verified by CM: Yes(Dr. Heriberto Melgar)  Last Visit to PCP: 03/07/19  Palliative Care Criteria Met (RRAT>21 & CHF Dx)?: No  Mode of Transport at Discharge: ALS  Transition of Care Consult (CM Consult): Discharge Planning  Discharge Durable Medical Equipment: No  Physical Therapy Consult: No  Occupational Therapy Consult: No  Speech Therapy Consult: No  Current Support Network: Relative's Home(brother)  Confirm Follow Up Transport: Family  Plan discussed with Pt/Family/Caregiver: Yes  Discharge Location  Discharge Placement: Transferred to higher level of care([facility])

## 2019-03-12 NOTE — PROGRESS NOTES
conducted an initial consultation and Spiritual Assessment for Rafael Sellers, who is a 70 y.o.,male. Patients Primary Language is: [place]. According to the patients EMR Taoism Affiliation is: [facility].     The reason the Patient came to the hospital is:   Patient Active Problem List    Diagnosis Date Noted    Acute blood loss anemia 03/11/2019    Esophageal cancer ([facility] [address] 03/10/2019    HTN (hypertension) 03/10/2019    GI bleed 03/10/2019    Essential hypertension 01/10/2019    Status post gastrostomy ([facility] 02/13/2018    Malnutrition of moderate degree (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 Provided information about Spiritual Care Services. Offered prayer and assurance of continued prayers on patient's behalf. The following outcomes were achieved:  Patient expressed gratitude for 's visit. Assessment:  There are no further spiritual or Muslim issues which require intervention at this time. Plan:  Chaplains will continue to follow and will provide pastoral care on an as needed/requested basis. Jaden Mejia M.Div.   , [facility] [facility]: [phone]/LYNN: [phone]

## 2019-03-12 NOTE — ANCILLARY DISCHARGE INSTRUCTIONS
Call made to [address], they do not have this patient in their system. Made [facility] aware, will  patient within the hour.

## 2019-03-12 NOTE — PROGRESS NOTES
Date/Time:  3/12/2019 10:27 AM    Patient was admitted to [facility]/[address] on 3/10/19 and discharged on 3/11/19 for GI bleed. Patient transferred from [facility] to [facility] on 3/11/19 - current for Upper GI bleed. Reviewed available notes on file. Will follow upon discharge.

## 2019-03-12 NOTE — ANESTHESIA POSTPROCEDURE EVALUATION
Procedure(s):  ESOPHAGOGASTRODUODENOSCOPY (EGD).     Anesthesia Post Evaluation      Multimodal analgesia: multimodal analgesia used between 6 hours prior to anesthesia start to PACU discharge  Patient location during evaluation: bedside  Patient participation: complete - patient participated  Level of consciousness: awake  Pain score: 0  Pain management: adequate  Airway patency: patent  Anesthetic complications: no  Cardiovascular status: stable  Respiratory status: acceptable  Hydration status: acceptable  Post anesthesia nausea and vomiting:  none      Visit Vitals  /66 (BP 1 Location: Right arm, BP Patient Position: At rest)   Pulse (!) 108   Temp 37.1 °C (98.7 °F)   Resp 18   Ht 5' 9\" (1.753 m)   Wt 75.3 kg (166 lb)   SpO2 95%   BMI 24.51 kg/m²

## 2019-03-25 NOTE — PROGRESS NOTES
Hospital Discharge Follow-Up Date/Time:  3/25/2019 12:44 PM 
 
Patient was admitted to [facility]/[address] on 3/10/19 and discharged on 3/11/19 for GI bleed. Patient was transferred to [facility] on 3/11/19-3/15/19 for Upper GI bleed. Top Challenges reviewed with the provider Method of communication with provider :none Inpatient RRAT score: 23 Was this a readmission? no  
Patient stated reason for the readmission: n/a Nurse Navigator (NN) contacted the patient by telephone to perform post hospital discharge assessment. Verified name and  with patient as identifiers. Provided introduction to self, and explanation of the Nurse Navigator role. Patient states that he is okay. Patient denied chest pain, shortness of breath, fever, chills, nausea, vomiting, abdominal pain, S/S of bleeding, lightheadedness, and dizziness. Patient strongly declined H/H at this time. Patient has good family, friend and neighbor support. Reviewed discharge instructions and red flags with patient who verbalized understanding. Patient given an opportunity to ask questions and does not have any further questions or concerns at this time. The patient agrees to contact the PCP office for questions related to their healthcare. NN provided contact information for future reference. Disease Specific:   N/A Summary of patient's top problems: 1. Upper GI bleed- No S/S  at this time per Pt. Patient will follow up with surgeon tomorrow 3/26/19. Patient will self schedule appt with Dr. Alissa Hurtado and Patient will call Oncology DrAdele To verify appt. 2. Metastatic esophageal cancer - ongoing. Pt has Peg- tube, tube feeds at night. Patient states that he attended his Radiation on 3/19. Strongly encouraged follow up appointment with [facility]. Patient verbalized understanding and states that his friend handles his appointments.  Patient states that he has good support from family, friends and neighbors. Patient will self schedule f/u appt with PCP and Oncology. 3. Heart burn at night before while doing tube feeds, Patient to talk to Surgeon tomorrow to address this issue. 4. Patient is taking Percocet not Oxycodone. Patient state that he has not taken his liquid tylenol. Given patient teaching about tylenol limit since patient has Percocet and liquid tylenol order from the hospital. Strongly encouraged patient to take all his medication bottles and D/C summary with him to all appointments. Patient verbalized understanding. Home Health orders at discharge: none [address]: n/a Date of initial visit: n/a Offered home health to patient, Patient strongly declined at this time. Durable Medical Equipment ordered/company: n/a Durable Medical Equipment received: n/a Barriers to care? lack of knowledge about disease, level of motivation, medication management Advance Care Planning:  
Does patient have an Advance Directive:  reviewed and current Medication(s):  
New Medications at Discharge: Reviewed with pt. Tylenol limit discussed with Pt. START taking these medications  per Dr. Lulu Sosa from [facility] office epic reviewed with Pt.   
  Details  
acetaminophen (TYLENOL) 650mg/20ml PO SOLN Instill 20 mL into tube Every 4 Hours As Needed for Pain. Qty: 354 mL, Refills: 0  
   
 
 
Changed Medications at Discharge: none noted Discontinued Medications at Discharge: STOP taking these medications  Per Dr. Lulu Sosa from [facility] office epic reviewed with Pt.   
   
  acetaminophen (TYLENOL) 325 mg PO TABS Comments:  
Reason for Stopping:   
     
   
 
 
Medication reconciliation was performed with patient, who verbalizes understanding of administration of home medications. There were no barriers to obtaining medications identified at this time. Referral to Pharm D needed: no  
 
Current Outpatient Medications Medication Sig  
  oxyCODONE-acetaminophen (PERCOCET 10)  mg per tablet Take 1 Tab by mouth every eight (8) hours as needed for Pain for up to 30 days. Max Daily Amount: 3 Tabs.  metoprolol tartrate (LOPRESSOR) 100 mg IR tablet Take 1 Tab by mouth two (2) times a day.  Lactose-Free Food with Fiber (JEVITY 1.5 MAYKEL) 0.06 gram-1.5 kcal/mL liqd 6 Cans by PEG Tube route nightly. 80 ml/hr from 9558-6014. provides 1680 kcal, 72 g protein, 851 ml free water.  multivitamin,tx-iron-ca-min (THERA-M W/ IRON) 27-0.4 mg tab Take 1 Tab by mouth daily. No current facility-administered medications for this visit. There are no discontinued medications. [facility] follow up appointment(s): No future appointments. Non-[facility] follow up appointment(s):  
Dr. Jaleesa Roman, [facility] on 3/26/19. [facility]:  n/a  
  
   
Discharge Procedure Orders of Dr. Maude Anderson from [facility] MD office epic reviewed with Pt. Diet as Follows:  
  Continue clear liquids in small amounts for comfort and tube feeds at home as you had been doing before admission to the hospital.  
  
   
Follow Up with Specialist  
  Follow up with your oncologist at [address] on 3/19/2019, 3:30pm.  
  
   
Restrictions as Follows:  
  The patient should avoid straining and heavy lifting (>20 lbs) for 2 weeks. No rigorous activity for 1-2 weeks. Otherwise can perform daily activities as tolerated. Do not drive, drink alcohol or operate machinery while taking narcotic pain medications. Be sure to use a stool softener while on your pain medication. If you experience high fever greater than 101.5F, increasing pain, nausea, vomiting, increasing redness, please call office or return to ED for further evaluation.  
  
   
Follow Up with Surgeon  
  Follow up is needed with Dr. Jaleesa Roman, [facility], in 1-2 weeks.  Please call [phone] to schedule an appointment at your convenience today or tomorrow at the latest. Thank you and we look forward to seeing you in the office.  
  
   
Additional Instructions as Follows:  
  Take the Tylenol prescribed to you from the hospital for pain. If your pain is not controlled, you may take your home Oxycodone that you have as needed for pain. Goals  Attends follow-up appointments as directed. Patient will attend appointment with Surgery tomorrow 3/26/19. Patient will call Oncology  And Dr. Hien Ramos office to schedule follow up appointment.  Prevent complications post hospitalization. Patient will attend appointment with Surgery tomorrow 3/26/19. Patient will call Oncology  And Dr. Hien Ramos office to schedule follow up appointment.  Understands red flags post discharge. Patient will go to the nearest emergency room for chest pain, shortness of breath, S/S of bleeding, returning of symptoms that brought him to the emergency room and/or worsening of symptoms. Patient will contact PCP office for any questions or concerns related to healthcare. Patient will call Surgery for problem with surgery.

## 2019-04-04 NOTE — PROGRESS NOTES
Matias Goldstein is a 70 y.o. male presents today for transition of care from [facility] on 3/11-3/15/19 for a GI bleed. Pt is in Room # 5        1. Have you been to the ER, urgent care clinic since your last visit? Hospitalized since your last visit? Yes When: 3/11-3/15/19 Where: [facility]  Reason for visit: GI bleed    2. Have you seen or consulted any other health care providers outside of the [address] [place] [address] since your last visit? Include any pap smears or colon screening. Yes When: yesterday [facility]: [facility] Reason for visit: follow up on peg tube placement     Health Maintenance reviewed - .

## 2019-04-04 NOTE — PROGRESS NOTES
Luigi Stevens is a 70 y.o.  male and presents with    Chief Complaint   Patient presents with    Other     esophageal cancer    Hypertension    Coronary Artery Disease    Anemia           Subjective: In hosp a couple of weeks ago with gi bleed  Since then has been fine  Has egd with coag  Basically bleeding from recurrant cancer in esophagus        Additional Concerns:          Patient Active Problem List    Diagnosis Date Noted    Acute blood loss anemia 03/11/2019    Esophageal cancer ([facility] [address] 03/10/2019    HTN (hypertension) 03/10/2019    GI bleed 03/10/2019    Essential hypertension 01/10/2019    Status post gastrostomy ([facility] [address] 02/13/2018    Malnutrition of moderate degree ([facility] [address] 12/13/2017    Metastatic squamous cell carcinoma to esophagus ([facility] [address] 10/17/2017    Arthritis 06/06/2011     Current Outpatient Medications   Medication Sig Dispense Refill    oxyCODONE-acetaminophen (PERCOCET 10)  mg per tablet Take 1 Tab by mouth every eight (8) hours as needed for Pain for up to 30 days. Max Daily Amount: 3 Tabs. 90 Tab 0    metoprolol tartrate (LOPRESSOR) 100 mg IR tablet Take 1 Tab by mouth two (2) times a day. 180 Tab 4    Lactose-Free Food with Fiber (JEVITY 1.5 MAYKEL) 0.06 gram-1.5 kcal/mL liqd 6 Cans by PEG Tube route nightly. 80 ml/hr from 7059-4638. provides 1680 kcal, 72 g protein, 851 ml free water.  180 Can 12     No Known Allergies  Past Medical History:   Diagnosis Date    Arthritis     Essential hypertension 1/10/2019    Essential hypertension, malignant 8/3/2010    Leg pain 8/3/2010    Throat cancer ([facility] [address]     Thyroid disease     hypothyroidism     Past Surgical History:   Procedure Laterality Date    EGD  3/11/2019         HX GI  11/2017    G-tube placement on 11/28/17    HX VASCULAR ACCESS  11/2017    MO EGD DELIVER THERMAL ENERGY SPHNCTR/CARDIA GERD       Family History   Problem Relation Age of Onset    Heart Disease Mother     Lung Disease Father Social History     Tobacco Use    Smoking status: Former Smoker     Packs/day: 0.25     Types: Cigarettes     Start date: 2017     Last attempt to quit: 2017     Years since quittin.3    Smokeless tobacco: Never Used    Tobacco comment: smokes occas   Substance Use Topics    Alcohol use: No     Alcohol/week: 4.8 oz     Types: 8 Standard drinks or equivalent per week       ROS       All other systems reviewed and are negative. Objective:  Vitals:    19 1409   BP: 123/78   Pulse: 64   Resp: 18   Temp: 97.4 °F (36.3 °C)   TempSrc: Oral   SpO2: 100%   Weight: 165 lb (74.8 kg)   Height: 5' 9\" (1.753 m)   PainSc:   0 - No pain                 alert, well appearing, and in no distress and oriented to person, place, and time  Chest - clear to auscultation, no wheezes, rales or rhonchi, symmetric air entry  Heart - normal rate, regular rhythm, normal S1, S2, no murmurs, rubs, clicks or gallops  Abdomen - soft, nontender, nondistended, no masses or organomegaly        LABS     TESTS      Assessment/Plan:    Esophageal cancer -- approaching terminal phaze  Pain meds refilled  Continue oral suplements       Lab review: labs are reviewed, up to date and normal    Diagnoses and all orders for this visit:    1. Metastatic squamous cell carcinoma to esophagus (HCC)  -     oxyCODONE-acetaminophen (PERCOCET 10)  mg per tablet; Take 1 Tab by mouth every eight (8) hours as needed for Pain for up to 30 days. Max Daily Amount: 3 Tabs. 2. Hypertension, unspecified type  -     oxyCODONE-acetaminophen (PERCOCET 10)  mg per tablet; Take 1 Tab by mouth every eight (8) hours as needed for Pain for up to 30 days. Max Daily Amount: 3 Tabs. 3. Malnutrition of moderate degree (HCC)  -     oxyCODONE-acetaminophen (PERCOCET 10)  mg per tablet; Take 1 Tab by mouth every eight (8) hours as needed for Pain for up to 30 days. Max Daily Amount: 3 Tabs.     4. Arthritis  - oxyCODONE-acetaminophen (PERCOCET 10)  mg per tablet; Take 1 Tab by mouth every eight (8) hours as needed for Pain for up to 30 days. Max Daily Amount: 3 Tabs. 5. Hyperthyroidism  -     oxyCODONE-acetaminophen (PERCOCET 10)  mg per tablet; Take 1 Tab by mouth every eight (8) hours as needed for Pain for up to 30 days. Max Daily Amount: 3 Tabs. 6. Malnutrition, unspecified type ([address]  -     oxyCODONE-acetaminophen (PERCOCET 10)  mg per tablet; Take 1 Tab by mouth every eight (8) hours as needed for Pain for up to 30 days. Max Daily Amount: 3 Tabs. 7. Esophageal dysphagia  -     oxyCODONE-acetaminophen (PERCOCET 10)  mg per tablet; Take 1 Tab by mouth every eight (8) hours as needed for Pain for up to 30 days. Max Daily Amount: 3 Tabs. 8. Abnormal liver enzymes  -     oxyCODONE-acetaminophen (PERCOCET 10)  mg per tablet; Take 1 Tab by mouth every eight (8) hours as needed for Pain for up to 30 days. Max Daily Amount: 3 Tabs. I have discussed the diagnosis with the patient and the intended plan as seen in the above orders. The patient has received an after-visit summary and questions were answered concerning future plans. I have discussed medication side effects and warnings with the patient as well. I have reviewed the plan of care with the patient, accepted their input and they are in agreement with the treatment goals.

## 2019-04-23 PROBLEM — I10 HTN (HYPERTENSION): Status: RESOLVED | Noted: 2019-01-01 | Resolved: 2019-01-01

## 2019-04-23 PROBLEM — K92.2 GI BLEED: Status: RESOLVED | Noted: 2019-01-01 | Resolved: 2019-01-01

## 2019-04-23 NOTE — PROGRESS NOTES
Keiko Short is a 70 y.o. male presents today for transition of care from [facility] 4/19-4/21/19, admitted for irregular heart beat. Patient reports of some abdominal pain. Patient reports his pain as 3/10. Patient ran out of his blood pressure medications when he was sent to the hospital. Education provided regarding medication refills. Pt is in Room # 4        1. Have you been to the ER, urgent care clinic since your last visit? Hospitalized since your last visit? Yes When: 4/19-4/21/19 Where: [facility] Reason for visit: irregular heart beat    2. Have you seen or consulted any other health care providers outside of the [address] [place] [address] since your last visit? Include any pap smears or colon screening. No     Health Maintenance reviewed - .

## 2019-04-23 NOTE — PROGRESS NOTES
Mayuri Logan is a 70 y.o.  male and presents with    Chief Complaint   Patient presents with    Other     throat cancer           Subjective:  Pt just in hosp last wk with gi bleed secoanry to esophageal cancer    No longer a candidate for radiation or chemotherapy          Additional Concerns:          Patient Active Problem List    Diagnosis Date Noted    Acute blood loss anemia 03/11/2019    Esophageal cancer ([facility] [address] 03/10/2019    Essential hypertension 01/10/2019    Status post gastrostomy ([facility] [address] 02/13/2018    Malnutrition of moderate degree ([facility] [address] 12/13/2017    Metastatic squamous cell carcinoma to esophagus (HCC) 10/17/2017    Arthritis 06/06/2011     Current Outpatient Medications   Medication Sig Dispense Refill    oxyCODONE-acetaminophen (PERCOCET 10)  mg per tablet Take 1 Tab by mouth every eight (8) hours as needed for Pain for up to 30 days. Max Daily Amount: 3 Tabs. 90 Tab 0    metoprolol tartrate (LOPRESSOR) 100 mg IR tablet Take 1 Tab by mouth two (2) times a day. 180 Tab 4    Lactose-Free Food with Fiber (JEVITY 1.5 MAYKEL) 0.06 gram-1.5 kcal/mL liqd 6 Cans by PEG Tube route nightly. 80 ml/hr from 3463-8162. provides 1680 kcal, 72 g protein, 851 ml free water.  180 Can 12     No Known Allergies  Past Medical History:   Diagnosis Date    Arthritis     Essential hypertension 1/10/2019    Essential hypertension, malignant 8/3/2010    Leg pain 8/3/2010    Throat cancer ([facility] [address]     Thyroid disease     hypothyroidism     Past Surgical History:   Procedure Laterality Date    EGD  3/11/2019         HX GI  11/2017    G-tube placement on 11/28/17    HX VASCULAR ACCESS  11/2017    [place] EGD DELIVER THERMAL ENERGY SPHNCTR/CARDIA GERD       Family History   Problem Relation Age of Onset    Heart Disease Mother     Lung Disease Father      Social History     Tobacco Use    Smoking status: Former Smoker     Packs/day: 0.25     Types: Cigarettes     Start date: 1/17/2017 Last attempt to quit: 2017     Years since quittin.4    Smokeless tobacco: Never Used    Tobacco comment: smokes occas   Substance Use Topics    Alcohol use: No     Alcohol/week: 4.8 oz     Types: 8 Standard drinks or equivalent per week       ROS       All other systems reviewed and are negative. Objective:  Vitals:    19 1309   BP: 128/83   Pulse: 87   Resp: 16   Temp: 98.1 °F (36.7 °C)   TempSrc: Oral   SpO2: 99%   Weight: 161 lb 6.4 oz (73.2 kg)   Height: 5' 9\" (1.753 m)   PainSc:   3   PainLoc: Abdomen                   Very weak and debilitated. Somewhat short of b reath         LABS     TESTS      Assessment/Plan:    Pt may be entering terminal phase  He doesn't want hospice at this time. Lab review: labs are reviewed, up to date and normal    Diagnoses and all orders for this visit:    1. Metastatic squamous cell carcinoma to esophagus (HCC)  -     oxyCODONE-acetaminophen (PERCOCET 10)  mg per tablet; Take 1 Tab by mouth every eight (8) hours as needed for Pain for up to 30 days. Max Daily Amount: 3 Tabs. 2. Hypertension, unspecified type  -     oxyCODONE-acetaminophen (PERCOCET 10)  mg per tablet; Take 1 Tab by mouth every eight (8) hours as needed for Pain for up to 30 days. Max Daily Amount: 3 Tabs. 3. Malnutrition of moderate degree (HCC)  -     oxyCODONE-acetaminophen (PERCOCET 10)  mg per tablet; Take 1 Tab by mouth every eight (8) hours as needed for Pain for up to 30 days. Max Daily Amount: 3 Tabs. 4. Arthritis  -     oxyCODONE-acetaminophen (PERCOCET 10)  mg per tablet; Take 1 Tab by mouth every eight (8) hours as needed for Pain for up to 30 days. Max Daily Amount: 3 Tabs. 5. Hyperthyroidism  -     oxyCODONE-acetaminophen (PERCOCET 10)  mg per tablet; Take 1 Tab by mouth every eight (8) hours as needed for Pain for up to 30 days. Max Daily Amount: 3 Tabs.     6. Malnutrition, unspecified type ([address]  - oxyCODONE-acetaminophen (PERCOCET 10)  mg per tablet; Take 1 Tab by mouth every eight (8) hours as needed for Pain for up to 30 days. Max Daily Amount: 3 Tabs. 7. Esophageal dysphagia  -     oxyCODONE-acetaminophen (PERCOCET 10)  mg per tablet; Take 1 Tab by mouth every eight (8) hours as needed for Pain for up to 30 days. Max Daily Amount: 3 Tabs. 8. Abnormal liver enzymes  -     oxyCODONE-acetaminophen (PERCOCET 10)  mg per tablet; Take 1 Tab by mouth every eight (8) hours as needed for Pain for up to 30 days. Max Daily Amount: 3 Tabs. Other orders  -     metoprolol tartrate (LOPRESSOR) 100 mg IR tablet; Take 1 Tab by mouth two (2) times a day. I have discussed the diagnosis with the patient and the intended plan as seen in the above orders. The patient has received an after-visit summary and questions were answered concerning future plans. I have discussed medication side effects and warnings with the patient as well. I have reviewed the plan of care with the patient, accepted their input and they are in agreement with the treatment goals.

## 2019-05-06 NOTE — PROGRESS NOTES
Jennifer Pandey is a 70 y.o.  male and presents with    Chief Complaint   Patient presents with    Other     esophageal ca    Hypertension    Arthritis    Anemia           Subjective:  Pt here today very weak, dizzy, short of breath  Seen 2 wk ago. Approaching terminal phaze   Did n't want hospice at that time    Today he has changed his mind and wants hospice care  Lives with his brother. Cardiovascular Review:  The patient has hypertension. Diet and Lifestyle: generally follows a low fat low cholesterol diet  Home BP Monitoring: is not measured at home. Pertinent ROS: taking medications as instructed, no medication side effects noted, no TIA's, no chest pain on exertion, no dyspnea on exertion, no swelling of ankles. Osteoarthritis and Chronic Pain:  Patient has osteoarthritis, primarily affecting the diffuse. Symptoms onset: problem is longstanding. Rheumatological ROS: no current joint or muscle symptoms, essentially pain-free. Response to treatment plan: stable. Esophageal cancer no longer a candidate for radiation or chemo. Approaching terminal phaze    Additional Concerns:          Patient Active Problem List    Diagnosis Date Noted    Acute blood loss anemia 03/11/2019    Esophageal cancer ([facility] [address] 03/10/2019    Essential hypertension 01/10/2019    Status post gastrostomy ([facility] [facility] 02/13/2018    Malnutrition of moderate degree ([facility] [facility] 12/13/2017    Metastatic squamous cell carcinoma to esophagus (HCC) 10/17/2017    Arthritis 06/06/2011     Current Outpatient Medications   Medication Sig Dispense Refill    metoprolol tartrate (LOPRESSOR) 100 mg IR tablet Take 1 Tab by mouth two (2) times a day. 180 Tab 4    oxyCODONE-acetaminophen (PERCOCET 10)  mg per tablet Take 1 Tab by mouth every four (4) hours as needed for Pain for up to 30 days. Max Daily Amount: 6 Tabs.  90 Tab 0    Lactose-Free Food with Fiber (JEVITY 1.5 MAYKEL) 0.06 gram-1.5 kcal/mL liqd 6 Cans by PEG Tube route nightly. 80 ml/hr from 2481-7185. provides 1680 kcal, 72 g protein, 851 ml free water. 180 Can 12     No Known Allergies  Past Medical History:   Diagnosis Date    Arthritis     Essential hypertension 1/10/2019    Essential hypertension, malignant 8/3/2010    Leg pain 8/3/2010    Throat cancer ([facility] [address]     Thyroid disease     hypothyroidism     Past Surgical History:   Procedure Laterality Date    EGD  3/11/2019         HX GI  2017    G-tube placement on 17    HX VASCULAR ACCESS  2017    CT EGD DELIVER THERMAL ENERGY SPHNCTR/CARDIA GERD       Family History   Problem Relation Age of Onset    Heart Disease Mother     Lung Disease Father      Social History     Tobacco Use    Smoking status: Former Smoker     Packs/day: 0.25     Types: Cigarettes     Start date: 2017     Last attempt to quit: 2017     Years since quittin.4    Smokeless tobacco: Never Used    Tobacco comment: smokes occas   Substance Use Topics    Alcohol use: No     Alcohol/week: 4.8 oz     Types: 8 Standard drinks or equivalent per week       ROS       All other systems reviewed and are negative. 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 Pt requests [facility].  Will refer    htn stable    arthrits stable    Anemia seconadry to gi bleed ongonig      Lab review: labs are reviewed, up to date and normal    Diagnoses and all orders for this visit:    1. Metastatic squamous cell carcinoma to esophagus (HCC)    2. Hypertension, unspecified type    3. Malnutrition of moderate degree (HCC)    4. Arthritis    5. Anemia, unspecified type    6. Essential hypertension, malignant          I have discussed the diagnosis with the patient and the intended plan as seen in the above orders. The patient has received an after-visit summary and questions were answered concerning future plans. I have discussed medication side effects and warnings with the patient as well. I have reviewed the plan of care with the patient, accepted their input and they are in agreement with the treatment goals. Follow-up and Dispositions    · Return if symptoms worsen or fail to improve. More than 1/2 of this 60 minute visit was spent in counselling and coordination of care, as described above.

## 2019-05-06 NOTE — PROGRESS NOTES
Room #  5    SUBJECTIVE:    Kit Aguilar Person is a 70 y.o. male who presents today for follow up for throat cancer. Patient c/o abdomen pain at a level 3    1. Have you been to the ER, urgent care clinic since your last visit? Hospitalized since your last visit? NO    2. Have you seen or consulted any other health care providers outside of the [address] [place] [address] since your last visit? Include any pap smears or colon screening. NO  When :  Reason:    Health Maintenance reviewed Yes    Health Maintenance Due   Topic Date Due    Shingrix Vaccine Age 49> (1 of 2) 10/28/1999    Pneumococcal 65+ years (2 of 2 - PPSV23) 11/19/2016    GLAUCOMA SCREENING Q2Y  07/14/2018    MEDICARE YEARLY EXAM  08/16/2018

## 2019-05-24 ENCOUNTER — HOME CARE VISIT (OUTPATIENT)
Dept: HOSPICE | Facility: HOSPICE | Age: 69
End: 2019-05-24
Payer: MEDICARE

## 2023-10-23 NOTE — PROGRESS NOTES
[facility]    Date: 2017    Name: Akil Sellers              MRN: [id number]              : 1950    Chemotherapy Cycle: C4D1  Taxol/Carbo       Mr. Jacquelyn Fairchild was assessed and education was provided. Patient arrived from radiation therapy, c/o being tired. Patient states he had a G-tube placed on 17 and was not given education on how to care for it. Dr. Soumya Morrison made aware. MD states patient's daughter will receive education from Dr. Rafi Pereira office and that the patient will be receiving home health assistance with care of G-tube. Patient made aware of this information. Mr. Sellers's vitals were reviewed. Visit Vitals    BP 96/66 (BP 1 Location: Left arm, BP Patient Position: Sitting)    Pulse 100    Temp 97.3 °F (36.3 °C)    Resp 18    Ht 5' 9\" (1.753 m)    Wt 66.1 kg (145 lb 12.8 oz)    BMI 21.53 kg/m2      Patient Vitals for the past 12 hrs:   Temp Pulse Resp BP   17 1101 97.3 °F (36.3 °C) 100 18 96/66     Mediport at left upper chest accessed with 20 gauge, 1 inch Gonzalez needle w/o difficulty. Good blood return obtained, labs drawn then flushed with 20 ml NS. Lab results were obtained and reviewed. Recent Results (from the past 12 hour(s))   CBC WITH 3 PART DIFF    Collection Time: 17 11:15 AM   Result Value Ref Range    WBC 3.6 (L) 4.5 - 13.0 K/uL    RBC 3.60 (L) 4.10 - 5.10 M/uL    HGB 10.0 (L) 12.0 - 16.0 g/dL    HCT 30.2 (L) 36 - 48 %    MCV 83.9 78 - 102 FL    MCH 27.8 25.0 - 35.0 PG    MCHC 33.1 31 - 37 g/dL    RDW 13.8 11.5 - 14.5 %    PLATELET 212 310 - 457 K/uL    NEUTROPHILS 85 (H) 40 - 70 %    MIXED CELLS 8 0.1 - 17 %    LYMPHOCYTES 7 (L) 14 - 44 %    ABS. NEUTROPHILS 3.1 1.8 - 9.5 K/UL    ABS. MIXED CELLS 0.3 0.0 - 2.3 K/uL    ABS.  LYMPHOCYTES 0.2 (L) 1.1 - 5.9 K/UL    DF AUTOMATED     POC CHEM8    Collection Time: 17 11:15 AM   Result Value Ref Range    CO2, POC 26 (H) 19 - 24 MMOL/L    Glucose,  (H) 74 - 106 MG/DL BUN, POC 13 7 - 18 MG/DL    Creatinine, POC 0.9 0.6 - 1.3 MG/DL    GFRAA, POC >60 >60 ml/min/1.73m2    GFRNA, POC >60 >60 ml/min/1.73m2    Sodium,  136 - 145 MMOL/L    Potassium, POC 4.0 3.5 - 5.5 MMOL/L    Calcium, ionized (POC) 1.20 1. 12 - 1.32 MMOL/L    Chloride, POC 99 (L) 100 - 108 MMOL/L    Anion gap, POC 16 10 - 20      Hematocrit, POC 31 (L) 36 - 49 %    Hemoglobin, POC 10.5 (L) 12 - 16 G/DL       Pre-medications of Aloxi 0.25 mg IVP, Pepcid 20 mg in 8 ml NS IVP, Benadryl 50 mg in 9 ml NS IVP, Dexamethasone 12 mg IVPB  were administered as ordered and chemotherapy was initiated. Taxol 90 mg in 285 ml NS was infused at 285 ml/hr. No s/s reaction noted. Carboplatin 160 mg in 266 ml NS was infused at 582 ml/hr. No s/s reaction noted. Carboplatin dose reduced due to side effects and toxicity. Port flushed with 20 ml NS and 5 ml of 100 units/ml Heparin, then de-accessed. No irritation noted at site, band aid applied. Mr. Dheeraj Resendiz tolerated infusion, and had no complaints at this time. Armband removed and shredded. Mr. Dheeraj Resendiz was discharged from [place] [address] in stable condition at 1420. He is to return on 12/06/2017 at 1100 for his next appointment for labs and chemotherapy.     Terresa Goldberg  November 29, 2017  4:41 PM If you are a smoker, it is important for your health to stop smoking. Please be aware that second hand smoke is also harmful.

## 2025-03-25 NOTE — ANESTHESIA POSTPROCEDURE EVALUATION
Post-Anesthesia Evaluation & Assessment    Visit Vitals    /83    Pulse 81    Temp 36.1 °C (97 °F)    Resp 20    Ht 5' 9\" (1.753 m)    Wt 80.7 kg (178 lb)    SpO2 98%    BMI 26.29 kg/m2       Nausea/Vomiting: no nausea and no vomiting    Post-operative hydration adequate. Pain score (VAS): 0    Mental status & Level of consciousness: alert and oriented x 3    Neurological status: moves all extremities, sensation grossly intact    Pulmonary status: airway patent, no supplemental oxygen required    Complications related to anesthesia: none    Patient has met all discharge requirements. Additional comments:        Gabbie Pitt CRNA  April 20, 2018Post-Anesthesia Evaluation and Assessment    Patient: Jonas Rangel Person MRN: [id number]  SSN: [id number]    YOB: 1950  Age: 75 y.o. Sex: male      Data from PACU flowsheet    Cardiovascular Function/Vital Signs  Visit Vitals    /83    Pulse 81    Temp 36.1 °C (97 °F)    Resp 20    Ht 5' 9\" (1.753 m)    Wt 80.7 kg (178 lb)    SpO2 98%    BMI 26.29 kg/m2       Patient is status post MAC anesthesia for Procedure(s):  ESOPHAGOGASTRODUODENOSCOPY (EGD) w/ biopsy. Nausea/Vomiting: controlled    Postoperative hydration reviewed and adequate. Pain:  Pain Scale 1: Numeric (0 - 10) (04/20/18 0857)  Pain Intensity 1: 0 (04/20/18 0857)   Managed      Mental Status and Level of Consciousness: Alert and oriented     Pulmonary Status:   O2 Device: Room air (04/20/18 1123)   Adequate oxygenation and airway patent    Complications related to anesthesia: None    Post-anesthesia assessment completed.  No concerns    Signed By: Gabbie Pitt CRNA     April 20, 2018
The patient is a 41y Female complaining of abdominal pain.

## (undated) DEVICE — (D)SYR 10ML 1/5ML GRAD NSAF -- PKGING CHANGE USE ITEM 338027

## (undated) DEVICE — LIGHT HANDLE: Brand: DEVON

## (undated) DEVICE — SUTURE PERMA HND SZ 0 L18IN NONABSORBABLE BLK L30MM PSL REV 580H

## (undated) DEVICE — MEDI-VAC NON-CONDUCTIVE SUCTION TUBING: Brand: CARDINAL HEALTH

## (undated) DEVICE — BASIN EMESIS 500CC ROSE 250/CS 60/PLT: Brand: MEDEGEN MEDICAL PRODUCTS, LLC

## (undated) DEVICE — DRAPE XR C ARM MOB SURG 44X72 --

## (undated) DEVICE — TOWEL SURG W16XL26IN BLU NONFENESTRATED DLX ST 2 PER PK

## (undated) DEVICE — DERMABOND SKIN ADH 0.7ML -- DERMABOND ADVANCED 12/BX

## (undated) DEVICE — SUTURE ETHLN SZ 2-0 L18IN NONABSORBABLE BLK L26MM PS 3/8 585H

## (undated) DEVICE — STERILE POLYISOPRENE POWDER-FREE SURGICAL GLOVES WITH EMOLLIENT COATING: Brand: PROTEXIS

## (undated) DEVICE — INTENDED FOR TISSUE SEPARATION, AND OTHER PROCEDURES THAT REQUIRE A SHARP SURGICAL BLADE TO PUNCTURE OR CUT.: Brand: BARD-PARKER ® STAINLESS STEEL BLADES

## (undated) DEVICE — KENDALL SCD EXPRESS SLEEVES, KNEE LENGTH, MEDIUM: Brand: KENDALL SCD

## (undated) DEVICE — BITE BLK ENDOSCP AD 54FR GRN POLYETH ENDOSCP W STRP SLD

## (undated) DEVICE — THYROID SHEET: Brand: CONVERTORS

## (undated) DEVICE — SPONGE GZ W4XL4IN COT 12 PLY TYP VII WVN C FLD DSGN

## (undated) DEVICE — SOL IRR NACL 0.9% 500ML POUR --

## (undated) DEVICE — TUBE GASTROSTMY 18FR MED GRD SIL FEED GAM RECESS DST TIP

## (undated) DEVICE — FIAPC® PROBE W/ FILTER 2200 A OD 2.3MM/6.9FR; L 2.2M/7.2FT: Brand: ERBE

## (undated) DEVICE — SUTURE VCRL SZ 3-0 L18IN ABSRB UD L26MM SH 1/2 CIR J864D

## (undated) DEVICE — DRAPE,THYROID,SOFT,STERILE: Brand: MEDLINE

## (undated) DEVICE — SLEEVE COMPR THGH LEN MED TEAR AWAY GARM SCD EXPRESS [DVT30] [MEDTRONIC COVIDIEN KENDALL]

## (undated) DEVICE — Device

## (undated) DEVICE — COVER US PRB W15XL120CM W/ GEL RUBBERBAND TAPE STRP FLD GEN

## (undated) DEVICE — SYRINGE MED 5ML STD CLR PLAS N CTRL SLIP TIP DISP

## (undated) DEVICE — PREP SKN CHLRAPRP 26ML TNT -- CONVERT TO ITEM 373320

## (undated) DEVICE — HEX-LOCKING BLADE ELECTRODE: Brand: EDGE

## (undated) DEVICE — TRAY CATH 16F URIN MTR LTX -- CONVERT TO ITEM 363111

## (undated) DEVICE — DEPAUL MAJOR PROCEDURE PACK: Brand: MEDLINE INDUSTRIES, INC.

## (undated) DEVICE — DRAPE THER FLUID WARMING 66X44 IN FLAT SLUSH DBL DISC ORS

## (undated) DEVICE — INTENDED FOR TISSUE SEPARATION, AND OTHER PROCEDURES THAT REQUIRE A SHARP SURGICAL BLADE TO PUNCTURE OR CUT.: Brand: BARD-PARKER ® CARBON RIB-BACK BLADES

## (undated) DEVICE — SYR 50ML SLIP TIP NSAF LF STRL --

## (undated) DEVICE — MEDI-VAC SUCTION HANDLE REGULAR CAPACITY: Brand: CARDINAL HEALTH

## (undated) DEVICE — STAPLER SKIN H3.9MM WIRE DIA0.58MM CRWN 6.9MM 35 STPL FIX

## (undated) DEVICE — SUT PROL 2-0 30IN CT2 BLU --

## (undated) DEVICE — SYRINGE MED 20ML STD CLR PLAS LUERLOCK TIP N CTRL DISP

## (undated) DEVICE — STERILE POLYISOPRENE POWDER-FREE SURGICAL GLOVES: Brand: PROTEXIS

## (undated) DEVICE — (D)PREP SKN CHLRAPRP APPL 26ML -- CONVERT TO ITEM 371833

## (undated) DEVICE — SUTURE MCRYL SZ 4-0 L27IN ABSRB UD L24MM PS-1 3/8 CIR PRIM Y935H

## (undated) DEVICE — BAG DRAIN URIN 2000ML LF STRL -- CONVERT TO ITEM 363123

## (undated) DEVICE — FORCEPS BX L240CM JAW DIA2.8MM L CAP W/ NDL MIC MESH TOOTH

## (undated) DEVICE — NEEDLE HYPO 22GA L1.5IN BLK POLYPR HUB S STL REG BVL STR

## (undated) DEVICE — FLEX ADVANTAGE 1500CC: Brand: FLEX ADVANTAGE

## (undated) DEVICE — SUTURE PDS II SZ 1 L36IN ABSRB VLT L48MM CTX 1/2 CIR Z371T

## (undated) DEVICE — KENDALL 500 SERIES DIAPHORETIC FOAM MONITORING ELECTRODE - TEAR DROP SHAPE ( 30/PK): Brand: KENDALL

## (undated) DEVICE — SPONGE LAP 18X18IN STRL -- 5/PK

## (undated) DEVICE — SUTURE VCRL SZ 3-0 L27IN ABSRB UD L26MM SH 1/2 CIR J416H

## (undated) DEVICE — CONTAINER PREFIL FRMLN 15ML --

## (undated) DEVICE — SUTURE PDS II SZ 1 L96IN ABSRB VLT TP-1 L65MM 1/2 CIR Z880G

## (undated) DEVICE — REM POLYHESIVE ADULT PATIENT RETURN ELECTRODE: Brand: VALLEYLAB

## (undated) DEVICE — KIT COLON W/ 1.1OZ LUB AND 2 END

## (undated) DEVICE — SOLUTION IV 1000ML 0.9% SOD CHL

## (undated) DEVICE — BLADE ASSEMB CLP HAIR FINE --

## (undated) DEVICE — SKIN MARKER,REGULAR TIP WITH RULER AND LABELS: Brand: DEVON

## (undated) DEVICE — SOLUTION IV 250ML 0.9% SOD CHL CLR INJ FLX BG CONT PRT CLSR

## (undated) DEVICE — STERILE LATEX POWDER FREE SURGICAL GLOVES WITH HYDROGEL COATING: Brand: PROTEXIS